# Patient Record
Sex: MALE | Race: WHITE | NOT HISPANIC OR LATINO | Employment: FULL TIME | URBAN - NONMETROPOLITAN AREA
[De-identification: names, ages, dates, MRNs, and addresses within clinical notes are randomized per-mention and may not be internally consistent; named-entity substitution may affect disease eponyms.]

---

## 2024-05-21 ENCOUNTER — HOSPITAL ENCOUNTER (EMERGENCY)
Facility: HOSPITAL | Age: 74
Discharge: HOME | End: 2024-05-21
Admitting: STUDENT IN AN ORGANIZED HEALTH CARE EDUCATION/TRAINING PROGRAM
Payer: MEDICARE

## 2024-05-21 VITALS
RESPIRATION RATE: 15 BRPM | SYSTOLIC BLOOD PRESSURE: 117 MMHG | OXYGEN SATURATION: 100 % | DIASTOLIC BLOOD PRESSURE: 69 MMHG | TEMPERATURE: 98.6 F | HEART RATE: 59 BPM | HEIGHT: 70 IN | BODY MASS INDEX: 30.21 KG/M2 | WEIGHT: 211 LBS

## 2024-05-21 DIAGNOSIS — E86.0 DEHYDRATION: Primary | ICD-10-CM

## 2024-05-21 DIAGNOSIS — R79.89 ELEVATED SERUM CREATININE: ICD-10-CM

## 2024-05-21 DIAGNOSIS — I95.1 ORTHOSTATIC HYPOTENSION: ICD-10-CM

## 2024-05-21 DIAGNOSIS — E87.5 HYPERKALEMIA: ICD-10-CM

## 2024-05-21 LAB
ALBUMIN SERPL-MCNC: 4.2 G/DL (ref 3.5–5.2)
ALBUMIN/GLOB SERPL: 1.2 G/DL
ALP SERPL-CCNC: 82 U/L (ref 39–117)
ALT SERPL W P-5'-P-CCNC: 61 U/L (ref 1–41)
ANION GAP SERPL CALCULATED.3IONS-SCNC: 13 MMOL/L (ref 5–15)
AST SERPL-CCNC: 252 U/L (ref 1–40)
BACTERIA UR QL AUTO: NORMAL /HPF
BASOPHILS # BLD AUTO: 0.02 10*3/MM3 (ref 0–0.2)
BASOPHILS NFR BLD AUTO: 0.1 % (ref 0–1.5)
BILIRUB SERPL-MCNC: 0.9 MG/DL (ref 0–1.2)
BILIRUB UR QL STRIP: ABNORMAL
BUN SERPL-MCNC: 16 MG/DL (ref 8–23)
BUN/CREAT SERPL: 11.9 (ref 7–25)
CALCIUM SPEC-SCNC: 9.2 MG/DL (ref 8.6–10.5)
CHLORIDE SERPL-SCNC: 98 MMOL/L (ref 98–107)
CLARITY UR: CLEAR
CO2 SERPL-SCNC: 23 MMOL/L (ref 22–29)
COLOR UR: ABNORMAL
CREAT SERPL-MCNC: 1.34 MG/DL (ref 0.76–1.27)
DEPRECATED RDW RBC AUTO: 46.8 FL (ref 37–54)
EGFRCR SERPLBLD CKD-EPI 2021: 56.3 ML/MIN/1.73
EOSINOPHIL # BLD AUTO: 0.03 10*3/MM3 (ref 0–0.4)
EOSINOPHIL NFR BLD AUTO: 0.2 % (ref 0.3–6.2)
ERYTHROCYTE [DISTWIDTH] IN BLOOD BY AUTOMATED COUNT: 13.4 % (ref 12.3–15.4)
GLOBULIN UR ELPH-MCNC: 3.5 GM/DL
GLUCOSE SERPL-MCNC: 108 MG/DL (ref 65–99)
GLUCOSE UR STRIP-MCNC: NEGATIVE MG/DL
HCT VFR BLD AUTO: 41.6 % (ref 37.5–51)
HGB BLD-MCNC: 13.3 G/DL (ref 13–17.7)
HGB UR QL STRIP.AUTO: NEGATIVE
HYALINE CASTS UR QL AUTO: NORMAL /LPF
IMM GRANULOCYTES # BLD AUTO: 0.06 10*3/MM3 (ref 0–0.05)
IMM GRANULOCYTES NFR BLD AUTO: 0.4 % (ref 0–0.5)
KETONES UR QL STRIP: ABNORMAL
LEUKOCYTE ESTERASE UR QL STRIP.AUTO: ABNORMAL
LYMPHOCYTES # BLD AUTO: 1.54 10*3/MM3 (ref 0.7–3.1)
LYMPHOCYTES NFR BLD AUTO: 11 % (ref 19.6–45.3)
MAGNESIUM SERPL-MCNC: 2.2 MG/DL (ref 1.6–2.4)
MCH RBC QN AUTO: 30.3 PG (ref 26.6–33)
MCHC RBC AUTO-ENTMCNC: 32 G/DL (ref 31.5–35.7)
MCV RBC AUTO: 94.8 FL (ref 79–97)
MONOCYTES # BLD AUTO: 1.01 10*3/MM3 (ref 0.1–0.9)
MONOCYTES NFR BLD AUTO: 7.2 % (ref 5–12)
NEUTROPHILS NFR BLD AUTO: 11.4 10*3/MM3 (ref 1.7–7)
NEUTROPHILS NFR BLD AUTO: 81.1 % (ref 42.7–76)
NITRITE UR QL STRIP: NEGATIVE
NRBC BLD AUTO-RTO: 0 /100 WBC (ref 0–0.2)
PH UR STRIP.AUTO: 6 [PH] (ref 5–8)
PLATELET # BLD AUTO: 187 10*3/MM3 (ref 140–450)
PMV BLD AUTO: 9.1 FL (ref 6–12)
POTASSIUM SERPL-SCNC: 5.6 MMOL/L (ref 3.5–5.2)
PROT SERPL-MCNC: 7.7 G/DL (ref 6–8.5)
PROT UR QL STRIP: ABNORMAL
RBC # BLD AUTO: 4.39 10*6/MM3 (ref 4.14–5.8)
RBC # UR STRIP: NORMAL /HPF
REF LAB TEST METHOD: NORMAL
SODIUM SERPL-SCNC: 134 MMOL/L (ref 136–145)
SP GR UR STRIP: 1.03 (ref 1–1.03)
SQUAMOUS #/AREA URNS HPF: NORMAL /HPF
TSH SERPL DL<=0.05 MIU/L-ACNC: 0.79 UIU/ML (ref 0.27–4.2)
UROBILINOGEN UR QL STRIP: ABNORMAL
WBC # UR STRIP: NORMAL /HPF
WBC NRBC COR # BLD AUTO: 14.06 10*3/MM3 (ref 3.4–10.8)

## 2024-05-21 PROCEDURE — 81001 URINALYSIS AUTO W/SCOPE: CPT

## 2024-05-21 PROCEDURE — 83735 ASSAY OF MAGNESIUM: CPT

## 2024-05-21 PROCEDURE — 99283 EMERGENCY DEPT VISIT LOW MDM: CPT

## 2024-05-21 PROCEDURE — 84443 ASSAY THYROID STIM HORMONE: CPT

## 2024-05-21 PROCEDURE — 36415 COLL VENOUS BLD VENIPUNCTURE: CPT

## 2024-05-21 PROCEDURE — 85025 COMPLETE CBC W/AUTO DIFF WBC: CPT

## 2024-05-21 PROCEDURE — 80053 COMPREHEN METABOLIC PANEL: CPT

## 2024-05-21 PROCEDURE — 25810000003 SODIUM CHLORIDE 0.9 % SOLUTION

## 2024-05-21 RX ORDER — SODIUM CHLORIDE 0.9 % (FLUSH) 0.9 %
10 SYRINGE (ML) INJECTION AS NEEDED
Status: DISCONTINUED | OUTPATIENT
Start: 2024-05-21 | End: 2024-05-21 | Stop reason: HOSPADM

## 2024-05-21 RX ADMIN — SODIUM CHLORIDE 1000 ML: 9 INJECTION, SOLUTION INTRAVENOUS at 18:57

## 2024-05-21 NOTE — ED PROVIDER NOTES
Subjective   History of Present Illness  Patient is a 74-year-old male who presents emergency department with possible dehydration.  Patient reports that he got too hot yesterday and now feels like he is dehydrated.  Patient states that he was lightheaded yesterday and still feels lightheaded at times whenever he stands up.  He denies any syncopal episodes.  Patient denies chest pain or pain anywhere else.  Denies shortness of breath.        Review of Systems   Neurological:  Positive for light-headedness.   All other systems reviewed and are negative.      History reviewed. No pertinent past medical history.    No Known Allergies    Past Surgical History:   Procedure Laterality Date    BACK SURGERY      HERNIA REPAIR         History reviewed. No pertinent family history.    Social History     Socioeconomic History    Marital status: Single   Tobacco Use    Smoking status: Every Day     Types: Cigarettes   Substance and Sexual Activity    Alcohol use: Not Currently    Drug use: Not Currently    Sexual activity: Yes           Objective   Physical Exam  Vitals and nursing note reviewed.   Constitutional:       General: He is not in acute distress.     Appearance: Normal appearance. He is normal weight. He is not ill-appearing or toxic-appearing.   HENT:      Head: Normocephalic.   Cardiovascular:      Rate and Rhythm: Normal rate and regular rhythm.      Pulses: Normal pulses.      Heart sounds: Normal heart sounds.   Pulmonary:      Effort: Pulmonary effort is normal.      Breath sounds: Normal breath sounds.   Abdominal:      General: Abdomen is flat. Bowel sounds are normal. There is no distension.      Palpations: Abdomen is soft.      Tenderness: There is no abdominal tenderness.   Musculoskeletal:         General: Normal range of motion.      Cervical back: Normal range of motion and neck supple.   Skin:     General: Skin is warm and dry.   Neurological:      General: No focal deficit present.      Mental  Status: He is alert and oriented to person, place, and time. Mental status is at baseline.   Psychiatric:         Mood and Affect: Mood normal.         Behavior: Behavior normal.         Thought Content: Thought content normal.         Judgment: Judgment normal.         Procedures           ED Course                                             Medical Decision Making  Patient is a 74-year-old male who presents emergency department with possible dehydration.  Patient reports that he got too hot yesterday and now feels like he is dehydrated.  Patient states that he was lightheaded yesterday and still feels lightheaded at times whenever he stands up.  He denies any syncopal episodes.  Patient denies chest pain or pain anywhere else.  Denies shortness of breath.    Patient was non-toxic and not-ill appearing on arrival. Vital signs stable.     Patient's presentation raises suspicion for differentials including, but not limited to, dehydration, electrolyte imbalance, COLEMAN, UTI, orthostatic hypotension.     External (non-ED) record review: None    Given this, Vj was placed on the monitor. Laboratory studies were ordered including CBC, CMP, TSH, magnesium, urinalysis.     Vj was given IV fluids for symptomatic relief.    Labs were reviewed.  Leukocytosis on CBC, stable H&H.  Magnesium normal.  TSH normal.  CMP with creatinine 1.34, sodium 134, potassium 5.6, ALT 61, .  Patient denied any symptoms other than feeling lightheaded.  He denied chest pain or abdominal pain.  Patient was orthostatic when she was given IV fluids.  Post IV fluids, patient's orthostatics improved.  He stated that he felt much better and was ready to go home.  On re-evaluation, patient remained hemodynamically stable and appeared to be comfortable and in no acute distress.  He was advised that he will need to follow-up with his primary care provider within this week for repeat labs.  He was also advised to stay well-hydrated at home.    I  discussed all of the lab results with the patient during this visit in the emergency department. I answered all the questions regarding the emergency department evaluation, diagnosis, and treatment plan. We talked about how crucial it is for the patient to follow up by calling their primary care provider as soon as possible to schedule an appointment for within the next few days or as soon as possible so that the symptoms can be reassessed to see if they have improved or to answer any additional questions. I also provided the patient with advice on returning safely and urged the patient to visit the emergency department right away if any worsening or new symptoms appeared. The patient verbalized understanding of the discharge instructions and agreed with them. Vj was discharged in stable condition.    Signed by:   RAMIRO Calloway 5/22/2024 14:40 CDT     Dragon disclaimer:  Part of this note may be an electronic transcription/translation of spoken language to printed text using the Dragon Dictation System.    Problems Addressed:  Dehydration: complicated acute illness or injury  Elevated serum creatinine: complicated acute illness or injury  Hyperkalemia: complicated acute illness or injury  Orthostatic hypotension: complicated acute illness or injury    Amount and/or Complexity of Data Reviewed  Labs: ordered.        Final diagnoses:   Dehydration   Elevated serum creatinine   Hyperkalemia   Orthostatic hypotension       ED Disposition  ED Disposition       ED Disposition   Discharge    Condition   Stable    Comment   --               Provider, No Known  [facility] [id number]  [phone]    Schedule an appointment as soon as possible for a visit in 1 day      [facility] EMERGENCY DEPARTMENT  [address] [place] [id number] [phone]  Go to   If symptoms worsen         Medication List      No changes were made to your prescriptions during this visit.             Eva Krueger, APRN  05/22/24 1445

## 2024-05-21 NOTE — ED NOTES
MEDICAL SCREENING    Reason for Visit: Patient presents with possible dehydration. Patient reports he got too hot and feels like he is dehydrated. Denies pain anywhere. Patient states he is lightheaded whenever he is standing. Denies syncopal episodes. Denies shortness of breath.    Patient initially seen in triage.  The patient was advised further evaluation and diagnostic testing will be needed, some of the treatment and testing will be initiated in the lobby in order to begin the process.  The patient will be returned to the waiting area for the time being and will  be reassessed by a subsequent ED provider.  The patient will be brought back to the treatment area in as timely manner as possible.       Eva Krueger, APRN  05/21/24 2910

## 2024-05-22 NOTE — DISCHARGE INSTRUCTIONS
Today you are seen in the ER for your symptoms.  Your lab work revealed that you were dehydrated.  You were given IV fluids in the ER.  You need to stay well-hydrated at home.  Your blood pressure did drop, but it did improve after the IV fluids.  You will need to follow-up with primary care provider soon as possible to reassess symptoms.  You will need blood work to be redone within the next week to reassess kidney function.  Please return to the ER for any new or worsening symptoms.

## 2024-05-22 NOTE — ED NOTES
PT attempted to void for urinalysis , but was unable to provide sufficient sample. IVF started. Encouraged pt to void asap for sample

## 2024-07-10 ENCOUNTER — OFFICE VISIT (OUTPATIENT)
Dept: CARDIOLOGY | Facility: CLINIC | Age: 74
End: 2024-07-10
Payer: MEDICARE

## 2024-07-10 VITALS
SYSTOLIC BLOOD PRESSURE: 142 MMHG | DIASTOLIC BLOOD PRESSURE: 80 MMHG | WEIGHT: 192 LBS | OXYGEN SATURATION: 98 % | HEIGHT: 70 IN | BODY MASS INDEX: 27.49 KG/M2

## 2024-07-10 DIAGNOSIS — I25.5 ISCHEMIC CARDIOMYOPATHY: ICD-10-CM

## 2024-07-10 DIAGNOSIS — I20.0 UNSTABLE ANGINA: Primary | ICD-10-CM

## 2024-07-10 PROCEDURE — 99204 OFFICE O/P NEW MOD 45 MIN: CPT | Performed by: EMERGENCY MEDICINE

## 2024-07-10 RX ORDER — CARVEDILOL 3.12 MG/1
3.12 TABLET ORAL 2 TIMES DAILY WITH MEALS
Qty: 180 TABLET | Refills: 3 | Status: ON HOLD | OUTPATIENT
Start: 2024-07-10

## 2024-07-10 RX ORDER — PRAVASTATIN SODIUM 40 MG
40 TABLET ORAL DAILY
Qty: 90 TABLET | Refills: 3 | Status: ON HOLD | OUTPATIENT
Start: 2024-07-10

## 2024-07-10 RX ORDER — DIPHENOXYLATE HYDROCHLORIDE AND ATROPINE SULFATE 2.5; .025 MG/1; MG/1
1 TABLET ORAL DAILY
Status: ON HOLD | COMMUNITY

## 2024-07-10 RX ORDER — RANOLAZINE 500 MG/1
500 TABLET, EXTENDED RELEASE ORAL 2 TIMES DAILY
Qty: 180 TABLET | Refills: 3 | Status: ON HOLD | OUTPATIENT
Start: 2024-07-10

## 2024-07-10 RX ORDER — ASPIRIN 81 MG/1
81 TABLET ORAL DAILY
Qty: 90 TABLET | Refills: 3 | Status: ON HOLD | OUTPATIENT
Start: 2024-07-10

## 2024-07-10 RX ORDER — LISINOPRIL 5 MG/1
5 TABLET ORAL DAILY
Qty: 90 TABLET | Refills: 3 | Status: ON HOLD | OUTPATIENT
Start: 2024-07-10

## 2024-07-12 ENCOUNTER — HOSPITAL ENCOUNTER (INPATIENT)
Facility: HOSPITAL | Age: 74
LOS: 4 days | Discharge: HOME | DRG: 287 | End: 2024-07-16
Attending: EMERGENCY MEDICINE | Admitting: EMERGENCY MEDICINE
Payer: MEDICARE

## 2024-07-12 ENCOUNTER — APPOINTMENT (OUTPATIENT)
Dept: CARDIOLOGY | Facility: HOSPITAL | Age: 74
DRG: 287 | End: 2024-07-12
Payer: MEDICARE

## 2024-07-12 DIAGNOSIS — I25.5 ISCHEMIC CARDIOMYOPATHY: ICD-10-CM

## 2024-07-12 DIAGNOSIS — I20.0 UNSTABLE ANGINA: ICD-10-CM

## 2024-07-12 PROBLEM — I25.10 CAD (CORONARY ARTERY DISEASE): Status: ACTIVE | Noted: 2024-07-12

## 2024-07-12 LAB
ALBUMIN SERPL-MCNC: 4 G/DL (ref 3.5–5.2)
ALBUMIN/GLOB SERPL: 1.1 G/DL
ALP SERPL-CCNC: 95 U/L (ref 39–117)
ALT SERPL W P-5'-P-CCNC: 19 U/L (ref 1–41)
ANION GAP SERPL CALCULATED.3IONS-SCNC: 12 MMOL/L (ref 5–15)
ANION GAP SERPL CALCULATED.3IONS-SCNC: 14 MMOL/L (ref 5–15)
APTT PPP: 34 SECONDS (ref 24.5–36)
APTT PPP: 58.9 SECONDS (ref 24.5–36)
ARTERIAL PATENCY WRIST A: POSITIVE
AST SERPL-CCNC: 22 U/L (ref 1–40)
ATMOSPHERIC PRESS: 755 MMHG
BASE EXCESS BLDA CALC-SCNC: -0.3 MMOL/L (ref 0–2)
BDY SITE: ABNORMAL
BILIRUB SERPL-MCNC: 0.8 MG/DL (ref 0–1.2)
BODY TEMPERATURE: 37
BUN SERPL-MCNC: 9 MG/DL (ref 8–23)
BUN SERPL-MCNC: 9 MG/DL (ref 8–23)
BUN/CREAT SERPL: 7.2 (ref 7–25)
BUN/CREAT SERPL: 7.4 (ref 7–25)
CALCIUM SPEC-SCNC: 9.1 MG/DL (ref 8.6–10.5)
CALCIUM SPEC-SCNC: 9.3 MG/DL (ref 8.6–10.5)
CHLORIDE SERPL-SCNC: 101 MMOL/L (ref 98–107)
CHLORIDE SERPL-SCNC: 101 MMOL/L (ref 98–107)
CO2 SERPL-SCNC: 23 MMOL/L (ref 22–29)
CO2 SERPL-SCNC: 25 MMOL/L (ref 22–29)
CREAT SERPL-MCNC: 1.21 MG/DL (ref 0.76–1.27)
CREAT SERPL-MCNC: 1.25 MG/DL (ref 0.76–1.27)
DEPRECATED RDW RBC AUTO: 44.7 FL (ref 37–54)
EGFRCR SERPLBLD CKD-EPI 2021: 61.2 ML/MIN/1.73
EGFRCR SERPLBLD CKD-EPI 2021: 63.6 ML/MIN/1.73
ERYTHROCYTE [DISTWIDTH] IN BLOOD BY AUTOMATED COUNT: 13.6 % (ref 12.3–15.4)
GLOBULIN UR ELPH-MCNC: 3.5 GM/DL
GLUCOSE SERPL-MCNC: 107 MG/DL (ref 65–99)
GLUCOSE SERPL-MCNC: 110 MG/DL (ref 65–99)
HBA1C MFR BLD: 6 % (ref 4.8–5.6)
HCO3 BLDA-SCNC: 23.1 MMOL/L (ref 20–26)
HCT VFR BLD AUTO: 41.5 % (ref 37.5–51)
HGB BLD-MCNC: 13.7 G/DL (ref 13–17.7)
INR PPP: 1.04 (ref 0.91–1.09)
Lab: ABNORMAL
MCH RBC QN AUTO: 29.7 PG (ref 26.6–33)
MCHC RBC AUTO-ENTMCNC: 33 G/DL (ref 31.5–35.7)
MCV RBC AUTO: 89.8 FL (ref 79–97)
MODALITY: ABNORMAL
PCO2 BLDA: 33.3 MM HG (ref 35–45)
PCO2 TEMP ADJ BLD: 33.3 MM HG (ref 35–45)
PH BLDA: 7.45 PH UNITS (ref 7.35–7.45)
PH, TEMP CORRECTED: 7.45 PH UNITS (ref 7.35–7.45)
PLATELET # BLD AUTO: 207 10*3/MM3 (ref 140–450)
PMV BLD AUTO: 9.7 FL (ref 6–12)
PO2 BLDA: 79.2 MM HG (ref 83–108)
PO2 TEMP ADJ BLD: 79.2 MM HG (ref 83–108)
POTASSIUM SERPL-SCNC: 4.5 MMOL/L (ref 3.5–5.2)
POTASSIUM SERPL-SCNC: 4.5 MMOL/L (ref 3.5–5.2)
PROT SERPL-MCNC: 7.5 G/DL (ref 6–8.5)
PROTHROMBIN TIME: 14.1 SECONDS (ref 11.8–14.8)
RBC # BLD AUTO: 4.62 10*6/MM3 (ref 4.14–5.8)
SAO2 % BLDCOA: 97.1 % (ref 94–99)
SODIUM SERPL-SCNC: 138 MMOL/L (ref 136–145)
SODIUM SERPL-SCNC: 138 MMOL/L (ref 136–145)
VENTILATOR MODE: ABNORMAL
WBC NRBC COR # BLD AUTO: 8.13 10*3/MM3 (ref 3.4–10.8)

## 2024-07-12 PROCEDURE — 25010000002 DIPHENHYDRAMINE PER 50 MG: Performed by: EMERGENCY MEDICINE

## 2024-07-12 PROCEDURE — 99152 MOD SED SAME PHYS/QHP 5/>YRS: CPT | Performed by: EMERGENCY MEDICINE

## 2024-07-12 PROCEDURE — 25010000002 FENTANYL CITRATE (PF) 100 MCG/2ML SOLUTION: Performed by: EMERGENCY MEDICINE

## 2024-07-12 PROCEDURE — 4A023N7 MEASUREMENT OF CARDIAC SAMPLING AND PRESSURE, LEFT HEART, PERCUTANEOUS APPROACH: ICD-10-PCS | Performed by: EMERGENCY MEDICINE

## 2024-07-12 PROCEDURE — S0260 H&P FOR SURGERY: HCPCS | Performed by: EMERGENCY MEDICINE

## 2024-07-12 PROCEDURE — 93306 TTE W/DOPPLER COMPLETE: CPT

## 2024-07-12 PROCEDURE — B2111ZZ FLUOROSCOPY OF MULTIPLE CORONARY ARTERIES USING LOW OSMOLAR CONTRAST: ICD-10-PCS | Performed by: EMERGENCY MEDICINE

## 2024-07-12 PROCEDURE — 25010000002 MIDAZOLAM HCL (PF) 5 MG/5ML SOLUTION: Performed by: EMERGENCY MEDICINE

## 2024-07-12 PROCEDURE — 93458 L HRT ARTERY/VENTRICLE ANGIO: CPT | Performed by: EMERGENCY MEDICINE

## 2024-07-12 PROCEDURE — 36600 WITHDRAWAL OF ARTERIAL BLOOD: CPT

## 2024-07-12 PROCEDURE — 25010000002 HEPARIN (PORCINE) PER 1000 UNITS: Performed by: EMERGENCY MEDICINE

## 2024-07-12 PROCEDURE — C1894 INTRO/SHEATH, NON-LASER: HCPCS | Performed by: EMERGENCY MEDICINE

## 2024-07-12 PROCEDURE — 83036 HEMOGLOBIN GLYCOSYLATED A1C: CPT

## 2024-07-12 PROCEDURE — 25010000002 HEPARIN (PORCINE) 1000-0.9 UT/500ML-% SOLUTION: Performed by: EMERGENCY MEDICINE

## 2024-07-12 PROCEDURE — 99223 1ST HOSP IP/OBS HIGH 75: CPT | Performed by: SURGERY

## 2024-07-12 PROCEDURE — 80053 COMPREHEN METABOLIC PANEL: CPT | Performed by: EMERGENCY MEDICINE

## 2024-07-12 PROCEDURE — 25010000002 HEPARIN (PORCINE) 25000-0.45 UT/250ML-% SOLUTION: Performed by: EMERGENCY MEDICINE

## 2024-07-12 PROCEDURE — 93306 TTE W/DOPPLER COMPLETE: CPT | Performed by: EMERGENCY MEDICINE

## 2024-07-12 PROCEDURE — 25510000001 PERFLUTREN 6.52 MG/ML SUSPENSION: Performed by: EMERGENCY MEDICINE

## 2024-07-12 PROCEDURE — 85610 PROTHROMBIN TIME: CPT | Performed by: EMERGENCY MEDICINE

## 2024-07-12 PROCEDURE — 82803 BLOOD GASES ANY COMBINATION: CPT

## 2024-07-12 PROCEDURE — 25510000001 IOPAMIDOL PER 1 ML: Performed by: EMERGENCY MEDICINE

## 2024-07-12 PROCEDURE — 85027 COMPLETE CBC AUTOMATED: CPT | Performed by: EMERGENCY MEDICINE

## 2024-07-12 PROCEDURE — C1769 GUIDE WIRE: HCPCS | Performed by: EMERGENCY MEDICINE

## 2024-07-12 PROCEDURE — 85730 THROMBOPLASTIN TIME PARTIAL: CPT | Performed by: EMERGENCY MEDICINE

## 2024-07-12 PROCEDURE — B2151ZZ FLUOROSCOPY OF LEFT HEART USING LOW OSMOLAR CONTRAST: ICD-10-PCS | Performed by: EMERGENCY MEDICINE

## 2024-07-12 PROCEDURE — 25010000002 HEPARIN (PORCINE) 2000-0.9 UNIT/L-% SOLUTION: Performed by: EMERGENCY MEDICINE

## 2024-07-12 RX ORDER — ACETAMINOPHEN 325 MG/1
650 TABLET ORAL EVERY 4 HOURS PRN
Status: DISCONTINUED | OUTPATIENT
Start: 2024-07-12 | End: 2024-07-16 | Stop reason: HOSPADM

## 2024-07-12 RX ORDER — RANOLAZINE 500 MG/1
500 TABLET, EXTENDED RELEASE ORAL 2 TIMES DAILY
Status: DISCONTINUED | OUTPATIENT
Start: 2024-07-12 | End: 2024-07-16 | Stop reason: HOSPADM

## 2024-07-12 RX ORDER — ASPIRIN 81 MG/1
81 TABLET ORAL DAILY
Status: DISCONTINUED | OUTPATIENT
Start: 2024-07-12 | End: 2024-07-16 | Stop reason: HOSPADM

## 2024-07-12 RX ORDER — SODIUM CHLORIDE 0.9 % (FLUSH) 0.9 %
10 SYRINGE (ML) INJECTION AS NEEDED
Status: DISCONTINUED | OUTPATIENT
Start: 2024-07-12 | End: 2024-07-16 | Stop reason: HOSPADM

## 2024-07-12 RX ORDER — FUROSEMIDE 10 MG/ML
40 INJECTION INTRAMUSCULAR; INTRAVENOUS
Status: DISCONTINUED | OUTPATIENT
Start: 2024-07-13 | End: 2024-07-14

## 2024-07-12 RX ORDER — MIDAZOLAM HYDROCHLORIDE 5 MG/5ML
INJECTION, SOLUTION INTRAMUSCULAR; INTRAVENOUS
Status: DISCONTINUED | OUTPATIENT
Start: 2024-07-12 | End: 2024-07-12 | Stop reason: HOSPADM

## 2024-07-12 RX ORDER — DIPHENHYDRAMINE HYDROCHLORIDE 50 MG/ML
INJECTION INTRAMUSCULAR; INTRAVENOUS
Status: DISCONTINUED | OUTPATIENT
Start: 2024-07-12 | End: 2024-07-12 | Stop reason: HOSPADM

## 2024-07-12 RX ORDER — ONDANSETRON 2 MG/ML
4 INJECTION INTRAMUSCULAR; INTRAVENOUS EVERY 6 HOURS PRN
Status: DISCONTINUED | OUTPATIENT
Start: 2024-07-12 | End: 2024-07-16 | Stop reason: HOSPADM

## 2024-07-12 RX ORDER — NITROGLYCERIN 0.4 MG/1
0.4 TABLET SUBLINGUAL
Status: DISCONTINUED | OUTPATIENT
Start: 2024-07-12 | End: 2024-07-16 | Stop reason: HOSPADM

## 2024-07-12 RX ORDER — HEPARIN SODIUM 1000 [USP'U]/ML
2500 INJECTION, SOLUTION INTRAVENOUS; SUBCUTANEOUS AS NEEDED
Status: DISCONTINUED | OUTPATIENT
Start: 2024-07-12 | End: 2024-07-16 | Stop reason: HOSPADM

## 2024-07-12 RX ORDER — HEPARIN SODIUM 1000 [USP'U]/ML
5000 INJECTION, SOLUTION INTRAVENOUS; SUBCUTANEOUS AS NEEDED
Status: DISCONTINUED | OUTPATIENT
Start: 2024-07-12 | End: 2024-07-16 | Stop reason: HOSPADM

## 2024-07-12 RX ORDER — SODIUM CHLORIDE 9 MG/ML
100 INJECTION, SOLUTION INTRAVENOUS CONTINUOUS
Status: DISCONTINUED | OUTPATIENT
Start: 2024-07-12 | End: 2024-07-12

## 2024-07-12 RX ORDER — SODIUM CHLORIDE 0.9 % (FLUSH) 0.9 %
10 SYRINGE (ML) INJECTION EVERY 12 HOURS SCHEDULED
Status: DISCONTINUED | OUTPATIENT
Start: 2024-07-12 | End: 2024-07-16 | Stop reason: HOSPADM

## 2024-07-12 RX ORDER — LISINOPRIL 5 MG/1
5 TABLET ORAL DAILY
Status: DISCONTINUED | OUTPATIENT
Start: 2024-07-12 | End: 2024-07-16 | Stop reason: HOSPADM

## 2024-07-12 RX ORDER — VERAPAMIL HYDROCHLORIDE 2.5 MG/ML
INJECTION, SOLUTION INTRAVENOUS
Status: DISCONTINUED | OUTPATIENT
Start: 2024-07-12 | End: 2024-07-12 | Stop reason: HOSPADM

## 2024-07-12 RX ORDER — SODIUM CHLORIDE 9 MG/ML
1 INJECTION, SOLUTION INTRAVENOUS CONTINUOUS
Status: DISPENSED | OUTPATIENT
Start: 2024-07-12 | End: 2024-07-12

## 2024-07-12 RX ORDER — ONDANSETRON 4 MG/1
4 TABLET, ORALLY DISINTEGRATING ORAL EVERY 6 HOURS PRN
Status: DISCONTINUED | OUTPATIENT
Start: 2024-07-12 | End: 2024-07-16 | Stop reason: HOSPADM

## 2024-07-12 RX ORDER — HEPARIN SODIUM 200 [USP'U]/100ML
INJECTION, SOLUTION INTRAVENOUS
Status: DISCONTINUED | OUTPATIENT
Start: 2024-07-12 | End: 2024-07-12 | Stop reason: HOSPADM

## 2024-07-12 RX ORDER — HEPARIN SODIUM 1000 [USP'U]/ML
5000 INJECTION, SOLUTION INTRAVENOUS; SUBCUTANEOUS ONCE
Status: COMPLETED | OUTPATIENT
Start: 2024-07-12 | End: 2024-07-12

## 2024-07-12 RX ORDER — HEPARIN SODIUM 10000 [USP'U]/100ML
11.1 INJECTION, SOLUTION INTRAVENOUS
Status: DISPENSED | OUTPATIENT
Start: 2024-07-12 | End: 2024-07-14

## 2024-07-12 RX ORDER — CARVEDILOL 3.12 MG/1
3.12 TABLET ORAL 2 TIMES DAILY WITH MEALS
Status: DISCONTINUED | OUTPATIENT
Start: 2024-07-12 | End: 2024-07-16 | Stop reason: HOSPADM

## 2024-07-12 RX ORDER — LIDOCAINE HYDROCHLORIDE 20 MG/ML
INJECTION, SOLUTION INFILTRATION; PERINEURAL
Status: DISCONTINUED | OUTPATIENT
Start: 2024-07-12 | End: 2024-07-12 | Stop reason: HOSPADM

## 2024-07-12 RX ORDER — HEPARIN SODIUM 1000 [USP'U]/ML
INJECTION, SOLUTION INTRAVENOUS; SUBCUTANEOUS
Status: DISCONTINUED | OUTPATIENT
Start: 2024-07-12 | End: 2024-07-12 | Stop reason: HOSPADM

## 2024-07-12 RX ORDER — NITROGLYCERIN 0.4 MG/1
0.4 TABLET SUBLINGUAL
Status: DISCONTINUED | OUTPATIENT
Start: 2024-07-12 | End: 2024-07-12 | Stop reason: SDUPTHER

## 2024-07-12 RX ORDER — FENTANYL CITRATE 50 UG/ML
INJECTION, SOLUTION INTRAMUSCULAR; INTRAVENOUS
Status: DISCONTINUED | OUTPATIENT
Start: 2024-07-12 | End: 2024-07-12 | Stop reason: HOSPADM

## 2024-07-12 RX ORDER — NITROGLYCERIN 20 MG/100ML
10-50 INJECTION INTRAVENOUS
Status: DISCONTINUED | OUTPATIENT
Start: 2024-07-12 | End: 2024-07-16 | Stop reason: HOSPADM

## 2024-07-12 RX ADMIN — RANOLAZINE 500 MG: 500 TABLET, FILM COATED, EXTENDED RELEASE ORAL at 20:40

## 2024-07-12 RX ADMIN — ASPIRIN 81 MG: 81 TABLET, COATED ORAL at 17:37

## 2024-07-12 RX ADMIN — HEPARIN SODIUM 11.1 UNITS/KG/HR: 10000 INJECTION, SOLUTION INTRAVENOUS at 17:41

## 2024-07-12 RX ADMIN — LISINOPRIL 5 MG: 5 TABLET ORAL at 17:37

## 2024-07-12 RX ADMIN — HEPARIN SODIUM 5000 UNITS: 1000 INJECTION, SOLUTION INTRAVENOUS; SUBCUTANEOUS at 17:39

## 2024-07-12 RX ADMIN — PERFLUTREN 9.78 MG: 6.52 INJECTION, SUSPENSION INTRAVENOUS at 15:35

## 2024-07-12 RX ADMIN — CARVEDILOL 3.12 MG: 3.12 TABLET, FILM COATED ORAL at 17:37

## 2024-07-12 RX ADMIN — Medication 10 ML: at 20:40

## 2024-07-12 NOTE — Clinical Note
Hemostasis started on the right radial vein. R-Band was used in achieving hemostasis. Radial compression device applied to vessel. Hemostasis achieved successfully.

## 2024-07-12 NOTE — OP NOTE
[facility]        Date of procedure: 7/12/2024     Procedures performed:     1.  Access to the right radial artery via modified Seldinger technique  2.  Left heart catheterization with retrograde crossing aortic valve to the left ventricle where pressures were recorded  3.  LV ventriculography  4.  Selective bilateral coronary angiography  5.  Conscious sedation with continuous hemodynamic monitoring for 20 minutes  6.  Patent hemostasis achieved in the right radial artery access site using a TR band        Risk, Benefits, and Alternatives discussed with the patient and/or family.  Plan is for moderate sedation, and the patient agrees to proceed with the procedure.  An immediate assessment was done prior to the administration of moderate sedation        Indication: Unstable angina, recent transthoracic echocardiogram concerning for inferior MI, ischemic cardiomyopathy highly suspected  Premedication: Versed, Fentanyl  Contrast: Isovue 132 cc   radiation: Flouro time= 3.8 minutes. Air Kerma= 403 mGy  Catheters: 5F TIG, pigtail      Procedural details:      The patient was brought to the cath lab and prepped and draped in the usual fashion.  Access was obtained in the right radial artery via modified Seldinger technique.  A 6 Telugu sheath was placed into the artery and flushed.  Next, a TIG catheter was inserted and used to engage both the left and right coronary arteries and selective bilateral coronary angiography was performed in multiple views.  Next, pigtail catheter was inserted and used to cross aortic valve the left ventricle pressure recorded LV ventriculography was performed.  This cath was then pulled back cross aortic valve and again pressures were recorded.  Everything was then withdrawn through the sheath and the sheath was flushed.  Patent hemostasis was achieved in the right radial artery access site using a TR band.  Patient tolerated the procedure well without any  complications.  He left the Cath Lab in stable condition.      I supervised the administration of conscious sedation by nursing staff throughout the case.  First dose was given at 1336 and the end of my face-to-face encounter was at 1355, accounting for a total of 20 minutes of supervision.  During the case, continuous pulse oximetry, heart rate, blood pressure, and patient status were monitored.     Findings:    Hemodynamics:    Aorta: 125/80 mmHg  LV: 153/17 mmHg  LVEDP: 47 mmHg    Left ventriculography:  1. The contractility of the left ventricle is moderately reduced.  Estimated ejection fraction 36-40%.  Anterior and apical walls appear to be functioning normal.  2.  There is a large sized inferior basal aneurysm with a wide neck consistent with a true aneurysm  3.  Moderate mitral regurgitation present  4.  No significant gradient across aortic valve on pullback    Selective coronary angiography:     Left Main: Large-caliber vessel with mild disease in the distal segment prior to the bifurcation, FLORENCIO-3 flow    LAD: Proximal segment is a large-caliber with mild disease , the mid segment becomes moderate caliber and has 50 to 60% stenosis, distal vessel has 30 to 40% stenosis, FLORENCIO-3 flow    Diagonals: First diagonal is moderate caliber with mild diffuse disease, the second diagonal is also moderate caliber with mild disease and bifurcates into a superior and inferior segment    Left circumflex: Large-caliber vessel with mild disease in the proximal segment, the mid segment has a 60 to 70% stenosis prior to the origin of the second obtuse marginal, the distal segment has a 70 to 80% stenosis, FLORENCIO-3 flow distally    Obtuse marginals: First OM is small caliber, the second OM is also small caliber    RCA: Large-caliber vessel with mild disease in the proximal segment and mid segment, the mid/distal segment has 100% occlusion with FLORENCIO 0 flow distally.  Distal vessel fills retrograde via left to right  collaterals    PDA/VERO: Poorly visualized but appear to be small caliber vessels    Estimated Blood Loss: 20 cc    Specimens: None    Complications: None     Impression:    1.  Multivessel coronary artery disease as described above including significant lesions in the LAD, left circumflex, and right coronary arteries  2.  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  At least moderate mitral regurgitation  4.  Ischemic cardiomyopathy with an ejection fraction moderately reduced in the 36 to 40% range  5.  Significantly elevated left ventricular end-diastolic pressure     Plan:     1.  TR band off in 2 hours, we will admit the patient to the hospital to undergo close monitoring and preoperative workup  2.  Stat transthoracic echocardiogram  3.  Preop workup including CT chest, vein mapping, carotids  4.  Aspirin 81 and Lipitor 40  5.  Will start on heparin drip until a LV thrombus can be ruled out  6.  Continue on home dose Coreg, lisinopril and Ranexa therapies  7.  Will start on Lasix 40 IV twice daily tomorrow morning for diuresis due to the significant elevated end-diastolic pressure  8.  Cardiothoracic surgery, Dr. Maciel, consult to evaluate for best time to proceed with coronary artery bypass grafting and possible aneurysmectomy        Brennan Khoury, DO  Interventional cardiology  [facility]  July 12, 2024

## 2024-07-12 NOTE — Clinical Note
Tr band to right wrist with 13 ml air no hematoma or bleeding noted, wasted 3mg versed and 50mcg of fentanyl with enrrique head

## 2024-07-12 NOTE — H&P
Patient seen and examined at bedside.  The history and physical have not changed as below.    We have been performing diagnostic left heart catheterization with possible intervention based on findings.    Risk, benefits and alternatives were explained to patient he wished to proceed.      Expand All Collapse All         [facility] - CARDIOLOGY  New Patient Initial Outpatient Evaulation     Primary Care Physician: Stephane Pedro DO     Subjective          Chief Complaint   Patient presents with    ABNORMAL ECHO         History of Present Illness  History of Present Illness  The patient is a 74-year-old male who presents for evaluation of multiple medical concerns.     The patient, a farmer, experienced an episode of dizziness approximately 5 weeks ago, which he initially attributed to a heat stroke. He recalls an incident where he experienced profuse sweating after exiting a tractor to retrieve mud. He was unable to rise from the ground and had to brace himself against the tire. Upon regaining consciousness, he experienced dizziness and near syncope. He sought medical attention at a walk-in clinic the following morning, where he was advised to seek emergency medical attention. Upon arrival at the emergency room, he was diagnosed with dehydration and received intravenous fluids. He was subsequently referred to Dr. Pedro for an echocardiogram in [facility]. His blood work was conducted, and he was diagnosed with anemia. He was prescribed iron pills, which he took for a week, but discontinued due to constipation. He denies any history of myocardial infarction. He denies experiencing chest tightness or pressure on the day of the episode. He reports mild arm pain. He began experiencing chest pressure 3 days ago, which prompted him to seek emergency care. He has been experiencing dizziness for the past week, which lasts for approximately 10 minutes. He also reports watery eyes, blurred vision, and sweating.   He  has been a smoker for 50 years.   His mother had a heart attack in her 80s. His father  of cancer at 57.     Review of Systems   Constitutional: Positive for diaphoresis and malaise/fatigue. Negative for fever.   HENT:  Negative for congestion.    Eyes:  Negative for vision loss in left eye and vision loss in right eye.   Cardiovascular:  Positive for chest pain, dyspnea on exertion and near-syncope. Negative for claudication, irregular heartbeat, leg swelling, orthopnea, palpitations and syncope.   Respiratory:  Positive for shortness of breath. Negative for cough and wheezing.    Hematologic/Lymphatic: Negative for adenopathy.   Skin:  Negative for rash.   Musculoskeletal:  Negative for joint pain and joint swelling.   Gastrointestinal:  Negative for abdominal pain, diarrhea, nausea and vomiting.   Neurological:  Positive for dizziness. Negative for excessive daytime sleepiness, focal weakness, light-headedness, numbness and weakness.   Psychiatric/Behavioral:  Negative for depression. The patient does not have insomnia.          Otherwise complete ROS reviewed and negative except as mentioned in the HPI.        Past Medical History:   Medical History   History reviewed. No pertinent past medical history.        Past Surgical History:  Surgical History         Past Surgical History:   Procedure Laterality Date    BACK SURGERY        HERNIA REPAIR                Family History: family history includes Heart disease in his mother.     Social History:  reports that he has been smoking cigarettes. He does not have any smokeless tobacco history on file. He reports that he does not currently use alcohol. He reports that he does not currently use drugs.     Medications:          Prior to Admission medications    Medication Sig Start Date End Date Taking? Authorizing Provider   multivitamin (MULTI VITAMIN DAILY PO) Daily.     Yes Provider, MD Regulo   aspirin 81 MG EC tablet Take 1 tablet by mouth Daily. 7/10/24   "   Brennan Khoury,    carvedilol (COREG) 3.125 MG tablet Take 1 tablet by mouth 2 (Two) Times a Day With Meals. 7/10/24     Brennan Khoury DO   lisinopril (PRINIVIL,ZESTRIL) 5 MG tablet Take 1 tablet by mouth Daily. 7/10/24     Brennan Khoury DO   pravastatin (Pravachol) 40 MG tablet Take 1 tablet by mouth Daily. 7/10/24     Brennan Khoury DO   ranolazine (Ranexa) 500 MG 12 hr tablet Take 1 tablet by mouth 2 (Two) Times a Day. 7/10/24     Brennan Khoury DO      Allergies:  Allergies   No Known Allergies        Objective      Vital Signs: /80   Ht 177.8 cm (70\")   Wt 87.1 kg (192 lb)   SpO2 98%   BMI 27.55 kg/m²      Vitals and nursing note reviewed.   Constitutional:       Appearance: Normal and healthy appearance. Well-developed and not in distress.   Eyes:      Extraocular Movements: Extraocular movements intact.      Pupils: Pupils are equal, round, and reactive to light.   HENT:      Head: Normocephalic and atraumatic.    Mouth/Throat:      Pharynx: Oropharynx is clear.   Neck:      Vascular: JVD normal.      Trachea: Trachea normal.   Pulmonary:      Effort: Pulmonary effort is normal.      Breath sounds: Normal breath sounds. No wheezing. No rhonchi. No rales.   Cardiovascular:      PMI at left midclavicular line. Normal rate. Regular rhythm. Normal S1. Normal S2.       Murmurs: There is a grade 2/6 systolic murmur.      No gallop.  No click. No rub.   Pulses:     Dorsalis pedis: 2+ bilaterally.     Posterior tibial: 2+ bilaterally.  Abdominal:      General: Bowel sounds are normal.      Palpations: Abdomen is soft.      Tenderness: There is no abdominal tenderness.   Musculoskeletal: Normal range of motion.      Cervical back: Normal range of motion and neck supple. Skin:     General: Skin is warm and dry.      Capillary Refill: Capillary refill takes less than 2 seconds.   Feet:      Right foot:      Skin integrity: Skin integrity normal. " "     Left foot:      Skin integrity: Skin integrity normal.   Neurological:      Mental Status: Alert and oriented to person, place and time.      Sensory: Sensation is intact.      Motor: Motor function is intact.      Coordination: Coordination is intact.   Psychiatric:         Speech: Speech normal.         Behavior: Behavior is cooperative.         Physical Exam  Vital Signs  Vitals show a blood pressure of 142/80.     Results Reviewed:     Procedures     Results  Laboratory Studies  BNP level is elevated.     Imaging  Echocardiogram shows left ventricular chamber size is normal, thickness is normal, no hypertrophy. The basal inferior wall is aneurysmal. The neck is large, which is consistent with a true left ventricular aneurysm. Inferior wall is akinetic. Posterior wall is severely hypokinetic. Septum is hypokinetic. Ejection fraction 40%. Right ventricle normal size, normal function. Left atrium and right atrium are okay. Aortic valve, mild regurgitation, no stenosis. Mitral valve, moderate to severe regurgitation, tricuspid valve, mild pulmonary hypertension.     No results found for: \"CHOL\", \"TRIG\", \"HDL\", \"VLDL\", \"LDLHDL\"  No results found for: \"HGBA1C\"     Assessment / Plan         Problem List Items Addressed This Visit         Unstable angina - Primary     Relevant Medications     carvedilol (COREG) 3.125 MG tablet     ranolazine (Ranexa) 500 MG 12 hr tablet     Other Relevant Orders     Case Request Cath Lab: Left Heart Cath (Completed)     Adult Transthoracic Echo Complete W/ Cont if Necessary Per Protocol     Ischemic cardiomyopathy     Relevant Medications     carvedilol (COREG) 3.125 MG tablet     ranolazine (Ranexa) 500 MG 12 hr tablet     Other Relevant Orders     Case Request Cath Lab: Left Heart Cath (Completed)     Adult Transthoracic Echo Complete W/ Cont if Necessary Per Protocol      Assessment & Plan  1. Suspected myocardial infarction.  The patient's symptoms, including dizziness, " lightheadedness, and weakness, suggest a possible myocardial infarction. His BNP level is elevated, indicating a weakened heart. A heart catheterization has been scheduled for Friday. The patient has been advised to resume daily baby aspirin and multivitamins. Pravachol 40 has been prescribed, along with Coreg, lisinopril, and Ranexa.        Transcribed from ambient dictation for Brennan Khoury DO by Brennan Khoury DO.  07/12/24   12:37 CDT     Patient or patient representative verbalized consent for the use of Ambient Listening during the visit with  Brennan Khoury DO for chart documentation. 7/12/2024  12:38 CDT

## 2024-07-12 NOTE — CONSULTS
Referring Provider: Dr. Brennan Khoury  Reason for Consultation: LV aneurysm, coronary artery disease    Patient Care Team:  Stephane Pedro DO as PCP - General (Family Medicine)    Chief complaint: fatigue, shortness of breath     Subjective      History of present illness: Mr. Garcia is a 74-year-old male who presented to the hospital today for cardiac catheterization by Dr. Khoury for further evaluation of suspected myocardial infarction with LV aneurysm.  Approximately 5 weeks ago he had an episode of dizziness which he initially thought was heatstroke.  He describes an incident of diaphoresis, weakness, dizziness and syncope.  He was evaluated at a walk-in clinic the following morning and advised to seek emergency medical evaluation.  Ultimately, he was diagnosed with dehydration and given IV fluids.  He followed up with Dr. Pedro's office.  Transthoracic echocardiogram was obtained which shows an aneurysmal basal inferior wall with a large neck consistent with a true LV aneurysm.  The inferior wall is akinetic.  The posterior wall is severely hypokinetic as well as the septum.  Estimated EF is 40 to 45% and there is moderate to severe mitral valve regurgitation (posterior mitral leaflet restriction).  Mild pulmonary hypertension.  Mild aortic valve regurgitation.  With that, decision making made to proceed with cardiac catheterization.  This was performed today by Dr. Khoury and shows multivessel coronary artery disease including the LAD, left circumflex, and right coronary arteries.  Large sized inferior basal LV aneurysm with at least moderate mitral valve regurgitation, and ischemic cardiomyopathy with EF 36 to 40%.  LVEDP elevated (47 mmHg).  Cardiothoracic surgery services have been consulted for consideration of coronary artery bypass grafting, possible aneurysmectomy and mitral valve treatment.    He has history of chronic tobacco use (1 ppd x 50 years), BPH (untreated) and history of neck  surgery x 3 and umbilical hernia repair. Edentulous (has full set dentures). 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  Wife at bedside.  Hemoglobin A1c 6%.    He is seen in the COU with echo tech at bedside.     History  Code Status and Medical Interventions:   Ordered at: 07/12/24 1451     Level Of Support Discussed With:    Patient     Code Status (Patient has no pulse and is not breathing):    CPR (Attempt to Resuscitate)     Medical Interventions (Patient has pulse or is breathing):    Full Support     History reviewed. No pertinent past medical history.,   Past Surgical History:   Procedure Laterality Date    BACK SURGERY      HERNIA REPAIR     ,   Family History   Problem Relation Age of Onset    Heart disease Mother    ,   Social History     Tobacco Use    Smoking status: Every Day     Types: Cigarettes   Vaping Use    Vaping status: Never Used   Substance Use Topics    Alcohol use: Not Currently    Drug use: Not Currently   ,   Medications Prior to Admission   Medication Sig Dispense Refill Last Dose    aspirin 81 MG EC tablet Take 1 tablet by mouth Daily. 90 tablet 3 7/11/2024    carvedilol (COREG) 3.125 MG tablet Take 1 tablet by mouth 2 (Two) Times a Day With Meals. 180 tablet 3 7/12/2024    lisinopril (PRINIVIL,ZESTRIL) 5 MG tablet Take 1 tablet by mouth Daily. 90 tablet 3 7/11/2024    multivitamin (MULTI VITAMIN DAILY PO) Daily.   7/11/2024    pravastatin (Pravachol) 40 MG tablet Take 1 tablet by mouth Daily. 90 tablet 3 7/11/2024    ranolazine (Ranexa) 500 MG 12 hr tablet Take 1 tablet by mouth 2 (Two) Times a Day. 180 tablet 3 7/12/2024   , Allergies: Patient has no known allergies.    Review of Systems  Review of Systems   Constitutional:  Positive for activity change, diaphoresis and fatigue.   HENT:  Negative for trouble swallowing and voice change.    Respiratory:  Positive for shortness of breath. Negative for wheezing.    Cardiovascular:  Negative for chest pain, palpitations and leg swelling.  "  Genitourinary:  Positive for difficulty urinating.   Musculoskeletal:  Positive for arthralgias. Negative for back pain and gait problem.   Neurological:  Positive for dizziness.   Psychiatric/Behavioral:  Negative for agitation and behavioral problems.         Objective     Vital Signs   Visit Vitals  /91   Pulse 66   Temp 98 °F (36.7 °C) (Temporal)   Resp 27   Ht 175.3 cm (69\")   Wt 90 kg (198 lb 6.4 oz)   SpO2 94%   BMI 29.30 kg/m²       Physical Exam  Vitals reviewed.   Constitutional:       General: He is not in acute distress.     Appearance: He is well-developed. He is not diaphoretic.   HENT:      Head: Normocephalic and atraumatic.   Eyes:      Pupils: Pupils are equal, round, and reactive to light.   Cardiovascular:      Rate and Rhythm: Normal rate and regular rhythm.      Heart sounds: Murmur heard.      No friction rub.   Pulmonary:      Effort: Pulmonary effort is normal. No respiratory distress.      Breath sounds: Normal breath sounds. No wheezing or rales.   Abdominal:      General: There is no distension.      Palpations: Abdomen is soft.      Tenderness: There is no abdominal tenderness. There is no guarding.   Musculoskeletal:      Cervical back: Normal range of motion and neck supple.      Right lower leg: No edema.      Left lower leg: No edema.   Skin:     General: Skin is warm and dry.      Coloration: Skin is not pale.      Findings: No rash.   Neurological:      Mental Status: He is alert and oriented to person, place, and time.   Psychiatric:         Behavior: Behavior normal.           LAB:   CBC:  Results from last 7 days   Lab Units 07/12/24  1127   WBC 10*3/mm3 8.13   HEMATOCRIT % 41.5   PLATELETS 10*3/mm3 207          BMP:)  Results from last 7 days   Lab Units 07/12/24  1127   SODIUM mmol/L 138  138   POTASSIUM mmol/L 4.5  4.5   CHLORIDE mmol/L 101  101   CO2 mmol/L 23.0  25.0   GLUCOSE mg/dL 107*  110*   BUN mg/dL 9  9   CREATININE mg/dL 1.21  1.25           " COAG:  Results from last 7 days   Lab Units 07/12/24  1127   INR  1.04           IMAGES:       Imaging Results (Last 24 Hours)       ** No results found for the last 24 hours. **          Cardiac catheterization 7/12/2024 by Dr. Khoury  Findings:    Hemodynamics:   Aorta: 125/80 mmHg  LV: 153/17 mmHg  LVEDP: 47 mmHg    Left ventriculography:  1. The contractility of the left ventricle is moderately reduced. Estimated ejection fraction 36-40%. Anterior and apical walls appear to be functioning normal.  2. There is a large sized inferior basal aneurysm with a wide neck consistent with a true aneurysm  3. Moderate mitral regurgitation present  4. No significant gradient across aortic valve on pullback    Selective coronary angiography:     Left Main: Large-caliber vessel with mild disease in the distal segment prior to the bifurcation, FLORENCIO-3 flow    LAD: Proximal segment is a large-caliber with mild disease , the mid segment becomes moderate caliber and has 50 to 60% stenosis, distal vessel has 30 to 40% stenosis, FLORENCIO-3 flow    Diagonals: First diagonal is moderate caliber with mild diffuse disease, the second diagonal is also moderate caliber with mild disease and bifurcates into a superior and inferior segment    Left circumflex: Large-caliber vessel with mild disease in the proximal segment, the mid segment has a 60 to 70% stenosis prior to the origin of the second obtuse marginal, the distal segment has a 70 to 80% stenosis, FLORENCIO-3 flow distally    Obtuse marginals: First OM is small caliber, the second OM is also small caliber    RCA: Large-caliber vessel with mild disease in the proximal segment and mid segment, the mid/distal segment has 100% occlusion with FLORENCIO 0 flow distally. Distal vessel fills retrograde via left to right collaterals    PDA/VERO: Poorly visualized but appear to be small caliber vessels    Estimated Blood Loss: 20 cc    Specimens: None    Complications: None    Impression:    1.  Multivessel coronary artery disease as described above including significant lesions in the LAD, left circumflex, and right coronary arteries  2. 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 At least moderate mitral regurgitation  4. Ischemic cardiomyopathy with an ejection fraction moderately reduced in the 36 to 40% range  5. Significantly elevated left ventricular end-diastolic pressure              Assessment & Plan        Multivessel coronary artery disease  LV aneurysm  Mitral valve regurgitation    CAD (coronary artery disease)    Unstable angina    Ischemic cardiomyopathy  Chronic tobacco use  Recent myocardial infarction      I discussed the patient's findings and my recommendations with patient and significant other. We discussed the options for treatment of coronary artery disease to include medical therapy, coronary stenting, and surgical revascularization.  We discussed the pros and cons of each option and how it pertains to the current case. Discussed finding of LV aneurysm with now mitral valve regurgitation. Preoperative testing will begin. The STS Risk score will be calculated using the STS Risk Calculator and discussed with the patient and family.  We discussed the operative conduct and expected hospital and outpatient recovery.  Risks were discussed to include but not limited to bleeding, infection, stroke, heart attack, need for additional procedures, anesthesia risk, organ dysfunction and/or death, prolonged mechanical ventilation, prolonged ICU stay, chronic pain syndromes, sternal nonunion, and/or death.  We discussed the need to utilize all medical treatments additionally prescribed post surgery.         Discussed smoking cessation.     Tsoha Bradshaw, APRN  07/12/24  16:00 CDT

## 2024-07-12 NOTE — PROGRESS NOTES
[facility] - CARDIOLOGY  New Patient Initial Outpatient Evaulation    Primary Care Physician: Stephane Pedro,     Subjective     Chief Complaint   Patient presents with    ABNORMAL ECHO        History of Present Illness  History of Present Illness  The patient is a 74-year-old male who presents for evaluation of multiple medical concerns.    The patient, a farmer, experienced an episode of dizziness approximately 5 weeks ago, which he initially attributed to a heat stroke. He recalls an incident where he experienced profuse sweating after exiting a tractor to retrieve mud. He was unable to rise from the ground and had to brace himself against the tire. Upon regaining consciousness, he experienced dizziness and near syncope. He sought medical attention at a walk-in clinic the following morning, where he was advised to seek emergency medical attention. Upon arrival at the emergency room, he was diagnosed with dehydration and received intravenous fluids. He was subsequently referred to Dr. Pedro for an echocardiogram in [facility]. His blood work was conducted, and he was diagnosed with anemia. He was prescribed iron pills, which he took for a week, but discontinued due to constipation. He denies any history of myocardial infarction. He denies experiencing chest tightness or pressure on the day of the episode. He reports mild arm pain. He began experiencing chest pressure 3 days ago, which prompted him to seek emergency care. He has been experiencing dizziness for the past week, which lasts for approximately 10 minutes. He also reports watery eyes, blurred vision, and sweating.   He has been a smoker for 50 years.   His mother had a heart attack in her 80s. His father  of cancer at 57.    Review of Systems   Constitutional: Positive for diaphoresis and malaise/fatigue. Negative for fever.   HENT:  Negative for congestion.    Eyes:  Negative for vision loss in left eye and vision loss in right eye.    Cardiovascular:  Positive for chest pain, dyspnea on exertion and near-syncope. Negative for claudication, irregular heartbeat, leg swelling, orthopnea, palpitations and syncope.   Respiratory:  Positive for shortness of breath. Negative for cough and wheezing.    Hematologic/Lymphatic: Negative for adenopathy.   Skin:  Negative for rash.   Musculoskeletal:  Negative for joint pain and joint swelling.   Gastrointestinal:  Negative for abdominal pain, diarrhea, nausea and vomiting.   Neurological:  Positive for dizziness. Negative for excessive daytime sleepiness, focal weakness, light-headedness, numbness and weakness.   Psychiatric/Behavioral:  Negative for depression. The patient does not have insomnia.         Otherwise complete ROS reviewed and negative except as mentioned in the HPI.      Past Medical History: History reviewed. No pertinent past medical history.    Past Surgical History:  Past Surgical History:   Procedure Laterality Date    BACK SURGERY      HERNIA REPAIR         Family History: family history includes Heart disease in his mother.    Social History:  reports that he has been smoking cigarettes. He does not have any smokeless tobacco history on file. He reports that he does not currently use alcohol. He reports that he does not currently use drugs.    Medications:  Prior to Admission medications    Medication Sig Start Date End Date Taking? Authorizing Provider   multivitamin (MULTI VITAMIN DAILY PO) Daily.   Yes Provider, MD Regulo   aspirin 81 MG EC tablet Take 1 tablet by mouth Daily. 7/10/24   Brennan Khoury, DO   carvedilol (COREG) 3.125 MG tablet Take 1 tablet by mouth 2 (Two) Times a Day With Meals. 7/10/24   Brennan Khoury, DO   lisinopril (PRINIVIL,ZESTRIL) 5 MG tablet Take 1 tablet by mouth Daily. 7/10/24   Brennan Khoury DO   pravastatin (Pravachol) 40 MG tablet Take 1 tablet by mouth Daily. 7/10/24   Brennan Khoury, DO   ranolazine  "(Ranexa) 500 MG 12 hr tablet Take 1 tablet by mouth 2 (Two) Times a Day. 7/10/24   Brennan Khoury DO     Allergies:  No Known Allergies    Objective     Vital Signs: /80   Ht 177.8 cm (70\")   Wt 87.1 kg (192 lb)   SpO2 98%   BMI 27.55 kg/m²     Vitals and nursing note reviewed.   Constitutional:       Appearance: Normal and healthy appearance. Well-developed and not in distress.   Eyes:      Extraocular Movements: Extraocular movements intact.      Pupils: Pupils are equal, round, and reactive to light.   HENT:      Head: Normocephalic and atraumatic.    Mouth/Throat:      Pharynx: Oropharynx is clear.   Neck:      Vascular: JVD normal.      Trachea: Trachea normal.   Pulmonary:      Effort: Pulmonary effort is normal.      Breath sounds: Normal breath sounds. No wheezing. No rhonchi. No rales.   Cardiovascular:      PMI at left midclavicular line. Normal rate. Regular rhythm. Normal S1. Normal S2.       Murmurs: There is a grade 2/6 systolic murmur.      No gallop.  No click. No rub.   Pulses:     Dorsalis pedis: 2+ bilaterally.     Posterior tibial: 2+ bilaterally.  Abdominal:      General: Bowel sounds are normal.      Palpations: Abdomen is soft.      Tenderness: There is no abdominal tenderness.   Musculoskeletal: Normal range of motion.      Cervical back: Normal range of motion and neck supple. Skin:     General: Skin is warm and dry.      Capillary Refill: Capillary refill takes less than 2 seconds.   Feet:      Right foot:      Skin integrity: Skin integrity normal.      Left foot:      Skin integrity: Skin integrity normal.   Neurological:      Mental Status: Alert and oriented to person, place and time.      Sensory: Sensation is intact.      Motor: Motor function is intact.      Coordination: Coordination is intact.   Psychiatric:         Speech: Speech normal.         Behavior: Behavior is cooperative.       Physical Exam  Vital Signs  Vitals show a blood pressure of " "142/80.    Results Reviewed:      ECG 12 Lead Rhythm Change    Date/Time: 7/14/2024 8:46 PM  Performed by: Brennan Khoury DO    Authorized by: Brennan Khoury DO  Comparison: compared with previous ECG   Similar to previous ECG  Rhythm: sinus rhythm  Rate: normal  Conduction: conduction normal  QRS axis: normal  Other findings: non-specific ST-T wave changes    Clinical impression: abnormal EKG          Results  Laboratory Studies  BNP level is elevated.    Imaging  Echocardiogram shows left ventricular chamber size is normal, thickness is normal, no hypertrophy. The basal inferior wall is aneurysmal. The neck is large, which is consistent with a true left ventricular aneurysm. Inferior wall is akinetic. Posterior wall is severely hypokinetic. Septum is hypokinetic. Ejection fraction 40%. Right ventricle normal size, normal function. Left atrium and right atrium are okay. Aortic valve, mild regurgitation, no stenosis. Mitral valve, moderate to severe regurgitation, tricuspid valve, mild pulmonary hypertension.    No results found for: \"CHOL\", \"TRIG\", \"HDL\", \"VLDL\", \"LDLHDL\"  No results found for: \"HGBA1C\"    Assessment / Plan        Problem List Items Addressed This Visit       Unstable angina - Primary    Relevant Medications    carvedilol (COREG) 3.125 MG tablet    ranolazine (Ranexa) 500 MG 12 hr tablet    Other Relevant Orders    Case Request Cath Lab: Left Heart Cath (Completed)    Adult Transthoracic Echo Complete W/ Cont if Necessary Per Protocol    Ischemic cardiomyopathy    Relevant Medications    carvedilol (COREG) 3.125 MG tablet    ranolazine (Ranexa) 500 MG 12 hr tablet    Other Relevant Orders    Case Request Cath Lab: Left Heart Cath (Completed)    Adult Transthoracic Echo Complete W/ Cont if Necessary Per Protocol     Assessment & Plan  1. Suspected myocardial infarction.  The patient's symptoms, including dizziness, lightheadedness, and weakness, suggest a possible myocardial " infarction. His BNP level is elevated, indicating a weakened heart. A heart catheterization has been scheduled for Friday. The patient has been advised to resume daily baby aspirin and multivitamins. Pravachol 40 has been prescribed, along with Coreg, lisinopril, and Ranexa.      Transcribed from ambient dictation for Brennan Khoury DO by Brennan Khoury DO.  07/12/24   12:37 CDT    Patient or patient representative verbalized consent for the use of Ambient Listening during the visit with  Brennan Khoury DO for chart documentation. 7/12/2024  12:38 CDT

## 2024-07-12 NOTE — Clinical Note
A sheath was successfully inserted with ultrasound guidance into the right radial artery. Sheath insertion not delayed.

## 2024-07-13 ENCOUNTER — APPOINTMENT (OUTPATIENT)
Dept: CT IMAGING | Facility: HOSPITAL | Age: 74
DRG: 287 | End: 2024-07-13
Payer: MEDICARE

## 2024-07-13 LAB
ANION GAP SERPL CALCULATED.3IONS-SCNC: 8 MMOL/L (ref 5–15)
APTT PPP: 58.6 SECONDS (ref 24.5–36)
APTT PPP: 84.7 SECONDS (ref 24.5–36)
APTT PPP: 86.2 SECONDS (ref 24.5–36)
BASOPHILS # BLD AUTO: 0.05 10*3/MM3 (ref 0–0.2)
BASOPHILS NFR BLD AUTO: 0.7 % (ref 0–1.5)
BUN SERPL-MCNC: 11 MG/DL (ref 8–23)
BUN/CREAT SERPL: 8.9 (ref 7–25)
CALCIUM SPEC-SCNC: 8.3 MG/DL (ref 8.6–10.5)
CHLORIDE SERPL-SCNC: 106 MMOL/L (ref 98–107)
CHOLEST SERPL-MCNC: 161 MG/DL (ref 0–200)
CO2 SERPL-SCNC: 25 MMOL/L (ref 22–29)
CREAT SERPL-MCNC: 1.24 MG/DL (ref 0.76–1.27)
DEPRECATED RDW RBC AUTO: 46.9 FL (ref 37–54)
EGFRCR SERPLBLD CKD-EPI 2021: 61.8 ML/MIN/1.73
EOSINOPHIL # BLD AUTO: 0.36 10*3/MM3 (ref 0–0.4)
EOSINOPHIL NFR BLD AUTO: 5 % (ref 0.3–6.2)
ERYTHROCYTE [DISTWIDTH] IN BLOOD BY AUTOMATED COUNT: 13.8 % (ref 12.3–15.4)
GLUCOSE SERPL-MCNC: 99 MG/DL (ref 65–99)
HCT VFR BLD AUTO: 35.6 % (ref 37.5–51)
HDLC SERPL-MCNC: 25 MG/DL (ref 40–60)
HGB BLD-MCNC: 11.3 G/DL (ref 13–17.7)
IMM GRANULOCYTES # BLD AUTO: 0.09 10*3/MM3 (ref 0–0.05)
IMM GRANULOCYTES NFR BLD AUTO: 1.3 % (ref 0–0.5)
LDLC SERPL CALC-MCNC: 109 MG/DL (ref 0–100)
LDLC/HDLC SERPL: 4.26 {RATIO}
LYMPHOCYTES # BLD AUTO: 2.16 10*3/MM3 (ref 0.7–3.1)
LYMPHOCYTES NFR BLD AUTO: 30 % (ref 19.6–45.3)
MCH RBC QN AUTO: 29.6 PG (ref 26.6–33)
MCHC RBC AUTO-ENTMCNC: 31.7 G/DL (ref 31.5–35.7)
MCV RBC AUTO: 93.2 FL (ref 79–97)
MONOCYTES # BLD AUTO: 0.56 10*3/MM3 (ref 0.1–0.9)
MONOCYTES NFR BLD AUTO: 7.8 % (ref 5–12)
NEUTROPHILS NFR BLD AUTO: 3.98 10*3/MM3 (ref 1.7–7)
NEUTROPHILS NFR BLD AUTO: 55.2 % (ref 42.7–76)
NRBC BLD AUTO-RTO: 0 /100 WBC (ref 0–0.2)
PLATELET # BLD AUTO: 166 10*3/MM3 (ref 140–450)
PMV BLD AUTO: 10 FL (ref 6–12)
POTASSIUM SERPL-SCNC: 4.2 MMOL/L (ref 3.5–5.2)
RBC # BLD AUTO: 3.82 10*6/MM3 (ref 4.14–5.8)
SODIUM SERPL-SCNC: 139 MMOL/L (ref 136–145)
TRIGL SERPL-MCNC: 148 MG/DL (ref 0–150)
VLDLC SERPL-MCNC: 27 MG/DL (ref 5–40)
WBC NRBC COR # BLD AUTO: 7.2 10*3/MM3 (ref 3.4–10.8)

## 2024-07-13 PROCEDURE — 85730 THROMBOPLASTIN TIME PARTIAL: CPT | Performed by: EMERGENCY MEDICINE

## 2024-07-13 PROCEDURE — 99233 SBSQ HOSP IP/OBS HIGH 50: CPT | Performed by: SURGERY

## 2024-07-13 PROCEDURE — 80061 LIPID PANEL: CPT | Performed by: EMERGENCY MEDICINE

## 2024-07-13 PROCEDURE — 93010 ELECTROCARDIOGRAM REPORT: CPT | Performed by: EMERGENCY MEDICINE

## 2024-07-13 PROCEDURE — 25510000001 IOPAMIDOL PER 1 ML: Performed by: EMERGENCY MEDICINE

## 2024-07-13 PROCEDURE — 85025 COMPLETE CBC W/AUTO DIFF WBC: CPT | Performed by: EMERGENCY MEDICINE

## 2024-07-13 PROCEDURE — 71275 CT ANGIOGRAPHY CHEST: CPT

## 2024-07-13 PROCEDURE — 80048 BASIC METABOLIC PNL TOTAL CA: CPT | Performed by: EMERGENCY MEDICINE

## 2024-07-13 PROCEDURE — 25010000002 HEPARIN (PORCINE) PER 1000 UNITS: Performed by: EMERGENCY MEDICINE

## 2024-07-13 PROCEDURE — 25010000002 HEPARIN (PORCINE) 25000-0.45 UT/250ML-% SOLUTION: Performed by: EMERGENCY MEDICINE

## 2024-07-13 PROCEDURE — 99232 SBSQ HOSP IP/OBS MODERATE 35: CPT | Performed by: NURSE PRACTITIONER

## 2024-07-13 PROCEDURE — 25010000002 FUROSEMIDE PER 20 MG: Performed by: EMERGENCY MEDICINE

## 2024-07-13 PROCEDURE — 93005 ELECTROCARDIOGRAM TRACING: CPT | Performed by: EMERGENCY MEDICINE

## 2024-07-13 RX ADMIN — FUROSEMIDE 40 MG: 10 INJECTION, SOLUTION INTRAMUSCULAR; INTRAVENOUS at 17:34

## 2024-07-13 RX ADMIN — ASPIRIN 81 MG: 81 TABLET, COATED ORAL at 08:26

## 2024-07-13 RX ADMIN — CARVEDILOL 3.12 MG: 3.12 TABLET, FILM COATED ORAL at 17:34

## 2024-07-13 RX ADMIN — IOPAMIDOL 100 ML: 755 INJECTION, SOLUTION INTRAVENOUS at 09:50

## 2024-07-13 RX ADMIN — HEPARIN SODIUM 2500 UNITS: 1000 INJECTION INTRAVENOUS; SUBCUTANEOUS at 13:59

## 2024-07-13 RX ADMIN — HEPARIN SODIUM 13.22 UNITS/KG/HR: 10000 INJECTION, SOLUTION INTRAVENOUS at 13:59

## 2024-07-13 RX ADMIN — Medication 10 ML: at 21:05

## 2024-07-13 RX ADMIN — Medication 10 ML: at 08:27

## 2024-07-13 RX ADMIN — LISINOPRIL 5 MG: 5 TABLET ORAL at 08:26

## 2024-07-13 RX ADMIN — RANOLAZINE 500 MG: 500 TABLET, FILM COATED, EXTENDED RELEASE ORAL at 08:26

## 2024-07-13 RX ADMIN — FUROSEMIDE 40 MG: 10 INJECTION, SOLUTION INTRAMUSCULAR; INTRAVENOUS at 08:26

## 2024-07-13 RX ADMIN — CARVEDILOL 3.12 MG: 3.12 TABLET, FILM COATED ORAL at 08:26

## 2024-07-13 RX ADMIN — RANOLAZINE 500 MG: 500 TABLET, FILM COATED, EXTENDED RELEASE ORAL at 21:05

## 2024-07-13 RX ADMIN — HEPARIN SODIUM 2500 UNITS: 1000 INJECTION INTRAVENOUS; SUBCUTANEOUS at 01:28

## 2024-07-13 NOTE — PROGRESS NOTES
[facility] -  Progress Note     LOS: 1 day   Patient Care Team:  Stephane Pedro DO as PCP - General (Family Medicine)    Chief Complaint: CAD Follow Up     Subjective     Interval History: sitting up in chair, stable without complaints. He denies any chest pain, shortness of breath, dizziness, or palpitations. He is currently eating visiting with family members.     Tele: SR intermittent PVCs        Review of Systems:     Review of Systems   Constitutional:  Negative for diaphoresis and fatigue.   Respiratory:  Negative for cough, chest tightness, shortness of breath and wheezing.    Cardiovascular:  Negative for chest pain, palpitations and leg swelling.   Gastrointestinal:  Negative for abdominal distention and abdominal pain.   Genitourinary:  Negative for difficulty urinating.   Neurological:  Negative for dizziness, weakness, light-headedness and headaches.   Psychiatric/Behavioral:  Negative for agitation and confusion.      Objective     Vital Sign Min/Max for last 24 hours  Temp  Min: 97.6 °F (36.4 °C)  Max: 98.4 °F (36.9 °C)   BP  Min: 114/60  Max: 154/85   Pulse  Min: 64  Max: 75   Resp  Min: 18  Max: 27   SpO2  Min: 92 %  Max: 98 %   No data recorded   No data recorded         07/12/24  1129   Weight: 90 kg (198 lb 6.4 oz)       Physical Exam:    Vitals reviewed.   Constitutional:       General: Awake.      Appearance: Normal appearance. Well-developed, overweight and not in distress. Chronically ill-appearing.   HENT:      Head: Normocephalic and atraumatic.   Pulmonary:      Effort: Pulmonary effort is normal.      Breath sounds: Normal breath sounds.   Cardiovascular:      Normal rate. Regular rhythm.   Edema:     Peripheral edema absent.   Musculoskeletal:      Cervical back: Normal range of motion and neck supple. Skin:     General: Skin is warm and dry.   Neurological:      Mental Status: Alert, oriented to person, place, and time and oriented to person, place and  time.   Psychiatric:         Attention and Perception: Attention normal.         Mood and Affect: Mood normal.         Speech: Speech normal.         Behavior: Behavior normal. Behavior is cooperative.         Thought Content: Thought content normal.         Cognition and Memory: Cognition and memory normal.         Judgment: Judgment normal.         Results Review:   Lab Results   Component Value Date    WBC 7.20 07/13/2024    HGB 11.3 (L) 07/13/2024    HCT 35.6 (L) 07/13/2024    MCV 93.2 07/13/2024     07/13/2024     Lab Results   Component Value Date    GLUCOSE 99 07/13/2024    CALCIUM 8.3 (L) 07/13/2024     07/13/2024    K 4.2 07/13/2024    CO2 25.0 07/13/2024     07/13/2024    BUN 11 07/13/2024    CREATININE 1.24 07/13/2024    EGFR 61.8 07/13/2024    BCR 8.9 07/13/2024    ANIONGAP 8.0 07/13/2024     Lab Results   Component Value Date    CHOL 161 07/13/2024    TRIG 148 07/13/2024    HDL 25 (L) 07/13/2024     (H) 07/13/2024     Lab Results   Component Value Date    HGBA1C 6.00 (H) 07/12/2024     Lab Results   Component Value Date    PTT 86.2 (H) 07/13/2024       Medication Review: yes  Current Facility-Administered Medications   Medication Dose Route Frequency Provider Last Rate Last Admin    acetaminophen (TYLENOL) tablet 650 mg  650 mg Oral Q4H PRN Brennan Khoury DO        aspirin EC tablet 81 mg  81 mg Oral Daily Brennan Khoury DO   81 mg at 07/13/24 0826    carvedilol (COREG) tablet 3.125 mg  3.125 mg Oral BID With Meals Brennan Khoury DO   3.125 mg at 07/13/24 0826    furosemide (LASIX) injection 40 mg  40 mg Intravenous BID Brennan Khoury DO   40 mg at 07/13/24 0826    heparin (porcine) injection 2,500 Units  2,500 Units Intravenous PRN Brennan Khoury DO   2,500 Units at 07/13/24 0128    heparin (porcine) injection 5,000 Units  5,000 Units Intravenous PRN Brennan Khoury,         heparin 21766 units/250 mL (100  units/mL) in 0.45 % NaCl infusion  11.1 Units/kg/hr Intravenous Titrated Brennan Khoury DO 10.9 mL/hr at 07/13/24 0119 12.111 Units/kg/hr at 07/13/24 0119    lisinopril (PRINIVIL,ZESTRIL) tablet 5 mg  5 mg Oral Daily Brennan Khoury, DO   5 mg at 07/13/24 0826    nitroglycerin (NITROSTAT) SL tablet 0.4 mg  0.4 mg Sublingual Q5 Min PRN Brennan Khoury,         nitroglycerin (TRIDIL) 200 mcg/ml infusion  10-50 mcg/min Intravenous Titrated Brennan Khoury DO   Held at 07/12/24 1621    ondansetron ODT (ZOFRAN-ODT) disintegrating tablet 4 mg  4 mg Oral Q6H PRN Brennan Khoury DO        Or    ondansetron (ZOFRAN) injection 4 mg  4 mg Intravenous Q6H PRN Brennan Khoury DO        ranolazine (RANEXA) 12 hr tablet 500 mg  500 mg Oral BID Brennan Khoury, DO   500 mg at 07/13/24 0826    sodium chloride 0.9 % flush 10 mL  10 mL Intravenous Q12H Brennan Khoury, DO   10 mL at 07/13/24 0827    sodium chloride 0.9 % flush 10 mL  10 mL Intravenous PRN Brennan Khoury DO           Assessment & Plan     1.  Multivessel Coronary Artery Disease  2.  LV aneurysm  3.  Ischemic Cardiomyopathy: LVEF 36-40% LVEDP 47  4.  Mitral Valve Regurgitation  5.  Mixed Hyperlipidemia  6.  Tobacco Abuse        Continue current medications  Strict intake and output documentation  CMP with other labs in a.m.  Continue to monitor volume status and transition to oral diuretics may be as early as tomorrow  Ongoing workup from CT surgery  Will plan for TERESA on Monday, requested by Dr. Maciel.      Electronically signed by RAMIRO Mattson, 07/13/24, 11:41 AM CDT.

## 2024-07-13 NOTE — PLAN OF CARE
Goal Outcome Evaluation:  Plan of Care Reviewed With: patient           Outcome Evaluation: Pt alert and oriented x4. Pleasant and cooperative, on heparing gtt. with no adverse reaction. waiting for further testing in the morning. Wife at bedside.

## 2024-07-13 NOTE — PLAN OF CARE
Goal Outcome Evaluation:  Plan of Care Reviewed With: patient        Progress: no change  Outcome Evaluation: Patient is doing well. No c/o pain. Receiving IV Lasix bid, good urine output, instructed patient to use urinal for accurate I/O. CTA chest done today. Heparin gtt currently infusing at 11.9 mL/hr. Awaiting TERESA on Monday and further plans per CT surgery. Family at bedside. Cont to monitor.

## 2024-07-13 NOTE — PROGRESS NOTES
"Patient name: Vj Garcia  Patient : 1950  VISIT # [id number]  MR #[id number]    Procedure:Procedure(s):  Left Heart Cath  Procedure Date:2024  POD:1 Day Post-Op    Subjective   Chest pain      Afebrile.  On room air.  No pain.  Remains on heparin drip.  A1c 6.0.  Awaiting further cardiac testing today.    Telemetry: Sinus, 70s  IV gtts: Heparin         Objective     Visit Vitals  /85 (BP Location: Left arm, Patient Position: Sitting)   Pulse 70   Temp 97.6 °F (36.4 °C) (Oral)   Resp 18   Ht 175.3 cm (69\")   Wt 90 kg (198 lb 6.4 oz)   SpO2 97%   BMI 29.30 kg/m²     No intake or output data in the 24 hours ending 24 0839    LABS:    CBC  WBC   Date/Time Value Ref Range Status   2024 05 7.20 3.40 - 10.80 10*3/mm3 Final     RBC   Date/Time Value Ref Range Status   202422 3.82 (L) 4.14 - 5.80 10*6/mm3 Final     Hemoglobin   Date/Time Value Ref Range Status   202422 11.3 (L) 13.0 - 17.7 g/dL Final     Hematocrit   Date/Time Value Ref Range Status   2024 35.6 (L) 37.5 - 51.0 % Final     MCH   Date/Time Value Ref Range Status   2024 29.6 26.6 - 33.0 pg Final     MCHC   Date/Time Value Ref Range Status   202422 31.7 31.5 - 35.7 g/dL Final     RDW   Date/Time Value Ref Range Status   2024 0522 13.8 12.3 - 15.4 % Final     RDW-SD   Date/Time Value Ref Range Status   2024 46.9 37.0 - 54.0 fl Final     MPV   Date/Time Value Ref Range Status   2024 10.0 6.0 - 12.0 fL Final     Platelets   Date/Time Value Ref Range Status   2024 166 140 - 450 10*3/mm3 Final       BMP  Glucose   Date/Time Value Ref Range Status   2024 0522 99 65 - 99 mg/dL Final     BUN   Date/Time Value Ref Range Status   2024 0522 11 8 - 23 mg/dL Final     Creatinine   Date/Time Value Ref Range Status   2024 0522 1.24 0.76 - 1.27 mg/dL Final     Sodium   Date/Time Value Ref Range Status   2024 0522 139 136 - 145 " mmol/L Final     Potassium   Date/Time Value Ref Range Status   07/13/2024 0522 4.2 3.5 - 5.2 mmol/L Final     Chloride   Date/Time Value Ref Range Status   07/13/2024 0522 106 98 - 107 mmol/L Final     CO2   Date/Time Value Ref Range Status   07/13/2024 0522 25.0 22.0 - 29.0 mmol/L Final     Calcium   Date/Time Value Ref Range Status   07/13/2024 0522 8.3 (L) 8.6 - 10.5 mg/dL Final     BUN/Creatinine Ratio   Date/Time Value Ref Range Status   07/13/2024 0522 8.9 7.0 - 25.0 Final     Anion Gap   Date/Time Value Ref Range Status   07/13/2024 0522 8.0 5.0 - 15.0 mmol/L Final     eGFR   Date/Time Value Ref Range Status   07/13/2024 0522 61.8 >60.0 mL/min/1.73 Final     IMAGES:       Imaging Results (Last 24 Hours)       ** No results found for the last 24 hours. **          My image interpretation: none    Physical Exam:  General: Alert, oriented. No apparent distress.   Cardiovascular: Regular rate and rhythm without murmur, rubs, or gallops.    Pulmonary: Clear to auscultation bilaterally without wheezing, rubs, or rales.  Abdomen: Soft, nondistended, and nontender.  Extremities: Warm, moves all extremities. No edema.    Neurologic:  Grossly intact with no focal deficits.            Impression:  Multivessel coronary artery disease  LV aneurysm  Mitral valve regurgitation    CAD (coronary artery disease)    Unstable angina    Ischemic cardiomyopathy  Chronic tobacco use  Recent myocardial infarction      Plan:  Awaiting further preoperative testing  Continue heparin drip  Will likely stay inpatient while awaiting TERESA next week.  Anticipate surgery in a couple of weeks  Discussed with patient and family      Uma Alicia, RAMIRO  07/13/24  08:39 CDT

## 2024-07-14 LAB
ALBUMIN SERPL-MCNC: 3.6 G/DL (ref 3.5–5.2)
ALBUMIN/GLOB SERPL: 1.2 G/DL
ALP SERPL-CCNC: 78 U/L (ref 39–117)
ALT SERPL W P-5'-P-CCNC: 13 U/L (ref 1–41)
ANION GAP SERPL CALCULATED.3IONS-SCNC: 12 MMOL/L (ref 5–15)
APTT PPP: 80.6 SECONDS (ref 24.5–36)
AST SERPL-CCNC: 17 U/L (ref 1–40)
BH CV ECHO MEAS - AO MAX PG: 4.2 MMHG
BH CV ECHO MEAS - AO MEAN PG: 2 MMHG
BH CV ECHO MEAS - AO ROOT DIAM: 3.1 CM
BH CV ECHO MEAS - AO V2 MAX: 103 CM/SEC
BH CV ECHO MEAS - AO V2 VTI: 21.7 CM
BH CV ECHO MEAS - AVA(I,D): 2.6 CM2
BH CV ECHO MEAS - EDV(CUBED): 231.5 ML
BH CV ECHO MEAS - EDV(MOD-SP2): 167 ML
BH CV ECHO MEAS - EDV(MOD-SP4): 201 ML
BH CV ECHO MEAS - EF(MOD-SP2): 49 %
BH CV ECHO MEAS - EF(MOD-SP4): 42.3 %
BH CV ECHO MEAS - ESV(CUBED): 128 ML
BH CV ECHO MEAS - ESV(MOD-SP2): 85.1 ML
BH CV ECHO MEAS - ESV(MOD-SP4): 116 ML
BH CV ECHO MEAS - FS: 17.9 %
BH CV ECHO MEAS - IVS/LVPW: 1.06 CM
BH CV ECHO MEAS - IVSD: 1.17 CM
BH CV ECHO MEAS - LA DIMENSION: 4 CM
BH CV ECHO MEAS - LAT PEAK E' VEL: 4.1 CM/SEC
BH CV ECHO MEAS - LV DIASTOLIC VOL/BSA (35-75): 97.7 CM2
BH CV ECHO MEAS - LV MASS(C)D: 302.9 GRAMS
BH CV ECHO MEAS - LV MAX PG: 2.12 MMHG
BH CV ECHO MEAS - LV MEAN PG: 1 MMHG
BH CV ECHO MEAS - LV SYSTOLIC VOL/BSA (12-30): 56.4 CM2
BH CV ECHO MEAS - LV V1 MAX: 72.8 CM/SEC
BH CV ECHO MEAS - LV V1 VTI: 13.6 CM
BH CV ECHO MEAS - LVIDD: 6.1 CM
BH CV ECHO MEAS - LVIDS: 5 CM
BH CV ECHO MEAS - LVOT AREA: 4.2 CM2
BH CV ECHO MEAS - LVOT DIAM: 2.3 CM
BH CV ECHO MEAS - LVPWD: 1.1 CM
BH CV ECHO MEAS - MED PEAK E' VEL: 4.7 CM/SEC
BH CV ECHO MEAS - MR MAX PG: 130.2 MMHG
BH CV ECHO MEAS - MR MAX VEL: 570.5 CM/SEC
BH CV ECHO MEAS - MR MEAN PG: 78 MMHG
BH CV ECHO MEAS - MR MEAN VEL: 405 CM/SEC
BH CV ECHO MEAS - MR VTI: 210 CM
BH CV ECHO MEAS - MV A MAX VEL: 56.6 CM/SEC
BH CV ECHO MEAS - MV DEC TIME: 0.18 SEC
BH CV ECHO MEAS - MV E MAX VEL: 74.4 CM/SEC
BH CV ECHO MEAS - MV E/A: 1.31
BH CV ECHO MEAS - PI END-D VEL: 145 CM/SEC
BH CV ECHO MEAS - SV(LVOT): 56.5 ML
BH CV ECHO MEAS - SV(MOD-SP2): 81.9 ML
BH CV ECHO MEAS - SV(MOD-SP4): 85 ML
BH CV ECHO MEAS - SVI(LVOT): 27.5 ML/M2
BH CV ECHO MEAS - SVI(MOD-SP2): 39.8 ML/M2
BH CV ECHO MEAS - SVI(MOD-SP4): 41.3 ML/M2
BH CV ECHO MEASUREMENTS AVERAGE E/E' RATIO: 16.91
BH CV XLRA - RV BASE: 2.9 CM
BH CV XLRA - RV LENGTH: 6.5 CM
BH CV XLRA - RV MID: 2.9 CM
BILIRUB SERPL-MCNC: 0.5 MG/DL (ref 0–1.2)
BUN SERPL-MCNC: 13 MG/DL (ref 8–23)
BUN/CREAT SERPL: 9.2 (ref 7–25)
CALCIUM SPEC-SCNC: 8.7 MG/DL (ref 8.6–10.5)
CHLORIDE SERPL-SCNC: 99 MMOL/L (ref 98–107)
CO2 SERPL-SCNC: 27 MMOL/L (ref 22–29)
CREAT SERPL-MCNC: 1.42 MG/DL (ref 0.76–1.27)
EGFRCR SERPLBLD CKD-EPI 2021: 52.5 ML/MIN/1.73
GLOBULIN UR ELPH-MCNC: 3.1 GM/DL
GLUCOSE SERPL-MCNC: 96 MG/DL (ref 65–99)
LEFT ATRIUM VOLUME INDEX: 30.7 ML/M2
LEFT ATRIUM VOLUME: 63.2 ML
POTASSIUM SERPL-SCNC: 4 MMOL/L (ref 3.5–5.2)
PROT SERPL-MCNC: 6.7 G/DL (ref 6–8.5)
QT INTERVAL: 452 MS
QTC INTERVAL: 484 MS
SODIUM SERPL-SCNC: 138 MMOL/L (ref 136–145)

## 2024-07-14 PROCEDURE — 99232 SBSQ HOSP IP/OBS MODERATE 35: CPT | Performed by: SURGERY

## 2024-07-14 PROCEDURE — 99232 SBSQ HOSP IP/OBS MODERATE 35: CPT | Performed by: NURSE PRACTITIONER

## 2024-07-14 PROCEDURE — B245ZZ4 ULTRASONOGRAPHY OF LEFT HEART, TRANSESOPHAGEAL: ICD-10-PCS | Performed by: EMERGENCY MEDICINE

## 2024-07-14 PROCEDURE — 80053 COMPREHEN METABOLIC PANEL: CPT | Performed by: NURSE PRACTITIONER

## 2024-07-14 PROCEDURE — 85730 THROMBOPLASTIN TIME PARTIAL: CPT | Performed by: EMERGENCY MEDICINE

## 2024-07-14 RX ORDER — FUROSEMIDE 40 MG/1
40 TABLET ORAL DAILY
Status: DISCONTINUED | OUTPATIENT
Start: 2024-07-14 | End: 2024-07-16 | Stop reason: HOSPADM

## 2024-07-14 RX ORDER — FUROSEMIDE 40 MG/1
40 TABLET ORAL DAILY
Status: DISCONTINUED | OUTPATIENT
Start: 2024-07-15 | End: 2024-07-14

## 2024-07-14 RX ADMIN — RANOLAZINE 500 MG: 500 TABLET, FILM COATED, EXTENDED RELEASE ORAL at 20:00

## 2024-07-14 RX ADMIN — RANOLAZINE 500 MG: 500 TABLET, FILM COATED, EXTENDED RELEASE ORAL at 10:07

## 2024-07-14 RX ADMIN — Medication 10 ML: at 20:00

## 2024-07-14 RX ADMIN — LISINOPRIL 5 MG: 5 TABLET ORAL at 10:07

## 2024-07-14 RX ADMIN — ASPIRIN 81 MG: 81 TABLET, COATED ORAL at 10:07

## 2024-07-14 RX ADMIN — Medication 10 ML: at 10:08

## 2024-07-14 RX ADMIN — FUROSEMIDE 40 MG: 40 TABLET ORAL at 10:13

## 2024-07-14 RX ADMIN — CARVEDILOL 3.12 MG: 3.12 TABLET, FILM COATED ORAL at 17:51

## 2024-07-14 RX ADMIN — CARVEDILOL 3.12 MG: 3.12 TABLET, FILM COATED ORAL at 10:07

## 2024-07-14 NOTE — PLAN OF CARE
Goal Outcome Evaluation:              Outcome Evaluation: Pt. with no c/o pain. Heparin gtt still infusing. Two therapeutic aPTT draws, now will get daily AM draws. Pt. to have TERESA on Monday. Safety maintained. Care ongoing.

## 2024-07-14 NOTE — PLAN OF CARE
Goal Outcome Evaluation:  Plan of Care Reviewed With: patient        Progress: no change  Outcome Evaluation: Heparin DC'd at 1800. NPO after mn for TERESA tomorrow.      Problem: Adult Inpatient Plan of Care  Goal: Plan of Care Review  Outcome: Ongoing, Progressing  Flowsheets (Taken 7/14/2024 1720)  Progress: no change  Plan of Care Reviewed With: patient  Outcome Evaluation: Heparin DC'd at 1800. NPO after mn for TERESA tomorrow.  Goal: Patient-Specific Goal (Individualized)  Outcome: Ongoing, Progressing  Goal: Absence of Hospital-Acquired Illness or Injury  Outcome: Ongoing, Progressing  Intervention: Identify and Manage Fall Risk  Recent Flowsheet Documentation  Taken 7/14/2024 1700 by Renetta Sharma RN  Safety Promotion/Fall Prevention: safety round/check completed  Taken 7/14/2024 1600 by Renetta Sharma RN  Safety Promotion/Fall Prevention: safety round/check completed  Taken 7/14/2024 1500 by Renetta Sharma RN  Safety Promotion/Fall Prevention: safety round/check completed  Taken 7/14/2024 1400 by Renetta Sharma RN  Safety Promotion/Fall Prevention: safety round/check completed  Taken 7/14/2024 1300 by Renetta Sharma RN  Safety Promotion/Fall Prevention: safety round/check completed  Taken 7/14/2024 1200 by Renetta Sharma RN  Safety Promotion/Fall Prevention: safety round/check completed  Taken 7/14/2024 1100 by Renetta Sharma RN  Safety Promotion/Fall Prevention: safety round/check completed  Taken 7/14/2024 1000 by Renetta Sharma RN  Safety Promotion/Fall Prevention: safety round/check completed  Taken 7/14/2024 0900 by Renetta Sharma RN  Safety Promotion/Fall Prevention: safety round/check completed  Taken 7/14/2024 0800 by Renetta Sharma RN  Safety Promotion/Fall Prevention: safety round/check completed  Taken 7/14/2024 0700 by Renetta Sharma RN  Safety Promotion/Fall Prevention: safety round/check completed  Intervention: Prevent Skin Injury  Recent Flowsheet Documentation  Taken 7/14/2024 1100 by  Renetta Sharma RN  Body Position: position changed independently  Taken 7/14/2024 1000 by Renetta Sharma RN  Body Position: position changed independently  Taken 7/14/2024 0800 by Renetta Sharma RN  Body Position: position changed independently  Intervention: Prevent and Manage VTE (Venous Thromboembolism) Risk  Recent Flowsheet Documentation  Taken 7/14/2024 0800 by Renetta Sharma RN  Activity Management: up ad kay  Goal: Optimal Comfort and Wellbeing  Outcome: Ongoing, Progressing  Goal: Readiness for Transition of Care  Outcome: Ongoing, Progressing     Problem: Hypertension Comorbidity  Goal: Blood Pressure in Desired Range  Outcome: Ongoing, Progressing     Problem: Arrhythmia/Dysrhythmia (Cardiac Catheterization)  Goal: Stable Heart Rate and Rhythm  Outcome: Ongoing, Progressing     Problem: Bleeding (Cardiac Catheterization)  Goal: Absence of Bleeding  Outcome: Ongoing, Progressing     Problem: Contrast-Induced Injury Risk (Cardiac Catheterization)  Goal: Absence of Contrast-Induced Injury  Outcome: Ongoing, Progressing     Problem: Embolism (Cardiac Catheterization)  Goal: Absence of Embolism Signs and Symptoms  Outcome: Ongoing, Progressing     Problem: Ongoing Anesthesia/Sedation Effects (Cardiac Catheterization)  Goal: Anesthesia/Sedation Recovery  Outcome: Ongoing, Progressing  Intervention: Optimize Anesthesia Recovery  Recent Flowsheet Documentation  Taken 7/14/2024 1700 by Renetta Sharma RN  Safety Promotion/Fall Prevention: safety round/check completed  Taken 7/14/2024 1600 by Renetta Sharma RN  Safety Promotion/Fall Prevention: safety round/check completed  Taken 7/14/2024 1500 by Renetta Sharma RN  Safety Promotion/Fall Prevention: safety round/check completed  Taken 7/14/2024 1400 by Renetta Sharma RN  Safety Promotion/Fall Prevention: safety round/check completed  Taken 7/14/2024 1300 by Renetta Sharma RN  Safety Promotion/Fall Prevention: safety round/check completed  Taken 7/14/2024  1200 by Renetta Sharma RN  Safety Promotion/Fall Prevention: safety round/check completed  Taken 7/14/2024 1100 by Renetta Sharma RN  Safety Promotion/Fall Prevention: safety round/check completed  Taken 7/14/2024 1000 by Renetta Sharma RN  Safety Promotion/Fall Prevention: safety round/check completed  Taken 7/14/2024 0900 by Renetta Sharma RN  Safety Promotion/Fall Prevention: safety round/check completed  Taken 7/14/2024 0800 by Renetta Sharma RN  Safety Promotion/Fall Prevention: safety round/check completed  Taken 7/14/2024 0700 by Renetta Sharma RN  Safety Promotion/Fall Prevention: safety round/check completed     Problem: Pain (Cardiac Catheterization)  Goal: Acceptable Pain Control  Outcome: Ongoing, Progressing     Problem: Vascular Access Protection (Cardiac Catheterization)  Goal: Absence of Vascular Access Complication  Outcome: Ongoing, Progressing  Intervention: Prevent Access Site Complications  Recent Flowsheet Documentation  Taken 7/14/2024 0800 by Renetta Sharma RN  Activity Management: up ad kay

## 2024-07-14 NOTE — PROGRESS NOTES
"Patient name: Vj Garcia  Patient : 1950  VISIT # [id number]  MR #[id number]    Procedure:Procedure(s):  Left Heart Cath  Procedure Date:2024  POD:2 Days Post-Op    Subjective   Chest pain      Afebrile.  On room air.  On heparin drip.  PTT 80.  He has been diuresing well.  Creatinine 1.4, potassium 4.0.  CTA completed yesterday.  Carotid studies, vein mapping and PFTs pending.  Plan for TERESA tomorrow.     Telemetry: Sinus, 60s         Objective     Visit Vitals  /67 (BP Location: Left arm, Patient Position: Lying)   Pulse 89   Temp 98.5 °F (36.9 °C) (Oral)   Resp 16   Ht 175.3 cm (69\")   Wt 90 kg (198 lb 6.4 oz)   SpO2 93%   BMI 29.30 kg/m²       Intake/Output Summary (Last 24 hours) at 2024 0722  Last data filed at 2024 0300  Gross per 24 hour   Intake 640 ml   Output 3825 ml   Net -3185 ml       LABS:    BMP  Glucose   Date/Time Value Ref Range Status   2024 0320 96 65 - 99 mg/dL Final     BUN   Date/Time Value Ref Range Status   2024 0320 13 8 - 23 mg/dL Final     Creatinine   Date/Time Value Ref Range Status   2024 0320 1.42 (H) 0.76 - 1.27 mg/dL Final     Sodium   Date/Time Value Ref Range Status   2024 0320 138 136 - 145 mmol/L Final     Potassium   Date/Time Value Ref Range Status   2024 0320 4.0 3.5 - 5.2 mmol/L Final     Chloride   Date/Time Value Ref Range Status   2024 0320 99 98 - 107 mmol/L Final     CO2   Date/Time Value Ref Range Status   2024 0320 27.0 22.0 - 29.0 mmol/L Final     Calcium   Date/Time Value Ref Range Status   2024 0320 8.7 8.6 - 10.5 mg/dL Final     BUN/Creatinine Ratio   Date/Time Value Ref Range Status   2024 0320 9.2 7.0 - 25.0 Final     Anion Gap   Date/Time Value Ref Range Status   2024 0320 12.0 5.0 - 15.0 mmol/L Final     eGFR   Date/Time Value Ref Range Status   2024 0320 52.5 (L) >60.0 mL/min/1.73 Final   IMAGES:       Imaging Results (Last 24 Hours)       Procedure Component " Value Units Date/Time    CT Angiogram Chest [[id number]] Collected: 07/13/24 0957     Updated: 07/13/24 1007    Narrative:      EXAM: CT ANGIOGRAM CHEST-      DATE: 7/13/2024 8:35 AM     HISTORY: LV aneurysm, CAD pre op planning; I20.0-Unstable angina;  I25.5-Ischemic cardiomyopathy       COMPARISON: None available.     DOSE LENGTH PRODUCT: 802.39 mGy.cm mGy cm. Automatic exposure control  was utilized to make radiation dose as low as reasonably achievable.     TECHNIQUE: Enhanced CT images of the chest obtained with multiplanar  reformats.     FINDINGS:     MEDIASTINUM/EXTRATHORACIC:   There is atherosclerosis of the thoracic  aorta which is ectatic. The ascending aorta measures 3.3 cm.. Aortic  arch is 3.0 cm. Descending aorta is 2.5 cm. Aortic root is 3.2 cm.     There is mild coronary artery calcification. There is cardiomegaly and a  small pericardial effusion. There is trace pleural fluid. There are  borderline mediastinal and right hilar lymph nodes. Subcarinal lymph  node measures 1 cm in short axis. Right hilar lymph nodes measure 1.5 cm  in short axis.     There is a broad-based aneurysm at the inferior wall of the left  ventricle which measures 4.8 x 5.8 x 4.2 cm.     PULMONARY ARTERIES: Pulmonary arteries are opacified and no filling  defects are seen.     LUNGS/AIRWAYS: There is emphysema. Bilateral areas of groundglass  opacity are noted and are nonspecific may be infectious in etiology. The  airways are unremarkable.        INCLUDED UPPER ABDOMEN: Liver contour is nodular worrisome for  cirrhosis. There is a right renal cyst. There is diverticulosis of the  colon without acute diverticulitis.     SOFT TISSUES: Submitted soft tissues of the chest are unremarkable.     BONES: No suspicious osseous lesions identified.       Impression:      1. Broad-based aneurysm at the base of the left ventricle.  2. Mild patchy groundglass opacities in both lungs are nonspecific but  may be infectious in etiology.  Borderline mediastinal and right hilar  lymph nodes likely reactive.  3. Nodular liver contour and recanalized periumbilical vein worrisome  for cirrhosis and portal venous hypertension.     This report was signed and finalized on 7/13/2024 10:04 AM by Dimas Gomez.             My image interpretation: Left ventricular aneurysm, mild groundglass changes, cirrhotic appearing liver    Physical Exam:  General: Alert, oriented. No apparent distress.   Cardiovascular: Regular rate and rhythm without murmur, rubs, or gallops.    Pulmonary: Clear to auscultation bilaterally without wheezing, rubs, or rales.  Abdomen: Soft, nondistended, and nontender.  Extremities: Warm, moves all extremities. No edema.    Neurologic:  Grossly intact with no focal deficits.          Impression:  Multivessel coronary artery disease  LV aneurysm  Mitral valve regurgitation    CAD (coronary artery disease)    Unstable angina    Ischemic cardiomyopathy  Chronic tobacco use  Recent myocardial infarction  ?  Liver cirrhosis        Plan:  Awaiting further preoperative testing.  Still awaiting vein mapping, carotid studies, PFTs and TERESA  Defer heparin drip  Anticipate TERESA tomorrow   Anticipate outpatient follow-up with plans surgery in a couple of weeks  Discussed with patient and family      Uma Alicia, RAMIRO  07/14/24  07:22 CDT

## 2024-07-14 NOTE — PROGRESS NOTES
[facility] -  Progress Note     LOS: 2 days   Patient Care Team:  Stephane Pedro DO as PCP - General (Family Medicine)    Chief Complaint: CAD Follow Up     Subjective     Interval History: sitting up in chair, stable without complaints. He denies any chest pain, shortness of breath, dizziness, or palpitations. He is eager to shower and have IV disconnected.    Tele: SR intermittent PVCs    Review of Systems:     Review of Systems   Constitutional:  Negative for diaphoresis and fatigue.   Respiratory:  Negative for cough, chest tightness, shortness of breath and wheezing.    Cardiovascular:  Negative for chest pain, palpitations and leg swelling.   Gastrointestinal:  Negative for abdominal distention and abdominal pain.   Genitourinary:  Negative for difficulty urinating.   Neurological:  Negative for dizziness, weakness, light-headedness and headaches.   Psychiatric/Behavioral:  Negative for agitation and confusion.      Objective     Vital Sign Min/Max for last 24 hours  Temp  Min: 97.6 °F (36.4 °C)  Max: 98.5 °F (36.9 °C)   BP  Min: 115/67  Max: 135/63   Pulse  Min: 64  Max: 89   Resp  Min: 16  Max: 18   SpO2  Min: 93 %  Max: 99 %   No data recorded   No data recorded         07/12/24  1129   Weight: 90 kg (198 lb 6.4 oz)       Physical Exam:    Vitals reviewed.   Constitutional:       General: Awake.      Appearance: Normal appearance. Well-developed, overweight and not in distress. Chronically ill-appearing.   HENT:      Head: Normocephalic and atraumatic.   Pulmonary:      Effort: Pulmonary effort is normal.      Breath sounds: Normal breath sounds.   Cardiovascular:      Normal rate. Regular rhythm.   Edema:     Peripheral edema absent.   Musculoskeletal:      Cervical back: Normal range of motion and neck supple. Skin:     General: Skin is warm and dry.   Neurological:      Mental Status: Alert, oriented to person, place, and time and oriented to person, place and time.    Psychiatric:         Attention and Perception: Attention normal.         Mood and Affect: Mood normal.         Speech: Speech normal.         Behavior: Behavior normal. Behavior is cooperative.         Thought Content: Thought content normal.         Cognition and Memory: Cognition and memory normal.         Judgment: Judgment normal.         Results Review:   Lab Results   Component Value Date    WBC 7.20 07/13/2024    HGB 11.3 (L) 07/13/2024    HCT 35.6 (L) 07/13/2024    MCV 93.2 07/13/2024     07/13/2024     Lab Results   Component Value Date    GLUCOSE 96 07/14/2024    CALCIUM 8.7 07/14/2024     07/14/2024    K 4.0 07/14/2024    CO2 27.0 07/14/2024    CL 99 07/14/2024    BUN 13 07/14/2024    CREATININE 1.42 (H) 07/14/2024    EGFR 52.5 (L) 07/14/2024    BCR 9.2 07/14/2024    ANIONGAP 12.0 07/14/2024     Lab Results   Component Value Date    CHOL 161 07/13/2024    TRIG 148 07/13/2024    HDL 25 (L) 07/13/2024     (H) 07/13/2024     Lab Results   Component Value Date    HGBA1C 6.00 (H) 07/12/2024     Lab Results   Component Value Date    PTT 80.6 (H) 07/14/2024       Medication Review: yes  Current Facility-Administered Medications   Medication Dose Route Frequency Provider Last Rate Last Admin    acetaminophen (TYLENOL) tablet 650 mg  650 mg Oral Q4H PRN Brennan Khoury DO        aspirin EC tablet 81 mg  81 mg Oral Daily Brennan Khoury DO   81 mg at 07/13/24 0826    carvedilol (COREG) tablet 3.125 mg  3.125 mg Oral BID With Meals Brennan Khoury DO   3.125 mg at 07/13/24 1734    [START ON 7/15/2024] furosemide (LASIX) tablet 40 mg  40 mg Oral Daily Brunilda Corral APRN        heparin (porcine) injection 2,500 Units  2,500 Units Intravenous PRN Brennan Khoury DO   2,500 Units at 07/13/24 1359    heparin (porcine) injection 5,000 Units  5,000 Units Intravenous PRN Brennan Khoury,         heparin 18789 units/250 mL (100 units/mL) in 0.45  % NaCl infusion  11.1 Units/kg/hr Intravenous Titrated Brennan Khoury DO 11.9 mL/hr at 07/13/24 1359 13.222 Units/kg/hr at 07/13/24 1359    lisinopril (PRINIVIL,ZESTRIL) tablet 5 mg  5 mg Oral Daily Brennan Khoury, DO   5 mg at 07/13/24 0826    nitroglycerin (NITROSTAT) SL tablet 0.4 mg  0.4 mg Sublingual Q5 Min PRN Brennan Khoury DO        nitroglycerin (TRIDIL) 200 mcg/ml infusion  10-50 mcg/min Intravenous Titrated Brennan Khoury DO   Held at 07/12/24 1621    ondansetron ODT (ZOFRAN-ODT) disintegrating tablet 4 mg  4 mg Oral Q6H PRN Brennan Khoury DO        Or    ondansetron (ZOFRAN) injection 4 mg  4 mg Intravenous Q6H PRN Brennan Khoury DO        ranolazine (RANEXA) 12 hr tablet 500 mg  500 mg Oral BID Brennan Khoury DO   500 mg at 07/13/24 2105    sodium chloride 0.9 % flush 10 mL  10 mL Intravenous Q12H Brennan Khoury, DO   10 mL at 07/13/24 2105    sodium chloride 0.9 % flush 10 mL  10 mL Intravenous PRN Brennan Khoury DO           Assessment & Plan     1.  Multivessel Coronary Artery Disease  2.  LV aneurysm  3.  Ischemic Cardiomyopathy: LVEF 36-40% LVEDP 47  4.  Mitral Valve Regurgitation  5.  Mixed Hyperlipidemia  6.  Tobacco Abuse  7. COLEMAN - likely ADRIANA with recent contrasted studies      NPO after midnight  Stop IV Lasix, change to oral dosing for tomorrow  Strict intake and output documentation  Ongoing workup from CT surgery.  BMP in am.   DC Heparin Gtt tonight - discussed with Dr. Maciel.   Will plan for TERESA on Monday, requested by Dr. Maciel, with his primary cardiologist Dr. Khoury.       Electronically signed by RAMIRO Mattson, 07/14/24, 8:22 AM CDT.

## 2024-07-14 NOTE — PROGRESS NOTES
I tried to do the Spirometry ordered on 7/12, but could not get a correct test result, I felt. I tried several times and patient stated, He was tired and did not want to do this again. He was very nice but did not want to do anymore. I have tx Javier Gutierrez to put this Spirometry on her list for Monday morning to see if she could get a better test on him.

## 2024-07-15 ENCOUNTER — APPOINTMENT (OUTPATIENT)
Dept: CARDIOLOGY | Facility: HOSPITAL | Age: 74
DRG: 287 | End: 2024-07-15
Payer: MEDICARE

## 2024-07-15 ENCOUNTER — APPOINTMENT (OUTPATIENT)
Dept: PULMONOLOGY | Facility: HOSPITAL | Age: 74
DRG: 287 | End: 2024-07-15
Payer: MEDICARE

## 2024-07-15 ENCOUNTER — APPOINTMENT (OUTPATIENT)
Dept: ULTRASOUND IMAGING | Facility: HOSPITAL | Age: 74
DRG: 287 | End: 2024-07-15
Payer: MEDICARE

## 2024-07-15 LAB
ANION GAP SERPL CALCULATED.3IONS-SCNC: 8 MMOL/L (ref 5–15)
APTT PPP: 30.4 SECONDS (ref 24.5–36)
BH CV ECHO MEAS - MR MAX PG: 82.1 MMHG
BH CV ECHO MEAS - MR MAX VEL: 453 CM/SEC
BH CV ECHO MEAS - MR MEAN PG: 53 MMHG
BH CV ECHO MEAS - MR MEAN VEL: 339 CM/SEC
BH CV ECHO MEAS - MR VTI: 180 CM
BH CV ECHO MEAS - MV MAX PG: 2.3 MMHG
BH CV ECHO MEAS - MV MEAN PG: 1 MMHG
BH CV ECHO MEAS - MV V2 VTI: 20 CM
BUN SERPL-MCNC: 12 MG/DL (ref 8–23)
BUN/CREAT SERPL: 8.4 (ref 7–25)
CALCIUM SPEC-SCNC: 8.6 MG/DL (ref 8.6–10.5)
CHLORIDE SERPL-SCNC: 101 MMOL/L (ref 98–107)
CO2 SERPL-SCNC: 28 MMOL/L (ref 22–29)
CREAT SERPL-MCNC: 1.43 MG/DL (ref 0.76–1.27)
EGFRCR SERPLBLD CKD-EPI 2021: 52.1 ML/MIN/1.73
GLUCOSE SERPL-MCNC: 103 MG/DL (ref 65–99)
POTASSIUM SERPL-SCNC: 4 MMOL/L (ref 3.5–5.2)
SODIUM SERPL-SCNC: 137 MMOL/L (ref 136–145)

## 2024-07-15 PROCEDURE — 93325 DOPPLER ECHO COLOR FLOW MAPG: CPT | Performed by: EMERGENCY MEDICINE

## 2024-07-15 PROCEDURE — 93312 ECHO TRANSESOPHAGEAL: CPT

## 2024-07-15 PROCEDURE — 94010 BREATHING CAPACITY TEST: CPT

## 2024-07-15 PROCEDURE — 93970 EXTREMITY STUDY: CPT

## 2024-07-15 PROCEDURE — 93880 EXTRACRANIAL BILAT STUDY: CPT

## 2024-07-15 PROCEDURE — 93321 DOPPLER ECHO F-UP/LMTD STD: CPT | Performed by: EMERGENCY MEDICINE

## 2024-07-15 PROCEDURE — 93312 ECHO TRANSESOPHAGEAL: CPT | Performed by: EMERGENCY MEDICINE

## 2024-07-15 PROCEDURE — 80048 BASIC METABOLIC PNL TOTAL CA: CPT | Performed by: NURSE PRACTITIONER

## 2024-07-15 PROCEDURE — 94010 BREATHING CAPACITY TEST: CPT | Performed by: INTERNAL MEDICINE

## 2024-07-15 PROCEDURE — 85730 THROMBOPLASTIN TIME PARTIAL: CPT | Performed by: EMERGENCY MEDICINE

## 2024-07-15 PROCEDURE — 25010000002 MIDAZOLAM HCL (PF) 5 MG/5ML SOLUTION: Performed by: EMERGENCY MEDICINE

## 2024-07-15 PROCEDURE — 93321 DOPPLER ECHO F-UP/LMTD STD: CPT

## 2024-07-15 PROCEDURE — 93325 DOPPLER ECHO COLOR FLOW MAPG: CPT

## 2024-07-15 PROCEDURE — 25010000002 FENTANYL CITRATE (PF) 50 MCG/ML SOLUTION: Performed by: EMERGENCY MEDICINE

## 2024-07-15 RX ORDER — MIDAZOLAM HYDROCHLORIDE 5 MG/5ML
INJECTION, SOLUTION INTRAMUSCULAR; INTRAVENOUS
Status: COMPLETED | OUTPATIENT
Start: 2024-07-15 | End: 2024-07-15

## 2024-07-15 RX ORDER — FENTANYL CITRATE 50 UG/ML
INJECTION, SOLUTION INTRAMUSCULAR; INTRAVENOUS
Status: COMPLETED | OUTPATIENT
Start: 2024-07-15 | End: 2024-07-15

## 2024-07-15 RX ADMIN — FUROSEMIDE 40 MG: 40 TABLET ORAL at 08:05

## 2024-07-15 RX ADMIN — RANOLAZINE 500 MG: 500 TABLET, FILM COATED, EXTENDED RELEASE ORAL at 08:05

## 2024-07-15 RX ADMIN — MIDAZOLAM HYDROCHLORIDE 1 MG: 1 INJECTION, SOLUTION INTRAMUSCULAR; INTRAVENOUS at 15:26

## 2024-07-15 RX ADMIN — CARVEDILOL 3.12 MG: 3.12 TABLET, FILM COATED ORAL at 08:05

## 2024-07-15 RX ADMIN — FENTANYL CITRATE 25 MCG: 50 INJECTION, SOLUTION INTRAMUSCULAR; INTRAVENOUS at 15:29

## 2024-07-15 RX ADMIN — FENTANYL CITRATE 25 MCG: 50 INJECTION, SOLUTION INTRAMUSCULAR; INTRAVENOUS at 15:27

## 2024-07-15 RX ADMIN — MIDAZOLAM HYDROCHLORIDE 2 MG: 1 INJECTION, SOLUTION INTRAMUSCULAR; INTRAVENOUS at 15:23

## 2024-07-15 RX ADMIN — MIDAZOLAM HYDROCHLORIDE 1 MG: 1 INJECTION, SOLUTION INTRAMUSCULAR; INTRAVENOUS at 15:45

## 2024-07-15 RX ADMIN — Medication 10 ML: at 22:20

## 2024-07-15 RX ADMIN — LISINOPRIL 5 MG: 5 TABLET ORAL at 08:05

## 2024-07-15 RX ADMIN — FENTANYL CITRATE 25 MCG: 50 INJECTION, SOLUTION INTRAMUSCULAR; INTRAVENOUS at 15:45

## 2024-07-15 RX ADMIN — RANOLAZINE 500 MG: 500 TABLET, FILM COATED, EXTENDED RELEASE ORAL at 21:56

## 2024-07-15 RX ADMIN — FENTANYL CITRATE 50 MCG: 50 INJECTION, SOLUTION INTRAMUSCULAR; INTRAVENOUS at 15:24

## 2024-07-15 RX ADMIN — FENTANYL CITRATE 25 MCG: 50 INJECTION, SOLUTION INTRAMUSCULAR; INTRAVENOUS at 15:34

## 2024-07-15 RX ADMIN — MIDAZOLAM HYDROCHLORIDE 1 MG: 1 INJECTION, SOLUTION INTRAMUSCULAR; INTRAVENOUS at 15:30

## 2024-07-15 RX ADMIN — MIDAZOLAM HYDROCHLORIDE 1 MG: 1 INJECTION, SOLUTION INTRAMUSCULAR; INTRAVENOUS at 15:29

## 2024-07-15 RX ADMIN — CARVEDILOL 3.12 MG: 3.12 TABLET, FILM COATED ORAL at 17:26

## 2024-07-15 RX ADMIN — ASPIRIN 81 MG: 81 TABLET, COATED ORAL at 08:05

## 2024-07-15 RX ADMIN — FENTANYL CITRATE 25 MCG: 50 INJECTION, SOLUTION INTRAMUSCULAR; INTRAVENOUS at 15:30

## 2024-07-15 RX ADMIN — FENTANYL CITRATE 25 MCG: 50 INJECTION, SOLUTION INTRAMUSCULAR; INTRAVENOUS at 15:25

## 2024-07-15 RX ADMIN — MIDAZOLAM HYDROCHLORIDE 1 MG: 1 INJECTION, SOLUTION INTRAMUSCULAR; INTRAVENOUS at 15:34

## 2024-07-15 RX ADMIN — MIDAZOLAM HYDROCHLORIDE 1 MG: 1 INJECTION, SOLUTION INTRAMUSCULAR; INTRAVENOUS at 15:55

## 2024-07-15 RX ADMIN — MIDAZOLAM HYDROCHLORIDE 2 MG: 1 INJECTION, SOLUTION INTRAMUSCULAR; INTRAVENOUS at 15:27

## 2024-07-15 RX ADMIN — Medication 10 ML: at 08:06

## 2024-07-15 NOTE — H&P (VIEW-ONLY)
"Patient name: Vj Garcia  Patient : 1950  VISIT # [id number]  MR #[id number]    Procedure:Procedure(s):  Left Heart Cath  Procedure Date:2024  POD:4 Days Post-Op    Subjective   Chest pain      Afebrile.  On room air and ambulating in the hallway.   He underwent TERESA yesterday and this has been reviewed by Dr. Maciel.  He is hopeful for discharge home today.  No chest pain.    Telemetry: Sinus, 60s    ROS: No fevers or chills.  No chest pain.  No shortness of breath.         Objective     Visit Vitals  /55 (BP Location: Left arm, Patient Position: Lying)   Pulse 65   Temp 98.1 °F (36.7 °C) (Oral)   Resp 16   Ht 177.2 cm (69.75\")   Wt 90 kg (198 lb 6.4 oz)   SpO2 94%   BMI 28.67 kg/m²       Intake/Output Summary (Last 24 hours) at 2024 0753  Last data filed at 2024 0305  Gross per 24 hour   Intake 120 ml   Output 300 ml   Net -180 ml       LABS:    BMP  Glucose   Date/Time Value Ref Range Status   07/15/2024 0655 103 (H) 65 - 99 mg/dL Final     BUN   Date/Time Value Ref Range Status   07/15/2024 0655 12 8 - 23 mg/dL Final     Creatinine   Date/Time Value Ref Range Status   07/15/2024 0655 1.43 (H) 0.76 - 1.27 mg/dL Final     Sodium   Date/Time Value Ref Range Status   07/15/2024 0655 137 136 - 145 mmol/L Final     Potassium   Date/Time Value Ref Range Status   07/15/2024 0655 4.0 3.5 - 5.2 mmol/L Final     Chloride   Date/Time Value Ref Range Status   07/15/2024 0655 101 98 - 107 mmol/L Final     CO2   Date/Time Value Ref Range Status   07/15/2024 0655 28.0 22.0 - 29.0 mmol/L Final     Calcium   Date/Time Value Ref Range Status   07/15/2024 0655 8.6 8.6 - 10.5 mg/dL Final     BUN/Creatinine Ratio   Date/Time Value Ref Range Status   07/15/2024 0655 8.4 7.0 - 25.0 Final     Anion Gap   Date/Time Value Ref Range Status   07/15/2024 0655 8.0 5.0 - 15.0 mmol/L Final     eGFR   Date/Time Value Ref Range Status   07/15/2024 0655 52.1 (L) >60.0 mL/min/1.73 Final   IMAGES:       Imaging " Results (Last 24 Hours)       Procedure Component Value Units Date/Time    US Carotid Bilateral [[id number]] Collected: 07/15/24 1032     Updated: 07/15/24 1036    Narrative:      US CAROTID BILATERAL-     HISTORY: Preoperative exam     7/15/2024 10:32 AM     FINDINGS:  Real time ultrasound with the assistance of color and spectral Doppler  demonstrates scattered areas of calcified plaque bilaterally.      Peak systolic velocities yield ICA/CCA peak systolic velocity ratios of  1.2 on the right and 1.0 on the left.       Peak systolic velocities within the right CCA, ECA, and ICA are as  follows: 83.5, 80.9 and 96.4 cm/s.       Peak systolic velocities within the left CCA, ECA, and ICA are as  follows: 83.7, 107.8 and 79.8 cm/s.       Antegrade vertebral artery flow is seen bilaterally.     These images and the report are permanently stored in the PACS system.       Impression:      No hemodynamically significant ICA stenosis per Society of Radiologists  in Ultrasound Consensus Criteria.    See below.        Consensus Panel Gray-Scale and Doppler US Criteria for Diagnosis of ICA  Stenosis:                                                                        Primary Parameters                                         Additional  Parameters     Degree of                                ICA PSV                Plaque  Estimate                 ICA/CCA PSV              ICA EDV  Stenosis (%)                          (cm/sec)                            (%)*                                     Ratio                       (cm/sec)  ________________________________________________________________________  _________________     Normal                                         <125                               None                                      <2.0                             <40     <50                                               <125                                <50                                       <2.0                              <40     50-69                                        125-230                            >/=50                                    2.0-4.0                             >/=70 but less than near             >230                              >/=50                                      >4.0                            >100       occlusion     Near occlusion                        High, low, or                      Visible                                  Variable                      Variable                                                    undetectable     Total occlusion                        Undetectable           Visible,  no detectable                     N/A                             N/A                                                                                                 lumen              This report was signed and finalized on 7/15/2024 10:33 AM by Dr. Vince Garrett MD.       US Vein Mapping Bilateral [[id number]] Collected: 07/15/24 1026     Updated: 07/15/24 1033    Narrative:      EXAMINATION: US VEIN MAPPING BILATERAL-  7/15/2024 10:26 AM     HISTORY: Preoperative planning. Coronary artery disease.     FINDINGS: Sonography was performed of the greater saphenous vein  bilaterally for assessment of patency and size. No evidence of  thrombosis.        GSV diameters:     Right SFJ-0.48 cm     Right upper thigh 0.33 cm     Right mid thigh-0.33 cm     Right lower thigh-0.28 cm     Right knee-0.26 cm     Right upper calf-0.22 cm     Right mid calf-0.22 cm     Right ankle-0.20 cm     Left GSV diameters:     Left SFJ-0.42 cm     Left upper thigh-0.39 cm     Left mid thigh-0.24 cm     Left lower thigh-0.22 cm     Left knee-0.19 cm     Left upper calf-0.20 cm      Left mid calf-0.22 cm     Left ankle-0.21 cm       Impression:      1. The greater saphenous vein is patent bilaterally. Diameters of the  vein are measured from the SFJ to the ankle bilaterally with  measurements as  above.     This report was signed and finalized on 7/15/2024 10:30 AM by Dr. Vince Garrett MD.               Physical Exam:  General: Alert, oriented. No apparent distress.   Cardiovascular: Regular rate and rhythm without murmur, rubs, or gallops.    Pulmonary: Clear to auscultation bilaterally without wheezing, rubs, or rales.  Abdomen: Soft, nondistended, and nontender.  Extremities: Warm, moves all extremities. No edema.    Neurologic:  Grossly intact with no focal deficits.          Impression:  Multivessel coronary artery disease  LV aneurysm  Mitral valve regurgitation    CAD (coronary artery disease)    Unstable angina    Ischemic cardiomyopathy  Chronic tobacco use  Recent myocardial infarction        Plan:  TERESA results discussed with patient and his wife in detail by Dr. Maciel.  Plan on OR 7/23 for CABG, LV aneurysm repair, possible mitral valve repair/replacement, LAAE by Dr. Maciel  Discussed with patient and family and they are agreeable  Okay for discharge home from CT surgery standpoint when okay with cardiology      RAMIRO Holbrook  07/16/24  07:53 CDT

## 2024-07-15 NOTE — CASE MANAGEMENT/SOCIAL WORK
Discharge Planning Assessment  [facility]     Patient Name: Vj Garcia  MRN: [id number]  Today's Date: 7/15/2024    Admit Date: 7/12/2024        Discharge Needs Assessment       Row Name 07/15/24 1326       Discharge Needs Assessment    Discharge Coordination/Progress Down for stress test. Unable to assess.                   Discharge Plan    No documentation.                 Continued Care and Services - Admitted Since 7/12/2024    No active coordination exists for this encounter.          Demographic Summary    No documentation.                  Functional Status    No documentation.                  Psychosocial    No documentation.                  Abuse/Neglect    No documentation.                  Legal    No documentation.                  Substance Abuse    No documentation.                  Patient Forms    No documentation.                     Jack Cervantes RN

## 2024-07-15 NOTE — PROGRESS NOTES
"Patient name: Vj Garcia  Patient : 1950  VISIT # [id number]  MR #[id number]    Procedure:Procedure(s):  Left Heart Cath  Procedure Date:2024  POD:4 Days Post-Op    Subjective   Chest pain      Afebrile.  On room air and ambulating in the hallway.   He underwent TERESA yesterday and this has been reviewed by Dr. Maciel.  He is hopeful for discharge home today.  No chest pain.    Telemetry: Sinus, 60s    ROS: No fevers or chills.  No chest pain.  No shortness of breath.         Objective     Visit Vitals  /55 (BP Location: Left arm, Patient Position: Lying)   Pulse 65   Temp 98.1 °F (36.7 °C) (Oral)   Resp 16   Ht 177.2 cm (69.75\")   Wt 90 kg (198 lb 6.4 oz)   SpO2 94%   BMI 28.67 kg/m²       Intake/Output Summary (Last 24 hours) at 2024 0753  Last data filed at 2024 0305  Gross per 24 hour   Intake 120 ml   Output 300 ml   Net -180 ml       LABS:    BMP  Glucose   Date/Time Value Ref Range Status   07/15/2024 0655 103 (H) 65 - 99 mg/dL Final     BUN   Date/Time Value Ref Range Status   07/15/2024 0655 12 8 - 23 mg/dL Final     Creatinine   Date/Time Value Ref Range Status   07/15/2024 0655 1.43 (H) 0.76 - 1.27 mg/dL Final     Sodium   Date/Time Value Ref Range Status   07/15/2024 0655 137 136 - 145 mmol/L Final     Potassium   Date/Time Value Ref Range Status   07/15/2024 0655 4.0 3.5 - 5.2 mmol/L Final     Chloride   Date/Time Value Ref Range Status   07/15/2024 0655 101 98 - 107 mmol/L Final     CO2   Date/Time Value Ref Range Status   07/15/2024 0655 28.0 22.0 - 29.0 mmol/L Final     Calcium   Date/Time Value Ref Range Status   07/15/2024 0655 8.6 8.6 - 10.5 mg/dL Final     BUN/Creatinine Ratio   Date/Time Value Ref Range Status   07/15/2024 0655 8.4 7.0 - 25.0 Final     Anion Gap   Date/Time Value Ref Range Status   07/15/2024 0655 8.0 5.0 - 15.0 mmol/L Final     eGFR   Date/Time Value Ref Range Status   07/15/2024 0655 52.1 (L) >60.0 mL/min/1.73 Final   IMAGES:       Imaging " Results (Last 24 Hours)       Procedure Component Value Units Date/Time    US Carotid Bilateral [[id number]] Collected: 07/15/24 1032     Updated: 07/15/24 1036    Narrative:      US CAROTID BILATERAL-     HISTORY: Preoperative exam     7/15/2024 10:32 AM     FINDINGS:  Real time ultrasound with the assistance of color and spectral Doppler  demonstrates scattered areas of calcified plaque bilaterally.      Peak systolic velocities yield ICA/CCA peak systolic velocity ratios of  1.2 on the right and 1.0 on the left.       Peak systolic velocities within the right CCA, ECA, and ICA are as  follows: 83.5, 80.9 and 96.4 cm/s.       Peak systolic velocities within the left CCA, ECA, and ICA are as  follows: 83.7, 107.8 and 79.8 cm/s.       Antegrade vertebral artery flow is seen bilaterally.     These images and the report are permanently stored in the PACS system.       Impression:      No hemodynamically significant ICA stenosis per Society of Radiologists  in Ultrasound Consensus Criteria.    See below.        Consensus Panel Gray-Scale and Doppler US Criteria for Diagnosis of ICA  Stenosis:                                                                        Primary Parameters                                         Additional  Parameters     Degree of                                ICA PSV                Plaque  Estimate                 ICA/CCA PSV              ICA EDV  Stenosis (%)                          (cm/sec)                            (%)*                                     Ratio                       (cm/sec)  ________________________________________________________________________  _________________     Normal                                         <125                               None                                      <2.0                             <40     <50                                               <125                                <50                                       <2.0                              <40     50-69                                        125-230                            >/=50                                    2.0-4.0                             >/=70 but less than near             >230                              >/=50                                      >4.0                            >100       occlusion     Near occlusion                        High, low, or                      Visible                                  Variable                      Variable                                                    undetectable     Total occlusion                        Undetectable           Visible,  no detectable                     N/A                             N/A                                                                                                 lumen              This report was signed and finalized on 7/15/2024 10:33 AM by Dr. Vince Garrett MD.       US Vein Mapping Bilateral [[id number]] Collected: 07/15/24 1026     Updated: 07/15/24 1033    Narrative:      EXAMINATION: US VEIN MAPPING BILATERAL-  7/15/2024 10:26 AM     HISTORY: Preoperative planning. Coronary artery disease.     FINDINGS: Sonography was performed of the greater saphenous vein  bilaterally for assessment of patency and size. No evidence of  thrombosis.        GSV diameters:     Right SFJ-0.48 cm     Right upper thigh 0.33 cm     Right mid thigh-0.33 cm     Right lower thigh-0.28 cm     Right knee-0.26 cm     Right upper calf-0.22 cm     Right mid calf-0.22 cm     Right ankle-0.20 cm     Left GSV diameters:     Left SFJ-0.42 cm     Left upper thigh-0.39 cm     Left mid thigh-0.24 cm     Left lower thigh-0.22 cm     Left knee-0.19 cm     Left upper calf-0.20 cm      Left mid calf-0.22 cm     Left ankle-0.21 cm       Impression:      1. The greater saphenous vein is patent bilaterally. Diameters of the  vein are measured from the SFJ to the ankle bilaterally with  measurements as  above.     This report was signed and finalized on 7/15/2024 10:30 AM by Dr. Vince Garrett MD.               Physical Exam:  General: Alert, oriented. No apparent distress.   Cardiovascular: Regular rate and rhythm without murmur, rubs, or gallops.    Pulmonary: Clear to auscultation bilaterally without wheezing, rubs, or rales.  Abdomen: Soft, nondistended, and nontender.  Extremities: Warm, moves all extremities. No edema.    Neurologic:  Grossly intact with no focal deficits.          Impression:  Multivessel coronary artery disease  LV aneurysm  Mitral valve regurgitation    CAD (coronary artery disease)    Unstable angina    Ischemic cardiomyopathy  Chronic tobacco use  Recent myocardial infarction        Plan:  TERESA results discussed with patient and his wife in detail by Dr. Maciel.  Plan on OR 7/23 for CABG, LV aneurysm repair, possible mitral valve repair/replacement, LAAE by Dr. Maciel  Discussed with patient and family and they are agreeable  Okay for discharge home from CT surgery standpoint when okay with cardiology      RAMIRO Holbrook  07/16/24  07:53 CDT

## 2024-07-15 NOTE — PLAN OF CARE
Goal Outcome Evaluation:              Outcome Evaluation: Pt. with no c/o pain this shift. VSS. SB/S on tele 54-84. NPO since midnight for TERESA scheduled today. Safety maintained. Care ongoing.

## 2024-07-16 ENCOUNTER — TELEPHONE (OUTPATIENT)
Dept: CARDIAC SURGERY | Facility: CLINIC | Age: 74
End: 2024-07-16
Payer: MEDICARE

## 2024-07-16 ENCOUNTER — READMISSION MANAGEMENT (OUTPATIENT)
Dept: CALL CENTER | Facility: HOSPITAL | Age: 74
End: 2024-07-16
Payer: MEDICARE

## 2024-07-16 ENCOUNTER — PREP FOR SURGERY (OUTPATIENT)
Dept: OTHER | Facility: HOSPITAL | Age: 74
End: 2024-07-16
Payer: MEDICARE

## 2024-07-16 VITALS
RESPIRATION RATE: 16 BRPM | HEART RATE: 58 BPM | TEMPERATURE: 97.6 F | DIASTOLIC BLOOD PRESSURE: 73 MMHG | HEIGHT: 70 IN | WEIGHT: 198.4 LBS | SYSTOLIC BLOOD PRESSURE: 125 MMHG | BODY MASS INDEX: 28.4 KG/M2 | OXYGEN SATURATION: 99 %

## 2024-07-16 DIAGNOSIS — R06.02 SOB (SHORTNESS OF BREATH): ICD-10-CM

## 2024-07-16 DIAGNOSIS — I20.0 UNSTABLE ANGINA: Primary | ICD-10-CM

## 2024-07-16 LAB
ANION GAP SERPL CALCULATED.3IONS-SCNC: 12 MMOL/L (ref 5–15)
APTT PPP: 31.7 SECONDS (ref 24.5–36)
BASOPHILS # BLD AUTO: 0.03 10*3/MM3 (ref 0–0.2)
BASOPHILS NFR BLD AUTO: 0.4 % (ref 0–1.5)
BUN SERPL-MCNC: 17 MG/DL (ref 8–23)
BUN/CREAT SERPL: 10.8 (ref 7–25)
CALCIUM SPEC-SCNC: 9.2 MG/DL (ref 8.6–10.5)
CHLORIDE SERPL-SCNC: 97 MMOL/L (ref 98–107)
CO2 SERPL-SCNC: 27 MMOL/L (ref 22–29)
CREAT SERPL-MCNC: 1.58 MG/DL (ref 0.76–1.27)
DEPRECATED RDW RBC AUTO: 47 FL (ref 37–54)
EGFRCR SERPLBLD CKD-EPI 2021: 46.2 ML/MIN/1.73
EOSINOPHIL # BLD AUTO: 0.3 10*3/MM3 (ref 0–0.4)
EOSINOPHIL NFR BLD AUTO: 4.3 % (ref 0.3–6.2)
ERYTHROCYTE [DISTWIDTH] IN BLOOD BY AUTOMATED COUNT: 13.9 % (ref 12.3–15.4)
GLUCOSE SERPL-MCNC: 170 MG/DL (ref 65–99)
HCT VFR BLD AUTO: 38.3 % (ref 37.5–51)
HGB BLD-MCNC: 12.1 G/DL (ref 13–17.7)
IMM GRANULOCYTES # BLD AUTO: 0.02 10*3/MM3 (ref 0–0.05)
IMM GRANULOCYTES NFR BLD AUTO: 0.3 % (ref 0–0.5)
LYMPHOCYTES # BLD AUTO: 1.84 10*3/MM3 (ref 0.7–3.1)
LYMPHOCYTES NFR BLD AUTO: 26.6 % (ref 19.6–45.3)
MCH RBC QN AUTO: 29.2 PG (ref 26.6–33)
MCHC RBC AUTO-ENTMCNC: 31.6 G/DL (ref 31.5–35.7)
MCV RBC AUTO: 92.3 FL (ref 79–97)
MONOCYTES # BLD AUTO: 0.56 10*3/MM3 (ref 0.1–0.9)
MONOCYTES NFR BLD AUTO: 8.1 % (ref 5–12)
NEUTROPHILS NFR BLD AUTO: 4.18 10*3/MM3 (ref 1.7–7)
NEUTROPHILS NFR BLD AUTO: 60.3 % (ref 42.7–76)
NRBC BLD AUTO-RTO: 0 /100 WBC (ref 0–0.2)
PLATELET # BLD AUTO: 196 10*3/MM3 (ref 140–450)
PMV BLD AUTO: 9.8 FL (ref 6–12)
POTASSIUM SERPL-SCNC: 4 MMOL/L (ref 3.5–5.2)
RBC # BLD AUTO: 4.15 10*6/MM3 (ref 4.14–5.8)
SODIUM SERPL-SCNC: 136 MMOL/L (ref 136–145)
WBC NRBC COR # BLD AUTO: 6.93 10*3/MM3 (ref 3.4–10.8)

## 2024-07-16 PROCEDURE — 99233 SBSQ HOSP IP/OBS HIGH 50: CPT | Performed by: SURGERY

## 2024-07-16 PROCEDURE — 99239 HOSP IP/OBS DSCHRG MGMT >30: CPT | Performed by: NURSE PRACTITIONER

## 2024-07-16 PROCEDURE — 85730 THROMBOPLASTIN TIME PARTIAL: CPT | Performed by: EMERGENCY MEDICINE

## 2024-07-16 PROCEDURE — 80048 BASIC METABOLIC PNL TOTAL CA: CPT | Performed by: SURGERY

## 2024-07-16 PROCEDURE — 85025 COMPLETE CBC W/AUTO DIFF WBC: CPT | Performed by: EMERGENCY MEDICINE

## 2024-07-16 RX ORDER — SODIUM CHLORIDE 9 MG/ML
40 INJECTION, SOLUTION INTRAVENOUS AS NEEDED
OUTPATIENT
Start: 2024-07-16

## 2024-07-16 RX ORDER — DEXTROSE MONOHYDRATE 25 G/50ML
10-50 INJECTION, SOLUTION INTRAVENOUS
OUTPATIENT
Start: 2024-07-16

## 2024-07-16 RX ORDER — SODIUM CHLORIDE 0.9 % (FLUSH) 0.9 %
10 SYRINGE (ML) INJECTION EVERY 12 HOURS SCHEDULED
OUTPATIENT
Start: 2024-07-16

## 2024-07-16 RX ORDER — SODIUM CHLORIDE 0.9 % (FLUSH) 0.9 %
30 SYRINGE (ML) INJECTION ONCE AS NEEDED
OUTPATIENT
Start: 2024-07-16

## 2024-07-16 RX ORDER — SODIUM CHLORIDE 9 MG/ML
30 INJECTION, SOLUTION INTRAVENOUS CONTINUOUS PRN
OUTPATIENT
Start: 2024-07-16

## 2024-07-16 RX ORDER — IBUPROFEN 600 MG/1
1 TABLET ORAL
OUTPATIENT
Start: 2024-07-16

## 2024-07-16 RX ORDER — ATORVASTATIN CALCIUM 40 MG/1
40 TABLET, FILM COATED ORAL DAILY
Qty: 30 TABLET | Refills: 11 | Status: SHIPPED | OUTPATIENT
Start: 2024-07-16

## 2024-07-16 RX ORDER — FUROSEMIDE 40 MG/1
40 TABLET ORAL DAILY
Qty: 30 TABLET | Refills: 11 | Status: SHIPPED | OUTPATIENT
Start: 2024-07-17 | End: 2024-07-29 | Stop reason: HOSPADM

## 2024-07-16 RX ORDER — SODIUM CHLORIDE 0.9 % (FLUSH) 0.9 %
10 SYRINGE (ML) INJECTION AS NEEDED
OUTPATIENT
Start: 2024-07-16

## 2024-07-16 RX ORDER — NICOTINE POLACRILEX 4 MG
15 LOZENGE BUCCAL
OUTPATIENT
Start: 2024-07-16

## 2024-07-16 RX ORDER — NITROGLYCERIN 0.4 MG/1
0.4 TABLET SUBLINGUAL
Qty: 25 TABLET | Refills: 2 | Status: SHIPPED | OUTPATIENT
Start: 2024-07-16 | End: 2024-07-29 | Stop reason: HOSPADM

## 2024-07-16 RX ORDER — ACETAMINOPHEN 500 MG
1000 TABLET ORAL ONCE
OUTPATIENT
Start: 2024-07-23

## 2024-07-16 RX ADMIN — Medication 10 ML: at 08:38

## 2024-07-16 RX ADMIN — ASPIRIN 81 MG: 81 TABLET, COATED ORAL at 08:34

## 2024-07-16 RX ADMIN — LISINOPRIL 5 MG: 5 TABLET ORAL at 08:35

## 2024-07-16 RX ADMIN — CARVEDILOL 3.12 MG: 3.12 TABLET, FILM COATED ORAL at 08:34

## 2024-07-16 RX ADMIN — RANOLAZINE 500 MG: 500 TABLET, FILM COATED, EXTENDED RELEASE ORAL at 08:34

## 2024-07-16 RX ADMIN — FUROSEMIDE 40 MG: 40 TABLET ORAL at 08:34

## 2024-07-16 NOTE — TELEPHONE ENCOUNTER
Surgery orders are in for 7/23/2024.  Please call patient with Prework information.  Confirm he does not take anticoagulation or Plavix.  He has dentures.

## 2024-07-16 NOTE — DISCHARGE SUMMARY
Date of Discharge:  2024    Discharge Diagnosis:   CAD (coronary artery disease)    Unstable angina    Ischemic cardiomyopathy      Presenting Problem/History of Present Illness  Unstable angina [I20.0]  Ischemic cardiomyopathy [I25.5]  CAD (coronary artery disease) [I25.10]    History of Present Illness  The patient is a 74-year-old male who presents for evaluation of multiple medical concerns.     The patient, a farmer, experienced an episode of dizziness approximately 5 weeks ago, which he initially attributed to a heat stroke. He recalls an incident where he experienced profuse sweating after exiting a tractor to retrieve mud. He was unable to rise from the ground and had to brace himself against the tire. Upon regaining consciousness, he experienced dizziness and near syncope. He sought medical attention at a walk-in clinic the following morning, where he was advised to seek emergency medical attention. Upon arrival at the emergency room, he was diagnosed with dehydration and received intravenous fluids. He was subsequently referred to Dr. Pedro for an echocardiogram in [facility]. His blood work was conducted, and he was diagnosed with anemia. He was prescribed iron pills, which he took for a week, but discontinued due to constipation. He denies any history of myocardial infarction. He denies experiencing chest tightness or pressure on the day of the episode. He reports mild arm pain. He began experiencing chest pressure 3 days ago, which prompted him to seek emergency care. He has been experiencing dizziness for the past week, which lasts for approximately 10 minutes. He also reports watery eyes, blurred vision, and sweating.   He has been a smoker for 50 years.   His mother had a heart attack in her 80s. His father  of cancer at 57.  Assessment & Plan  1. Suspected myocardial infarction.  The patient's symptoms, including dizziness, lightheadedness, and weakness, suggest a possible myocardial  infarction. His BNP level is elevated, indicating a weakened heart. A heart catheterization has been scheduled for Friday. The patient has been advised to resume daily baby aspirin and multivitamins. Pravachol 40 has been prescribed, along with Coreg, lisinopril, and Ranexa.       Hospital Course  Patient presented on 7/12 for a diagnostic LHC. 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 He CT surgery was consulted for consideration of CABG and he was started on IV lasix. 2D echo was obtained and revealed EF 36-40%, moderate-severe MR, moderate pericardial effusion and aneurysmal basal inferior and basal inferoseptal walls. A TERESA was recommended per CT surgery to assess the degree of MR and location of the aneurysm. This was completed yesterday which revealed severe MR and aneurysmal basal inferior and inferoseptal wall. He has been cleared by CT surgery to be discharged home with tentative plans for CABG, LV aneurysm repair, possible mitral valve repair/replacement and LAAE on 7/23 per Dr. Maciel. He reports he feels well and is eager to go home. Labs and vitals are stable.     Procedures Performed  Procedure(s):  Left Heart Cath       Review of Systems   Constitutional:  Negative for chills, diaphoresis, fatigue and unexpected weight change.   HENT:  Negative for nosebleeds.    Respiratory:  Negative for cough, chest tightness, shortness of breath and wheezing.    Cardiovascular:  Negative for chest pain, palpitations and leg swelling.   Gastrointestinal:  Negative for anal bleeding, blood in stool, diarrhea and nausea.   Neurological:  Negative for dizziness, syncope and light-headedness.   All other systems reviewed and are negative.      Consults:   Consults       Date and Time Order Name Status Description    7/12/2024  2:52 PM  Inpatient Cardiothoracic Surgery Consult              Pertinent Test Results:  Lab Results (last 72 hours)       Procedure Component Value Units Date/Time    Basic Metabolic Panel [[id number]]  (Abnormal) Collected: 07/16/24 0812    Specimen: Blood Updated: 07/16/24 0925     Glucose 170 mg/dL      BUN 17 mg/dL      Creatinine 1.58 mg/dL      Sodium 136 mmol/L      Potassium 4.0 mmol/L      Chloride 97 mmol/L      CO2 27.0 mmol/L      Calcium 9.2 mg/dL      BUN/Creatinine Ratio 10.8     Anion Gap 12.0 mmol/L      eGFR 46.2 mL/min/1.73     Narrative:      GFR Normal >60  Chronic Kidney Disease <60  Kidney Failure <15    The GFR formula is only valid for adults with stable renal function between ages 18 and 70.    aPTT [[id number]]  (Normal) Collected: 07/16/24 0244    Specimen: Blood Updated: 07/16/24 0332     PTT 31.7 seconds     CBC & Differential [[id number]]  (Abnormal) Collected: 07/16/24 0244    Specimen: Blood Updated: 07/16/24 0322    Narrative:      The following orders were created for panel order CBC & Differential.  Procedure                               Abnormality         Status                     ---------                               -----------         ------                     CBC Auto Differential[[id number]]        Abnormal            Final result                 Please view results for these tests on the individual orders.    CBC Auto Differential [[id number]]  (Abnormal) Collected: 07/16/24 0244    Specimen: Blood Updated: 07/16/24 0322     WBC 6.93 10*3/mm3      RBC 4.15 10*6/mm3      Hemoglobin 12.1 g/dL      Hematocrit 38.3 %      MCV 92.3 fL      MCH 29.2 pg      MCHC 31.6 g/dL      RDW 13.9 %      RDW-SD 47.0 fl      MPV 9.8 fL      Platelets 196 10*3/mm3      Neutrophil % 60.3 %      Lymphocyte % 26.6 %      Monocyte % 8.1 %      Eosinophil % 4.3 %      Basophil % 0.4 %      Immature Grans % 0.3 %      Neutrophils, Absolute 4.18 10*3/mm3      Lymphocytes, Absolute 1.84 10*3/mm3       Monocytes, Absolute 0.56 10*3/mm3      Eosinophils, Absolute 0.30 10*3/mm3      Basophils, Absolute 0.03 10*3/mm3      Immature Grans, Absolute 0.02 10*3/mm3      nRBC 0.0 /100 WBC     aPTT [[id number]]  (Normal) Collected: 07/15/24 0655    Specimen: Blood Updated: 07/15/24 0808     PTT 30.4 seconds     Basic Metabolic Panel [[id number]]  (Abnormal) Collected: 07/15/24 0655    Specimen: Blood Updated: 07/15/24 0737     Glucose 103 mg/dL      BUN 12 mg/dL      Creatinine 1.43 mg/dL      Sodium 137 mmol/L      Potassium 4.0 mmol/L      Chloride 101 mmol/L      CO2 28.0 mmol/L      Calcium 8.6 mg/dL      BUN/Creatinine Ratio 8.4     Anion Gap 8.0 mmol/L      eGFR 52.1 mL/min/1.73     Narrative:      GFR Normal >60  Chronic Kidney Disease <60  Kidney Failure <15    The GFR formula is only valid for adults with stable renal function between ages 18 and 70.    Comprehensive Metabolic Panel [[id number]]  (Abnormal) Collected: 07/14/24 0320    Specimen: Blood Updated: 07/14/24 0437     Glucose 96 mg/dL      BUN 13 mg/dL      Creatinine 1.42 mg/dL      Sodium 138 mmol/L      Potassium 4.0 mmol/L      Chloride 99 mmol/L      CO2 27.0 mmol/L      Calcium 8.7 mg/dL      Total Protein 6.7 g/dL      Albumin 3.6 g/dL      ALT (SGPT) 13 U/L      AST (SGOT) 17 U/L      Alkaline Phosphatase 78 U/L      Total Bilirubin 0.5 mg/dL      Globulin 3.1 gm/dL      A/G Ratio 1.2 g/dL      BUN/Creatinine Ratio 9.2     Anion Gap 12.0 mmol/L      eGFR 52.5 mL/min/1.73     Narrative:      GFR Normal >60  Chronic Kidney Disease <60  Kidney Failure <15    The GFR formula is only valid for adults with stable renal function between ages 18 and 70.    aPTT [[id number]]  (Abnormal) Collected: 07/14/24 0015    Specimen: Blood Updated: 07/14/24 0047     PTT 80.6 seconds     aPTT [[id number]]  (Abnormal) Collected: 07/13/24 1906    Specimen: Blood Updated: 07/13/24 1933     PTT 84.7 seconds     aPTT [[id number]]  (Abnormal) Collected: 07/13/24 1242    Specimen:  "Blood Updated: 07/13/24 1326     PTT 58.6 seconds             Ejection Fraction  No results found for: \"EF\"    Echo EF Estimated  No results found for: \"ECHOEFEST\"    Nuclear Stress Ejection Fraction  No components found for: \"NUCEF\"    Cath Ejection Fraction Quantitative  No results found for: \"CATHEF\"    Condition on Discharge:  stable    Vital Signs  Temp:  [97.5 °F (36.4 °C)-98.7 °F (37.1 °C)] 97.6 °F (36.4 °C)  Heart Rate:  [53-74] 58  Resp:  [14-18] 16  BP: (106-156)/(55-96) 125/73    Physical Exam:  Vitals reviewed.   Constitutional:       General: Not in acute distress.     Appearance: Healthy appearance. Well-developed. Not diaphoretic.   Eyes:      General: No scleral icterus.     Conjunctiva/sclera: Conjunctivae normal.      Pupils: Pupils are equal, round, and reactive to light.   HENT:      Head: Normocephalic.    Mouth/Throat:      Pharynx: No oropharyngeal exudate.   Neck:      Vascular: No JVR.   Pulmonary:      Effort: Pulmonary effort is normal. No respiratory distress.      Breath sounds: Normal breath sounds. No wheezing. No rhonchi. No rales.   Chest:      Chest wall: Not tender to palpatation.   Cardiovascular:      Normal rate. Regular rhythm.      Murmurs: There is a grade 2/6 high frequency blowing holosystolic murmur at the apex.   Pulses:     Intact distal pulses.   Edema:     Peripheral edema absent.   Abdominal:      General: Bowel sounds are normal. There is no distension.      Palpations: Abdomen is soft.      Tenderness: There is no abdominal tenderness.   Musculoskeletal: Normal range of motion.      Cervical back: Normal range of motion and neck supple. Skin:     General: Skin is warm and dry.      Coloration: Skin is not pale.      Findings: No erythema or rash.   Neurological:      Mental Status: Alert, oriented to person, place, and time and oriented to person, place and time.      Deep Tendon Reflexes: Reflexes are normal and symmetric.   Psychiatric:         Behavior: Behavior " normal.         Discharge Disposition  Home or Self Care    Discharge Medications     Discharge Medications        New Medications        Instructions Start Date   atorvastatin 40 MG tablet  Commonly known as: LIPITOR   40 mg, Oral, Daily      furosemide 40 MG tablet  Commonly known as: LASIX   40 mg, Oral, Daily   Start Date: July 17, 2024     mupirocin 2 % nasal ointment  Commonly known as: BACTROBAN   1 Application, Nasal, Once, Apply a pea-sized amount to a Q-tip and swab each nare x 1 dose on 7/22/2024 at 1700   Start Date: July 22, 2024     nitroglycerin 0.4 MG SL tablet  Commonly known as: NITROSTAT   0.4 mg, Sublingual, Every 5 Minutes PRN, Take no more than 3 doses in 15 minutes.             Continue These Medications        Instructions Start Date   aspirin 81 MG EC tablet   81 mg, Oral, Daily      carvedilol 3.125 MG tablet  Commonly known as: COREG   3.125 mg, Oral, 2 Times Daily With Meals      lisinopril 5 MG tablet  Commonly known as: PRINIVIL,ZESTRIL   5 mg, Oral, Daily      multivitamin tablet tablet   1 tablet, Oral, Daily      ranolazine 500 MG 12 hr tablet  Commonly known as: Ranexa   500 mg, Oral, 2 Times Daily             Stop These Medications      pravastatin 40 MG tablet  Commonly known as: Pravachol              Discharge Diet: cardiac, low salt    Activity at Discharge: Do not lift greater than 5 pounds. Do not drive for 48 hours. Do not bend and twist at waist. Shower only for one week, do not submerge in water. Discussed signs and symptoms of infection including drainage, redness, and pain. Discussed routine groin care.       Follow-up Appointments  Future Appointments   Date Time Provider Department Center   7/22/2024 11:30 AM  PAD PAT 35 RM 5 BH PAD PAT PAD   8/26/2024 11:00 AM PAD ECHO ROOM 2  PAD CARDI PAD   10/10/2024 10:00 AM Brennan Khoury DO [facility] CD PAD PAD       Plan:   -d/c home today.   -continue home medications which include ASA, Lisinopril and Toprol.   -new  home medications to include Lasix 40mg daily and lipitor in place of pravastatin.   -not starting Aldactone given kidney function   -educated on low salt diet and daily weights.   -tentative plans for surgery per Dr. Maciel on 7/23.        Electronically signed by RAMIRO Ralph, 07/16/24, 1:10 PM CDT.     Time spent on discharge: 30 minutes

## 2024-07-16 NOTE — PLAN OF CARE
"Goal Outcome Evaluation:              Outcome Evaluation: Patient oriented x4 with VSS. Currently refusing telemetry, stating \"it's not doing any good anyway. I'll wear it again in the morning.\" No other complaints expressed at this time. Rest has been decent.                               "

## 2024-07-16 NOTE — TELEPHONE ENCOUNTER
Patient aware of prework date/time and OR date/arrival time 0700/npo/no meds. Aware to  Bactroban for use on 7/22 @ 1700 - instructions given. Confirmed pt is not currently taking any anticoagulation other than 81 mg aspirin.

## 2024-07-17 ENCOUNTER — TELEPHONE (OUTPATIENT)
Dept: CARDIAC SURGERY | Facility: CLINIC | Age: 74
End: 2024-07-17
Payer: MEDICARE

## 2024-07-17 NOTE — OUTREACH NOTE
Prep Survey      Flowsheet Row Responses   [facility] patient discharged from? [facility]   Is LACE score < 7 ? No   Eligibility Readm Mgmt   Discharge diagnosis CAD (coronary artery disease)   Does the patient have one of the following disease processes/diagnoses(primary or secondary)? Other   Does the patient have Home health ordered? No   Is there a DME ordered? No   Prep survey completed? Yes            Melissa QUINTERO - Registered Nurse

## 2024-07-17 NOTE — TELEPHONE ENCOUNTER
I am not sure why he needs to see his PCP before surgery.  These instructions were not from our office unless it was given to him on his discharge paperwork from cardiology.  Please notify patient that Dr. Maciel would like him to arrive at 0600 on the day of surgery rather than 0700.

## 2024-07-17 NOTE — TELEPHONE ENCOUNTER
Pt calling.  States he is sched for surgery with Dr Maciel on Tuesday but he has gotten a call from our office or someone at [facility] that left a message stating he needed to get appt with his PCP (Dr Pedro) before surgery.  Pt calling to find out why or more info re: this.  Can reach pt at #[phone]/jory

## 2024-07-18 NOTE — TELEPHONE ENCOUNTER
Called pt re: this. He states he got it figured out, it was something with his insurance that requires he see his PCP prior to OR. He has an appt. I advised of new OR arrival time of 0600 instead of 0700. He voiced understanding.

## 2024-07-22 ENCOUNTER — HOSPITAL ENCOUNTER (OUTPATIENT)
Dept: GENERAL RADIOLOGY | Facility: HOSPITAL | Age: 74
Discharge: HOME | End: 2024-07-22
Payer: MEDICARE

## 2024-07-22 ENCOUNTER — ANESTHESIA EVENT (OUTPATIENT)
Dept: PERIOP | Facility: HOSPITAL | Age: 74
End: 2024-07-22
Payer: MEDICARE

## 2024-07-22 ENCOUNTER — PRE-ADMISSION TESTING (OUTPATIENT)
Dept: PREADMISSION TESTING | Facility: HOSPITAL | Age: 74
DRG: 220 | End: 2024-07-22
Payer: MEDICARE

## 2024-07-22 VITALS
HEIGHT: 70 IN | BODY MASS INDEX: 27.58 KG/M2 | SYSTOLIC BLOOD PRESSURE: 124 MMHG | OXYGEN SATURATION: 98 % | HEART RATE: 42 BPM | DIASTOLIC BLOOD PRESSURE: 52 MMHG | RESPIRATION RATE: 16 BRPM | WEIGHT: 192.68 LBS

## 2024-07-22 DIAGNOSIS — I20.0 UNSTABLE ANGINA: ICD-10-CM

## 2024-07-22 DIAGNOSIS — R06.02 SOB (SHORTNESS OF BREATH): ICD-10-CM

## 2024-07-22 LAB
ABO GROUP BLD: NORMAL
ALBUMIN SERPL-MCNC: 4.3 G/DL (ref 3.5–5.2)
ALBUMIN/GLOB SERPL: 1.3 G/DL
ALP SERPL-CCNC: 82 U/L (ref 39–117)
ALT SERPL W P-5'-P-CCNC: 23 U/L (ref 1–41)
ANION GAP SERPL CALCULATED.3IONS-SCNC: 11 MMOL/L (ref 5–15)
APTT PPP: 29.3 SECONDS (ref 24.5–36)
AST SERPL-CCNC: 21 U/L (ref 1–40)
BASOPHILS # BLD AUTO: 0.06 10*3/MM3 (ref 0–0.2)
BASOPHILS NFR BLD AUTO: 0.8 % (ref 0–1.5)
BILIRUB SERPL-MCNC: 0.6 MG/DL (ref 0–1.2)
BILIRUB UR QL STRIP: NEGATIVE
BLD GP AB SCN SERPL QL: NEGATIVE
BUN SERPL-MCNC: 15 MG/DL (ref 8–23)
BUN/CREAT SERPL: 10.3 (ref 7–25)
CALCIUM SPEC-SCNC: 9.3 MG/DL (ref 8.6–10.5)
CHLORIDE SERPL-SCNC: 98 MMOL/L (ref 98–107)
CLARITY UR: CLEAR
CO2 SERPL-SCNC: 28 MMOL/L (ref 22–29)
COLOR UR: YELLOW
CREAT SERPL-MCNC: 1.45 MG/DL (ref 0.76–1.27)
DEPRECATED RDW RBC AUTO: 46.1 FL (ref 37–54)
EGFRCR SERPLBLD CKD-EPI 2021: 51.2 ML/MIN/1.73
EOSINOPHIL # BLD AUTO: 0.32 10*3/MM3 (ref 0–0.4)
EOSINOPHIL NFR BLD AUTO: 4.5 % (ref 0.3–6.2)
ERYTHROCYTE [DISTWIDTH] IN BLOOD BY AUTOMATED COUNT: 13.5 % (ref 12.3–15.4)
GLOBULIN UR ELPH-MCNC: 3.4 GM/DL
GLUCOSE SERPL-MCNC: 94 MG/DL (ref 65–99)
GLUCOSE UR STRIP-MCNC: NEGATIVE MG/DL
HCT VFR BLD AUTO: 43 % (ref 37.5–51)
HGB BLD-MCNC: 13.7 G/DL (ref 13–17.7)
HGB UR QL STRIP.AUTO: NEGATIVE
IMM GRANULOCYTES # BLD AUTO: 0.02 10*3/MM3 (ref 0–0.05)
IMM GRANULOCYTES NFR BLD AUTO: 0.3 % (ref 0–0.5)
INR PPP: 1 (ref 0.91–1.09)
KETONES UR QL STRIP: NEGATIVE
LEUKOCYTE ESTERASE UR QL STRIP.AUTO: NEGATIVE
LYMPHOCYTES # BLD AUTO: 2.05 10*3/MM3 (ref 0.7–3.1)
LYMPHOCYTES NFR BLD AUTO: 28.9 % (ref 19.6–45.3)
MCH RBC QN AUTO: 29.7 PG (ref 26.6–33)
MCHC RBC AUTO-ENTMCNC: 31.9 G/DL (ref 31.5–35.7)
MCV RBC AUTO: 93.3 FL (ref 79–97)
MONOCYTES # BLD AUTO: 0.46 10*3/MM3 (ref 0.1–0.9)
MONOCYTES NFR BLD AUTO: 6.5 % (ref 5–12)
NEUTROPHILS NFR BLD AUTO: 4.18 10*3/MM3 (ref 1.7–7)
NEUTROPHILS NFR BLD AUTO: 59 % (ref 42.7–76)
NITRITE UR QL STRIP: NEGATIVE
NRBC BLD AUTO-RTO: 0 /100 WBC (ref 0–0.2)
PH UR STRIP.AUTO: 6 [PH] (ref 5–8)
PLATELET # BLD AUTO: 251 10*3/MM3 (ref 140–450)
PMV BLD AUTO: 9.9 FL (ref 6–12)
POTASSIUM SERPL-SCNC: 4.5 MMOL/L (ref 3.5–5.2)
PROT SERPL-MCNC: 7.7 G/DL (ref 6–8.5)
PROT UR QL STRIP: NEGATIVE
PROTHROMBIN TIME: 13.6 SECONDS (ref 11.8–14.8)
RBC # BLD AUTO: 4.61 10*6/MM3 (ref 4.14–5.8)
RH BLD: POSITIVE
SODIUM SERPL-SCNC: 137 MMOL/L (ref 136–145)
SP GR UR STRIP: 1.01 (ref 1–1.03)
T&S EXPIRATION DATE: NORMAL
UROBILINOGEN UR QL STRIP: NORMAL
WBC NRBC COR # BLD AUTO: 7.09 10*3/MM3 (ref 3.4–10.8)

## 2024-07-22 PROCEDURE — 86920 COMPATIBILITY TEST SPIN: CPT

## 2024-07-22 PROCEDURE — 85610 PROTHROMBIN TIME: CPT

## 2024-07-22 PROCEDURE — 71046 X-RAY EXAM CHEST 2 VIEWS: CPT

## 2024-07-22 PROCEDURE — 86900 BLOOD TYPING SEROLOGIC ABO: CPT

## 2024-07-22 PROCEDURE — 86901 BLOOD TYPING SEROLOGIC RH(D): CPT

## 2024-07-22 PROCEDURE — 86850 RBC ANTIBODY SCREEN: CPT

## 2024-07-22 PROCEDURE — 85730 THROMBOPLASTIN TIME PARTIAL: CPT

## 2024-07-22 PROCEDURE — 80053 COMPREHEN METABOLIC PANEL: CPT

## 2024-07-22 PROCEDURE — 93010 ELECTROCARDIOGRAM REPORT: CPT | Performed by: INTERNAL MEDICINE

## 2024-07-22 PROCEDURE — 81003 URINALYSIS AUTO W/O SCOPE: CPT

## 2024-07-22 PROCEDURE — 93005 ELECTROCARDIOGRAM TRACING: CPT

## 2024-07-22 PROCEDURE — 36415 COLL VENOUS BLD VENIPUNCTURE: CPT

## 2024-07-22 PROCEDURE — 85025 COMPLETE CBC W/AUTO DIFF WBC: CPT

## 2024-07-22 NOTE — ANESTHESIA PREPROCEDURE EVALUATION
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  LV function looks to be around 40%.    Neuro/Psych- negative ROS  (-) seizures, TIA, CVA  GI/Hepatic/Renal/Endo    (+) renal disease- CRI  (-) GERD, liver disease, diabetes    Musculoskeletal (-) negative ROS    Abdominal    Substance History - negative use     OB/GYN          Other                    Anesthesia Plan    ASA 4     general, [place], CVL and PAC     (No CI to TERESA)  intravenous induction   Postoperative Plan: Expected vent after surgery  Anesthetic plan, risks, benefits, and alternatives have been provided, discussed and informed consent has been obtained with: patient.    Use of blood products discussed with patient  Consented to blood products.        CODE STATUS:

## 2024-07-22 NOTE — DISCHARGE INSTRUCTIONS

## 2024-07-23 ENCOUNTER — APPOINTMENT (OUTPATIENT)
Dept: CARDIOLOGY | Facility: HOSPITAL | Age: 74
DRG: 220 | End: 2024-07-23
Payer: MEDICARE

## 2024-07-23 ENCOUNTER — HOSPITAL ENCOUNTER (INPATIENT)
Facility: HOSPITAL | Age: 74
LOS: 6 days | Discharge: HOME | DRG: 220 | End: 2024-07-29
Attending: SURGERY | Admitting: SURGERY
Payer: MEDICARE

## 2024-07-23 ENCOUNTER — ANESTHESIA (OUTPATIENT)
Dept: PERIOP | Facility: HOSPITAL | Age: 74
End: 2024-07-23
Payer: MEDICARE

## 2024-07-23 ENCOUNTER — APPOINTMENT (OUTPATIENT)
Dept: GENERAL RADIOLOGY | Facility: HOSPITAL | Age: 74
DRG: 220 | End: 2024-07-23
Payer: MEDICARE

## 2024-07-23 ENCOUNTER — READMISSION MANAGEMENT (OUTPATIENT)
Dept: CALL CENTER | Facility: HOSPITAL | Age: 74
End: 2024-07-23
Payer: MEDICARE

## 2024-07-23 DIAGNOSIS — Z74.09 IMPAIRED MOBILITY: Primary | ICD-10-CM

## 2024-07-23 DIAGNOSIS — I25.3 LEFT VENTRICULAR ANEURYSM: ICD-10-CM

## 2024-07-23 DIAGNOSIS — I20.0 UNSTABLE ANGINA: ICD-10-CM

## 2024-07-23 DIAGNOSIS — I25.119 CORONARY ARTERY DISEASE INVOLVING NATIVE CORONARY ARTERY OF NATIVE HEART WITH ANGINA PECTORIS: ICD-10-CM

## 2024-07-23 LAB
ALBUMIN SERPL-MCNC: 3.4 G/DL (ref 3.5–5.2)
ALBUMIN SERPL-MCNC: 4.1 G/DL (ref 3.5–5.2)
ANION GAP SERPL CALCULATED.3IONS-SCNC: 11 MMOL/L (ref 5–15)
ANION GAP SERPL CALCULATED.3IONS-SCNC: 8 MMOL/L (ref 5–15)
APTT PPP: 31.9 SECONDS (ref 24.5–36)
ARTERIAL PATENCY WRIST A: ABNORMAL
ATMOSPHERIC PRESS: 753 MMHG
ATMOSPHERIC PRESS: 754 MMHG
ATMOSPHERIC PRESS: 755 MMHG
BASE EXCESS BLDA CALC-SCNC: -1 MMOL/L (ref 0–2)
BASE EXCESS BLDA CALC-SCNC: -1.2 MMOL/L (ref 0–2)
BASE EXCESS BLDA CALC-SCNC: -2 MMOL/L (ref 0–2)
BASE EXCESS BLDA CALC-SCNC: -2.1 MMOL/L (ref 0–2)
BASE EXCESS BLDA CALC-SCNC: -2.5 MMOL/L (ref 0–2)
BASE EXCESS BLDA CALC-SCNC: 0.3 MMOL/L (ref 0–2)
BASE EXCESS BLDA CALC-SCNC: 0.6 MMOL/L (ref 0–2)
BASE EXCESS BLDA CALC-SCNC: 2 MMOL/L (ref 0–2)
BASE EXCESS BLDA CALC-SCNC: 2.1 MMOL/L (ref 0–2)
BASOPHILS # BLD AUTO: 0.02 10*3/MM3 (ref 0–0.2)
BASOPHILS NFR BLD AUTO: 0.2 % (ref 0–1.5)
BDY SITE: ABNORMAL
BODY TEMPERATURE: 37
BUN SERPL-MCNC: 15 MG/DL (ref 8–23)
BUN SERPL-MCNC: 15 MG/DL (ref 8–23)
BUN/CREAT SERPL: 10.6 (ref 7–25)
BUN/CREAT SERPL: 10.7 (ref 7–25)
CA-I BLD-MCNC: 4.28 MG/DL (ref 4.6–5.4)
CA-I BLD-MCNC: 4.31 MG/DL (ref 4.6–5.4)
CA-I BLD-MCNC: 4.42 MG/DL (ref 4.6–5.4)
CA-I BLD-MCNC: 4.58 MG/DL (ref 4.6–5.4)
CA-I BLD-MCNC: 4.91 MG/DL (ref 4.6–5.4)
CA-I BLD-MCNC: 5.02 MG/DL (ref 4.6–5.4)
CA-I BLD-MCNC: 5.1 MG/DL (ref 4.6–5.4)
CA-I BLD-MCNC: 5.67 MG/DL (ref 4.6–5.4)
CA-I BLD-MCNC: 5.72 MG/DL (ref 4.6–5.4)
CA-I BLD-MCNC: 5.8 MG/DL (ref 4.6–5.4)
CALCIUM SPEC-SCNC: 9.3 MG/DL (ref 8.6–10.5)
CALCIUM SPEC-SCNC: 9.9 MG/DL (ref 8.6–10.5)
CHLORIDE SERPL-SCNC: 105 MMOL/L (ref 98–107)
CHLORIDE SERPL-SCNC: 107 MMOL/L (ref 98–107)
CO2 SERPL-SCNC: 23 MMOL/L (ref 22–29)
CO2 SERPL-SCNC: 24 MMOL/L (ref 22–29)
COHGB MFR BLD: 0.6 % (ref 0–5)
COHGB MFR BLD: 0.7 % (ref 0–5)
COHGB MFR BLD: 0.8 % (ref 0–5)
COHGB MFR BLD: 0.9 % (ref 0–5)
COHGB MFR BLD: 0.9 % (ref 0–5)
COHGB MFR BLD: 1 % (ref 0–5)
COHGB MFR BLD: 1 % (ref 0–5)
COHGB MFR BLD: 1.2 % (ref 0–5)
CREAT SERPL-MCNC: 1.4 MG/DL (ref 0.76–1.27)
CREAT SERPL-MCNC: 1.42 MG/DL (ref 0.76–1.27)
DEPRECATED RDW RBC AUTO: 46.3 FL (ref 37–54)
DEPRECATED RDW RBC AUTO: 46.4 FL (ref 37–54)
DEPRECATED RDW RBC AUTO: 46.5 FL (ref 37–54)
DEPRECATED RDW RBC AUTO: 46.5 FL (ref 37–54)
EGFRCR SERPLBLD CKD-EPI 2021: 52.5 ML/MIN/1.73
EGFRCR SERPLBLD CKD-EPI 2021: 53.4 ML/MIN/1.73
EOSINOPHIL # BLD AUTO: 0.12 10*3/MM3 (ref 0–0.4)
EOSINOPHIL NFR BLD AUTO: 0.9 % (ref 0.3–6.2)
ERYTHROCYTE [DISTWIDTH] IN BLOOD BY AUTOMATED COUNT: 13.6 % (ref 12.3–15.4)
ERYTHROCYTE [DISTWIDTH] IN BLOOD BY AUTOMATED COUNT: 13.6 % (ref 12.3–15.4)
ERYTHROCYTE [DISTWIDTH] IN BLOOD BY AUTOMATED COUNT: 13.7 % (ref 12.3–15.4)
ERYTHROCYTE [DISTWIDTH] IN BLOOD BY AUTOMATED COUNT: 13.7 % (ref 12.3–15.4)
FIBRINOGEN PPP-MCNC: 278 MG/DL (ref 240–460)
FIBRINOGEN PPP-MCNC: 302 MG/DL (ref 240–460)
GAS FLOW AIRWAY: 2.5 LPM
GAS FLOW AIRWAY: 2.5 LPM
GAS FLOW AIRWAY: 3 LPM
GLUCOSE BLDC GLUCOMTR-MCNC: 115 MG/DL (ref 70–130)
GLUCOSE BLDC GLUCOMTR-MCNC: 119 MG/DL (ref 70–130)
GLUCOSE BLDC GLUCOMTR-MCNC: 133 MG/DL (ref 70–130)
GLUCOSE BLDC GLUCOMTR-MCNC: 158 MG/DL (ref 70–130)
GLUCOSE BLDC GLUCOMTR-MCNC: 159 MG/DL (ref 70–130)
GLUCOSE BLDC GLUCOMTR-MCNC: 159 MG/DL (ref 70–130)
GLUCOSE BLDC GLUCOMTR-MCNC: 254 MG/DL (ref 70–130)
GLUCOSE SERPL-MCNC: 132 MG/DL (ref 65–99)
GLUCOSE SERPL-MCNC: 156 MG/DL (ref 65–99)
HCO3 BLDA-SCNC: 23 MMOL/L (ref 20–26)
HCO3 BLDA-SCNC: 23.4 MMOL/L (ref 20–26)
HCO3 BLDA-SCNC: 24.2 MMOL/L (ref 20–26)
HCO3 BLDA-SCNC: 24.4 MMOL/L (ref 20–26)
HCO3 BLDA-SCNC: 25 MMOL/L (ref 20–26)
HCO3 BLDA-SCNC: 25.1 MMOL/L (ref 20–26)
HCO3 BLDA-SCNC: 25.9 MMOL/L (ref 20–26)
HCT VFR BLD AUTO: 28.6 % (ref 37.5–51)
HCT VFR BLD AUTO: 28.8 % (ref 37.5–51)
HCT VFR BLD AUTO: 32.3 % (ref 37.5–51)
HCT VFR BLD AUTO: 34.9 % (ref 37.5–51)
HCT VFR BLD CALC: 30.6 % (ref 38–51)
HCT VFR BLD CALC: 30.8 % (ref 38–51)
HCT VFR BLD CALC: 31.2 % (ref 38–51)
HCT VFR BLD CALC: 31.8 % (ref 38–51)
HCT VFR BLD CALC: 33.3 % (ref 38–51)
HCT VFR BLD CALC: 35.9 % (ref 38–51)
HCT VFR BLD CALC: 38 % (ref 38–51)
HCT VFR BLD CALC: 38.1 % (ref 38–51)
HGB BLD-MCNC: 10.6 G/DL (ref 13–17.7)
HGB BLD-MCNC: 11.6 G/DL (ref 13–17.7)
HGB BLD-MCNC: 9.3 G/DL (ref 13–17.7)
HGB BLD-MCNC: 9.4 G/DL (ref 13–17.7)
HGB BLDA-MCNC: 10 G/DL (ref 14–18)
HGB BLDA-MCNC: 10 G/DL (ref 14–18)
HGB BLDA-MCNC: 10.2 G/DL (ref 14–18)
HGB BLDA-MCNC: 10.4 G/DL (ref 14–18)
HGB BLDA-MCNC: 10.9 G/DL (ref 14–18)
HGB BLDA-MCNC: 11.7 G/DL (ref 14–18)
HGB BLDA-MCNC: 12.4 G/DL (ref 14–18)
HGB BLDA-MCNC: 12.4 G/DL (ref 14–18)
IMM GRANULOCYTES # BLD AUTO: 0.34 10*3/MM3 (ref 0–0.05)
IMM GRANULOCYTES NFR BLD AUTO: 2.6 % (ref 0–0.5)
INHALED O2 CONCENTRATION: 100 %
INHALED O2 CONCENTRATION: 30 %
INHALED O2 CONCENTRATION: 40 %
INHALED O2 CONCENTRATION: 60 %
INHALED O2 CONCENTRATION: 90 %
INR PPP: 1.18 (ref 0.91–1.09)
INR PPP: 1.49 (ref 0.91–1.09)
LYMPHOCYTES # BLD AUTO: 1.84 10*3/MM3 (ref 0.7–3.1)
LYMPHOCYTES NFR BLD AUTO: 14.3 % (ref 19.6–45.3)
Lab: ABNORMAL
Lab: NORMAL
MCH RBC QN AUTO: 30 PG (ref 26.6–33)
MCH RBC QN AUTO: 30.1 PG (ref 26.6–33)
MCH RBC QN AUTO: 30.2 PG (ref 26.6–33)
MCH RBC QN AUTO: 30.5 PG (ref 26.6–33)
MCHC RBC AUTO-ENTMCNC: 32.5 G/DL (ref 31.5–35.7)
MCHC RBC AUTO-ENTMCNC: 32.6 G/DL (ref 31.5–35.7)
MCHC RBC AUTO-ENTMCNC: 32.8 G/DL (ref 31.5–35.7)
MCHC RBC AUTO-ENTMCNC: 33.2 G/DL (ref 31.5–35.7)
MCV RBC AUTO: 91.5 FL (ref 79–97)
MCV RBC AUTO: 91.8 FL (ref 79–97)
MCV RBC AUTO: 92.3 FL (ref 79–97)
MCV RBC AUTO: 92.9 FL (ref 79–97)
METHGB BLD QL: 0.5 % (ref 0–3)
METHGB BLD QL: 0.5 % (ref 0–3)
METHGB BLD QL: 0.7 % (ref 0–3)
METHGB BLD QL: 0.8 % (ref 0–3)
METHGB BLD QL: 1 % (ref 0–3)
METHGB BLD QL: 1 % (ref 0–3)
MODALITY: ABNORMAL
MONOCYTES # BLD AUTO: 0.53 10*3/MM3 (ref 0.1–0.9)
MONOCYTES NFR BLD AUTO: 4.1 % (ref 5–12)
NEUTROPHILS NFR BLD AUTO: 10.03 10*3/MM3 (ref 1.7–7)
NEUTROPHILS NFR BLD AUTO: 77.9 % (ref 42.7–76)
NOTIFIED BY: ABNORMAL
NOTIFIED WHO: ABNORMAL
NRBC BLD AUTO-RTO: 0 /100 WBC (ref 0–0.2)
OXYHGB MFR BLDV: 95.1 % (ref 94–99)
OXYHGB MFR BLDV: 98.6 % (ref 94–99)
OXYHGB MFR BLDV: 98.7 % (ref 94–99)
OXYHGB MFR BLDV: 98.7 % (ref 94–99)
OXYHGB MFR BLDV: 99 % (ref 94–99)
OXYHGB MFR BLDV: 99.1 % (ref 94–99)
OXYHGB MFR BLDV: 99.1 % (ref 94–99)
OXYHGB MFR BLDV: 99.2 % (ref 94–99)
PCO2 BLDA: 33.9 MM HG (ref 35–45)
PCO2 BLDA: 36.3 MM HG (ref 35–45)
PCO2 BLDA: 37.1 MM HG (ref 35–45)
PCO2 BLDA: 39.9 MM HG (ref 35–45)
PCO2 BLDA: 41.4 MM HG (ref 35–45)
PCO2 BLDA: 41.7 MM HG (ref 35–45)
PCO2 BLDA: 43 MM HG (ref 35–45)
PCO2 BLDA: 46.1 MM HG (ref 35–45)
PCO2 BLDA: 58.3 MM HG (ref 35–45)
PCO2 TEMP ADJ BLD: 33.9 MM HG (ref 35–45)
PCO2 TEMP ADJ BLD: 36.3 MM HG (ref 35–45)
PCO2 TEMP ADJ BLD: 37.1 MM HG (ref 35–45)
PCO2 TEMP ADJ BLD: 39.9 MM HG (ref 35–45)
PCO2 TEMP ADJ BLD: 41.4 MM HG (ref 35–45)
PCO2 TEMP ADJ BLD: 41.7 MM HG (ref 35–45)
PCO2 TEMP ADJ BLD: 43 MM HG (ref 35–45)
PCO2 TEMP ADJ BLD: 46.1 MM HG (ref 35–45)
PCO2 TEMP ADJ BLD: 58.3 MM HG (ref 35–45)
PEEP RESPIRATORY: 10 CM[H2O]
PEEP RESPIRATORY: 5 CM[H2O]
PEEP RESPIRATORY: 8 CM[H2O]
PH BLDA: 7.25 PH UNITS (ref 7.35–7.45)
PH BLDA: 7.34 PH UNITS (ref 7.35–7.45)
PH BLDA: 7.35 PH UNITS (ref 7.35–7.45)
PH BLDA: 7.36 PH UNITS (ref 7.35–7.45)
PH BLDA: 7.36 PH UNITS (ref 7.35–7.45)
PH BLDA: 7.41 PH UNITS (ref 7.35–7.45)
PH BLDA: 7.45 PH UNITS (ref 7.35–7.45)
PH BLDA: 7.46 PH UNITS (ref 7.35–7.45)
PH BLDA: 7.46 PH UNITS (ref 7.35–7.45)
PH, TEMP CORRECTED: 7.25 PH UNITS (ref 7.35–7.45)
PH, TEMP CORRECTED: 7.34 PH UNITS (ref 7.35–7.45)
PH, TEMP CORRECTED: 7.35 PH UNITS (ref 7.35–7.45)
PH, TEMP CORRECTED: 7.36 PH UNITS (ref 7.35–7.45)
PH, TEMP CORRECTED: 7.36 PH UNITS (ref 7.35–7.45)
PH, TEMP CORRECTED: 7.41 PH UNITS (ref 7.35–7.45)
PH, TEMP CORRECTED: 7.45 PH UNITS (ref 7.35–7.45)
PH, TEMP CORRECTED: 7.46 PH UNITS (ref 7.35–7.45)
PH, TEMP CORRECTED: 7.46 PH UNITS (ref 7.35–7.45)
PHOSPHATE SERPL-MCNC: 3.6 MG/DL (ref 2.5–4.5)
PHOSPHATE SERPL-MCNC: 4 MG/DL (ref 2.5–4.5)
PLATELET # BLD AUTO: 126 10*3/MM3 (ref 140–450)
PLATELET # BLD AUTO: 130 10*3/MM3 (ref 140–450)
PLATELET # BLD AUTO: 130 10*3/MM3 (ref 140–450)
PLATELET # BLD AUTO: 156 10*3/MM3 (ref 140–450)
PMV BLD AUTO: 10.3 FL (ref 6–12)
PMV BLD AUTO: 9.5 FL (ref 6–12)
PMV BLD AUTO: 9.8 FL (ref 6–12)
PMV BLD AUTO: 9.9 FL (ref 6–12)
PO2 BLD: 395 MM[HG] (ref 0–500)
PO2 BLDA: 158 MM HG (ref 83–108)
PO2 BLDA: 204 MM HG (ref 83–108)
PO2 BLDA: 426 MM HG (ref 83–108)
PO2 BLDA: 453 MM HG (ref 83–108)
PO2 BLDA: 468 MM HG (ref 83–108)
PO2 BLDA: 545 MM HG (ref 83–108)
PO2 BLDA: 88.8 MM HG (ref 83–108)
PO2 BLDA: >547 MM HG (ref 83–108)
PO2 BLDA: >547 MM HG (ref 83–108)
PO2 TEMP ADJ BLD: 158 MM HG (ref 83–108)
PO2 TEMP ADJ BLD: 204 MM HG (ref 83–108)
PO2 TEMP ADJ BLD: 426 MM HG (ref 83–108)
PO2 TEMP ADJ BLD: 453 MM HG (ref 83–108)
PO2 TEMP ADJ BLD: 468 MM HG (ref 83–108)
PO2 TEMP ADJ BLD: 545 MM HG (ref 83–108)
PO2 TEMP ADJ BLD: 88.8 MM HG (ref 83–108)
PO2 TEMP ADJ BLD: >547 MM HG (ref 83–108)
PO2 TEMP ADJ BLD: >547 MM HG (ref 83–108)
POTASSIUM BLDA-SCNC: 4.2 MMOL/L (ref 3.5–5.2)
POTASSIUM BLDA-SCNC: 4.4 MMOL/L (ref 3.5–5.2)
POTASSIUM BLDA-SCNC: 4.5 MMOL/L (ref 3.5–5.2)
POTASSIUM BLDA-SCNC: 5 MMOL/L (ref 3.5–5.2)
POTASSIUM BLDA-SCNC: 5.3 MMOL/L (ref 3.5–5.2)
POTASSIUM BLDA-SCNC: 5.5 MMOL/L (ref 3.5–5.2)
POTASSIUM BLDA-SCNC: 5.9 MMOL/L (ref 3.5–5.2)
POTASSIUM BLDA-SCNC: 6.2 MMOL/L (ref 3.5–5.2)
POTASSIUM SERPL-SCNC: 5.2 MMOL/L (ref 3.5–5.2)
POTASSIUM SERPL-SCNC: 5.9 MMOL/L (ref 3.5–5.2)
PROTHROMBIN TIME: 15.5 SECONDS (ref 11.8–14.8)
PROTHROMBIN TIME: 18.6 SECONDS (ref 11.8–14.8)
PSV: 10 CMH2O
QT INTERVAL: 454 MS
QTC INTERVAL: 460 MS
RBC # BLD AUTO: 3.08 10*6/MM3 (ref 4.14–5.8)
RBC # BLD AUTO: 3.12 10*6/MM3 (ref 4.14–5.8)
RBC # BLD AUTO: 3.53 10*6/MM3 (ref 4.14–5.8)
RBC # BLD AUTO: 3.8 10*6/MM3 (ref 4.14–5.8)
SAO2 % BLDCOA: 100 % (ref 94–99)
SAO2 % BLDCOA: 97.1 % (ref 94–99)
SAO2 % BLDCOA: 99.8 % (ref 94–99)
SAO2 % BLDCOA: >100.1 % (ref 94–99)
SET MECH RESP RATE: 20
SET MECH RESP RATE: 20
SODIUM BLDA-SCNC: 136 MMOL/L (ref 136–145)
SODIUM BLDA-SCNC: 138 MMOL/L (ref 136–145)
SODIUM BLDA-SCNC: 138 MMOL/L (ref 136–145)
SODIUM BLDA-SCNC: 139 MMOL/L (ref 136–145)
SODIUM BLDA-SCNC: 141 MMOL/L (ref 136–145)
SODIUM BLDA-SCNC: 141 MMOL/L (ref 136–145)
SODIUM SERPL-SCNC: 139 MMOL/L (ref 136–145)
SODIUM SERPL-SCNC: 139 MMOL/L (ref 136–145)
VENTILATOR MODE: ABNORMAL
VT ON VENT VENT: 560 ML
VT ON VENT VENT: 560 ML
WBC NRBC COR # BLD AUTO: 12.61 10*3/MM3 (ref 3.4–10.8)
WBC NRBC COR # BLD AUTO: 12.88 10*3/MM3 (ref 3.4–10.8)
WBC NRBC COR # BLD AUTO: 12.97 10*3/MM3 (ref 3.4–10.8)
WBC NRBC COR # BLD AUTO: 22.01 10*3/MM3 (ref 3.4–10.8)

## 2024-07-23 PROCEDURE — C1713 ANCHOR/SCREW BN/BN,TIS/BN: HCPCS | Performed by: SURGERY

## 2024-07-23 PROCEDURE — 25010000002 HEPARIN (PORCINE) PER 1000 UNITS

## 2024-07-23 PROCEDURE — 25010000002 EPINEPHRINE PER 0.1 MG: Performed by: SURGERY

## 2024-07-23 PROCEDURE — 25010000002 MIDAZOLAM PER 1 MG: Performed by: ANESTHESIOLOGY

## 2024-07-23 PROCEDURE — C1760 CLOSURE DEV, VASC: HCPCS | Performed by: SURGERY

## 2024-07-23 PROCEDURE — 25010000002 PROTAMINE SULFATE PER 10 MG

## 2024-07-23 PROCEDURE — 25810000003 SODIUM CHLORIDE 0.9 % SOLUTION

## 2024-07-23 PROCEDURE — 93005 ELECTROCARDIOGRAM TRACING: CPT | Performed by: SURGERY

## 2024-07-23 PROCEDURE — 94799 UNLISTED PULMONARY SVC/PX: CPT

## 2024-07-23 PROCEDURE — 25010000002 FUROSEMIDE PER 20 MG

## 2024-07-23 PROCEDURE — 94640 AIRWAY INHALATION TREATMENT: CPT

## 2024-07-23 PROCEDURE — 25010000002 ALBUMIN HUMAN 5% PER 50 ML: Performed by: SURGERY

## 2024-07-23 PROCEDURE — C1751 CATH, INF, PER/CENT/MIDLINE: HCPCS

## 2024-07-23 PROCEDURE — A4648 IMPLANTABLE TISSUE MARKER: HCPCS | Performed by: SURGERY

## 2024-07-23 PROCEDURE — 25010000002 PHENYLEPHRINE 10 MG/ML SOLUTION

## 2024-07-23 PROCEDURE — 02100Z9 BYPASS CORONARY ARTERY, ONE ARTERY FROM LEFT INTERNAL MAMMARY, OPEN APPROACH: ICD-10-PCS | Performed by: SURGERY

## 2024-07-23 PROCEDURE — 82805 BLOOD GASES W/O2 SATURATION: CPT

## 2024-07-23 PROCEDURE — 0 DEXTROSE 5 % SOLUTION: Performed by: SURGERY

## 2024-07-23 PROCEDURE — 82803 BLOOD GASES ANY COMBINATION: CPT

## 2024-07-23 PROCEDURE — 25810000003 DEXTROSE 5 % WITH KCL 20 MEQ 20 MEQ/L SOLUTION: Performed by: SURGERY

## 2024-07-23 PROCEDURE — 82375 ASSAY CARBOXYHB QUANT: CPT

## 2024-07-23 PROCEDURE — 02QL0ZZ REPAIR LEFT VENTRICLE, OPEN APPROACH: ICD-10-PCS | Performed by: SURGERY

## 2024-07-23 PROCEDURE — 33542 REMOVAL OF HEART LESION: CPT | Performed by: SURGERY

## 2024-07-23 PROCEDURE — 0 INSULIN REGULAR HUMAN PER 5 UNITS: Performed by: SURGERY

## 2024-07-23 PROCEDURE — B24BZZ4 ULTRASONOGRAPHY OF HEART WITH AORTA, TRANSESOPHAGEAL: ICD-10-PCS | Performed by: SURGERY

## 2024-07-23 PROCEDURE — 94761 N-INVAS EAR/PLS OXIMETRY MLT: CPT

## 2024-07-23 PROCEDURE — C1889 IMPLANT/INSERT DEVICE, NOC: HCPCS | Performed by: SURGERY

## 2024-07-23 PROCEDURE — 5A1221Z PERFORMANCE OF CARDIAC OUTPUT, CONTINUOUS: ICD-10-PCS | Performed by: SURGERY

## 2024-07-23 PROCEDURE — P9047 ALBUMIN (HUMAN), 25%, 50ML: HCPCS

## 2024-07-23 PROCEDURE — 25010000002 MANNITOL PER 50 ML

## 2024-07-23 PROCEDURE — 25010000002 VANCOMYCIN 1 G RECONSTITUTED SOLUTION 1 EACH VIAL: Performed by: SURGERY

## 2024-07-23 PROCEDURE — 82330 ASSAY OF CALCIUM: CPT

## 2024-07-23 PROCEDURE — 25810000003 LACTATED RINGERS PER 1000 ML: Performed by: SURGERY

## 2024-07-23 PROCEDURE — 25010000002 VANCOMYCIN 1 G RECONSTITUTED SOLUTION

## 2024-07-23 PROCEDURE — 85730 THROMBOPLASTIN TIME PARTIAL: CPT | Performed by: SURGERY

## 2024-07-23 PROCEDURE — 25010000002 BUPIVACAINE (PF) 0.5 % SOLUTION 30 ML VIAL: Performed by: SURGERY

## 2024-07-23 PROCEDURE — 63710000001 INSULIN LISPRO (HUMAN) PER 5 UNITS: Performed by: SURGERY

## 2024-07-23 PROCEDURE — 93010 ELECTROCARDIOGRAM REPORT: CPT | Performed by: INTERNAL MEDICINE

## 2024-07-23 PROCEDURE — 33425 REPAIR OF MITRAL VALVE: CPT | Performed by: SURGERY

## 2024-07-23 PROCEDURE — 25810000003 LACTATED RINGERS PER 1000 ML

## 2024-07-23 PROCEDURE — P9041 ALBUMIN (HUMAN),5%, 50ML: HCPCS | Performed by: SURGERY

## 2024-07-23 PROCEDURE — 02L70CK OCCLUSION OF LEFT ATRIAL APPENDAGE WITH EXTRALUMINAL DEVICE, OPEN APPROACH: ICD-10-PCS | Performed by: SURGERY

## 2024-07-23 PROCEDURE — 25010000002 HEPARIN (PORCINE) PER 1000 UNITS: Performed by: SURGERY

## 2024-07-23 PROCEDURE — 93318 ECHO TRANSESOPHAGEAL INTRAOP: CPT

## 2024-07-23 PROCEDURE — 25010000002 VASOPRESSIN 20 UNIT/ML SOLUTION

## 2024-07-23 PROCEDURE — 33517 CABG ARTERY-VEIN SINGLE: CPT | Performed by: SURGERY

## 2024-07-23 PROCEDURE — 88307 TISSUE EXAM BY PATHOLOGIST: CPT | Performed by: SURGERY

## 2024-07-23 PROCEDURE — 85027 COMPLETE CBC AUTOMATED: CPT | Performed by: SURGERY

## 2024-07-23 PROCEDURE — 88304 TISSUE EXAM BY PATHOLOGIST: CPT | Performed by: SURGERY

## 2024-07-23 PROCEDURE — 25010000002 MORPHINE PER 10 MG: Performed by: SURGERY

## 2024-07-23 PROCEDURE — G0378 HOSPITAL OBSERVATION PER HR: HCPCS

## 2024-07-23 PROCEDURE — 85384 FIBRINOGEN ACTIVITY: CPT | Performed by: SURGERY

## 2024-07-23 PROCEDURE — 25010000002 ALBUMIN HUMAN 25% PER 50 ML

## 2024-07-23 PROCEDURE — 021009W BYPASS CORONARY ARTERY, ONE ARTERY FROM AORTA WITH AUTOLOGOUS VENOUS TISSUE, OPEN APPROACH: ICD-10-PCS | Performed by: SURGERY

## 2024-07-23 PROCEDURE — 25010000002 PROPOFOL 10 MG/ML EMULSION

## 2024-07-23 PROCEDURE — 83050 HGB METHEMOGLOBIN QUAN: CPT

## 2024-07-23 PROCEDURE — 25010000002 ACETAMINOPHEN 10 MG/ML SOLUTION: Performed by: SURGERY

## 2024-07-23 PROCEDURE — 25810000003 SODIUM CHLORIDE 0.9 % SOLUTION: Performed by: SURGERY

## 2024-07-23 PROCEDURE — 71045 X-RAY EXAM CHEST 1 VIEW: CPT

## 2024-07-23 PROCEDURE — 25010000002 PAPAVERINE PER 60 MG: Performed by: SURGERY

## 2024-07-23 PROCEDURE — 25010000002 MIDAZOLAM PER 1 MG

## 2024-07-23 PROCEDURE — 94664 DEMO&/EVAL PT USE INHALER: CPT

## 2024-07-23 PROCEDURE — 25010000002 POTASSIUM CHLORIDE PER 2 MEQ OF POTASSIUM

## 2024-07-23 PROCEDURE — 02CL0ZZ EXTIRPATION OF MATTER FROM LEFT VENTRICLE, OPEN APPROACH: ICD-10-PCS | Performed by: SURGERY

## 2024-07-23 PROCEDURE — 25010000002 SUGAMMADEX 200 MG/2ML SOLUTION

## 2024-07-23 PROCEDURE — 25010000002 CEFAZOLIN PER 500 MG: Performed by: SURGERY

## 2024-07-23 PROCEDURE — 25810000003 SODIUM CHLORIDE 0.9 % SOLUTION 250 ML FLEX CONT

## 2024-07-23 PROCEDURE — 02VG0ZZ RESTRICTION OF MITRAL VALVE, OPEN APPROACH: ICD-10-PCS | Performed by: SURGERY

## 2024-07-23 PROCEDURE — 85025 COMPLETE CBC W/AUTO DIFF WBC: CPT | Performed by: SURGERY

## 2024-07-23 PROCEDURE — 82948 REAGENT STRIP/BLOOD GLUCOSE: CPT

## 2024-07-23 PROCEDURE — 80069 RENAL FUNCTION PANEL: CPT | Performed by: SURGERY

## 2024-07-23 PROCEDURE — 06BP4ZZ EXCISION OF RIGHT SAPHENOUS VEIN, PERCUTANEOUS ENDOSCOPIC APPROACH: ICD-10-PCS | Performed by: SURGERY

## 2024-07-23 PROCEDURE — 94002 VENT MGMT INPAT INIT DAY: CPT

## 2024-07-23 PROCEDURE — 25010000002 CEFAZOLIN PER 500 MG: Performed by: NURSE PRACTITIONER

## 2024-07-23 PROCEDURE — 25810000003 LACTATED RINGERS PER 1000 ML: Performed by: ANESTHESIOLOGY

## 2024-07-23 PROCEDURE — 85610 PROTHROMBIN TIME: CPT | Performed by: SURGERY

## 2024-07-23 PROCEDURE — 33533 CABG ARTERIAL SINGLE: CPT | Performed by: SURGERY

## 2024-07-23 DEVICE — ADHS SURG BIOGLUE PREF 5ML: Type: IMPLANTABLE DEVICE | Site: HEART | Status: FUNCTIONAL

## 2024-07-23 DEVICE — IMPLANTABLE DEVICE: Type: IMPLANTABLE DEVICE | Site: HEART | Status: FUNCTIONAL

## 2024-07-23 DEVICE — APPL CLIP LAA ATRICLIP FLX 45MM: Type: IMPLANTABLE DEVICE | Site: HEART | Status: FUNCTIONAL

## 2024-07-23 DEVICE — PLEDGET INCISIONLINE REINF TFE SFT PTFE 1.5X3X7MM WHT: Type: IMPLANTABLE DEVICE | Site: HEART | Status: FUNCTIONAL

## 2024-07-23 DEVICE — DISK-SHAPED STYLE, SILICONE (1 PER STERILE PKG)
Type: IMPLANTABLE DEVICE | Site: HEART | Status: FUNCTIONAL
Brand: SCANLAN® RADIOMARK® GRAFT MARKERS

## 2024-07-23 DEVICE — SEAL HEMO SURG ARISTA/AH ABS/PWDR 3GM: Type: IMPLANTABLE DEVICE | Site: CHEST | Status: FUNCTIONAL

## 2024-07-23 DEVICE — WR SUT NONABS MF SS V40 1/2CIR TC 6/0 18IN M649G: Type: IMPLANTABLE DEVICE | Site: CHEST | Status: FUNCTIONAL

## 2024-07-23 DEVICE — INCISIONLINE PLEDGET SFT 133X25.5MM: Type: IMPLANTABLE DEVICE | Site: HEART | Status: FUNCTIONAL

## 2024-07-23 DEVICE — ABSORBABLE HEMOSTAT (OXIDIZED REGENERATED CELLULOSE)
Type: IMPLANTABLE DEVICE | Site: HEART | Status: FUNCTIONAL
Brand: SURGICEL NU-KNIT

## 2024-07-23 DEVICE — WAX BONE HEMO NAT 2.5G: Type: IMPLANTABLE DEVICE | Site: CHEST | Status: FUNCTIONAL

## 2024-07-23 DEVICE — KT HEMOST ABS SURGIFOAM PORCN 1GRAM: Type: IMPLANTABLE DEVICE | Site: CHEST | Status: FUNCTIONAL

## 2024-07-23 RX ORDER — NICARDIPINE HYDROCHLORIDE 2.5 MG/ML
INJECTION INTRAVENOUS AS NEEDED
Status: DISCONTINUED | OUTPATIENT
Start: 2024-07-23 | End: 2024-07-23 | Stop reason: SURG

## 2024-07-23 RX ORDER — ASPIRIN 81 MG/1
81 TABLET ORAL DAILY
Status: DISCONTINUED | OUTPATIENT
Start: 2024-07-25 | End: 2024-07-29 | Stop reason: HOSPADM

## 2024-07-23 RX ORDER — ONDANSETRON 2 MG/ML
4 INJECTION INTRAMUSCULAR; INTRAVENOUS EVERY 6 HOURS PRN
Status: DISCONTINUED | OUTPATIENT
Start: 2024-07-23 | End: 2024-07-29 | Stop reason: HOSPADM

## 2024-07-23 RX ORDER — OXYCODONE HYDROCHLORIDE 5 MG/1
5 TABLET ORAL EVERY 4 HOURS PRN
Status: DISCONTINUED | OUTPATIENT
Start: 2024-07-23 | End: 2024-07-29 | Stop reason: HOSPADM

## 2024-07-23 RX ORDER — NICOTINE POLACRILEX 4 MG
15 LOZENGE BUCCAL
Status: DISCONTINUED | OUTPATIENT
Start: 2024-07-23 | End: 2024-07-24

## 2024-07-23 RX ORDER — SODIUM CHLORIDE, SODIUM LACTATE, POTASSIUM CHLORIDE, CALCIUM CHLORIDE 600; 310; 30; 20 MG/100ML; MG/100ML; MG/100ML; MG/100ML
9 INJECTION, SOLUTION INTRAVENOUS CONTINUOUS
Status: DISCONTINUED | OUTPATIENT
Start: 2024-07-23 | End: 2024-07-23

## 2024-07-23 RX ORDER — ACETAMINOPHEN 650 MG/1
650 SUPPOSITORY RECTAL EVERY 4 HOURS PRN
Status: DISCONTINUED | OUTPATIENT
Start: 2024-07-24 | End: 2024-07-23

## 2024-07-23 RX ORDER — SODIUM CHLORIDE 0.9 % (FLUSH) 0.9 %
10 SYRINGE (ML) INJECTION AS NEEDED
Status: DISCONTINUED | OUTPATIENT
Start: 2024-07-23 | End: 2024-07-23 | Stop reason: HOSPADM

## 2024-07-23 RX ORDER — SODIUM CHLORIDE, SODIUM LACTATE, POTASSIUM CHLORIDE, CALCIUM CHLORIDE 600; 310; 30; 20 MG/100ML; MG/100ML; MG/100ML; MG/100ML
1000 INJECTION, SOLUTION INTRAVENOUS CONTINUOUS
Status: DISCONTINUED | OUTPATIENT
Start: 2024-07-23 | End: 2024-07-23

## 2024-07-23 RX ORDER — IBUPROFEN 600 MG/1
1 TABLET ORAL
Status: DISCONTINUED | OUTPATIENT
Start: 2024-07-23 | End: 2024-07-23 | Stop reason: HOSPADM

## 2024-07-23 RX ORDER — METOPROLOL TARTRATE 1 MG/ML
INJECTION, SOLUTION INTRAVENOUS AS NEEDED
Status: DISCONTINUED | OUTPATIENT
Start: 2024-07-23 | End: 2024-07-23 | Stop reason: SURG

## 2024-07-23 RX ORDER — DEXTROSE MONOHYDRATE 25 G/50ML
50 INJECTION, SOLUTION INTRAVENOUS ONCE
Status: COMPLETED | OUTPATIENT
Start: 2024-07-23 | End: 2024-07-23

## 2024-07-23 RX ORDER — CHLORHEXIDINE GLUCONATE 500 MG/1
1 CLOTH TOPICAL EVERY 24 HOURS
Status: DISCONTINUED | OUTPATIENT
Start: 2024-07-24 | End: 2024-07-24

## 2024-07-23 RX ORDER — SODIUM CHLORIDE 0.9 % (FLUSH) 0.9 %
10 SYRINGE (ML) INJECTION EVERY 12 HOURS SCHEDULED
Status: DISCONTINUED | OUTPATIENT
Start: 2024-07-23 | End: 2024-07-23 | Stop reason: HOSPADM

## 2024-07-23 RX ORDER — ACETAMINOPHEN 325 MG/1
650 TABLET ORAL EVERY 4 HOURS PRN
Status: DISCONTINUED | OUTPATIENT
Start: 2024-07-24 | End: 2024-07-23

## 2024-07-23 RX ORDER — VANCOMYCIN HYDROCHLORIDE 1 G/20ML
INJECTION, POWDER, LYOPHILIZED, FOR SOLUTION INTRAVENOUS AS NEEDED
Status: DISCONTINUED | OUTPATIENT
Start: 2024-07-23 | End: 2024-07-23 | Stop reason: SURG

## 2024-07-23 RX ORDER — CLOPIDOGREL BISULFATE 75 MG/1
75 TABLET ORAL DAILY
Status: DISCONTINUED | OUTPATIENT
Start: 2024-07-24 | End: 2024-07-29 | Stop reason: HOSPADM

## 2024-07-23 RX ORDER — LIDOCAINE 4 G/G
2 PATCH TOPICAL
Status: DISCONTINUED | OUTPATIENT
Start: 2024-07-24 | End: 2024-07-24

## 2024-07-23 RX ORDER — DEXTROSE MONOHYDRATE 25 G/50ML
12.5 INJECTION, SOLUTION INTRAVENOUS AS NEEDED
Status: DISCONTINUED | OUTPATIENT
Start: 2024-07-23 | End: 2024-07-23 | Stop reason: HOSPADM

## 2024-07-23 RX ORDER — NICOTINE POLACRILEX 4 MG
15 LOZENGE BUCCAL
Status: DISCONTINUED | OUTPATIENT
Start: 2024-07-23 | End: 2024-07-23 | Stop reason: HOSPADM

## 2024-07-23 RX ORDER — MAGNESIUM HYDROXIDE 1200 MG/15ML
LIQUID ORAL AS NEEDED
Status: DISCONTINUED | OUTPATIENT
Start: 2024-07-23 | End: 2024-07-23 | Stop reason: HOSPADM

## 2024-07-23 RX ORDER — ROCURONIUM BROMIDE 10 MG/ML
INJECTION, SOLUTION INTRAVENOUS AS NEEDED
Status: DISCONTINUED | OUTPATIENT
Start: 2024-07-23 | End: 2024-07-23 | Stop reason: SURG

## 2024-07-23 RX ORDER — INSULIN LISPRO 100 [IU]/ML
10 INJECTION, SOLUTION INTRAVENOUS; SUBCUTANEOUS ONCE
Status: COMPLETED | OUTPATIENT
Start: 2024-07-23 | End: 2024-07-23

## 2024-07-23 RX ORDER — HEPARIN SODIUM 1000 [USP'U]/ML
INJECTION, SOLUTION INTRAVENOUS; SUBCUTANEOUS AS NEEDED
Status: DISCONTINUED | OUTPATIENT
Start: 2024-07-23 | End: 2024-07-23 | Stop reason: SURG

## 2024-07-23 RX ORDER — DEXMEDETOMIDINE HYDROCHLORIDE 4 UG/ML
INJECTION, SOLUTION INTRAVENOUS CONTINUOUS PRN
Status: DISCONTINUED | OUTPATIENT
Start: 2024-07-23 | End: 2024-07-23 | Stop reason: SURG

## 2024-07-23 RX ORDER — NOREPINEPHRINE BITARTRATE 0.03 MG/ML
.02-.3 INJECTION, SOLUTION INTRAVENOUS CONTINUOUS PRN
Status: DISCONTINUED | OUTPATIENT
Start: 2024-07-23 | End: 2024-07-24

## 2024-07-23 RX ORDER — PROPOFOL 10 MG/ML
VIAL (ML) INTRAVENOUS AS NEEDED
Status: DISCONTINUED | OUTPATIENT
Start: 2024-07-23 | End: 2024-07-23 | Stop reason: SURG

## 2024-07-23 RX ORDER — CHLORHEXIDINE GLUCONATE ORAL RINSE 1.2 MG/ML
15 SOLUTION DENTAL EVERY 12 HOURS
Status: DISCONTINUED | OUTPATIENT
Start: 2024-07-23 | End: 2024-07-26

## 2024-07-23 RX ORDER — OXYCODONE HYDROCHLORIDE 10 MG/1
10 TABLET ORAL EVERY 4 HOURS PRN
Status: DISCONTINUED | OUTPATIENT
Start: 2024-07-23 | End: 2024-07-29 | Stop reason: HOSPADM

## 2024-07-23 RX ORDER — POTASSIUM CHLORIDE, DEXTROSE MONOHYDRATE 150; 5 MG/100ML; G/100ML
30 INJECTION, SOLUTION INTRAVENOUS CONTINUOUS
Status: DISCONTINUED | OUTPATIENT
Start: 2024-07-23 | End: 2024-07-24

## 2024-07-23 RX ORDER — SODIUM CHLORIDE 0.9 % (FLUSH) 0.9 %
3 SYRINGE (ML) INJECTION AS NEEDED
Status: DISCONTINUED | OUTPATIENT
Start: 2024-07-23 | End: 2024-07-23 | Stop reason: HOSPADM

## 2024-07-23 RX ORDER — CHLORHEXIDINE GLUCONATE ORAL RINSE 1.2 MG/ML
15 SOLUTION DENTAL EVERY 12 HOURS SCHEDULED
Status: DISCONTINUED | OUTPATIENT
Start: 2024-07-23 | End: 2024-07-23 | Stop reason: SDUPTHER

## 2024-07-23 RX ORDER — MEPERIDINE HYDROCHLORIDE 50 MG/ML
25 INJECTION INTRAMUSCULAR; INTRAVENOUS; SUBCUTANEOUS
Status: DISCONTINUED | OUTPATIENT
Start: 2024-07-23 | End: 2024-07-24

## 2024-07-23 RX ORDER — ACETAMINOPHEN 500 MG
1000 TABLET ORAL ONCE
Status: COMPLETED | OUTPATIENT
Start: 2024-07-23 | End: 2024-07-23

## 2024-07-23 RX ORDER — LIDOCAINE HYDROCHLORIDE 20 MG/ML
INJECTION, SOLUTION EPIDURAL; INFILTRATION; INTRACAUDAL; PERINEURAL AS NEEDED
Status: DISCONTINUED | OUTPATIENT
Start: 2024-07-23 | End: 2024-07-23 | Stop reason: SURG

## 2024-07-23 RX ORDER — MIDAZOLAM HYDROCHLORIDE 1 MG/ML
0.5 INJECTION INTRAMUSCULAR; INTRAVENOUS
Status: DISCONTINUED | OUTPATIENT
Start: 2024-07-23 | End: 2024-07-23 | Stop reason: HOSPADM

## 2024-07-23 RX ORDER — FENTANYL CITRATE 50 UG/ML
25 INJECTION, SOLUTION INTRAMUSCULAR; INTRAVENOUS
Status: DISCONTINUED | OUTPATIENT
Start: 2024-07-23 | End: 2024-07-23 | Stop reason: HOSPADM

## 2024-07-23 RX ORDER — METHOCARBAMOL 500 MG/1
500 TABLET, FILM COATED ORAL EVERY 12 HOURS SCHEDULED
Status: DISCONTINUED | OUTPATIENT
Start: 2024-07-23 | End: 2024-07-24

## 2024-07-23 RX ORDER — ACETAMINOPHEN 160 MG/5ML
650 SOLUTION ORAL EVERY 4 HOURS PRN
Status: DISCONTINUED | OUTPATIENT
Start: 2024-07-24 | End: 2024-07-23

## 2024-07-23 RX ORDER — CALCIUM CHLORIDE 100 MG/ML
INJECTION INTRAVENOUS; INTRAVENTRICULAR AS NEEDED
Status: DISCONTINUED | OUTPATIENT
Start: 2024-07-23 | End: 2024-07-23 | Stop reason: SURG

## 2024-07-23 RX ORDER — LIDOCAINE HYDROCHLORIDE 10 MG/ML
0.5 INJECTION, SOLUTION EPIDURAL; INFILTRATION; INTRACAUDAL; PERINEURAL ONCE AS NEEDED
Status: DISCONTINUED | OUTPATIENT
Start: 2024-07-23 | End: 2024-07-23 | Stop reason: HOSPADM

## 2024-07-23 RX ORDER — MORPHINE SULFATE 2 MG/ML
2 INJECTION, SOLUTION INTRAMUSCULAR; INTRAVENOUS
Status: DISCONTINUED | OUTPATIENT
Start: 2024-07-23 | End: 2024-07-24

## 2024-07-23 RX ORDER — DEXMEDETOMIDINE HYDROCHLORIDE 4 UG/ML
.2-1.5 INJECTION, SOLUTION INTRAVENOUS
Status: DISCONTINUED | OUTPATIENT
Start: 2024-07-23 | End: 2024-07-24

## 2024-07-23 RX ORDER — VECURONIUM BROMIDE 1 MG/ML
INJECTION, POWDER, LYOPHILIZED, FOR SOLUTION INTRAVENOUS AS NEEDED
Status: DISCONTINUED | OUTPATIENT
Start: 2024-07-23 | End: 2024-07-23 | Stop reason: SURG

## 2024-07-23 RX ORDER — ACETAMINOPHEN 325 MG/1
650 TABLET ORAL EVERY 6 HOURS SCHEDULED
Status: DISCONTINUED | OUTPATIENT
Start: 2024-07-24 | End: 2024-07-29 | Stop reason: HOSPADM

## 2024-07-23 RX ORDER — DEXTROSE MONOHYDRATE 25 G/50ML
10-50 INJECTION, SOLUTION INTRAVENOUS
Status: DISCONTINUED | OUTPATIENT
Start: 2024-07-23 | End: 2024-07-24

## 2024-07-23 RX ORDER — SODIUM CHLORIDE 9 MG/ML
30 INJECTION, SOLUTION INTRAVENOUS CONTINUOUS PRN
Status: DISCONTINUED | OUTPATIENT
Start: 2024-07-23 | End: 2024-07-23 | Stop reason: HOSPADM

## 2024-07-23 RX ORDER — IBUPROFEN 600 MG/1
1 TABLET ORAL
Status: DISCONTINUED | OUTPATIENT
Start: 2024-07-23 | End: 2024-07-24

## 2024-07-23 RX ORDER — DEXTROSE MONOHYDRATE 50 MG/ML
30 INJECTION, SOLUTION INTRAVENOUS CONTINUOUS
Status: DISCONTINUED | OUTPATIENT
Start: 2024-07-23 | End: 2024-07-24

## 2024-07-23 RX ORDER — BUPIVACAINE HCL/0.9 % NACL/PF 0.125 %
PLASTIC BAG, INJECTION (ML) EPIDURAL AS NEEDED
Status: DISCONTINUED | OUTPATIENT
Start: 2024-07-23 | End: 2024-07-23 | Stop reason: SURG

## 2024-07-23 RX ORDER — PROTAMINE SULFATE 10 MG/ML
INJECTION, SOLUTION INTRAVENOUS AS NEEDED
Status: DISCONTINUED | OUTPATIENT
Start: 2024-07-23 | End: 2024-07-23 | Stop reason: SURG

## 2024-07-23 RX ORDER — SUFENTANIL CITRATE 50 UG/ML
INJECTION EPIDURAL; INTRAVENOUS AS NEEDED
Status: DISCONTINUED | OUTPATIENT
Start: 2024-07-23 | End: 2024-07-23 | Stop reason: SURG

## 2024-07-23 RX ORDER — MIDAZOLAM HYDROCHLORIDE 1 MG/ML
2 INJECTION INTRAMUSCULAR; INTRAVENOUS ONCE
Status: COMPLETED | OUTPATIENT
Start: 2024-07-23 | End: 2024-07-23

## 2024-07-23 RX ORDER — MIDAZOLAM HYDROCHLORIDE 1 MG/ML
INJECTION INTRAMUSCULAR; INTRAVENOUS AS NEEDED
Status: DISCONTINUED | OUTPATIENT
Start: 2024-07-23 | End: 2024-07-23 | Stop reason: SURG

## 2024-07-23 RX ORDER — ALBUTEROL SULFATE 2.5 MG/3ML
2.5 SOLUTION RESPIRATORY (INHALATION) EVERY 4 HOURS PRN
Status: DISCONTINUED | OUTPATIENT
Start: 2024-07-23 | End: 2024-07-24

## 2024-07-23 RX ORDER — ALBUMIN, HUMAN INJ 5% 5 %
500 SOLUTION INTRAVENOUS AS NEEDED
Status: DISCONTINUED | OUTPATIENT
Start: 2024-07-23 | End: 2024-07-24

## 2024-07-23 RX ORDER — ASPIRIN 81 MG/1
162 TABLET, CHEWABLE ORAL ONCE
Status: COMPLETED | OUTPATIENT
Start: 2024-07-24 | End: 2024-07-24

## 2024-07-23 RX ORDER — POLYETHYLENE GLYCOL 3350 17 G/17G
17 POWDER, FOR SOLUTION ORAL DAILY
Status: DISCONTINUED | OUTPATIENT
Start: 2024-07-24 | End: 2024-07-29 | Stop reason: HOSPADM

## 2024-07-23 RX ORDER — BISACODYL 5 MG/1
10 TABLET, DELAYED RELEASE ORAL 2 TIMES DAILY
Status: DISCONTINUED | OUTPATIENT
Start: 2024-07-24 | End: 2024-07-29 | Stop reason: HOSPADM

## 2024-07-23 RX ORDER — IPRATROPIUM BROMIDE AND ALBUTEROL SULFATE 2.5; .5 MG/3ML; MG/3ML
3 SOLUTION RESPIRATORY (INHALATION)
Status: DISCONTINUED | OUTPATIENT
Start: 2024-07-23 | End: 2024-07-24

## 2024-07-23 RX ORDER — SODIUM CHLORIDE 9 MG/ML
125 INJECTION, SOLUTION INTRAVENOUS CONTINUOUS
Status: DISCONTINUED | OUTPATIENT
Start: 2024-07-23 | End: 2024-07-24

## 2024-07-23 RX ORDER — SODIUM CHLORIDE 9 MG/ML
40 INJECTION, SOLUTION INTRAVENOUS AS NEEDED
Status: DISCONTINUED | OUTPATIENT
Start: 2024-07-23 | End: 2024-07-23 | Stop reason: HOSPADM

## 2024-07-23 RX ORDER — DEXTROSE MONOHYDRATE 25 G/50ML
10-50 INJECTION, SOLUTION INTRAVENOUS
Status: DISCONTINUED | OUTPATIENT
Start: 2024-07-23 | End: 2024-07-23 | Stop reason: HOSPADM

## 2024-07-23 RX ORDER — ATORVASTATIN CALCIUM 10 MG/1
20 TABLET, FILM COATED ORAL NIGHTLY
Status: DISCONTINUED | OUTPATIENT
Start: 2024-07-24 | End: 2024-07-29 | Stop reason: HOSPADM

## 2024-07-23 RX ORDER — CARVEDILOL 3.12 MG/1
3.12 TABLET ORAL EVERY 12 HOURS SCHEDULED
Status: DISCONTINUED | OUTPATIENT
Start: 2024-07-24 | End: 2024-07-27

## 2024-07-23 RX ORDER — SODIUM CHLORIDE 0.9 % (FLUSH) 0.9 %
30 SYRINGE (ML) INJECTION ONCE AS NEEDED
Status: DISCONTINUED | OUTPATIENT
Start: 2024-07-23 | End: 2024-07-23 | Stop reason: HOSPADM

## 2024-07-23 RX ORDER — ENOXAPARIN SODIUM 100 MG/ML
40 INJECTION SUBCUTANEOUS DAILY
Status: DISCONTINUED | OUTPATIENT
Start: 2024-07-24 | End: 2024-07-29 | Stop reason: HOSPADM

## 2024-07-23 RX ORDER — ACETAMINOPHEN 10 MG/ML
1000 INJECTION, SOLUTION INTRAVENOUS EVERY 8 HOURS
Status: COMPLETED | OUTPATIENT
Start: 2024-07-23 | End: 2024-07-23

## 2024-07-23 RX ADMIN — NOREPINEPHRINE BITARTRATE 6 MCG: 1 INJECTION, SOLUTION, CONCENTRATE INTRAVENOUS at 10:53

## 2024-07-23 RX ADMIN — NOREPINEPHRINE BITARTRATE 6 MCG: 1 INJECTION, SOLUTION, CONCENTRATE INTRAVENOUS at 10:59

## 2024-07-23 RX ADMIN — VECURONIUM BROMIDE 5 MG: 1 INJECTION, POWDER, LYOPHILIZED, FOR SOLUTION INTRAVENOUS at 13:00

## 2024-07-23 RX ADMIN — ACETAMINOPHEN 1000 MG: 500 TABLET, FILM COATED ORAL at 06:46

## 2024-07-23 RX ADMIN — NOREPINEPHRINE BITARTRATE 6 MCG: 1 INJECTION, SOLUTION, CONCENTRATE INTRAVENOUS at 11:40

## 2024-07-23 RX ADMIN — NOREPINEPHRINE BITARTRATE 3 MCG: 1 INJECTION, SOLUTION, CONCENTRATE INTRAVENOUS at 11:19

## 2024-07-23 RX ADMIN — SUGAMMADEX 200 MG: 100 INJECTION, SOLUTION INTRAVENOUS at 15:14

## 2024-07-23 RX ADMIN — ACETAMINOPHEN 1000 MG: 10 INJECTION, SOLUTION INTRAVENOUS at 23:14

## 2024-07-23 RX ADMIN — SODIUM CHLORIDE 2 UNITS/HR: 9 INJECTION, SOLUTION INTRAVENOUS at 17:19

## 2024-07-23 RX ADMIN — Medication 100 MCG: at 10:18

## 2024-07-23 RX ADMIN — NOREPINEPHRINE BITARTRATE 6 MCG: 1 INJECTION, SOLUTION, CONCENTRATE INTRAVENOUS at 11:42

## 2024-07-23 RX ADMIN — CALCIUM CHLORIDE 0.5 G: 100 INJECTION INTRAVENOUS; INTRAVENTRICULAR at 14:09

## 2024-07-23 RX ADMIN — SUFENTANIL CITRATE 50 MCG: 50 INJECTION, SOLUTION EPIDURAL; INTRAVENOUS at 14:37

## 2024-07-23 RX ADMIN — DEXTROSE MONOHYDRATE 30 ML/HR: 50 INJECTION, SOLUTION INTRAVENOUS at 16:21

## 2024-07-23 RX ADMIN — PROTAMINE SULFATE 200 MG: 10 INJECTION, SOLUTION INTRAVENOUS at 13:56

## 2024-07-23 RX ADMIN — SODIUM CHLORIDE 50 ML/HR: 9 INJECTION, SOLUTION INTRAVENOUS at 14:17

## 2024-07-23 RX ADMIN — SUFENTANIL CITRATE 25 MCG: 50 INJECTION, SOLUTION EPIDURAL; INTRAVENOUS at 13:54

## 2024-07-23 RX ADMIN — CEFAZOLIN 2000 MG: 2 INJECTION, POWDER, FOR SOLUTION INTRAMUSCULAR; INTRAVENOUS at 14:16

## 2024-07-23 RX ADMIN — ALBUMIN (HUMAN) 500 ML: 12.5 INJECTION, SOLUTION INTRAVENOUS at 17:37

## 2024-07-23 RX ADMIN — PROPOFOL 20 MG: 10 INJECTION, EMULSION INTRAVENOUS at 14:41

## 2024-07-23 RX ADMIN — SODIUM CHLORIDE, POTASSIUM CHLORIDE, SODIUM LACTATE AND CALCIUM CHLORIDE 1000 ML: 600; 310; 30; 20 INJECTION, SOLUTION INTRAVENOUS at 08:17

## 2024-07-23 RX ADMIN — SUFENTANIL CITRATE 20 MCG: 50 INJECTION, SOLUTION EPIDURAL; INTRAVENOUS at 11:04

## 2024-07-23 RX ADMIN — DEXMEDETOMIDINE HYDROCHLORIDE 0.2 MCG/KG/HR: 4 INJECTION, SOLUTION INTRAVENOUS at 13:25

## 2024-07-23 RX ADMIN — SUFENTANIL CITRATE 50 MCG: 50 INJECTION, SOLUTION EPIDURAL; INTRAVENOUS at 10:13

## 2024-07-23 RX ADMIN — CALCIUM CHLORIDE 0.5 G: 100 INJECTION INTRAVENOUS; INTRAVENTRICULAR at 14:05

## 2024-07-23 RX ADMIN — CEFAZOLIN 2 G: 2 INJECTION, POWDER, FOR SOLUTION INTRAMUSCULAR; INTRAVENOUS at 21:46

## 2024-07-23 RX ADMIN — SUFENTANIL CITRATE 25 MCG: 50 INJECTION, SOLUTION EPIDURAL; INTRAVENOUS at 14:15

## 2024-07-23 RX ADMIN — IPRATROPIUM BROMIDE AND ALBUTEROL SULFATE 3 ML: 2.5; .5 SOLUTION RESPIRATORY (INHALATION) at 18:00

## 2024-07-23 RX ADMIN — DEXMEDETOMIDINE HYDROCHLORIDE 0.8 MCG/KG/HR: 4 INJECTION, SOLUTION INTRAVENOUS at 18:19

## 2024-07-23 RX ADMIN — IPRATROPIUM BROMIDE AND ALBUTEROL SULFATE 3 ML: 2.5; .5 SOLUTION RESPIRATORY (INHALATION) at 15:37

## 2024-07-23 RX ADMIN — METOPROLOL TARTRATE 1 MG: 5 INJECTION INTRAVENOUS at 10:39

## 2024-07-23 RX ADMIN — SODIUM CHLORIDE 125 ML/HR: 9 INJECTION, SOLUTION INTRAVENOUS at 20:36

## 2024-07-23 RX ADMIN — CHLORHEXIDINE GLUCONATE 0.12% ORAL RINSE 15 ML: 1.2 LIQUID ORAL at 17:39

## 2024-07-23 RX ADMIN — MIDAZOLAM HYDROCHLORIDE 3 MG: 1 INJECTION, SOLUTION INTRAMUSCULAR; INTRAVENOUS at 13:16

## 2024-07-23 RX ADMIN — ROCURONIUM BROMIDE 50 MG: 50 INJECTION INTRAVENOUS at 11:58

## 2024-07-23 RX ADMIN — INSULIN LISPRO 10 UNITS: 100 INJECTION, SOLUTION INTRAVENOUS; SUBCUTANEOUS at 19:25

## 2024-07-23 RX ADMIN — ROCURONIUM BROMIDE 100 MG: 50 INJECTION INTRAVENOUS at 10:13

## 2024-07-23 RX ADMIN — NICARDIPINE HYDROCHLORIDE 5 MG/HR: 2.5 INJECTION INTRAVENOUS at 14:56

## 2024-07-23 RX ADMIN — Medication 200 MCG: at 10:23

## 2024-07-23 RX ADMIN — HEPARIN SODIUM 35000 UNITS: 1000 INJECTION, SOLUTION INTRAVENOUS; SUBCUTANEOUS at 11:32

## 2024-07-23 RX ADMIN — MIDAZOLAM HYDROCHLORIDE 2 MG: 1 INJECTION, SOLUTION INTRAMUSCULAR; INTRAVENOUS at 08:21

## 2024-07-23 RX ADMIN — Medication 1 APPLICATION: at 06:46

## 2024-07-23 RX ADMIN — POTASSIUM CHLORIDE AND DEXTROSE MONOHYDRATE 30 ML/HR: 150; 5 INJECTION, SOLUTION INTRAVENOUS at 15:35

## 2024-07-23 RX ADMIN — VANCOMYCIN HYDROCHLORIDE 1 G: 1 INJECTION, POWDER, LYOPHILIZED, FOR SOLUTION INTRAVENOUS at 10:22

## 2024-07-23 RX ADMIN — OXYCODONE HYDROCHLORIDE 5 MG: 5 TABLET ORAL at 21:41

## 2024-07-23 RX ADMIN — MIDAZOLAM HYDROCHLORIDE 5 MG: 1 INJECTION, SOLUTION INTRAMUSCULAR; INTRAVENOUS at 10:12

## 2024-07-23 RX ADMIN — NICARDIPINE HYDROCHLORIDE 500 MCG: 2.5 INJECTION INTRAVENOUS at 14:53

## 2024-07-23 RX ADMIN — ACETAMINOPHEN 1000 MG: 10 INJECTION, SOLUTION INTRAVENOUS at 16:54

## 2024-07-23 RX ADMIN — SUFENTANIL CITRATE 30 MCG: 50 INJECTION, SOLUTION EPIDURAL; INTRAVENOUS at 14:32

## 2024-07-23 RX ADMIN — Medication 200 MCG: at 10:29

## 2024-07-23 RX ADMIN — MORPHINE SULFATE 2 MG: 2 INJECTION, SOLUTION INTRAMUSCULAR; INTRAVENOUS at 19:29

## 2024-07-23 RX ADMIN — LIDOCAINE HYDROCHLORIDE 100 MG: 20 INJECTION, SOLUTION EPIDURAL; INFILTRATION; INTRACAUDAL; PERINEURAL at 10:12

## 2024-07-23 RX ADMIN — SUFENTANIL CITRATE 50 MCG: 50 INJECTION, SOLUTION EPIDURAL; INTRAVENOUS at 10:12

## 2024-07-23 RX ADMIN — DEXTROSE MONOHYDRATE 50 ML: 25 INJECTION, SOLUTION INTRAVENOUS at 19:20

## 2024-07-23 RX ADMIN — SODIUM CHLORIDE, POTASSIUM CHLORIDE, SODIUM LACTATE AND CALCIUM CHLORIDE: 600; 310; 30; 20 INJECTION, SOLUTION INTRAVENOUS at 10:46

## 2024-07-23 RX ADMIN — PROPOFOL 30 MG: 10 INJECTION, EMULSION INTRAVENOUS at 10:12

## 2024-07-23 RX ADMIN — AMINOCAPROIC ACID 5 G: 250 INJECTION, SOLUTION INTRAVENOUS at 10:46

## 2024-07-23 RX ADMIN — ROCURONIUM BROMIDE 50 MG: 50 INJECTION INTRAVENOUS at 11:04

## 2024-07-23 RX ADMIN — METHOCARBAMOL 500 MG: 500 TABLET, FILM COATED ORAL at 22:49

## 2024-07-23 RX ADMIN — CEFAZOLIN 2000 MG: 2 INJECTION, POWDER, FOR SOLUTION INTRAMUSCULAR; INTRAVENOUS at 10:18

## 2024-07-23 RX ADMIN — Medication 1 APPLICATION: at 17:39

## 2024-07-23 RX ADMIN — NOREPINEPHRINE BITARTRATE 6 MCG: 1 INJECTION, SOLUTION, CONCENTRATE INTRAVENOUS at 11:05

## 2024-07-23 RX ADMIN — ALBUMIN (HUMAN) 500 ML: 12.5 INJECTION, SOLUTION INTRAVENOUS at 16:22

## 2024-07-23 NOTE — OP NOTE
Cardiac Surgery Operative Note     Date of Procedure:  7/23/2024     Preoperative Diagnosis:   Posterior Basal LV Aneurysm  Severe Mitral Regurgitation, Functional  Coronary artery disease involving native coronary artery of native heart with unstable angina  Chronic Systolic Heart Failure, LVEF 40%  Hypertension  Hyperlipidemia  Current Active Smoker  COPD  CKD     Postoperative Diagnosis:  Same     Procedures Performed:  1. LV Aneurysmectomy with Linear Repair  2. Mitral Valve Repair with [id number] Hollis Stitch  3. Coronary Artery Bypass, using arterial graft; single arterial graft  4. Coronary Artery bypass, using venous grafts and arterial grafts, 1 venous graft  5. Left Atrial Appendage Closure, 45mm Atriclip     Procedure Summary: Posterior Basal LV Aneurysm Repair (Resection of Aneurysm Wall with Linear Closure, MV repair (Hollis Stitch at A2/P2), CABG x2 (LIMA to LAD, SVG to distal OM), LAAE with 45 mm Atriclip    Surgeon:  Kobe Maciel MD  Assistants:  Sil Weaver CFA; Violetta Gregorio CFA  Anesthesia Staff:  Aaron Juarez MD  Anesthesia Type:  General     Estimated Blood Loss:  Minimal (Cell Saver)     Drains:  1. 24 [place] Serafin drain-left pleural space  2. 24 [place] Serafin drains x2-anterior and posterior mediastinum     Specimens:  None     Operative Indications:   Mr. Garcia is a 74-year-old male who presented to hospital last week with new onset heart failure symptoms that been worsening over the last several weeks.  Approximately 5 weeks prior to his evaluation he had what he thought was a heatstroke and in retrospect was likely STEMI.  On workup after presentation was found to have occluded RCA and a large LV aneurysm posterior basal.  He also had disease in his LAD and circumflex.  He had a right dominant coronary system.  Aneurysm was causing severe MR due to functional tethering.  The other segments of his LV were working well.  He was a good surgical candidate given this I discussed  with him proceeding with LV aneurysmectomy, mitral valve repair versus replacement and CABG.  He understood the risk and benefits and agreed to proceed.    Operative Findings:    Moderate sized bloody pericardial effusion on opening pericardium    Excellent arterial conduit. Good venous conduit. The LAD at site of grafting measured 2 mm in size and had moderate to severe atherosclerotic disease burden, grafted using LIMA.  Distal OM bypassed using saphenous vein, 1.8 mm with mild atherosclerotic disease burden.      Posterior basal LV Aneurysm, abutted the ventricular septum, did not involve the posterior medial papillary muscle, meausred approximately 3.5cm in width and 4.5 cm in length, linear closure with excellent opposition      MV easily exposed through ventriculotomy, Hollis stitch x2 at A2/P2    Closure of AUDRA with 45mm Atriclip    At beginning of case, TERESA with large aneurysm, moderate LV dysfunction, mild AI, severe MR  At end of case, TERESA with improved LV function to mildly reduced, reduced size of LV cavity with closure of the aneurysm, trace or no MR     Total aortic cross-clamp time:  91 minutes  Total cardiac bypass time:  118 minutes     Operative description in detail:  The patient was taken to the operative suite where the patient was placed in a supine position.  Induction of general anesthesia and placement of a single-lumen endotracheal tube was performed without remark.  Appropriate arterial and venous access was established without remark.  A right IJ central venous catheter was placed followed by a [place] pulmonary artery catheter by anesthesia staff. The patient was then prepped and draped in the usual and sterile surgical fashion.  A timeout was performed.  Perioperative antibiotics were administered. Beta blocker was held given bradycardia.     A simultaneous team approach was used to harvest both the internal mammary artery as well as the right saphenous vein.  Right saphenous vein was  harvested using open skip technique in the standard fashion.  It was an adequate length conduit with some sclerotic segments that were excised.  Side branches were controlled with a combination of suture and clips.  The leg wounds were hemostatic and were closed in anatomic layers using absorbable suture.     Median sternotomy was performed in standard fashion. Hemostasis was ensured along sternal edges.  A Rultract device was used to expose the posterior sternal table.  The left internal mammary artery was taken down using a standard pedicle technique with a combination of electrocautery and clips to control all side branches.  At that time, the patient was systemically anticoagulated with IV heparin.  A 24 Arabic Serafin drain was placed in the left pleural space.  After suitable time of circulating heparin, clips were placed doubly onto the mammary artery distally and it was divided proximal to the previously placed clips.  It had excellent arterial inflow.  The mammary artery was controlled distally.  The mammary artery harvest bed was hemostatic.  The Rultract device was removed from the sterile field and a sternal retractor was used for exposure.         Midline mediastinal fat and thymus were divided and pericardium opened.  There was a moderate-sized bloody pericardial effusion.  A pericardial well was created.  The distal ascending aorta and right atrial appendage were cannulated for cardiopulmonary bypass in standard fashion.  Aortic line was tested and was satisfactory.  A retrograde cardioplegia catheter was inserted to the coronary sinus in standard fashion using TERESA guidance.  A combined cardioplegia/aortic root vent set was secured with a horizontal mattress 4-0 Prolene suture.  With an appropriate ACT cardiopulmonary bypass was commenced.  I dissected out the LAD for suitable sites for bypass grafting. The distal OM artery was examined and dissected out for suitable bypass sites. With that, I proceeded  to apply the aortic cross-clamp and administered cardioplegia utilizing standard cardioplegia.    This was given in an antegrade and retrograde fashion.  I ensured during the entire cardioplegia administration the LV did not distend.  Upon achieving electrical-mechanical arrest, I applied topical hypothermia to the ventricle and total standard dose was administered.  Cardioplegia was administered after each anastomosis and every 15 minutes with combination of retrograde and antegrade cardioplegia.     I then directed my attention to the distal OM.  Coronary arteriotomy was made and augmented to size with Parsons scissors.  The saphenous vein graft was prepared, it was anastomosed in end-to-side fashion using a running 7-0 Prolene suture.  Anastomosis was assessed for hemostasis which was excellent.      I then placed a stay stitch in the apex of the heart retracted towards the head exposing the posterior wall.  Aneurysm was easily exposed.  Stay sutures were placed on each side of the aneurysm and it was opened sharply with a 15 blade.  There was some mural thrombus within the aneurysm this was carefully dissected out and the aneurysm was opened in its entirety.  Given its size in elliptical shape I felt comfortable with a linear closure.  Given this I resected the diseased aneurysmal portion of the wall.      With this there was excellent exposure of the mitral valve from the ventricular side.  I was able to identify P2 and A2 as the areas of convergence of chordae from each papillary head.  Both papillary muscles appeared healthy.  I placed 2 Hollis stitches at A2 P2 using 4-0 Ethibond suture.    I returned my attention to the aneurysm repair.  The aneurysm did approximate the septum.  I then closed with interrupted horizontal mattress 2-0 Ethibond sutures with a felt strip tolerate side.  This resulted in excellent opposition.  I then oversewed the entire suture line with a running 0 Prolene suture.  BioGlue was  placed over the closed aneurysmectomy.    I then measured the vein graft with heart full for proximal anastomosis.  Aortotomy was made and augmented to size using a 4 mm punch.  Proximal anastomosis was made in an end-to-side fashion using a running 6-0 Prolene suture.  Hemostasis was excellent and the aortic root was de-aired while tying the suture.  Additional cardioplegia dose was given.      I directed my attention towards the LAD.  A coronary arteriotomy was made and augmented to size with Parsons scissors.  It is as per operative findings.  The LIMA was prepared.  The anastomosis was constructed in an end-to-side orientation with running 7-0 Prolene suture.  The anastomosis was assessed for hemostasis which was excellent. It is without tension or torsion.  I did tack the mammary artery pedicle to the anterior aspect of the heart with 6-0 Prolene suture.     I then measured the left atrial appendage to a 45 mm atrial clip and closed in standard fashion.    With that being accomplished, a terminal hotshot was administered.  The patient was placed in Trendelenburg position.  Upon completion of terminal hotshot and placement of temporary epicardial pacing wires, with the aortic vent on high and pump flows diminished, the aortic cross-clamp was released.  With that, full support was implemented.  A nonworking beating phase was implemented.  Ventilation restored.  I surveyed each graft and each anastomosis was hemostatic and had excellent lay.  I examined the aneurysm repair site and there was adequate hemostasis with good closure.  With all in readiness, the heart was allowed to fill and then weaned off cardiopulmonary bypass.  Retrograde cardioplegia catheter was removed and site oversewn as a matter of routine.  We confirmed on TERESA no MR and excellent LV function.  We removed the aortic root vent/cardioplegia cannula set.  Its associated pursestring suture was tied securely and this was reinforced as per matter of  routine. The table was now placed in neutral position. I decannulated the venous line and snared down its associated pursestring suture.  Systemic intravenous protamine was administered.  All associated blood volume was returned to the patient. With continued good hemodynamics, I decannulated the arterial line and tied down its associated pursestring sutures.  Aortic cannulation site was reinforced as per routine.  At this time I tied down the previously snared pursestring suture.  The mediastinum was drained with 24 [place] Serafin drains placed anteriorly and posteriorly.  I surveyed the chest and hemostasis was pristine.  The sternum was reapproximated with stainless steel wires in an interrupted fashion.  In layers anatomically the soft tissue planes were reapproximated. Instruments, sharps, and sponge counts were reported as correct.      Complications: None     Disposition: Transferred to ICU in stable and guarded condition.     Kobe Maciel MD  Cardiothoracic Surgeon

## 2024-07-23 NOTE — ANESTHESIA PROCEDURE NOTES
Airway  Date/Time: 7/23/2024 10:17 AM  Airway not difficult    General Information and Staff    Patient location during procedure: OR  CRNA/CAA: Brunilda Gutierrez CRNA    Indications and Patient Condition  Indications for airway management: airway protection    Preoxygenated: yes  Mask difficulty assessment: 2 - vent by mask + OA or adjuvant +/- NMBA    Final Airway Details  Final airway type: endotracheal airway      Successful airway: ETT  Cuffed: yes   Successful intubation technique: direct laryngoscopy  Facilitating devices/methods: intubating stylet  Endotracheal tube insertion site: oral  Blade: Gilbert  Blade size: 3.5  ETT size (mm): 7.5  Cormack-Lehane Classification: grade I - full view of glottis  Placement verified by: chest auscultation   Cuff volume (mL): 7  Measured from: gums  ETT/EBT to gums (cm): 22  Number of attempts at approach: 1  Assessment: lips, teeth, and gum same as pre-op and atraumatic intubation    Additional Comments  Intubation performed without difficulty by SRNA

## 2024-07-23 NOTE — ANESTHESIA PROCEDURE NOTES
Intra-Op Anesthesia TERESA    Procedure Performed: Intra-Op Anesthesia TERESA       Start Time:  7/23/2024 10:31 AM       End Time:   7/23/2024 10:46 AM    Preanesthesia Checklist:  Patient identified, IV assessed, risks and benefits discussed, monitors and equipment assessed, procedure being performed at surgeon's request and anesthesia consent obtained.    General Procedure Information  Diagnostic Indications for Echo:  assessment of surgical repair and hemodynamic monitoring  Physician Requesting Echo: Kobe Maciel MD  Location performed:  OR  Intubated  Bite block not placed  Heart visualized  Probe Insertion:  Easy  Probe Type:  Multiplane  Modalities:  2D only, pulse wave Doppler, color flow mapping and continuous wave Doppler        Anesthesia Information  Performed Personally  Anesthesiologist:  Aaron Juarez MD      Echocardiogram Comments:       Routine periop TERESA

## 2024-07-23 NOTE — ANESTHESIA PROCEDURE NOTES
Central Line      Patient location during procedure: OR  Start time: 7/23/2024 10:20 AM  Stop Time:7/23/2024 10:28 AM  Indications: central pressure monitoring, vascular access and MD/Surgeon request  Staff  Anesthesiologist: Aaron Juarez MD  Preanesthetic Checklist  Completed: patient identified, IV checked, site marked, risks and benefits discussed, surgical consent, monitors and equipment checked, pre-op evaluation and timeout performed  Central Line Prep  Sterile Tech:cap, gloves, gown, mask and sterile barriers  Prep: chloraprep  Patient monitoring: blood pressure monitoring, continuous pulse oximetry and EKG  Central Line Procedure  Laterality:right  Location:internal jugular  Catheter Type:triple lumen, MAC and [place]  Catheter Size:9 Fr  Guidance:ultrasound guided  PROCEDURE NOTE/ULTRASOUND INTERPRETATION.  Using ultrasound guidance the potential vascular sites for insertion of the catheter were visualized to determine the patency of the vessel to be used for vascular access.  After selecting the appropriate site for insertion, the needle was visualized under ultrasound being inserted into the internal jugular vein, followed by ultrasound confirmation of wire and catheter placement. There were no abnormalities seen on ultrasound; an image was taken; and the patient tolerated the procedure with no complications. Images: still images not obtained  Assessment  Post procedure:biopatch applied, line sutured and occlusive dressing applied  Assessement:blood return through all ports, free fluid flow, chest x-ray ordered and Tre Test  Complications:no  Patient Tolerance:patient tolerated the procedure well with no apparent complications

## 2024-07-23 NOTE — ANESTHESIA POSTPROCEDURE EVALUATION
Patient: Vj Garcia    Procedure Summary       Date: 07/23/24 Room / Location:  [facility] OR  /  [facility]    Anesthesia Start: 1010 Anesthesia Stop: 1507    Procedures:       CORONARY ARTERY BYPASS GRAFTING X2, LEFT INTERNAL MAMMARY ARTERY GRAFT, RIGHT LEG OPEN VEIN HARVEST, MITRAL VALVE REAPIR, LEFT VENTRICULAR ANEURYSM REPAIR,  ATRIAL APPENDAGE EXCLUSION LEFT 45 ATRICLIP WITH TRANSESOPHAGEAL ECHOCARDIOGRAM (Chest)      POSSIBLE MITRAL VALVE REPAIR/REPLACEMENT (Chest)      ATRIAL APPENDAGE EXCLUSION LEFT WITH TRANSESOPHAGEAL ECHOCARDIOGRAM (Chest) Diagnosis:       Unstable angina      (Unstable angina [I20.0])    Surgeons: Kobe Maciel MD Provider: Brunilda Gutierrez CRNA    Anesthesia Type: general, Gricelda, CVL, PAC ASA Status: 4            Anesthesia Type: general, Gricelda, CVL, PAC    Vitals  Vitals Value Taken Time   /71 07/23/24 1516   Temp     Pulse 80 07/23/24 1521   Resp     SpO2 100 % 07/23/24 1521   Vitals shown include unfiled device data.        Post Anesthesia Care and Evaluation    Patient location during evaluation: ICU  Patient participation: complete - patient cannot participate  Post-procedure mental status: sedated & intubated.    Airway patency: intubated.  Anesthetic complications: No anesthetic complications  PONV Status: NA  Cardiovascular status: acceptable and hemodynamically stable (a-paced @ 80 bpm)  Respiratory status: acceptable, intubated and ETT  Hydration status: acceptable    Comments: Report given to primary RN- , RR 20, HR- a-paced at 80 bpm, CVP 16, PAP- 33/23, BP- 118/71, manual CO performed by nurse upon arrival = 4.83, precedex at 1 mcg/kg/hr, amicar @ 1 g/hr, cardene at 2.5 mg/hr. NAD noted. VSS.   No anesthesia care post op

## 2024-07-23 NOTE — INTERVAL H&P NOTE
No significant change.  Cr is stable.  Continued heart failure symptoms.  Discussed again the operative plan as well as risks and benefits.  He understands and agrees to proceed.    Kobe Maciel M.D.  Cardiothoracic Surgeon

## 2024-07-23 NOTE — ANESTHESIA PROCEDURE NOTES
Arterial Line      Patient location during procedure: holding area  Start time: 7/23/2024 8:25 AM  Stop Time:7/23/2024 8:28 AM       Line placed for hemodynamic monitoring.  Performed By   Anesthesiologist: Aaron Juarez MD   Preanesthetic Checklist  Completed: patient identified, IV checked, site marked, risks and benefits discussed, surgical consent, monitors and equipment checked, pre-op evaluation and timeout performed  Arterial Line Prep    Sterile Tech: mask, cap and gloves  Prep: ChloraPrep  Patient monitoring: continuous pulse oximetry  Arterial Line Procedure   Laterality:left  Location:  radial artery  Catheter size: 20 G   Guidance: ultrasound guided and palpation technique  Number of attempts: 1  Successful placement: yes Images: still images not obtained  Post Assessment   Dressing Type: occlusive dressing applied and secured with tape.   Complications no  Circ/Move/Sens Assessment: normal.   Patient Tolerance: patient tolerated the procedure well with no apparent complications

## 2024-07-24 ENCOUNTER — APPOINTMENT (OUTPATIENT)
Dept: GENERAL RADIOLOGY | Facility: HOSPITAL | Age: 74
DRG: 220 | End: 2024-07-24
Payer: MEDICARE

## 2024-07-24 PROBLEM — Z72.0 TOBACCO USE: Status: ACTIVE | Noted: 2024-07-24

## 2024-07-24 PROBLEM — E66.3 OVERWEIGHT WITH BODY MASS INDEX (BMI) OF 28 TO 28.9 IN ADULT: Status: ACTIVE | Noted: 2024-07-24

## 2024-07-24 PROBLEM — N18.31 STAGE 3A CHRONIC KIDNEY DISEASE: Status: ACTIVE | Noted: 2024-07-24

## 2024-07-24 PROBLEM — I25.119 CORONARY ARTERY DISEASE INVOLVING NATIVE CORONARY ARTERY OF NATIVE HEART WITH ANGINA PECTORIS: Status: ACTIVE | Noted: 2024-07-24

## 2024-07-24 LAB
ALBUMIN SERPL-MCNC: 3.7 G/DL (ref 3.5–5.2)
ANION GAP SERPL CALCULATED.3IONS-SCNC: 11 MMOL/L (ref 5–15)
BASOPHILS # BLD AUTO: 0.02 10*3/MM3 (ref 0–0.2)
BASOPHILS NFR BLD AUTO: 0.1 % (ref 0–1.5)
BUN SERPL-MCNC: 17 MG/DL (ref 8–23)
BUN/CREAT SERPL: 12.5 (ref 7–25)
CALCIUM SPEC-SCNC: 8.8 MG/DL (ref 8.6–10.5)
CHLORIDE SERPL-SCNC: 103 MMOL/L (ref 98–107)
CO2 SERPL-SCNC: 23 MMOL/L (ref 22–29)
CREAT SERPL-MCNC: 1.36 MG/DL (ref 0.76–1.27)
DEPRECATED RDW RBC AUTO: 46.3 FL (ref 37–54)
EGFRCR SERPLBLD CKD-EPI 2021: 55.3 ML/MIN/1.73
EOSINOPHIL # BLD AUTO: 0.01 10*3/MM3 (ref 0–0.4)
EOSINOPHIL NFR BLD AUTO: 0.1 % (ref 0.3–6.2)
ERYTHROCYTE [DISTWIDTH] IN BLOOD BY AUTOMATED COUNT: 13.5 % (ref 12.3–15.4)
GLUCOSE BLDC GLUCOMTR-MCNC: 146 MG/DL (ref 70–130)
GLUCOSE BLDC GLUCOMTR-MCNC: 150 MG/DL (ref 70–130)
GLUCOSE BLDC GLUCOMTR-MCNC: 152 MG/DL (ref 70–130)
GLUCOSE BLDC GLUCOMTR-MCNC: 153 MG/DL (ref 70–130)
GLUCOSE BLDC GLUCOMTR-MCNC: 161 MG/DL (ref 70–130)
GLUCOSE BLDC GLUCOMTR-MCNC: 166 MG/DL (ref 70–130)
GLUCOSE BLDC GLUCOMTR-MCNC: 173 MG/DL (ref 70–130)
GLUCOSE SERPL-MCNC: 154 MG/DL (ref 65–99)
HCT VFR BLD AUTO: 31.4 % (ref 37.5–51)
HGB BLD-MCNC: 9.9 G/DL (ref 13–17.7)
IMM GRANULOCYTES # BLD AUTO: 0.08 10*3/MM3 (ref 0–0.05)
IMM GRANULOCYTES NFR BLD AUTO: 0.5 % (ref 0–0.5)
INR PPP: 1.2 (ref 0.91–1.09)
LYMPHOCYTES # BLD AUTO: 0.22 10*3/MM3 (ref 0.7–3.1)
LYMPHOCYTES NFR BLD AUTO: 1.4 % (ref 19.6–45.3)
MAGNESIUM SERPL-MCNC: 1.9 MG/DL (ref 1.6–2.4)
MCH RBC QN AUTO: 29.5 PG (ref 26.6–33)
MCHC RBC AUTO-ENTMCNC: 31.5 G/DL (ref 31.5–35.7)
MCV RBC AUTO: 93.5 FL (ref 79–97)
MONOCYTES # BLD AUTO: 0.61 10*3/MM3 (ref 0.1–0.9)
MONOCYTES NFR BLD AUTO: 4 % (ref 5–12)
NEUTROPHILS NFR BLD AUTO: 14.48 10*3/MM3 (ref 1.7–7)
NEUTROPHILS NFR BLD AUTO: 93.9 % (ref 42.7–76)
PHOSPHATE SERPL-MCNC: 3.3 MG/DL (ref 2.5–4.5)
PLATELET # BLD AUTO: 131 10*3/MM3 (ref 140–450)
PMV BLD AUTO: 10.1 FL (ref 6–12)
POTASSIUM SERPL-SCNC: 4.6 MMOL/L (ref 3.5–5.2)
PROTHROMBIN TIME: 15.7 SECONDS (ref 11.8–14.8)
RBC # BLD AUTO: 3.36 10*6/MM3 (ref 4.14–5.8)
SODIUM SERPL-SCNC: 137 MMOL/L (ref 136–145)
WBC NRBC COR # BLD AUTO: 15.42 10*3/MM3 (ref 3.4–10.8)

## 2024-07-24 PROCEDURE — 94761 N-INVAS EAR/PLS OXIMETRY MLT: CPT

## 2024-07-24 PROCEDURE — 80069 RENAL FUNCTION PANEL: CPT | Performed by: SURGERY

## 2024-07-24 PROCEDURE — 25010000002 ONDANSETRON PER 1 MG: Performed by: SURGERY

## 2024-07-24 PROCEDURE — 25010000002 MAGNESIUM SULFATE 4 GM/100ML SOLUTION: Performed by: SURGERY

## 2024-07-24 PROCEDURE — 63710000001 INSULIN LISPRO (HUMAN) PER 5 UNITS: Performed by: SURGERY

## 2024-07-24 PROCEDURE — 25010000002 CEFAZOLIN PER 500 MG: Performed by: SURGERY

## 2024-07-24 PROCEDURE — 25010000002 ENOXAPARIN PER 10 MG: Performed by: SURGERY

## 2024-07-24 PROCEDURE — 94799 UNLISTED PULMONARY SVC/PX: CPT

## 2024-07-24 PROCEDURE — 25010000002 METOCLOPRAMIDE PER 10 MG: Performed by: NURSE PRACTITIONER

## 2024-07-24 PROCEDURE — 83735 ASSAY OF MAGNESIUM: CPT | Performed by: SURGERY

## 2024-07-24 PROCEDURE — 25010000002 AMIODARONE IN DEXTROSE 5% 360-4.14 MG/200ML-% SOLUTION: Performed by: SURGERY

## 2024-07-24 PROCEDURE — 63710000001 INSULIN LISPRO (HUMAN) PER 5 UNITS: Performed by: NURSE PRACTITIONER

## 2024-07-24 PROCEDURE — 97162 PT EVAL MOD COMPLEX 30 MIN: CPT

## 2024-07-24 PROCEDURE — 93005 ELECTROCARDIOGRAM TRACING: CPT | Performed by: SURGERY

## 2024-07-24 PROCEDURE — 85610 PROTHROMBIN TIME: CPT | Performed by: SURGERY

## 2024-07-24 PROCEDURE — 94664 DEMO&/EVAL PT USE INHALER: CPT

## 2024-07-24 PROCEDURE — 71045 X-RAY EXAM CHEST 1 VIEW: CPT

## 2024-07-24 PROCEDURE — 93010 ELECTROCARDIOGRAM REPORT: CPT | Performed by: INTERNAL MEDICINE

## 2024-07-24 PROCEDURE — 82948 REAGENT STRIP/BLOOD GLUCOSE: CPT

## 2024-07-24 PROCEDURE — 85025 COMPLETE CBC W/AUTO DIFF WBC: CPT | Performed by: SURGERY

## 2024-07-24 PROCEDURE — 97116 GAIT TRAINING THERAPY: CPT

## 2024-07-24 PROCEDURE — 25010000002 CEFAZOLIN PER 500 MG: Performed by: NURSE PRACTITIONER

## 2024-07-24 RX ORDER — NICOTINE POLACRILEX 4 MG
15 LOZENGE BUCCAL
Status: DISCONTINUED | OUTPATIENT
Start: 2024-07-24 | End: 2024-07-29 | Stop reason: HOSPADM

## 2024-07-24 RX ORDER — MAGNESIUM SULFATE HEPTAHYDRATE 40 MG/ML
4 INJECTION, SOLUTION INTRAVENOUS ONCE
Status: COMPLETED | OUTPATIENT
Start: 2024-07-24 | End: 2024-07-24

## 2024-07-24 RX ORDER — METOPROLOL TARTRATE 1 MG/ML
5 INJECTION, SOLUTION INTRAVENOUS ONCE
Status: COMPLETED | OUTPATIENT
Start: 2024-07-24 | End: 2024-07-24

## 2024-07-24 RX ORDER — METOCLOPRAMIDE HYDROCHLORIDE 5 MG/ML
10 INJECTION INTRAMUSCULAR; INTRAVENOUS EVERY 6 HOURS
Status: COMPLETED | OUTPATIENT
Start: 2024-07-24 | End: 2024-07-25

## 2024-07-24 RX ORDER — IBUPROFEN 600 MG/1
1 TABLET ORAL
Status: DISCONTINUED | OUTPATIENT
Start: 2024-07-24 | End: 2024-07-29 | Stop reason: HOSPADM

## 2024-07-24 RX ORDER — TAMSULOSIN HYDROCHLORIDE 0.4 MG/1
0.4 CAPSULE ORAL NIGHTLY
Status: DISCONTINUED | OUTPATIENT
Start: 2024-07-24 | End: 2024-07-29 | Stop reason: HOSPADM

## 2024-07-24 RX ORDER — PANTOPRAZOLE SODIUM 40 MG/1
40 TABLET, DELAYED RELEASE ORAL
Status: DISCONTINUED | OUTPATIENT
Start: 2024-07-24 | End: 2024-07-29 | Stop reason: HOSPADM

## 2024-07-24 RX ORDER — INSULIN LISPRO 100 [IU]/ML
2-7 INJECTION, SOLUTION INTRAVENOUS; SUBCUTANEOUS
Status: DISCONTINUED | OUTPATIENT
Start: 2024-07-24 | End: 2024-07-29 | Stop reason: HOSPADM

## 2024-07-24 RX ORDER — LIDOCAINE 4 G/G
2 PATCH TOPICAL
Status: DISCONTINUED | OUTPATIENT
Start: 2024-07-24 | End: 2024-07-29 | Stop reason: HOSPADM

## 2024-07-24 RX ORDER — METHOCARBAMOL 500 MG/1
500 TABLET, FILM COATED ORAL EVERY 8 HOURS SCHEDULED
Status: DISCONTINUED | OUTPATIENT
Start: 2024-07-24 | End: 2024-07-29 | Stop reason: HOSPADM

## 2024-07-24 RX ORDER — DEXTROSE MONOHYDRATE 25 G/50ML
25 INJECTION, SOLUTION INTRAVENOUS
Status: DISCONTINUED | OUTPATIENT
Start: 2024-07-24 | End: 2024-07-29 | Stop reason: HOSPADM

## 2024-07-24 RX ADMIN — CARVEDILOL 3.12 MG: 3.12 TABLET, FILM COATED ORAL at 20:31

## 2024-07-24 RX ADMIN — ENOXAPARIN SODIUM 40 MG: 100 INJECTION SUBCUTANEOUS at 08:30

## 2024-07-24 RX ADMIN — METHOCARBAMOL 500 MG: 500 TABLET, FILM COATED ORAL at 15:01

## 2024-07-24 RX ADMIN — PANTOPRAZOLE SODIUM 40 MG: 40 TABLET, DELAYED RELEASE ORAL at 08:29

## 2024-07-24 RX ADMIN — INSULIN LISPRO 2 UNITS: 100 INJECTION, SOLUTION INTRAVENOUS; SUBCUTANEOUS at 12:32

## 2024-07-24 RX ADMIN — METOCLOPRAMIDE HYDROCHLORIDE 10 MG: 5 INJECTION INTRAMUSCULAR; INTRAVENOUS at 20:32

## 2024-07-24 RX ADMIN — Medication 1 APPLICATION: at 05:31

## 2024-07-24 RX ADMIN — ACETAMINOPHEN 650 MG: 325 TABLET, FILM COATED ORAL at 18:03

## 2024-07-24 RX ADMIN — METOCLOPRAMIDE HYDROCHLORIDE 10 MG: 5 INJECTION INTRAMUSCULAR; INTRAVENOUS at 15:01

## 2024-07-24 RX ADMIN — Medication 1 APPLICATION: at 18:03

## 2024-07-24 RX ADMIN — LIDOCAINE 2 PATCH: 4 PATCH TOPICAL at 04:40

## 2024-07-24 RX ADMIN — METOPROLOL TARTRATE 5 MG: 1 INJECTION, SOLUTION INTRAVENOUS at 21:01

## 2024-07-24 RX ADMIN — CEFAZOLIN 2 G: 2 INJECTION, POWDER, FOR SOLUTION INTRAMUSCULAR; INTRAVENOUS at 22:06

## 2024-07-24 RX ADMIN — AMIODARONE HYDROCHLORIDE 1 MG/MIN: 1.8 INJECTION, SOLUTION INTRAVENOUS at 21:01

## 2024-07-24 RX ADMIN — ONDANSETRON 4 MG: 2 INJECTION INTRAMUSCULAR; INTRAVENOUS at 01:21

## 2024-07-24 RX ADMIN — BISACODYL 10 MG: 5 TABLET, COATED ORAL at 08:29

## 2024-07-24 RX ADMIN — OXYCODONE HYDROCHLORIDE 5 MG: 5 TABLET ORAL at 12:33

## 2024-07-24 RX ADMIN — OXYCODONE HYDROCHLORIDE 10 MG: 10 TABLET ORAL at 05:20

## 2024-07-24 RX ADMIN — ACETAMINOPHEN 650 MG: 325 TABLET, FILM COATED ORAL at 12:32

## 2024-07-24 RX ADMIN — INSULIN LISPRO 2 UNITS: 100 INJECTION, SOLUTION INTRAVENOUS; SUBCUTANEOUS at 18:03

## 2024-07-24 RX ADMIN — METOCLOPRAMIDE HYDROCHLORIDE 10 MG: 5 INJECTION INTRAMUSCULAR; INTRAVENOUS at 08:29

## 2024-07-24 RX ADMIN — OXYCODONE HYDROCHLORIDE 5 MG: 5 TABLET ORAL at 22:06

## 2024-07-24 RX ADMIN — TAMSULOSIN HYDROCHLORIDE 0.4 MG: 0.4 CAPSULE ORAL at 20:31

## 2024-07-24 RX ADMIN — IPRATROPIUM BROMIDE AND ALBUTEROL SULFATE 3 ML: 2.5; .5 SOLUTION RESPIRATORY (INHALATION) at 06:11

## 2024-07-24 RX ADMIN — CARVEDILOL 3.12 MG: 3.12 TABLET, FILM COATED ORAL at 08:29

## 2024-07-24 RX ADMIN — MAGNESIUM SULFATE HEPTAHYDRATE 4 G: 40 INJECTION, SOLUTION INTRAVENOUS at 05:31

## 2024-07-24 RX ADMIN — METHOCARBAMOL 500 MG: 500 TABLET, FILM COATED ORAL at 20:31

## 2024-07-24 RX ADMIN — CHLORHEXIDINE GLUCONATE 0.12% ORAL RINSE 15 ML: 1.2 LIQUID ORAL at 05:31

## 2024-07-24 RX ADMIN — ASPIRIN 81 MG CHEWABLE TABLET 162 MG: 81 TABLET CHEWABLE at 08:29

## 2024-07-24 RX ADMIN — ACETAMINOPHEN 650 MG: 325 TABLET, FILM COATED ORAL at 05:31

## 2024-07-24 RX ADMIN — CHLORHEXIDINE GLUCONATE 1 APPLICATION: 500 CLOTH TOPICAL at 05:27

## 2024-07-24 RX ADMIN — BISACODYL 10 MG: 5 TABLET, COATED ORAL at 20:31

## 2024-07-24 RX ADMIN — CEFAZOLIN 2 G: 2 INJECTION, POWDER, FOR SOLUTION INTRAMUSCULAR; INTRAVENOUS at 05:31

## 2024-07-24 RX ADMIN — POLYETHYLENE GLYCOL 3350 17 G: 17 POWDER, FOR SOLUTION ORAL at 08:29

## 2024-07-24 RX ADMIN — CLOPIDOGREL BISULFATE 75 MG: 75 TABLET, FILM COATED ORAL at 18:03

## 2024-07-24 RX ADMIN — CEFAZOLIN 2 G: 2 INJECTION, POWDER, FOR SOLUTION INTRAMUSCULAR; INTRAVENOUS at 13:48

## 2024-07-24 RX ADMIN — INSULIN LISPRO 2 UNITS: 100 INJECTION, SOLUTION INTRAVENOUS; SUBCUTANEOUS at 20:47

## 2024-07-24 RX ADMIN — ATORVASTATIN CALCIUM 20 MG: 10 TABLET, FILM COATED ORAL at 20:31

## 2024-07-24 RX ADMIN — CHLORHEXIDINE GLUCONATE 0.12% ORAL RINSE 15 ML: 1.2 LIQUID ORAL at 18:03

## 2024-07-24 RX ADMIN — OXYCODONE HYDROCHLORIDE 10 MG: 10 TABLET ORAL at 01:42

## 2024-07-24 NOTE — CASE MANAGEMENT/SOCIAL WORK
Discharge Planning Assessment   [place]     Patient Name: Vj Garcia  MRN: [id number]  Today's Date: 7/24/2024    Admit Date: 7/23/2024        Discharge Needs Assessment       Row Name 07/24/24 1423       Living Environment    Current Living Arrangements home    Potentially Unsafe Housing Conditions none    In the past 12 months has the electric, gas, oil, or water company threatened to shut off services in your home? No    Primary Care Provided by self    Provides Primary Care For no one    Family Caregiver if Needed child(adrian), adult;significant other    Quality of Family Relationships supportive;helpful;involved    Able to Return to Prior Arrangements yes       Resource/Environmental Concerns    Resource/Environmental Concerns none    Transportation Concerns none       Transportation Needs    In the past 12 months, has lack of transportation kept you from medical appointments or from getting medications? no    In the past 12 months, has lack of transportation kept you from meetings, work, or from getting things needed for daily living? No       Food Insecurity    Within the past 12 months, you worried that your food would run out before you got the money to buy more. Never true    Within the past 12 months, the food you bought just didn't last and you didn't have money to get more. Never true       Transition Planning    Patient/Family Anticipates Transition to home with family    Patient/Family Anticipated Services at Transition none    Transportation Anticipated family or friend will provide       Discharge Needs Assessment    Readmission Within the Last 30 Days no previous admission in last 30 days    Equipment Currently Used at Home none    Concerns to be Addressed no discharge needs identified    Anticipated Changes Related to Illness none    Equipment Needed After Discharge none    Discharge Coordination/Progress Patient resides at home with his spouse/significant other.  Patient functions  independently, has a PCP and RX coverage.  Patient plans to return home upon discharge.  SW following for any needs.                   Discharge Plan    No documentation.                 Continued Care and Services - Admitted Since 7/23/2024    No active coordination exists for this encounter.          Demographic Summary    No documentation.                  Functional Status    No documentation.                  Psychosocial    No documentation.                  Abuse/Neglect    No documentation.                  Legal    No documentation.                  Substance Abuse    No documentation.                  Patient Forms    No documentation.                     WANG Dubose

## 2024-07-24 NOTE — PLAN OF CARE
Goal Outcome Evaluation:  Plan of Care Reviewed With: patient        Progress: improving  Outcome Evaluation: PT eval complete. Pt alert and oriented x4. Pt found in recliner and agreeable to therapy. Pt reports being ind prior to this including ambulation short and long distances without AD. 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 Pt educated and reminded of sternal precautions throughout session. Pt pre vitals found to be HR 82, O2 93, and /66. Pt spv-CGA for sit<>stand and CGA for ambulation 150 ft. Pt demonstrates decrease in gait speed and step length. Pt reports dizziness upon standing and dizziness penitentiary into walk requiring a 30 second rest break. Pt returned to room with good eccentric control to sit in recliner and left with all needs met. Pt post vitals found to be HR 84, O2 96, and /56. Pt would benefit from skilled PT to address gait deficits and functional strength and endurance deficits. Anticipate d/c home with assist.      Anticipated Discharge Disposition (PT): home with assist

## 2024-07-24 NOTE — THERAPY EVALUATION
Patient Name: Vj Garcia  : 1950    MRN: [id number]                              Today's Date: 2024       Admit Date: 2024    Visit Dx:     ICD-10-CM ICD-9-CM   1. Impaired mobility [Z74.09]  Z74.09 799.89   2. Unstable angina  I20.0 411.1     Patient Active Problem List   Diagnosis    Unstable angina    Ischemic cardiomyopathy    CAD (coronary artery disease)    Left ventricular aneurysm    Coronary artery disease involving native coronary artery of native heart with angina pectoris    Tobacco use    Overweight with body mass index (BMI) of 28 to 28.9 in adult    Stage 3a chronic kidney disease     Past Medical History:   Diagnosis Date    COPD (chronic obstructive pulmonary disease)     Elevated cholesterol     Enlarged prostate     History of heart attack     Hypertension     Urination frequency     Weak urine stream      Past Surgical History:   Procedure Laterality Date    ATRIAL APPENDAGE EXCLUSION LEFT WITH TRANSESOPHAGEAL ECHOCARDIOGRAM N/A 2024    Procedure: ATRIAL APPENDAGE EXCLUSION LEFT WITH TRANSESOPHAGEAL ECHOCARDIOGRAM;  Surgeon: Kobe Maciel MD;  Location:  [facility] OR;  Service: Cardiothoracic;  Laterality: N/A;    BACK SURGERY      x3    CARDIAC CATHETERIZATION N/A 2024    Procedure: Left Heart Cath;  Surgeon: Brennan Khoury DO;  Location:  [facility]ATH [facility]AS[facility]ATION;  Service: Cardiovascular;  Laterality: N/A;    CORONARY ARTERY BYPASS GRAFT N/A 2024    Procedure: CORONARY ARTERY BYPASS GRAFTING X2, LEFT INTERNAL MAMMARY ARTERY GRAFT, RIGHT LEG OPEN VEIN HARVEST, MITRAL VALVE REAPIR, LEFT VENTRICULAR ANEURYSM REPAIR,  ATRIAL APPENDAGE EXCLUSION LEFT 45 ATRICLIP WITH TRANSESOPHAGEAL ECHOCARDIOGRAM;  Surgeon: Kobe Maciel MD;  Location:  [facility];  Service: Cardiothoracic;  Laterality: N/A;    EXCISION      fatty patch on back    HERNIA REPAIR      MITRAL VALVE REPAIR/REPLACEMENT N/A 2024    Procedure: POSSIBLE MITRAL VALVE  REPAIR/REPLACEMENT;  Surgeon: Kobe Maciel MD;  Location:  [facility];  Service: Cardiothoracic;  Laterality: N/A;      General Information       Row Name 07/24/24 08          Physical Therapy Time and Intention    Document Type evaluation  CC: abnormal echo; PMH: heart cath 7/12/24 Dx: unstable chest pain s/p CABG x2 7/23/24  -ERNESTO (r) CN (t) ERNESTO (c)     Mode of Treatment physical therapy  -ERNESTO (r) CN (t) ERNESTO (c)       Row Name 07/24/24 08          General Information    Patient Profile Reviewed yes  -ERNESTO (r) CN (t) ERNESTO (c)     Prior Level of Function independent:;feeding;grooming;dressing;bathing;all household mobility;home management;community mobility;gait;driving;work;ADL's  -ERNESTO (r) CN (t) ERNESTO (c)     Existing Precautions/Restrictions fall;sternal  -ERNESTO (r)  ERNESTO (c)     Barriers to Rehab medically complex  -ERNESTO       Row Name 07/24/24 08          Living Environment    People in Home spouse  -ERNESTO (r) CN (t) ERNESTO (c)       Row Name 07/24/24 08          Home Main Entrance    Number of Stairs, Main Entrance three  -ERNESTO (r) CN (t) ERNESTO (c)     Stair Railings, Main Entrance none  -ERNESTO (r) CN (t) ERNESTO (c)       Row Name 07/24/24 08          Stairs Within Home, Primary    Number of Stairs, Within Home, Primary none  -ERNESTO (r) CN (t) ERNESTO (c)       Row Name 07/24/24 08          Cognition    Orientation Status (Cognition) oriented x 4  -ERNESTO (r) CN (t) ERNESTO (c)       Row Name 07/24/24 08          Safety Issues, Functional Mobility    Impairments Affecting Function (Mobility) balance;endurance/activity tolerance;pain;strength  -ERNESTO (r) CN (t) ERNESTO (c)               User Key  (r) = Recorded By, (t) = Taken By, (c) = Cosigned By      Initials Name Provider Type    Phi Pierre, PT DPT Physical Therapist    Sarah Chavez, PT Student PT Student                   Mobility       Row Name 07/24/24 08          Bed Mobility    Comment, (Bed Mobility) Pt found in recliner upon arrival.  -ERNESTO (r) CN (t) ERNESTO (c)       Row Name  07/24/24 0807          Sit-Stand Transfer    Sit-Stand [place] (Transfers) contact guard  -ERNESTO       Row Name 07/24/24 0807          Gait/Stairs (Locomotion)    [place] Level (Gait) contact guard  -ERNESTO     Distance in Feet (Gait) 150  -ERNESTO (r) CN (t) ERNESTO (c)     Deviations/Abnormal Patterns (Gait) gait speed decreased;stride length decreased;home decreased  -ERNESTO (r) CN (t) ERNESTO (c)               User Key  (r) = Recorded By, (t) = Taken By, (c) = Cosigned By      Initials Name Provider Type    Phi Pierre, PT DPT Physical Therapist    Sarah Chavez, PT Student PT Student                   Obj/Interventions       Row Name 07/24/24 0807          Range of Motion Comprehensive    General Range of Motion bilateral upper extremity ROM WFL;bilateral lower extremity ROM WFL  -ERNESTO (r) CN (t) ERNESTO (c)       Row Name 07/24/24 0807          Strength Comprehensive (MMT)    Comment, General Manual Muscle Testing (MMT) Assessment BLE functional oberservation sit<>stand and ambulation 4+/5.  -ERNESTO (r) CN (t) ERNESTO (c)       Row Name 07/24/24 0807          Balance    Balance Assessment sitting static balance;sitting dynamic balance;standing static balance;standing dynamic balance  -ERNESTO (r) CN (t) ERNESTO (c)     Static Sitting Balance standby assist  -ERNESTO (r) CN (t) ERNESTO (c)     Dynamic Sitting Balance standby assist  -ERNESTO (r) CN (t) ERNESTO (c)     Position, Sitting Balance unsupported;sitting in chair  -ERNESTO (r) CN (t) ERNESTO (c)     Static Standing Balance supervision  -ERNESTO (r) CN (t) ERNESTO (c)     Dynamic Standing Balance supervision;contact guard  -ERNESTO (r) CN (t) ERNESTO (c)     Position/Device Used, Standing Balance unsupported  -ERNESTO (r) CN (t) ERNESTO (c)               User Key  (r) = Recorded By, (t) = Taken By, (c) = Cosigned By      Initials Name Provider Type    Phi Pierre, PT DPT Physical Therapist    Sarah Chavez, PT Student PT Student                   Goals/Plan       Row Name 07/24/24 0807          Bed Mobility Goal 1  (PT)    Activity/Assistive Device (Bed Mobility Goal 1, PT) sit to supine/supine to sit  -ERNESTO     [place] Level/Cues Needed (Bed Mobility Goal 1, PT) supervision required  -ERNESTO     Time Frame (Bed Mobility Goal 1, PT) long term goal (LTG);10 days  -ERNESTO     Progress/Outcomes (Bed Mobility Goal 1, PT) new goal  -ERNESTO       Row Name 07/24/24 0807          Transfer Goal 1 (PT)    Activity/Assistive Device (Transfer Goal 1, PT) sit-to-stand/stand-to-sit;bed-to-chair/chair-to-bed  -ERNESTO     [place] Level/Cues Needed (Transfer Goal 1, PT) supervision required  -ERNESTO     Time Frame (Transfer Goal 1, PT) long term goal (LTG);10 days  -ERNESTO     Progress/Outcome (Transfer Goal 1, PT) new goal  -ERNESTO       Row Name 07/24/24 0807          Gait Training Goal 1 (PT)    Activity/Assistive Device (Gait Training Goal 1, PT) gait (walking locomotion);decrease fall risk;forward stepping;improve balance and speed;increase endurance/gait distance  -ERNESTO (r) CN (t) ERNESTO (c)     [place] Level (Gait Training Goal 1, PT) supervision required  -ERNESTO     Distance (Gait Training Goal 1, PT) 300  -ERNESTO (r) CN (t) ERNESTO (c)     Time Frame (Gait Training Goal 1, PT) long term goal (LTG);10 days  -ERNESTO (r) CN (t) ERNESTO (c)     Progress/Outcome (Gait Training Goal 1, PT) new goal  -ERNESTO (r) CN (t) ERNESTO (c)       Row Name 07/24/24 0807          Stairs Goal 1 (PT)    Activity/Assistive Device (Stairs Goal 1, PT) stairs, all skills;ascending stairs;descending stairs;decrease fall risk;improve balance and speed  -ERNESTO (r) CN (t) ERNESTO (c)     [place] Level/Cues Needed (Stairs Goal 1, PT) supervision required  -ERNESTO     Number of Stairs (Stairs Goal 1, PT) 4  -ERNESTO (r) CN (t) ERNESTO (c)     Time Frame (Stairs Goal 1, PT) long term goal (LTG);10 days  -ERNESTO (r) CN (t) ERNESTO (c)     Progress/Outcome (Stairs Goal 1, PT) new goal  -ERNESTO (r) CN (t) ERNESTO (c)       Row Name 07/24/24 8993          Therapy Assessment/Plan (PT)    Planned Therapy Interventions (PT) balance training;bed mobility  training;gait training;home exercise program;strengthening;stair training;ROM (range of motion);postural re-education;patient/family education;transfer training  -ERNESTO               User Key  (r) = Recorded By, (t) = Taken By, (c) = Cosigned By      Initials Name Provider Type    Phi Pierre, PT DPT Physical Therapist    Sarah Chavez, PT Student PT Student                   Clinical Impression       Row Name 07/24/24 0807          Pain    Pain Intervention(s) Repositioned;Ambulation/increased activity  -ERNESTO (r) CN (t) ERNESTO (c)     Additional Documentation Pain Scale: FACES Pre/Post-Treatment (Group)  -ERNESTO (r) CN (t) ERNESTO (c)       Row Name 07/24/24 0807          Pain Scale: FACES Pre/Post-Treatment    Pain: FACES Scale, Pretreatment 2-->hurts little bit  -ERNESTO (r) CN (t) ERNESTO (c)     Posttreatment Pain Rating 4-->hurts little more  -ERNESTO (r) CN (t) ERNESTO (c)     Pain Location incisional  -ERNESTO     Pain Location - chest  -ERNESTO (r) CN (t) ERNESTO (c)       Row Name 07/24/24 0807          Plan of Care Review    Plan of Care Reviewed With patient  -ERNESTO (r) CN (t) ERNESTO (c)     Progress improving  -ERNESTO (r) CN (t) ERNESTO (c)     Outcome Evaluation PT eval complete. Pt alert and oriented x4. Pt found in recliner and agreeable to therapy. Pt reports being ind prior to this including ambulation short and long distances without AD. 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 Pt educated and reminded of sternal precautions throughout session. Pt pre vitals found to be HR 82, O2 93, and /66. Pt spv-CGA for sit<>stand and CGA for ambulation 150 ft. Pt demonstrates decrease in gait speed and step length. Pt reports dizziness upon standing and dizziness half-way into walk requiring a 30 second rest break. Pt returned to room with good eccentric control to sit in recliner and left with all needs met. Pt post vitals found to be HR 84, O2 96, and /56. Pt would benefit from skilled PT to address gait deficits and functional  strength and endurance deficits. Anticipate d/c home with assist.  -ERNESTO (r) CN (t) ERNESTO (c)       [facility] Name 07/24/24 0807          Therapy Assessment/Plan (PT)    Patient/Family Therapy Goals Statement (PT) go home.  -ERNESTO (r) CN (t) ERNESTO (c)     Rehab Potential (PT) good, to achieve stated therapy goals  -ERNESTO (r) CN (t) ERNESTO (c)     Criteria for Skilled Interventions Met (PT) yes;skilled treatment is necessary  -ERNESTO (r) CN (t) ERNESTO (c)     Therapy Frequency (PT) 2 times/day  -ERNESTO (r) CN (t) ERNESTO (c)     Predicted Duration of Therapy Intervention (PT) until d/c  -ERNESTO (r) CN (t) ERNESTO (c)       Row Name 07/24/24 0807          Vital Signs    Pre Systolic BP Rehab 108  -ERNESTO (r) CN (t) ERNESTO (c)     Pre Treatment Diastolic BP 66  -ERNESTO (r) CN (t) ERNESTO (c)     Post Systolic BP Rehab 109  -ERNESTO (r) CN (t) ERNESTO (c)     Post Treatment Diastolic BP 56  -ERNESTO (r) CN (t) ERNESTO (c)     Pretreatment Heart Rate (beats/min) 82  -ERNESTO (r) CN (t) ERNESTO (c)     Posttreatment Heart Rate (beats/min) 84  -ERNESTO (r) CN (t) ERNESTO (c)     Pre SpO2 (%) 93  -ERNESTO (r) CN (t) ERNESTO (c)     Post SpO2 (%) 96  -ERNESTO (r) CN (t) ERNESTO (c)     Pre Patient Position Sitting  -ERNESTO     Intra Patient Position Standing  -ERNESTO     Post Patient Position Sitting  -ERNESTO       Row Name 07/24/24 0807          Positioning and Restraints    Pre-Treatment Position sitting in chair/recliner  -ERNESTO (r) CN (t) ERNESTO (c)     Post Treatment Position chair  -ERNESTO (r) CN (t) ERNESTO (c)     In Chair sitting;call light within reach;encouraged to call for assist;with nsg;patient within staff view  -ERNESTO (r) CN (t) ERNESTO (c)               User Key  (r) = Recorded By, (t) = Taken By, (c) = Cosigned By      Initials Name Provider Type    Phi Pierre, PT DPT Physical Therapist    Sraah Chavez, PT Student PT Student                   Outcome Measures       Row Name 07/24/24 0811 07/24/24 0807       How much help from another person do you currently need...    Turning from your back to your side while in flat bed without using bedrails? 2   -SE 4  -ERNESTO (r) CN (t) ERNESTO (c)    Moving from lying on back to sitting on the side of a flat bed without bedrails? 2  -SE 3  -ERNESTO (r) CN (t) ERNESTO (c)    Moving to and from a bed to a chair (including a wheelchair)? 2  -SE 4  -ERNESTO (r) CN (t) ERNESTO (c)    Standing up from a chair using your arms (e.g., wheelchair, bedside chair)? 2  -SE 4  -ERNESTO (r) CN (t) ERNESTO (c)    Climbing 3-5 steps with a railing? 2  -SE 3  -ERNESTO (r) CN (t) ERNESTO (c)    To walk in hospital room? 3  -SE 3  -ERNESTO (r) CN (t) ERNESTO (c)    AM-PAC 6 Clicks Score (PT) 13  -SE 21  -ERNESTO (r) CN (t)    Highest Level of Mobility Goal 4 --> Transfer to chair/commode  -SE 6 --> Walk 10 steps or more  -ERNESTO (r) CN (t)      Row Name 07/24/24 0500          How much help from another person do you currently need...    Turning from your back to your side while in flat bed without using bedrails? 2  -SEA     Moving from lying on back to sitting on the side of a flat bed without bedrails? 2  -SEA     Moving to and from a bed to a chair (including a wheelchair)? 2  -SEA     Standing up from a chair using your arms (e.g., wheelchair, bedside chair)? 2  -SEA     Climbing 3-5 steps with a railing? 2  -SEA     To walk in hospital room? 3  -SEA     AM-PAC 6 Clicks Score (PT) 13  -SEA     Highest Level of Mobility Goal 4 --> Transfer to chair/commode  -SEA       Row Name 07/24/24 0807          Functional Assessment    Outcome Measure Options AM-PAC 6 Clicks Basic Mobility (PT)  -ERNESTO (r) CN (t) ERNESTO (c)               User Key  (r) = Recorded By, (t) = Taken By, (c) = Cosigned By      Initials Name Provider Type    Phi Pierre, PT DPT Physical Therapist    Aung Montana, RN Registered Nurse    Aung Lemos RN Registered Nurse    Sarah Chavez, PT Student PT Student                                 Physical Therapy Education       Title: PT OT SLP Therapies (In Progress)       Topic: Physical Therapy (In Progress)       Point: Mobility training (Done)       Learning Progress  Summary             Patient Acceptance, E,TB, VU,DU by CN at 7/24/2024 0807    Comment: Educated on role of PT in pt care.                         Point: Home exercise program (Not Started)       Learner Progress:  Not documented in this visit.              Point: Body mechanics (Not Started)       Learner Progress:  Not documented in this visit.              Point: Precautions (Not Started)       Learner Progress:  Not documented in this visit.                              User Key       Initials Effective Dates Name Provider Type Discipline    CHINO 06/24/24 -  Sarah Mitchell, PT Student PT Student PT                  PT Recommendation and Plan     Plan of Care Reviewed With: patient  Progress: improving  Outcome Evaluation: PT eval complete. Pt alert and oriented x4. Pt found in recliner and agreeable to therapy. Pt reports being ind prior to this including ambulation short and long distances without AD. 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 Pt educated and reminded of sternal precautions throughout session. Pt pre vitals found to be HR 82, O2 93, and /66. Pt spv-CGA for sit<>stand and CGA for ambulation 150 ft. Pt demonstrates decrease in gait speed and step length. Pt reports dizziness upon standing and dizziness senior care into walk requiring a 30 second rest break. Pt returned to room with good eccentric control to sit in recliner and left with all needs met. Pt post vitals found to be HR 84, O2 96, and /56. Pt would benefit from skilled PT to address gait deficits and functional strength and endurance deficits. Anticipate d/c home with assist.     Time Calculation:         PT Charges       Row Name 07/24/24 0807             Time Calculation    Start Time 0807  -RENESTO (r) CN (t) ERNESTO (c)      Stop Time 0900  -ERNESTO (r) CN (t) ERNESTO (c)      Time Calculation (min) 53 min  -ERNESTO (r) CN (t)      PT Received On 07/24/24  -ERNESTO (r) CN (t) ERNESTO (c)      PT Goal Re-Cert Due Date 08/03/24  -ERNESTO (r) CN (t) ERNESTO  (c)                User Key  (r) = Recorded By, (t) = Taken By, (c) = Cosigned By      Initials Name Provider Type    Phi Pierre, PT DPT Physical Therapist    Sarah Chavez, PT Student PT Student                      PT G-Codes  Outcome Measure Options: AM-PAC 6 Clicks Basic Mobility (PT)  AM-PAC 6 Clicks Score (PT): 13  PT Discharge Summary  Anticipated Discharge Disposition (PT): home with assist    Sarah Mitchell, PT Student  7/24/2024

## 2024-07-24 NOTE — PROGRESS NOTES
"Patient name: Vj Garcia  Patient : 1950  VISIT # [id number]  MR #[id number]    Procedure:Procedure(s):  CORONARY ARTERY BYPASS GRAFTING X2, LEFT INTERNAL MAMMARY ARTERY GRAFT, RIGHT LEG OPEN VEIN HARVEST, MITRAL VALVE REAPIR, LEFT VENTRICULAR ANEURYSM REPAIR,  ATRIAL APPENDAGE EXCLUSION LEFT 45 ATRICLIP WITH TRANSESOPHAGEAL ECHOCARDIOGRAM  POSSIBLE MITRAL VALVE REPAIR/REPLACEMENT  ATRIAL APPENDAGE EXCLUSION LEFT WITH TRANSESOPHAGEAL ECHOCARDIOGRAM  Procedure Date:2024  POD:1 Day Post-Op    Subjective     Extubated overnight tolerating room air.  Weight is up 6 pounds from baseline.  Creatinine today is 1.36.  Complains of surgical pain.  He is ambulated a lap around the ICU.    Telemetry: Sinus rhythm 80s  IV drips: IV fluids, carding, insulin       Objective     Visit Vitals  /56   Pulse 84   Temp 99 °F (37.2 °C) (Bladder)   Resp 18   Ht 178.5 cm (70.28\")   Wt 91.1 kg (200 lb 12.8 oz)   SpO2 95%   BMI 28.59 kg/m²   Baseline weight 194 pounds    Intake/Output Summary (Last 24 hours) at 2024 0930  Last data filed at 2024 0600  Gross per 24 hour   Intake 5340.07 ml   Output 3497 ml   Net 1843.07 ml     MCT: 420 mL 24 hours, serosanguineous, no airleak  Left pleural drain: 82 mL in 24 hours, serosanguineous, no airleak    Lab:     CBC:  Results from last 7 days   Lab Units 24  1519   WBC 10*3/mm3 15.42* 12.61* 22.01*   HEMATOCRIT % 31.4* 32.3* 34.9*   PLATELETS 10*3/mm3 131* 130* 156          BMP:  Results from last 7 days   Lab Units 24  1519   SODIUM mmol/L 137  --  139 139   SODIUM, ARTERIAL mmol/L  --  139  --   --    POTASSIUM mmol/L 4.6  --  5.9* 5.2   CHLORIDE mmol/L 103  --  105 107   CO2 mmol/L 23.0  --  23.0 24.0   GLUCOSE mg/dL 154*  --  156* 132*   BUN mg/dL 17  --  15 15   CREATININE mg/dL 1.36*  --  1.42* 1.40*          COAG:  Results from last 7 days   Lab Units 24  0228 " 07/23/24  1519 07/23/24  1403   INR  1.20*   < > 1.49*   APTT seconds  --   --  31.9    < > = values in this interval not displayed.       IMAGES:       Imaging Results (Last 24 Hours)       Procedure Component Value Units Date/Time    XR Chest 1 View [[id number]] Collected: 07/24/24 0556     Updated: 07/24/24 0603    Narrative:      EXAMINATION: XR CHEST 1 VW-     7/24/2024 2:10 AM     HISTORY: Post-Op Heart Surgery     A frontal projection of the chest is compared with the previous study  dated 7/23/2024.     The lungs are poorly expanded.     There is interval removal of the endotracheal tube and [place]-Huber  catheter. The vascular sheath in the right internal jugular vein  persist. Left-sided chest tube and mediastinal drain are in place.     There are linear parenchymal changes in the lungs bilaterally suggesting  atelectatic changes or evolving mild pulmonary venous congestion.     There is left lower lobar consolidation and a small left basilar pleural  effusion with blunting of the left lateral costophrenic sulcus.     There is moderate cardiomegaly. There is evidence of prior cardiac  surgery. Left atrial appendage clamp is in place.     No acute bony abnormality. Postsurgical changes of the neck are similar  to the previous study.       Impression:      1. Poor lung expansion. Mild pulm venous congestion. Left lower lobar  consolidation and a small left basal pleural effusion.  2. There are right IJ vascular sheath, left chest tube and mediastinal  drain in place.              This report was signed and finalized on 7/24/2024 6:00 AM by Dr. Everton Ziegler MD.       XR Chest 1 View [[id number]] Collected: 07/23/24 1624     Updated: 07/23/24 1631    Narrative:      XR CHEST 1 VW- 7/23/2024 4:11 p.m.     HISTORY: [place] placement; I20.0-Unstable angina     COMPARISON: 7/23/2024 3:33 p.m.     FINDINGS: Frontal view the chest obtained.     Repositioning of the [place]-Huber catheter. The tip is now present  within  the pulmonary outflow track/proximal main pulmonary artery. Endotracheal  tube remains appropriate in position. Left atrial appendage clip. There  is an additional line projecting over the central mediastinum of unknown  etiology, perhaps a mediastinal drain. Left thoracotomy tube in place.     Basilar atelectasis. No convincing edema or consolidation. No pleural  effusion or pneumothorax. Calcified left hilar lymph nodes.       Impression:      1. Retraction of the [place]-Huber catheter with the tip now present in the  pulmonary outflow tract. Appropriate positioning of the endotracheal  tube. Left thoracotomy tube and additional line projecting over the mid  mediastinum, perhaps mediastinal drain. Bibasilar atelectasis.  .        This report was signed and finalized on 7/23/2024 4:28 PM by Dr. Dara Coelho MD.       XR Chest 1 View [[id number]] Collected: 07/23/24 1553     Updated: 07/23/24 1559    Narrative:      EXAMINATION: XR CHEST 1 VW- 7/23/2024 3:53 PM     HISTORY: Post-Op Check Line & Tube Placement; I20.0-Unstable angina.     REPORT: A frontal view of the chest was obtained.     COMPARISON: Chest x-rays 7/22/2024.     The patient has undergone median sternotomy and CABG, the endotracheal  tube, mediastinal drains and left pleural drain appear in satisfactory  position. The tip of the pulmonary artery catheter is partially coiled,  likely within the main pulmonary artery segment. No pneumothorax or  pleural effusion is identified. A left atrial appendage occlusion clip  is present. Heart size is normal. There is previous ACDF and cervical  corpectomy. 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       Impression:      Status post median sternotomy/CABG, the life support lines  appear in good position, though the tip of the pulmonary artery catheter  is noted to be partially coiled likely within the main pulmonary artery  segment. The lungs are clear.     This  report was signed and finalized on 7/23/2024 3:56 PM by Dr. Keaton Gifford MD.             CXR: Chest tubes in stable position with no pneumothorax.  Hypoventilation with mild pulmonary vascular congestion.  Left basilar pleural effusion.    Physical Exam:  General: Alert, oriented. No apparent distress.   Cardiovascular: Regular rate and rhythm without murmur, rubs, or gallops.    Pulmonary: Clear to auscultation bilaterally without wheezing, rubs, or rales.  Chest: Sternotomy incision clean, dry, and intact. Sternum stable. No clicks. Chest tubes to 20 cm suction. No air leak. Fluid is serosanguineous.   Abdomen: Soft, nondistended, and nontender.  Extremities: Warm, moves all extremities. No edema.  Saphenectomy site is clean dry and intact with Ace wrap  Neurologic:  Grossly intact with no focal deficits.            Impression:  Posterior basal LV aneurysm  Severe mitral valve regurgitation, functional  Coronary artery disease  Hypertension, well-controlled  Hyperlipidemia, on statin  Tobacco use  CKD, stage IIIa        Plan:  Begin bowel regimen  Reglan 10 mg IV every 6 hours x 1 day  Discontinue insulin drip  Wean Cardene, goal SBP less than 140  Routine postcardiac surgery care  Encourage pulmonary toilet and ambulation  Keep chest tubes 1 more day  Remain in ICU for now  Discussed with patient and nursing      RAMIRO Holbrook  07/24/24  09:30 CDT

## 2024-07-24 NOTE — PLAN OF CARE
Goal Outcome Evaluation:      Patient stood with SBA. Ambulated 200' with CGA. Good step length, good speed. Tolerated walk very well.

## 2024-07-24 NOTE — PLAN OF CARE
Goal Outcome Evaluation:  Plan of Care Reviewed With: patient        Progress: improving  Outcome Evaluation: Extubated 2056 to 2L NC, currently on room air spo2 94%. CO/CI 5.9/2.9. MST output 280mL overnight, PL 42mL overnight. Mag replaced per protocol order. 750mL on IS. Prn pain medicine given x3, pain increasing overnight. Nausea treated with prn medication x1. Dressings changed, C/D/I. Verbalizes frustration with pain and opposition to plan for typical routine for post-op cardiothoracic surgery patients, encouragement provided and benefits explained.

## 2024-07-25 ENCOUNTER — APPOINTMENT (OUTPATIENT)
Dept: GENERAL RADIOLOGY | Facility: HOSPITAL | Age: 74
DRG: 220 | End: 2024-07-25
Payer: MEDICARE

## 2024-07-25 LAB
ANION GAP SERPL CALCULATED.3IONS-SCNC: 10 MMOL/L (ref 5–15)
BUN SERPL-MCNC: 19 MG/DL (ref 8–23)
BUN/CREAT SERPL: 14.4 (ref 7–25)
CALCIUM SPEC-SCNC: 8.8 MG/DL (ref 8.6–10.5)
CHLORIDE SERPL-SCNC: 97 MMOL/L (ref 98–107)
CO2 SERPL-SCNC: 25 MMOL/L (ref 22–29)
CREAT SERPL-MCNC: 1.32 MG/DL (ref 0.76–1.27)
CYTO UR: NORMAL
DEPRECATED RDW RBC AUTO: 48.9 FL (ref 37–54)
EGFRCR SERPLBLD CKD-EPI 2021: 57.3 ML/MIN/1.73
ERYTHROCYTE [DISTWIDTH] IN BLOOD BY AUTOMATED COUNT: 14.1 % (ref 12.3–15.4)
GLUCOSE BLDC GLUCOMTR-MCNC: 124 MG/DL (ref 70–130)
GLUCOSE BLDC GLUCOMTR-MCNC: 156 MG/DL (ref 70–130)
GLUCOSE BLDC GLUCOMTR-MCNC: 158 MG/DL (ref 70–130)
GLUCOSE BLDC GLUCOMTR-MCNC: 170 MG/DL (ref 70–130)
GLUCOSE SERPL-MCNC: 156 MG/DL (ref 65–99)
HCT VFR BLD AUTO: 31.4 % (ref 37.5–51)
HGB BLD-MCNC: 10 G/DL (ref 13–17.7)
LAB AP CASE REPORT: NORMAL
Lab: NORMAL
MAGNESIUM SERPL-MCNC: 2.2 MG/DL (ref 1.6–2.4)
MCH RBC QN AUTO: 30.2 PG (ref 26.6–33)
MCHC RBC AUTO-ENTMCNC: 31.8 G/DL (ref 31.5–35.7)
MCV RBC AUTO: 94.9 FL (ref 79–97)
PATH REPORT.FINAL DX SPEC: NORMAL
PATH REPORT.GROSS SPEC: NORMAL
PLATELET # BLD AUTO: 142 10*3/MM3 (ref 140–450)
PMV BLD AUTO: 10.5 FL (ref 6–12)
POTASSIUM SERPL-SCNC: 4.4 MMOL/L (ref 3.5–5.2)
QT INTERVAL: 400 MS
QTC INTERVAL: 467 MS
RBC # BLD AUTO: 3.31 10*6/MM3 (ref 4.14–5.8)
SODIUM SERPL-SCNC: 132 MMOL/L (ref 136–145)
WBC NRBC COR # BLD AUTO: 17.61 10*3/MM3 (ref 3.4–10.8)

## 2024-07-25 PROCEDURE — 97116 GAIT TRAINING THERAPY: CPT

## 2024-07-25 PROCEDURE — 83735 ASSAY OF MAGNESIUM: CPT | Performed by: NURSE PRACTITIONER

## 2024-07-25 PROCEDURE — 25010000002 CEFAZOLIN PER 500 MG: Performed by: NURSE PRACTITIONER

## 2024-07-25 PROCEDURE — 25010000002 FUROSEMIDE PER 20 MG: Performed by: NURSE PRACTITIONER

## 2024-07-25 PROCEDURE — 63710000001 INSULIN LISPRO (HUMAN) PER 5 UNITS: Performed by: NURSE PRACTITIONER

## 2024-07-25 PROCEDURE — 93010 ELECTROCARDIOGRAM REPORT: CPT | Performed by: EMERGENCY MEDICINE

## 2024-07-25 PROCEDURE — 71045 X-RAY EXAM CHEST 1 VIEW: CPT

## 2024-07-25 PROCEDURE — 25010000002 AMIODARONE IN DEXTROSE 5% 360-4.14 MG/200ML-% SOLUTION: Performed by: SURGERY

## 2024-07-25 PROCEDURE — 25010000002 ENOXAPARIN PER 10 MG: Performed by: NURSE PRACTITIONER

## 2024-07-25 PROCEDURE — 85027 COMPLETE CBC AUTOMATED: CPT | Performed by: NURSE PRACTITIONER

## 2024-07-25 PROCEDURE — 80048 BASIC METABOLIC PNL TOTAL CA: CPT | Performed by: NURSE PRACTITIONER

## 2024-07-25 PROCEDURE — 25010000002 METOCLOPRAMIDE PER 10 MG: Performed by: NURSE PRACTITIONER

## 2024-07-25 PROCEDURE — 93005 ELECTROCARDIOGRAM TRACING: CPT | Performed by: SURGERY

## 2024-07-25 PROCEDURE — 82948 REAGENT STRIP/BLOOD GLUCOSE: CPT

## 2024-07-25 RX ORDER — FUROSEMIDE 10 MG/ML
20 INJECTION INTRAMUSCULAR; INTRAVENOUS
Status: DISCONTINUED | OUTPATIENT
Start: 2024-07-25 | End: 2024-07-28

## 2024-07-25 RX ORDER — BISACODYL 10 MG
10 SUPPOSITORY, RECTAL RECTAL ONCE
Status: COMPLETED | OUTPATIENT
Start: 2024-07-25 | End: 2024-07-25

## 2024-07-25 RX ORDER — GUAIFENESIN 600 MG/1
600 TABLET, EXTENDED RELEASE ORAL EVERY 12 HOURS SCHEDULED
Status: DISCONTINUED | OUTPATIENT
Start: 2024-07-25 | End: 2024-07-29 | Stop reason: HOSPADM

## 2024-07-25 RX ORDER — AMIODARONE HYDROCHLORIDE 200 MG/1
400 TABLET ORAL 2 TIMES DAILY WITH MEALS
Status: DISCONTINUED | OUTPATIENT
Start: 2024-07-25 | End: 2024-07-29 | Stop reason: HOSPADM

## 2024-07-25 RX ORDER — POTASSIUM CHLORIDE 750 MG/1
20 CAPSULE, EXTENDED RELEASE ORAL 2 TIMES DAILY WITH MEALS
Status: DISCONTINUED | OUTPATIENT
Start: 2024-07-25 | End: 2024-07-28

## 2024-07-25 RX ADMIN — ASPIRIN 81 MG: 81 TABLET, COATED ORAL at 08:21

## 2024-07-25 RX ADMIN — CEFAZOLIN 2 G: 2 INJECTION, POWDER, FOR SOLUTION INTRAMUSCULAR; INTRAVENOUS at 05:37

## 2024-07-25 RX ADMIN — ACETAMINOPHEN 650 MG: 325 TABLET, FILM COATED ORAL at 12:01

## 2024-07-25 RX ADMIN — OXYCODONE HYDROCHLORIDE 10 MG: 10 TABLET ORAL at 14:27

## 2024-07-25 RX ADMIN — METHOCARBAMOL 500 MG: 500 TABLET, FILM COATED ORAL at 05:37

## 2024-07-25 RX ADMIN — OXYCODONE HYDROCHLORIDE 10 MG: 10 TABLET ORAL at 05:42

## 2024-07-25 RX ADMIN — POTASSIUM CHLORIDE 20 MEQ: 750 CAPSULE, EXTENDED RELEASE ORAL at 08:21

## 2024-07-25 RX ADMIN — OXYCODONE HYDROCHLORIDE 10 MG: 10 TABLET ORAL at 09:56

## 2024-07-25 RX ADMIN — OXYCODONE HYDROCHLORIDE 10 MG: 10 TABLET ORAL at 22:27

## 2024-07-25 RX ADMIN — AMIODARONE HYDROCHLORIDE 400 MG: 200 TABLET ORAL at 17:17

## 2024-07-25 RX ADMIN — AMIODARONE HYDROCHLORIDE 0.5 MG/MIN: 1.8 INJECTION, SOLUTION INTRAVENOUS at 02:16

## 2024-07-25 RX ADMIN — ACETAMINOPHEN 650 MG: 325 TABLET, FILM COATED ORAL at 17:17

## 2024-07-25 RX ADMIN — GUAIFENESIN 600 MG: 600 TABLET, EXTENDED RELEASE ORAL at 08:27

## 2024-07-25 RX ADMIN — CARVEDILOL 3.12 MG: 3.12 TABLET, FILM COATED ORAL at 20:26

## 2024-07-25 RX ADMIN — INSULIN LISPRO 2 UNITS: 100 INJECTION, SOLUTION INTRAVENOUS; SUBCUTANEOUS at 08:22

## 2024-07-25 RX ADMIN — CLOPIDOGREL BISULFATE 75 MG: 75 TABLET, FILM COATED ORAL at 17:17

## 2024-07-25 RX ADMIN — POLYETHYLENE GLYCOL 3350 17 G: 17 POWDER, FOR SOLUTION ORAL at 08:22

## 2024-07-25 RX ADMIN — METOCLOPRAMIDE HYDROCHLORIDE 10 MG: 5 INJECTION INTRAMUSCULAR; INTRAVENOUS at 02:17

## 2024-07-25 RX ADMIN — BISACODYL 10 MG: 10 SUPPOSITORY RECTAL at 08:22

## 2024-07-25 RX ADMIN — AMIODARONE HYDROCHLORIDE 400 MG: 200 TABLET ORAL at 08:27

## 2024-07-25 RX ADMIN — INSULIN LISPRO 2 UNITS: 100 INJECTION, SOLUTION INTRAVENOUS; SUBCUTANEOUS at 12:02

## 2024-07-25 RX ADMIN — PANTOPRAZOLE SODIUM 40 MG: 40 TABLET, DELAYED RELEASE ORAL at 05:37

## 2024-07-25 RX ADMIN — OXYCODONE HYDROCHLORIDE 10 MG: 10 TABLET ORAL at 18:33

## 2024-07-25 RX ADMIN — FUROSEMIDE 20 MG: 10 INJECTION, SOLUTION INTRAMUSCULAR; INTRAVENOUS at 08:22

## 2024-07-25 RX ADMIN — CHLORHEXIDINE GLUCONATE 0.12% ORAL RINSE 15 ML: 1.2 LIQUID ORAL at 05:37

## 2024-07-25 RX ADMIN — ENOXAPARIN SODIUM 40 MG: 100 INJECTION SUBCUTANEOUS at 08:21

## 2024-07-25 RX ADMIN — BISACODYL 10 MG: 5 TABLET, COATED ORAL at 08:22

## 2024-07-25 RX ADMIN — ACETAMINOPHEN 650 MG: 325 TABLET, FILM COATED ORAL at 05:37

## 2024-07-25 RX ADMIN — ATORVASTATIN CALCIUM 20 MG: 10 TABLET, FILM COATED ORAL at 20:28

## 2024-07-25 RX ADMIN — INSULIN LISPRO 2 UNITS: 100 INJECTION, SOLUTION INTRAVENOUS; SUBCUTANEOUS at 20:27

## 2024-07-25 RX ADMIN — METHOCARBAMOL 500 MG: 500 TABLET, FILM COATED ORAL at 14:27

## 2024-07-25 RX ADMIN — BISACODYL 10 MG: 5 TABLET, COATED ORAL at 20:26

## 2024-07-25 RX ADMIN — CARVEDILOL 3.12 MG: 3.12 TABLET, FILM COATED ORAL at 08:21

## 2024-07-25 RX ADMIN — TAMSULOSIN HYDROCHLORIDE 0.4 MG: 0.4 CAPSULE ORAL at 20:28

## 2024-07-25 RX ADMIN — Medication 1 APPLICATION: at 05:37

## 2024-07-25 RX ADMIN — LIDOCAINE 2 PATCH: 4 PATCH TOPICAL at 08:23

## 2024-07-25 RX ADMIN — GUAIFENESIN 600 MG: 600 TABLET, EXTENDED RELEASE ORAL at 20:27

## 2024-07-25 RX ADMIN — POTASSIUM CHLORIDE 20 MEQ: 750 CAPSULE, EXTENDED RELEASE ORAL at 17:17

## 2024-07-25 RX ADMIN — FUROSEMIDE 20 MG: 10 INJECTION, SOLUTION INTRAMUSCULAR; INTRAVENOUS at 17:17

## 2024-07-25 RX ADMIN — METHOCARBAMOL 500 MG: 500 TABLET, FILM COATED ORAL at 20:27

## 2024-07-25 RX ADMIN — OXYCODONE HYDROCHLORIDE 10 MG: 10 TABLET ORAL at 02:10

## 2024-07-25 NOTE — DISCHARGE SUMMARY
[facility] Cardiothoracic Surgery  DISCHARGE SUMMARY        Date of Admission: 7/23/2024  Date of Discharge:  7/29/2024  Primary Care Physician: Stephane Pedro,     Discharge Diagnoses:  Active Hospital Problems    Diagnosis     **Unstable angina     Coronary artery disease involving native coronary artery of native heart with angina pectoris     Tobacco use     Overweight with body mass index (BMI) of 28 to 28.9 in adult     Stage 3a chronic kidney disease     Left ventricular aneurysm     CAD (coronary artery disease)        Procedures Performed:   Posterior Basal LV Aneurysm Repair (Resection of Aneurysm Wall with Linear Closure, MV repair (Hollis Stitch at A2/P2), CABG x2 (LIMA to LAD, SVG to distal OM), LAAE with 45 mm Atriclip by Dr. Maciel on 7/23/2024    HPI:  Mr. Vj Garcia is a 74 y.o. male who presented to the hospital last week with new onset heart failure symptoms that have been worsening over the past several weeks.  Approximately 5 weeks prior to his evaluation he had what he thought was a heatstroke and an retrospect was likely STEMI.  Workup during his hospitalization revealed occluded RCA with large posterior basal LV aneurysm.  He also had LAD and circumflex disease.  He had severe MR.  Dr. Maciel discussed with him the risk and benefits of proceeding with LV aneurysmectomy, mitral valve repair versus replacement along with CABG.  He understood these risks and agreed to proceed.    Hospital Course: On 7/23/2024, Mr. Garcia was taken to the operating room for posterior basal LV aneurysm repair, mitral valve repair, and CABG.  See separate op note by Dr. Maciel detailing the operation.  Following surgery he was transferred to the ICU in stable but guarded condition.  He remained hemodynamically stable and was extubated without remark during the evening hours following surgery.  Neurological exam was intact.  He was extubated to nasal cannula and ultimately was  tolerating room air.  When he was able to walk a lap around the unit with physical therapy, he was transferred to  on the afternoon of postop day 1.  The mediastinal chest tubes were removed on postop day 2.  The left pleural drain was removed on post op day 3.  The remainder of his hospital stay was significant for encouraging pulmonary toilet and ambulation, diuresis, and pain control.  He is eating well and is demonstrated adequate bowel function.  He is tolerating room air and has met all physical therapy goals.  Repeat transthoracic echo was obtained on postop day 3 revealed EF 36 to 40%.  He did have intermittent atrial fibrillation during his hospital stay and was started on amiodarone.  He is in sinus rhythm at the time of discharge.  Pacing wires were removed and he meets criteria for discharge home on postop day 6.  The patient is agreeable to this plan.     He will take Eliquis and aspirin at discharge.  He will take Lasix 20 mg daily for 5 days.  Routine follow-up with me in 1 week with labs.    Condition on Discharge:  Neurologically intact and has good pain control.  He is eating well and has demonstrated good bowel function.  The sternum is stable without clicks and the saphenectomy incisions are healing nicely.  The heart is in normal sinus rhythm.  He has met all physical therapy criteria and verbalizes understanding of sternotomy precautions.   He verbalizes understanding of a separately handed out cardiac surgery handout.       Discharge Disposition:  Home or Self Care [1]    Discharge Medications:     Discharge Medications        New Medications        Instructions Start Date   amiodarone 400 MG tablet  Commonly known as: PACERONE   400 mg, Oral, Daily      apixaban 5 MG tablet tablet  Commonly known as: ELIQUIS   5 mg, Oral, 2 Times Daily      HYDROcodone-acetaminophen 5-325 MG per tablet  Commonly known as: Norco   1 tablet, Oral, Every 6 Hours PRN      pantoprazole 40 MG EC tablet  Commonly  known as: PROTONIX   40 mg, Oral, Every Early Morning   Start Date: July 30, 2024     potassium chloride 10 MEQ CR capsule  Commonly known as: MICRO-K   10 mEq, Oral, Daily             Changes to Medications        Instructions Start Date   carvedilol 6.25 MG tablet  Commonly known as: COREG  What changed:   medication strength  how much to take  when to take this   6.25 mg, Oral, Every 12 Hours Scheduled      furosemide 20 MG tablet  Commonly known as: LASIX  What changed:   medication strength  how much to take   20 mg, Oral, Daily             Continue These Medications        Instructions Start Date   aspirin 81 MG EC tablet   81 mg, Oral, Daily      atorvastatin 40 MG tablet  Commonly known as: LIPITOR   40 mg, Oral, Daily      lisinopril 5 MG tablet  Commonly known as: PRINIVIL,ZESTRIL   5 mg, Oral, Daily      multivitamin tablet tablet   1 tablet, Oral, Daily             Stop These Medications      mupirocin 2 % nasal ointment  Commonly known as: BACTROBAN     nitroglycerin 0.4 MG SL tablet  Commonly known as: NITROSTAT     ranolazine 500 MG 12 hr tablet  Commonly known as: Ranexa              Discharge Diet: Regular diet     Discharge Care Plan / Instructions: Please see the separately handed out cardiac surgery handout.  He will not be referred to cardiac rehab at this time due to recent sternotomy.  We will reassess at his postop follow-up visit.    Activity at Discharge:   No heavy lifting greater than 5 pounds or a gallon of milk while maintaining sternal precautions.  Vj Garcia has been instructed on an exercise  regiment as detailed in a handed out cardiac surgery handout.    Tobacco:  The patient does not use tobacco products and therefore does not need tobacco cessation education.    BMI: BMI is >= 25 and <30. (Overweight) The following options were offered after discussion;: referral to primary care      Follow-up Appointments: Vj Garcia  is requested to see Stephane Pedro,   within 1-2 weeks from time of discharge, to see Tosha RUBIO in 1 week with labs, to follow-up with Dr. Maciel in 6 weeks,  and to follow-up with Dr. Khoury of the cardiology service in 6 weeks.

## 2024-07-25 NOTE — PLAN OF CARE
Problem: Adult Inpatient Plan of Care  Goal: Plan of Care Review  Outcome: Ongoing, Progressing  Flowsheets (Taken 7/25/2024 0356)  Progress: no change  Plan of Care Reviewed With: patient  Outcome Evaluation: Patient c/o pain, prn medication given with relief. Jones cath pulled at 0000, voided in urinal. Patient went afib rate in the 120's at 2010, MD notified. Amio protocol started and one time dose of lopressor 5mg IV given. Patient converted to NSR at 2130. NSR 74-83 on tele. VSS. Safety maintained. Will notify MD of any changes.

## 2024-07-25 NOTE — PLAN OF CARE
Goal Outcome Evaluation:              Outcome Evaluation: pt c/o 10/10 pain that decreased after mediastinal tubes were pulled but returned. S 73-85. Amio drip d/c, rhythm stable. Incisions c/d/i. safety maintained.

## 2024-07-25 NOTE — PROGRESS NOTES
"Patient name: Vj Garcia  Patient : 1950  VISIT # [id number]  MR #[id number]    Procedure:Procedure(s):  CORONARY ARTERY BYPASS GRAFTING X2, LEFT INTERNAL MAMMARY ARTERY GRAFT, RIGHT LEG OPEN VEIN HARVEST, MITRAL VALVE REAPIR, LEFT VENTRICULAR ANEURYSM REPAIR,  ATRIAL APPENDAGE EXCLUSION LEFT 45 ATRICLIP WITH TRANSESOPHAGEAL ECHOCARDIOGRAM  POSSIBLE MITRAL VALVE REPAIR/REPLACEMENT  ATRIAL APPENDAGE EXCLUSION LEFT WITH TRANSESOPHAGEAL ECHOCARDIOGRAM  Procedure Date:2024  POD:2 Days Post-Op    Subjective     Transferred to  yesterday afternoon.  He is tolerating room air.  Weight is down 1 pound and he is 7 pounds above his baseline.  Creatinine today is stable at 1.3.  No bowel movement.  Walking 200 feet with physical therapy.  A flutter overnight and amiodarone protocol has been started.  Converted to sinus rhythm at 2130.    Telemetry: Sinus rhythm 80s  IV drips: Amiodarone       Objective     Visit Vitals  /53 (BP Location: Left arm, Patient Position: Sitting)   Pulse 85   Temp 98.5 °F (36.9 °C) (Oral)   Resp 16   Ht 178.5 cm (70.28\")   Wt 91.4 kg (201 lb 9.6 oz)   SpO2 95%   BMI 28.70 kg/m²   Baseline weight 194 pounds    Intake/Output Summary (Last 24 hours) at 2024 0814  Last data filed at 2024 0725  Gross per 24 hour   Intake 520 ml   Output 1460 ml   Net -940 ml     MCT: 200 mL 24 hours, serosanguineous, no airleak  Left pleural drain: 310 mL in 24 hours, serosanguineous, no airleak    Lab:     CBC:  Results from last 7 days   Lab Units 24  0402 24  1832   WBC 10*3/mm3 17.61* 15.42* 12.61*   HEMATOCRIT % 31.4* 31.4* 32.3*   PLATELETS 10*3/mm3 142 131* 130*          BMP:  Results from last 7 days   Lab Units 24  0402 24  1832   SODIUM mmol/L 132* 137  --  139   SODIUM, ARTERIAL mmol/L  --   --  139  --    POTASSIUM mmol/L 4.4 4.6  --  5.9*   CHLORIDE mmol/L 97* 103  --  105   CO2 mmol/L 25.0 23.0  --  " 23.0   GLUCOSE mg/dL 156* 154*  --  156*   BUN mg/dL 19 17  --  15   CREATININE mg/dL 1.32* 1.36*  --  1.42*          COAG:  Results from last 7 days   Lab Units 07/24/24  0228 07/23/24  1519 07/23/24  1403   INR  1.20*   < > 1.49*   APTT seconds  --   --  31.9    < > = values in this interval not displayed.       IMAGES:       Imaging Results (Last 24 Hours)       Procedure Component Value Units Date/Time    XR Chest 1 View [[id number]] Collected: 07/25/24 0653     Updated: 07/25/24 0658    Narrative:      EXAMINATION: XR CHEST 1 VW-     7/25/2024 2:30 AM     HISTORY: Post-Op Heart Surgery     A frontal projection of the chest is compared with the previous study  dated 7/24/2024.     The lungs are poorly expanded.     There is interval removal of the right IJ vascular sheath.     There is a mild pulmonary venous congestion. No change.     Atelectatic changes in the lungs bilaterally persist.     There is a small left basal pleural effusion is similar to the previous  study.     Left-sided chest tube is in place. No visible pneumothorax. Mediastinal  drain is in place.     The heart size is not optimally evaluated due to the AP projection.  There is evidence of prior cardiac surgery. Left atrial appendage clamp  is in place.     There is no acute bony abnormality. Hardware fusion of the lower  cervical spine is partially visualized.       Impression:      1. Removal of the right IJ vascular sheath since the previous study. The  cardiopulmonary status is unchanged.     This report was signed and finalized on 7/25/2024 6:55 AM by Dr. Everton Ziegler MD.             CXR: Chest tubes in stable position with no pneumothorax.  Hypoventilation with mild pulmonary vascular congestion.  Left basilar pleural effusion.  Stable exam     Physical Exam:  General: Alert, oriented. No apparent distress.   Cardiovascular: Regular rate and rhythm without murmur, rubs, or gallops.    Pulmonary: Clear to auscultation bilaterally  without wheezing, rubs, or rales.  Chest: Sternotomy incision clean, dry, and intact. Sternum stable. No clicks. Chest tubes to 20 cm suction. No air leak. Fluid is serosanguineous.   Abdomen: Soft, nondistended, and nontender.  Extremities: Warm, moves all extremities.  Moderate peripheral edema.  Saphenectomy site is clean dry and intact  Neurologic:  Grossly intact with no focal deficits.            Impression:  Posterior basal LV aneurysm  Severe mitral valve regurgitation, functional  Coronary artery disease  Hypertension, well-controlled  Hyperlipidemia, on statin  Tobacco use  CKD, stage IIIa        Plan:  Continue bowel regimen, suppository today  Discontinue mediastinal chest tubes, keep left pleural drain  Diurese  Discontinue amiodarone drip after current bag has infused, start p.o. amiodarone  Routine postcardiac surgery care  Encourage pulmonary toilet and ambulation  Anticipate discharge home in 1 to 2 days  Discussed with patient and nursing      RAMIRO Holbrook  07/25/24  08:14 CDT

## 2024-07-25 NOTE — PLAN OF CARE
Goal Outcome Evaluation:      Patient stands and sits with SBA. Ambulated 275' with CGA. Patient has inconsistent gait speed and forward lean which worsens with fatigue. Had another discussion concerning his use of UE's and nonchalant attitude about it. Patient consistently uses UEs and then denies it with an unreasonable excuse. Patient will continue to benefit from safety education.

## 2024-07-25 NOTE — PLAN OF CARE
Goal Outcome Evaluation:      Patient stood and ambulated 260' with SBA. Patient has an inconsistent gait speed. Reviewed sternal restrictions as he has an attitude today about using arms. Discussed POC post D/C. Patient will benefit from increased ambulation and safety education.

## 2024-07-25 NOTE — THERAPY TREATMENT NOTE
Acute Care - Physical Therapy Treatment Note  [facility]     Patient Name: Vj Garcia  : 1950  MRN: [id number]  Today's Date: 2024      Visit Dx:     ICD-10-CM ICD-9-CM   1. Impaired mobility [Z74.09]  Z74.09 799.89   2. Unstable angina  I20.0 411.1     Patient Active Problem List   Diagnosis    Unstable angina    Ischemic cardiomyopathy    CAD (coronary artery disease)    Left ventricular aneurysm    Coronary artery disease involving native coronary artery of native heart with angina pectoris    Tobacco use    Overweight with body mass index (BMI) of 28 to 28.9 in adult    Stage 3a chronic kidney disease     Past Medical History:   Diagnosis Date    COPD (chronic obstructive pulmonary disease)     Elevated cholesterol     Enlarged prostate     History of heart attack     Hypertension     Urination frequency     Weak urine stream      Past Surgical History:   Procedure Laterality Date    ATRIAL APPENDAGE EXCLUSION LEFT WITH TRANSESOPHAGEAL ECHOCARDIOGRAM N/A 2024    Procedure: ATRIAL APPENDAGE EXCLUSION LEFT WITH TRANSESOPHAGEAL ECHOCARDIOGRAM;  Surgeon: Kobe Maciel MD;  Location:  [facility] OR;  Service: Cardiothoracic;  Laterality: N/A;    BACK SURGERY      x3    CARDIAC CATHETERIZATION N/A 2024    Procedure: Left Heart Cath;  Surgeon: Brennan Khoury DO;  Location:  [facility] CATH INVASIVE LOCATION;  Service: Cardiovascular;  Laterality: N/A;    CORONARY ARTERY BYPASS GRAFT N/A 2024    Procedure: CORONARY ARTERY BYPASS GRAFTING X2, LEFT INTERNAL MAMMARY ARTERY GRAFT, RIGHT LEG OPEN VEIN HARVEST, MITRAL VALVE REAPIR, LEFT VENTRICULAR ANEURYSM REPAIR,  ATRIAL APPENDAGE EXCLUSION LEFT 45 ATRICLIP WITH TRANSESOPHAGEAL ECHOCARDIOGRAM;  Surgeon: Kobe Maciel MD;  Location:  [facility] OR;  Service: Cardiothoracic;  Laterality: N/A;    EXCISION      fatty patch on back    HERNIA REPAIR      MITRAL VALVE REPAIR/REPLACEMENT N/A 2024    Procedure: POSSIBLE MITRAL VALVE  REPAIR/REPLACEMENT;  Surgeon: Kobe Maciel MD;  Location:  [facility] OR;  Service: Cardiothoracic;  Laterality: N/A;     PT Assessment (Last 12 Hours)       PT Evaluation and Treatment       Row Name 07/25/24 1000          Physical Therapy Time and Intention    Subjective Information complains of;fatigue;pain  -MAIK     Document Type therapy note (daily note)  -MAIK     Mode of Treatment physical therapy  -     Comment Reviewed sternal restrictions  -MAIK       Row Name 07/25/24 1000          General Information    Patient/Family/Caregiver Comments/Observations S.O.  -     Existing Precautions/Restrictions fall;sternal  chest tube  -       Row Name 07/25/24 1000          Pain    Pretreatment Pain Rating 5/10  -MAIK     Posttreatment Pain Rating 5/10  -MAIK     Pain Location - chest  -MAIK       Row Name 07/25/24 1000          Sit-Stand Transfer    Sit-Stand [place] (Transfers) standby assist  -       Row Name 07/25/24 1000          Stand-Sit Transfer    Stand-Sit [place] (Transfers) verbal cues;standby assist  -       Row Name 07/25/24 1000          Gait/Stairs (Locomotion)    [place] Level (Gait) contact guard  -     Distance in Feet (Gait) 260  -MAIK     Deviations/Abnormal Patterns (Gait) --  inconsistent gait speed  -       Row Name 07/25/24 1000          Motor Skills    Comments, Therapeutic Exercise warm ups x15  -MAIK       Row Name             Wound 07/23/24 1103 midline sternal Incision    Wound - Properties Group Placement Date: 07/23/24  - Placement Time: 1103  -LH Orientation: midline  -LH Location: sternal  -LH Primary Wound Type: Incision  -LH    Retired Wound - Properties Group Placement Date: 07/23/24  - Placement Time: 1103  -LH Orientation: midline  -LH Location: sternal  -LH Primary Wound Type: Incision  -LH    Retired Wound - Properties Group Date first assessed: 07/23/24  - Time first assessed: 1103  -LH Location: sternal  -LH Primary Wound Type: Incision  -LH      Row Name              Wound 07/23/24 1055 Right distal leg Incision    Wound - Properties Group Placement Date: 07/23/24  - Placement Time: 1055  -LH Side: Right  -LH Orientation: distal  -LH Location: leg  -LH Primary Wound Type: Incision  -LH Additional Comments: mastisol, steri strips, mepilex, ace  -LH    Retired Wound - Properties Group Placement Date: 07/23/24  - Placement Time: 1055  -LH Side: Right  -LH Orientation: distal  -LH Location: leg  -LH Primary Wound Type: Incision  -LH Additional Comments: mastisol, steri strips, mepilex, ace  -LH    Retired Wound - Properties Group Date first assessed: 07/23/24  - Time first assessed: 1055  -LH Side: Right  -LH Location: leg  -LH Primary Wound Type: Incision  -LH Additional Comments: mastisol, steri strips, mepilex, ace  -LH      Row Name 07/25/24 1000          Positioning and Restraints    Pre-Treatment Position sitting in chair/recliner  -MAIK     In Chair reclined;call light within reach;encouraged to call for assist;with family/caregiver  -MAIK               User Key  (r) = Recorded By, (t) = Taken By, (c) = Cosigned By      Initials Name Provider Type    Alesia Servin PTA Physical Therapist Assistant     Phyllis Chappell RN Registered Nurse                    Physical Therapy Education       Title: PT OT SLP Therapies (In Progress)       Topic: Physical Therapy (In Progress)       Point: Mobility training (Done)       Learning Progress Summary             Patient Acceptance, E,TB, VU,DU by CN at 7/24/2024 0807    Comment: Educated on role of PT in pt care.                         Point: Home exercise program (Not Started)       Learner Progress:  Not documented in this visit.              Point: Body mechanics (Not Started)       Learner Progress:  Not documented in this visit.              Point: Precautions (In Progress)       Learning Progress Summary             Patient Acceptance, E,D, NR by MAIK at 7/25/2024 1243    Comment: sternal restrictions                                          User Key       Initials Effective Dates Name Provider Type Discipline    MAIK 02/03/23 -  Alesia Metzger PTA Physical Therapist Assistant PT    CN 06/24/24 -  Sarah Mitchell, PT Student PT Student PT                  PT Recommendation and Plan         Outcome Measures       Row Name 07/25/24 1200             How much help from another person do you currently need...    Turning from your back to your side while in flat bed without using bedrails? 3  -MAIK      Moving from lying on back to sitting on the side of a flat bed without bedrails? 3  -MAIK      Moving to and from a bed to a chair (including a wheelchair)? 3  -MAIK      Standing up from a chair using your arms (e.g., wheelchair, bedside chair)? 3  -MAKI      Climbing 3-5 steps with a railing? 3  -MAIK      To walk in hospital room? 3  -MAIK      AM-PAC 6 Clicks Score (PT) 18  -MAIK      Highest Level of Mobility Goal 6 --> Walk 10 steps or more  -MAIK                User Key  (r) = Recorded By, (t) = Taken By, (c) = Cosigned By      Initials Name Provider Type    Alesia Servin PTA Physical Therapist Assistant                     Time Calculation:    PT Charges       Row Name 07/25/24 1247             Time Calculation    Start Time 1000  -MAIK      Stop Time 1025  -MAIK      Time Calculation (min) 25 min  -MAIK         Timed Charges    96156 - Gait Training Minutes  25  -MAIK         Total Minutes    Timed Charges Total Minutes 25  -MAIK       Total Minutes 25  -MAIK                User Key  (r) = Recorded By, (t) = Taken By, (c) = Cosigned By      Initials Name Provider Type    Alesia Servin PTA Physical Therapist Assistant                  Therapy Charges for Today       Code Description Service Date Service Provider Modifiers Qty    13422293096 HC GAIT TRAINING EA 15 MIN 7/24/2024 Alesia Metzger PTA GP 2    74857010938 HC GAIT TRAINING EA 15 MIN 7/25/2024 Alesia Metzger PTA GP 2            PT G-Codes  Outcome Measure Options:  AM-PAC 6 Clicks Basic Mobility (PT)  -[facility] 6 Clicks Score (PT): 18    Alesia Metzger, PTA  7/25/2024

## 2024-07-26 ENCOUNTER — APPOINTMENT (OUTPATIENT)
Dept: GENERAL RADIOLOGY | Facility: HOSPITAL | Age: 74
DRG: 220 | End: 2024-07-26
Payer: MEDICARE

## 2024-07-26 ENCOUNTER — APPOINTMENT (OUTPATIENT)
Dept: CARDIOLOGY | Facility: HOSPITAL | Age: 74
DRG: 220 | End: 2024-07-26
Payer: MEDICARE

## 2024-07-26 LAB
ANION GAP SERPL CALCULATED.3IONS-SCNC: 10 MMOL/L (ref 5–15)
BH BB BLOOD EXPIRATION DATE: NORMAL
BH BB BLOOD EXPIRATION DATE: NORMAL
BH BB BLOOD TYPE BARCODE: 5100
BH BB BLOOD TYPE BARCODE: 5100
BH BB DISPENSE STATUS: NORMAL
BH BB DISPENSE STATUS: NORMAL
BH BB PRODUCT CODE: NORMAL
BH BB PRODUCT CODE: NORMAL
BH BB UNIT NUMBER: NORMAL
BH BB UNIT NUMBER: NORMAL
BH CV ECHO MEAS - AO MAX PG: 4.1 MMHG
BH CV ECHO MEAS - AO MEAN PG: 2.44 MMHG
BH CV ECHO MEAS - AO ROOT DIAM: 3.2 CM
BH CV ECHO MEAS - AO V2 MAX: 101.4 CM/SEC
BH CV ECHO MEAS - AO V2 VTI: 20.6 CM
BH CV ECHO MEAS - AVA(I,D): 3.6 CM2
BH CV ECHO MEAS - EDV(CUBED): 158.1 ML
BH CV ECHO MEAS - EDV(MOD-SP2): 179.2 ML
BH CV ECHO MEAS - EDV(MOD-SP4): 90.6 ML
BH CV ECHO MEAS - EF(MOD-SP2): 41.2 %
BH CV ECHO MEAS - EF(MOD-SP4): 36.8 %
BH CV ECHO MEAS - ESV(CUBED): 101.6 ML
BH CV ECHO MEAS - ESV(MOD-SP2): 105.3 ML
BH CV ECHO MEAS - ESV(MOD-SP4): 57.2 ML
BH CV ECHO MEAS - FS: 13.7 %
BH CV ECHO MEAS - IVS/LVPW: 0.83 CM
BH CV ECHO MEAS - IVSD: 0.87 CM
BH CV ECHO MEAS - LA DIMENSION: 4.2 CM
BH CV ECHO MEAS - LAT PEAK E' VEL: 4 CM/SEC
BH CV ECHO MEAS - LV MASS(C)D: 196.5 GRAMS
BH CV ECHO MEAS - LV MAX PG: 2.2 MMHG
BH CV ECHO MEAS - LV MEAN PG: 1.24 MMHG
BH CV ECHO MEAS - LV V1 MAX: 74 CM/SEC
BH CV ECHO MEAS - LV V1 VTI: 14.8 CM
BH CV ECHO MEAS - LVIDD: 5.4 CM
BH CV ECHO MEAS - LVIDS: 4.7 CM
BH CV ECHO MEAS - LVOT AREA: 5 CM2
BH CV ECHO MEAS - LVOT DIAM: 2.5 CM
BH CV ECHO MEAS - LVPWD: 1.05 CM
BH CV ECHO MEAS - MED PEAK E' VEL: 4 CM/SEC
BH CV ECHO MEAS - MV A MAX VEL: 81.5 CM/SEC
BH CV ECHO MEAS - MV DEC SLOPE: 474.2 CM/SEC2
BH CV ECHO MEAS - MV DEC TIME: 0.22 SEC
BH CV ECHO MEAS - MV E MAX VEL: 102.9 CM/SEC
BH CV ECHO MEAS - MV E/A: 1.26
BH CV ECHO MEAS - MV MAX PG: 4.2 MMHG
BH CV ECHO MEAS - MV MEAN PG: 1.48 MMHG
BH CV ECHO MEAS - MV V2 VTI: 30.3 CM
BH CV ECHO MEAS - MVA(VTI): 2.47 CM2
BH CV ECHO MEAS - PA V2 MAX: 119.3 CM/SEC
BH CV ECHO MEAS - RV MAX PG: 1.26 MMHG
BH CV ECHO MEAS - RV V1 MAX: 56.2 CM/SEC
BH CV ECHO MEAS - RV V1 VTI: 11.6 CM
BH CV ECHO MEAS - SV(LVOT): 74.7 ML
BH CV ECHO MEAS - SV(MOD-SP2): 73.9 ML
BH CV ECHO MEAS - SV(MOD-SP4): 33.3 ML
BH CV ECHO MEAS - TAPSE (>1.6): 1.4 CM
BH CV ECHO MEAS - TR MAX PG: 1.01 MMHG
BH CV ECHO MEAS - TR MAX VEL: 50.3 CM/SEC
BH CV ECHO MEASUREMENTS AVERAGE E/E' RATIO: 25.73
BH CV XLRA - RV BASE: 3.8 CM
BH CV XLRA - RV LENGTH: 2.4 CM
BH CV XLRA - TDI S': 10 CM/SEC
BUN SERPL-MCNC: 24 MG/DL (ref 8–23)
BUN/CREAT SERPL: 19.7 (ref 7–25)
CALCIUM SPEC-SCNC: 8.4 MG/DL (ref 8.6–10.5)
CHLORIDE SERPL-SCNC: 97 MMOL/L (ref 98–107)
CO2 SERPL-SCNC: 26 MMOL/L (ref 22–29)
CREAT SERPL-MCNC: 1.22 MG/DL (ref 0.76–1.27)
CROSSMATCH INTERPRETATION: NORMAL
CROSSMATCH INTERPRETATION: NORMAL
DEPRECATED RDW RBC AUTO: 48.9 FL (ref 37–54)
EGFRCR SERPLBLD CKD-EPI 2021: 63 ML/MIN/1.73
ERYTHROCYTE [DISTWIDTH] IN BLOOD BY AUTOMATED COUNT: 14.2 % (ref 12.3–15.4)
GLUCOSE BLDC GLUCOMTR-MCNC: 134 MG/DL (ref 70–130)
GLUCOSE BLDC GLUCOMTR-MCNC: 139 MG/DL (ref 70–130)
GLUCOSE BLDC GLUCOMTR-MCNC: 142 MG/DL (ref 70–130)
GLUCOSE BLDC GLUCOMTR-MCNC: 194 MG/DL (ref 70–130)
GLUCOSE SERPL-MCNC: 147 MG/DL (ref 65–99)
HCT VFR BLD AUTO: 28.7 % (ref 37.5–51)
HGB BLD-MCNC: 9 G/DL (ref 13–17.7)
LEFT ATRIUM VOLUME INDEX: 32 ML/M2
LEFT ATRIUM VOLUME: 66 ML
MCH RBC QN AUTO: 29.6 PG (ref 26.6–33)
MCHC RBC AUTO-ENTMCNC: 31.4 G/DL (ref 31.5–35.7)
MCV RBC AUTO: 94.4 FL (ref 79–97)
PLATELET # BLD AUTO: 122 10*3/MM3 (ref 140–450)
PMV BLD AUTO: 10.9 FL (ref 6–12)
POTASSIUM SERPL-SCNC: 4.7 MMOL/L (ref 3.5–5.2)
QT INTERVAL: 404 MS
QTC INTERVAL: 463 MS
RBC # BLD AUTO: 3.04 10*6/MM3 (ref 4.14–5.8)
SODIUM SERPL-SCNC: 133 MMOL/L (ref 136–145)
UNIT  ABO: NORMAL
UNIT  ABO: NORMAL
UNIT  RH: NORMAL
UNIT  RH: NORMAL
WBC NRBC COR # BLD AUTO: 13.6 10*3/MM3 (ref 3.4–10.8)

## 2024-07-26 PROCEDURE — 80048 BASIC METABOLIC PNL TOTAL CA: CPT | Performed by: NURSE PRACTITIONER

## 2024-07-26 PROCEDURE — 93010 ELECTROCARDIOGRAM REPORT: CPT | Performed by: INTERNAL MEDICINE

## 2024-07-26 PROCEDURE — 85027 COMPLETE CBC AUTOMATED: CPT | Performed by: NURSE PRACTITIONER

## 2024-07-26 PROCEDURE — 25010000002 ENOXAPARIN PER 10 MG: Performed by: NURSE PRACTITIONER

## 2024-07-26 PROCEDURE — 93306 TTE W/DOPPLER COMPLETE: CPT

## 2024-07-26 PROCEDURE — 93306 TTE W/DOPPLER COMPLETE: CPT | Performed by: INTERNAL MEDICINE

## 2024-07-26 PROCEDURE — 97116 GAIT TRAINING THERAPY: CPT

## 2024-07-26 PROCEDURE — 82948 REAGENT STRIP/BLOOD GLUCOSE: CPT

## 2024-07-26 PROCEDURE — 93005 ELECTROCARDIOGRAM TRACING: CPT | Performed by: SURGERY

## 2024-07-26 PROCEDURE — 63710000001 INSULIN LISPRO (HUMAN) PER 5 UNITS: Performed by: NURSE PRACTITIONER

## 2024-07-26 PROCEDURE — 25010000002 FUROSEMIDE PER 20 MG: Performed by: NURSE PRACTITIONER

## 2024-07-26 PROCEDURE — 71046 X-RAY EXAM CHEST 2 VIEWS: CPT

## 2024-07-26 RX ORDER — BISACODYL 10 MG
10 SUPPOSITORY, RECTAL RECTAL ONCE
Status: COMPLETED | OUTPATIENT
Start: 2024-07-26 | End: 2024-07-26

## 2024-07-26 RX ADMIN — METHOCARBAMOL 500 MG: 500 TABLET, FILM COATED ORAL at 05:06

## 2024-07-26 RX ADMIN — ACETAMINOPHEN 650 MG: 325 TABLET, FILM COATED ORAL at 05:07

## 2024-07-26 RX ADMIN — TAMSULOSIN HYDROCHLORIDE 0.4 MG: 0.4 CAPSULE ORAL at 21:35

## 2024-07-26 RX ADMIN — ATORVASTATIN CALCIUM 20 MG: 10 TABLET, FILM COATED ORAL at 21:35

## 2024-07-26 RX ADMIN — FUROSEMIDE 20 MG: 10 INJECTION, SOLUTION INTRAMUSCULAR; INTRAVENOUS at 18:09

## 2024-07-26 RX ADMIN — METHOCARBAMOL 500 MG: 500 TABLET, FILM COATED ORAL at 21:35

## 2024-07-26 RX ADMIN — INSULIN LISPRO 2 UNITS: 100 INJECTION, SOLUTION INTRAVENOUS; SUBCUTANEOUS at 21:35

## 2024-07-26 RX ADMIN — AMIODARONE HYDROCHLORIDE 400 MG: 200 TABLET ORAL at 08:25

## 2024-07-26 RX ADMIN — ACETAMINOPHEN 650 MG: 325 TABLET, FILM COATED ORAL at 12:06

## 2024-07-26 RX ADMIN — PANTOPRAZOLE SODIUM 40 MG: 40 TABLET, DELAYED RELEASE ORAL at 05:07

## 2024-07-26 RX ADMIN — CARVEDILOL 3.12 MG: 3.12 TABLET, FILM COATED ORAL at 08:25

## 2024-07-26 RX ADMIN — POTASSIUM CHLORIDE 20 MEQ: 750 CAPSULE, EXTENDED RELEASE ORAL at 08:24

## 2024-07-26 RX ADMIN — GUAIFENESIN 600 MG: 600 TABLET, EXTENDED RELEASE ORAL at 08:25

## 2024-07-26 RX ADMIN — AMIODARONE HYDROCHLORIDE 400 MG: 200 TABLET ORAL at 18:09

## 2024-07-26 RX ADMIN — FUROSEMIDE 20 MG: 10 INJECTION, SOLUTION INTRAMUSCULAR; INTRAVENOUS at 08:24

## 2024-07-26 RX ADMIN — ENOXAPARIN SODIUM 40 MG: 100 INJECTION SUBCUTANEOUS at 08:24

## 2024-07-26 RX ADMIN — CARVEDILOL 3.12 MG: 3.12 TABLET, FILM COATED ORAL at 21:35

## 2024-07-26 RX ADMIN — BISACODYL 10 MG: 5 TABLET, COATED ORAL at 08:25

## 2024-07-26 RX ADMIN — OXYCODONE HYDROCHLORIDE 10 MG: 10 TABLET ORAL at 08:24

## 2024-07-26 RX ADMIN — OXYCODONE HYDROCHLORIDE 10 MG: 10 TABLET ORAL at 03:47

## 2024-07-26 RX ADMIN — BISACODYL 10 MG: 10 SUPPOSITORY RECTAL at 08:24

## 2024-07-26 RX ADMIN — METHOCARBAMOL 500 MG: 500 TABLET, FILM COATED ORAL at 14:30

## 2024-07-26 RX ADMIN — ASPIRIN 81 MG: 81 TABLET, COATED ORAL at 08:25

## 2024-07-26 RX ADMIN — ACETAMINOPHEN 650 MG: 325 TABLET, FILM COATED ORAL at 18:09

## 2024-07-26 RX ADMIN — POLYETHYLENE GLYCOL 3350 17 G: 17 POWDER, FOR SOLUTION ORAL at 08:24

## 2024-07-26 RX ADMIN — CLOPIDOGREL BISULFATE 75 MG: 75 TABLET, FILM COATED ORAL at 18:09

## 2024-07-26 RX ADMIN — POTASSIUM CHLORIDE 20 MEQ: 750 CAPSULE, EXTENDED RELEASE ORAL at 18:09

## 2024-07-26 RX ADMIN — Medication 10 MG: at 16:18

## 2024-07-26 RX ADMIN — GUAIFENESIN 600 MG: 600 TABLET, EXTENDED RELEASE ORAL at 21:35

## 2024-07-26 RX ADMIN — OXYCODONE HYDROCHLORIDE 10 MG: 10 TABLET ORAL at 19:34

## 2024-07-26 NOTE — PROGRESS NOTES
"Patient name: Vj Garcia  Patient : 1950  VISIT # [id number]  MR #[id number]    Procedure:Procedure(s):  CORONARY ARTERY BYPASS GRAFTING X2, LEFT INTERNAL MAMMARY ARTERY GRAFT, RIGHT LEG OPEN VEIN HARVEST, MITRAL VALVE REAPIR, LEFT VENTRICULAR ANEURYSM REPAIR,  ATRIAL APPENDAGE EXCLUSION LEFT 45 ATRICLIP WITH TRANSESOPHAGEAL ECHOCARDIOGRAM  POSSIBLE MITRAL VALVE REPAIR/REPLACEMENT  ATRIAL APPENDAGE EXCLUSION LEFT WITH TRANSESOPHAGEAL ECHOCARDIOGRAM  Procedure Date:2024  POD:3 Days Post-Op    Subjective     On room air.  Weight is down 1 pound and he is 6 pounds above his baseline.  Creatinine improved today at 1.22.  No bowel movement.  Walking 275 feet with physical therapy.  He has remained in sinus rhythm.    Telemetry: Sinus rhythm 79-86  IV drips: None       Objective     Visit Vitals  /64 (BP Location: Left arm, Patient Position: Sitting)   Pulse 79   Temp 98.5 °F (36.9 °C) (Oral)   Resp 16   Ht 178.5 cm (70.28\")   Wt 90.9 kg (200 lb 6.4 oz)   SpO2 96%   BMI 28.53 kg/m²   Baseline weight 194 pounds    Intake/Output Summary (Last 24 hours) at 2024 0745  Last data filed at 2024 0348  Gross per 24 hour   Intake 200 ml   Output 695 ml   Net -495 ml     Left pleural drain: 120 mL in 24 hours, serosanguineous, no airleak    Lab:     CBC:  Results from last 7 days   Lab Units 24  0410 24   WBC 10*3/mm3 13.60* 17.61* 15.42*   HEMATOCRIT % 28.7* 31.4* 31.4*   PLATELETS 10*3/mm3 122* 142 131*          BMP:  Results from last 7 days   Lab Units 24  0410 242 24   SODIUM mmol/L 133* 132* 137   POTASSIUM mmol/L 4.7 4.4 4.6   CHLORIDE mmol/L 97* 97* 103   CO2 mmol/L 26.0 25.0 23.0   GLUCOSE mg/dL 147* 156* 154*   BUN mg/dL 24* 19 17   CREATININE mg/dL 1.22 1.32* 1.36*          COAG:  Results from last 7 days   Lab Units 24  0228 24  1519 24  1403   INR  1.20*   < > 1.49*   APTT seconds  --   --  31.9    < > = " values in this interval not displayed.       IMAGES:       Imaging Results (Last 24 Hours)       Procedure Component Value Units Date/Time    XR Chest PA & Lateral [[id number]] Collected: 07/26/24 0648     Updated: 07/26/24 0653    Narrative:      EXAMINATION: XR CHEST PA AND LATERAL-     7/26/2024 3:42 AM     HISTORY: s/p CABG, MVRepair, LV aneurysm Repair, LAAE     The frontal and lateral views of the chest are compared with the  previous study dated 7/25/2024.     The lungs are poorly expanded.     Mild pulmonary venous congestion persist.     A small bibasilar pleural effusion persist. No pneumothorax.     There is persistent moderate cardiomegaly. There is a prior cardiac  surgery. Left atrial appendage clamp is in place.     Left-sided chest tube is in place. The mediastinal drain is not  visualized.     No acute bony abnormality. Hardware fusion of the lower cervical spine  is partially visible and not evaluated.       Impression:      1. No significant change since the previous study 1 day ago.        This report was signed and finalized on 7/26/2024 6:50 AM by Dr. Everton Ziegler MD.             CXR: Left chest tube in stable position with no pneumothorax.  Small left basilar pleural effusion.    Physical Exam:  General: Alert, oriented. No apparent distress.   Cardiovascular: Regular rate and rhythm without murmur, rubs, or gallops.    Pulmonary: Clear to auscultation bilaterally without wheezing, rubs, or rales.  Chest: Sternotomy incision clean, dry, and intact. Sternum stable. No clicks.  Left pleural drain to 20 cm suction. No air leak. Fluid is serosanguineous.   Abdomen: Soft, nondistended, and nontender.  Extremities: Warm, moves all extremities.  Mild peripheral edema.  Saphenectomy site is clean dry and intact  Neurologic:  Grossly intact with no focal deficits.            Impression:  Posterior basal LV aneurysm  Severe mitral valve regurgitation, functional  Coronary artery  disease  Hypertension, well-controlled  Hyperlipidemia, on statin  Tobacco use  CKD, stage IIIa        Plan:  Continue bowel regimen, suppository again today  Discontinue left pleural drain  Continue diuresis  Repeat complete transthoracic echo today.  Routine postcardiac surgery care  Encourage pulmonary toilet and ambulation  Anticipate discharge home tomorrow  Discussed with patient and nursing      RAMIRO Holbrook  07/26/24  07:45 CDT

## 2024-07-26 NOTE — PLAN OF CARE
Goal Outcome Evaluation:           Progress: no change  Outcome Evaluation: VSS. Pt c/o 10/10 pain this shift. See MAR for interventions. Pt up in chair at beginning of shift then moved to bed. Bed alarm was turned on. S 73-86 on tele. Pt had one walk this morning. Safety maintained.

## 2024-07-26 NOTE — CASE MANAGEMENT/SOCIAL WORK
Continued Stay Note  [facility]     Patient Name: Vj Garcia  MRN: [id number]  Today's Date: 7/26/2024    Admit Date: 7/23/2024    Plan: Home   Discharge Plan       Row Name 07/26/24 1425       Plan    Plan Home    Final Discharge Disposition Code 01 - home or self-care    Final Note Anticipate discharge home tomorrow. No dc needs identified.             MERI Marinelli

## 2024-07-26 NOTE — PLAN OF CARE
Goal Outcome Evaluation:              Outcome Evaluation: Patient pain better today in comparison to other days. Pleural tube removed, no complaints. Ambulating well. small BM visualized. Sinus 73-81. Safety maintained.

## 2024-07-26 NOTE — PLAN OF CARE
Goal Outcome Evaluation:      Patient stands and sits with supervision. Ambulated 275' with CGA. Instructed on and practiced stairs. Patient not using arms as much, strongly encourage he carry pillow. Reviewed sternal restrictions.

## 2024-07-27 ENCOUNTER — APPOINTMENT (OUTPATIENT)
Dept: GENERAL RADIOLOGY | Facility: HOSPITAL | Age: 74
DRG: 220 | End: 2024-07-27
Payer: MEDICARE

## 2024-07-27 LAB
ANION GAP SERPL CALCULATED.3IONS-SCNC: 11 MMOL/L (ref 5–15)
BUN SERPL-MCNC: 29 MG/DL (ref 8–23)
BUN/CREAT SERPL: 25.7 (ref 7–25)
CALCIUM SPEC-SCNC: 8.2 MG/DL (ref 8.6–10.5)
CHLORIDE SERPL-SCNC: 96 MMOL/L (ref 98–107)
CO2 SERPL-SCNC: 24 MMOL/L (ref 22–29)
CREAT SERPL-MCNC: 1.13 MG/DL (ref 0.76–1.27)
DEPRECATED RDW RBC AUTO: 47.6 FL (ref 37–54)
EGFRCR SERPLBLD CKD-EPI 2021: 69.1 ML/MIN/1.73
ERYTHROCYTE [DISTWIDTH] IN BLOOD BY AUTOMATED COUNT: 14.1 % (ref 12.3–15.4)
GLUCOSE BLDC GLUCOMTR-MCNC: 122 MG/DL (ref 70–130)
GLUCOSE BLDC GLUCOMTR-MCNC: 123 MG/DL (ref 70–130)
GLUCOSE BLDC GLUCOMTR-MCNC: 130 MG/DL (ref 70–130)
GLUCOSE BLDC GLUCOMTR-MCNC: 140 MG/DL (ref 70–130)
GLUCOSE SERPL-MCNC: 120 MG/DL (ref 65–99)
HCT VFR BLD AUTO: 27.9 % (ref 37.5–51)
HGB BLD-MCNC: 9 G/DL (ref 13–17.7)
MCH RBC QN AUTO: 30 PG (ref 26.6–33)
MCHC RBC AUTO-ENTMCNC: 32.3 G/DL (ref 31.5–35.7)
MCV RBC AUTO: 93 FL (ref 79–97)
PLATELET # BLD AUTO: 136 10*3/MM3 (ref 140–450)
PMV BLD AUTO: 11 FL (ref 6–12)
POTASSIUM SERPL-SCNC: 4.5 MMOL/L (ref 3.5–5.2)
RBC # BLD AUTO: 3 10*6/MM3 (ref 4.14–5.8)
SODIUM SERPL-SCNC: 131 MMOL/L (ref 136–145)
WBC NRBC COR # BLD AUTO: 10.95 10*3/MM3 (ref 3.4–10.8)

## 2024-07-27 PROCEDURE — 85027 COMPLETE CBC AUTOMATED: CPT | Performed by: NURSE PRACTITIONER

## 2024-07-27 PROCEDURE — 25010000002 ONDANSETRON PER 1 MG: Performed by: NURSE PRACTITIONER

## 2024-07-27 PROCEDURE — 25010000002 FUROSEMIDE PER 20 MG: Performed by: NURSE PRACTITIONER

## 2024-07-27 PROCEDURE — 25010000002 ENOXAPARIN PER 10 MG: Performed by: NURSE PRACTITIONER

## 2024-07-27 PROCEDURE — 25010000002 AMIODARONE IN DEXTROSE 5% 360-4.14 MG/200ML-% SOLUTION: Performed by: NURSE PRACTITIONER

## 2024-07-27 PROCEDURE — 71045 X-RAY EXAM CHEST 1 VIEW: CPT

## 2024-07-27 PROCEDURE — 82948 REAGENT STRIP/BLOOD GLUCOSE: CPT

## 2024-07-27 PROCEDURE — 93005 ELECTROCARDIOGRAM TRACING: CPT | Performed by: SURGERY

## 2024-07-27 PROCEDURE — 93010 ELECTROCARDIOGRAM REPORT: CPT | Performed by: EMERGENCY MEDICINE

## 2024-07-27 PROCEDURE — 93010 ELECTROCARDIOGRAM REPORT: CPT | Performed by: INTERNAL MEDICINE

## 2024-07-27 PROCEDURE — 25010000002 METOCLOPRAMIDE PER 10 MG: Performed by: NURSE PRACTITIONER

## 2024-07-27 PROCEDURE — 80048 BASIC METABOLIC PNL TOTAL CA: CPT | Performed by: NURSE PRACTITIONER

## 2024-07-27 PROCEDURE — 97116 GAIT TRAINING THERAPY: CPT

## 2024-07-27 RX ORDER — CARVEDILOL 6.25 MG/1
6.25 TABLET ORAL EVERY 12 HOURS SCHEDULED
Status: DISCONTINUED | OUTPATIENT
Start: 2024-07-27 | End: 2024-07-29 | Stop reason: HOSPADM

## 2024-07-27 RX ORDER — FUROSEMIDE 10 MG/ML
20 INJECTION INTRAMUSCULAR; INTRAVENOUS ONCE
Status: COMPLETED | OUTPATIENT
Start: 2024-07-27 | End: 2024-07-27

## 2024-07-27 RX ORDER — LISINOPRIL 5 MG/1
5 TABLET ORAL
Status: DISCONTINUED | OUTPATIENT
Start: 2024-07-27 | End: 2024-07-29 | Stop reason: HOSPADM

## 2024-07-27 RX ORDER — CARVEDILOL 3.12 MG/1
3.12 TABLET ORAL ONCE
Status: COMPLETED | OUTPATIENT
Start: 2024-07-27 | End: 2024-07-27

## 2024-07-27 RX ORDER — METOCLOPRAMIDE HYDROCHLORIDE 5 MG/ML
10 INJECTION INTRAMUSCULAR; INTRAVENOUS EVERY 6 HOURS PRN
Status: DISCONTINUED | OUTPATIENT
Start: 2024-07-27 | End: 2024-07-29 | Stop reason: HOSPADM

## 2024-07-27 RX ORDER — METOPROLOL TARTRATE 1 MG/ML
5 INJECTION, SOLUTION INTRAVENOUS ONCE
Status: COMPLETED | OUTPATIENT
Start: 2024-07-27 | End: 2024-07-27

## 2024-07-27 RX ORDER — DIPHENHYDRAMINE HCL 25 MG
25 CAPSULE ORAL NIGHTLY PRN
Status: DISCONTINUED | OUTPATIENT
Start: 2024-07-27 | End: 2024-07-29 | Stop reason: HOSPADM

## 2024-07-27 RX ADMIN — GUAIFENESIN 600 MG: 600 TABLET, EXTENDED RELEASE ORAL at 21:16

## 2024-07-27 RX ADMIN — FUROSEMIDE 20 MG: 10 INJECTION, SOLUTION INTRAMUSCULAR; INTRAVENOUS at 17:27

## 2024-07-27 RX ADMIN — FUROSEMIDE 20 MG: 10 INJECTION, SOLUTION INTRAMUSCULAR; INTRAVENOUS at 08:05

## 2024-07-27 RX ADMIN — METHOCARBAMOL 500 MG: 500 TABLET, FILM COATED ORAL at 14:31

## 2024-07-27 RX ADMIN — CARVEDILOL 3.12 MG: 3.12 TABLET, FILM COATED ORAL at 10:17

## 2024-07-27 RX ADMIN — CLOPIDOGREL BISULFATE 75 MG: 75 TABLET, FILM COATED ORAL at 17:27

## 2024-07-27 RX ADMIN — BISACODYL 10 MG: 5 TABLET, COATED ORAL at 08:06

## 2024-07-27 RX ADMIN — ONDANSETRON 4 MG: 2 INJECTION INTRAMUSCULAR; INTRAVENOUS at 07:32

## 2024-07-27 RX ADMIN — ATORVASTATIN CALCIUM 20 MG: 10 TABLET, FILM COATED ORAL at 21:16

## 2024-07-27 RX ADMIN — GUAIFENESIN 600 MG: 600 TABLET, EXTENDED RELEASE ORAL at 08:06

## 2024-07-27 RX ADMIN — CARVEDILOL 6.25 MG: 6.25 TABLET, FILM COATED ORAL at 21:16

## 2024-07-27 RX ADMIN — METHOCARBAMOL 500 MG: 500 TABLET, FILM COATED ORAL at 21:16

## 2024-07-27 RX ADMIN — METOCLOPRAMIDE 10 MG: 5 INJECTION, SOLUTION INTRAMUSCULAR; INTRAVENOUS at 10:17

## 2024-07-27 RX ADMIN — PANTOPRAZOLE SODIUM 40 MG: 40 TABLET, DELAYED RELEASE ORAL at 04:35

## 2024-07-27 RX ADMIN — POTASSIUM CHLORIDE 20 MEQ: 750 CAPSULE, EXTENDED RELEASE ORAL at 17:27

## 2024-07-27 RX ADMIN — FUROSEMIDE 20 MG: 10 INJECTION, SOLUTION INTRAMUSCULAR; INTRAVENOUS at 12:53

## 2024-07-27 RX ADMIN — TAMSULOSIN HYDROCHLORIDE 0.4 MG: 0.4 CAPSULE ORAL at 21:16

## 2024-07-27 RX ADMIN — ACETAMINOPHEN 650 MG: 325 TABLET, FILM COATED ORAL at 17:27

## 2024-07-27 RX ADMIN — LISINOPRIL 5 MG: 5 TABLET ORAL at 10:41

## 2024-07-27 RX ADMIN — ENOXAPARIN SODIUM 40 MG: 100 INJECTION SUBCUTANEOUS at 08:06

## 2024-07-27 RX ADMIN — METOPROLOL TARTRATE 5 MG: 5 INJECTION INTRAVENOUS at 08:06

## 2024-07-27 RX ADMIN — ASPIRIN 81 MG: 81 TABLET, COATED ORAL at 08:06

## 2024-07-27 RX ADMIN — AMIODARONE HYDROCHLORIDE 400 MG: 200 TABLET ORAL at 08:06

## 2024-07-27 RX ADMIN — CARVEDILOL 3.12 MG: 3.12 TABLET, FILM COATED ORAL at 08:06

## 2024-07-27 RX ADMIN — ACETAMINOPHEN 650 MG: 325 TABLET, FILM COATED ORAL at 04:36

## 2024-07-27 RX ADMIN — AMIODARONE HYDROCHLORIDE 0.5 MG/MIN: 1.8 INJECTION, SOLUTION INTRAVENOUS at 10:16

## 2024-07-27 RX ADMIN — ACETAMINOPHEN 650 MG: 325 TABLET, FILM COATED ORAL at 12:53

## 2024-07-27 RX ADMIN — Medication 5 MG: at 22:14

## 2024-07-27 RX ADMIN — BISACODYL 10 MG: 5 TABLET, COATED ORAL at 21:15

## 2024-07-27 RX ADMIN — METHOCARBAMOL 500 MG: 500 TABLET, FILM COATED ORAL at 04:35

## 2024-07-27 RX ADMIN — AMIODARONE HYDROCHLORIDE 400 MG: 200 TABLET ORAL at 17:27

## 2024-07-27 RX ADMIN — POTASSIUM CHLORIDE 20 MEQ: 750 CAPSULE, EXTENDED RELEASE ORAL at 08:06

## 2024-07-27 NOTE — PLAN OF CARE
Goal Outcome Evaluation:  Plan of Care Reviewed With: patient        Progress: no change  Outcome Evaluation: VSS. Pt c/o pain this shift. See MAR for intervention. Pt has had 2 bowel movements this shift. Possible d/c home today. S/ST . Safety maintained.

## 2024-07-27 NOTE — PROGRESS NOTES
"Patient name: Vj Garcia  Patient : 1950  VISIT # [id number]  MR #[id number]    Procedure:Procedure(s):  CORONARY ARTERY BYPASS GRAFTING X2, LEFT INTERNAL MAMMARY ARTERY GRAFT, RIGHT LEG OPEN VEIN HARVEST, MITRAL VALVE REAPIR, LEFT VENTRICULAR ANEURYSM REPAIR,  ATRIAL APPENDAGE EXCLUSION LEFT 45 ATRICLIP WITH TRANSESOPHAGEAL ECHOCARDIOGRAM  POSSIBLE MITRAL VALVE REPAIR/REPLACEMENT  ATRIAL APPENDAGE EXCLUSION LEFT WITH TRANSESOPHAGEAL ECHOCARDIOGRAM  Procedure Date:2024  POD:4 Days Post-Op    Subjective       Up and resting in recliner.  Developed atrial flutter earlier this morning denied any chest pain or significant shortness of breath.  Remains on room air.  Weight is up 1 pound, 7 pounds above baseline.  Ambulating well, 275 feet with physical therapy.  Complained of nausea.      Telemetry: Atrial flutter  bpm  IV drips: None       Objective     Visit Vitals  /73 (BP Location: Left arm, Patient Position: Sitting)   Pulse 110   Temp 97.6 °F (36.4 °C) (Oral)   Resp 16   Ht 178.5 cm (70.28\")   Wt 91.2 kg (201 lb)   SpO2 99%   BMI 28.61 kg/m²   Baseline weight 194 pounds    Intake/Output Summary (Last 24 hours) at 2024 1132  Last data filed at 2024 2130  Gross per 24 hour   Intake 120 ml   Output 551 ml   Net -431 ml       Lab:     CBC:  Results from last 7 days   Lab Units 24  0410 24  0402   WBC 10*3/mm3 10.95* 13.60* 17.61*   HEMATOCRIT % 27.9* 28.7* 31.4*   PLATELETS 10*3/mm3 136* 122* 142          BMP:  Results from last 7 days   Lab Units 24  02124  0410 24  0402   SODIUM mmol/L 131* 133* 132*   POTASSIUM mmol/L 4.5 4.7 4.4   CHLORIDE mmol/L 96* 97* 97*   CO2 mmol/L 24.0 26.0 25.0   GLUCOSE mg/dL 120* 147* 156*   BUN mg/dL 29* 24* 19   CREATININE mg/dL 1.13 1.22 1.32*          COAG:  Results from last 7 days   Lab Units 24  0228 24  1519 24  1403   INR  1.20*   < > 1.49*   APTT seconds  --   --  31.9    < " > = values in this interval not displayed.       IMAGES:       Imaging Results (Last 24 Hours)       Procedure Component Value Units Date/Time    XR Chest 1 View [[id number]] Collected: 07/27/24 0909     Updated: 07/27/24 0914    Narrative:      EXAMINATION: XR CHEST 1 VW- 7/27/2024 9:09 AM     HISTORY: Post-Op Heart Surgery.     REPORT: A frontal view of the chest was obtained.     COMPARISON: Chest x-rays 7/26/2024 0405 hours.     The left pleural drain has been removed, no pneumothorax is identified.  Mild increase in left basilar atelectasis. There may be a small left  pleural effusion. The right lung remains clear. Previous median  sternotomy, with a left atrial appendage occlusion device. There is  previous ACDF and mid cervical corpectomy. Mild central vascular  crowding without evidence of CHF.       Impression:      Level of the left-sided pleural drain with no pneumothorax.  Mild increase in left basilar atelectasis with questionable trace left  pleural effusion.     This report was signed and finalized on 7/27/2024 9:11 AM by Dr. Keaton Gifford MD.             CXR: No pneumothorax.  Mild left pleural effusion.    Physical Exam:  General: Alert, oriented. No apparent distress.   Cardiovascular: Atrial flutter without murmur, rubs, or gallops.    Pulmonary: Clear to auscultation bilaterally without wheezing, rubs, or rales.  Chest: Sternotomy incision clean, dry, and intact. Sternum stable. No clicks.   Abdomen: Soft, nondistended, and nontender.  Extremities: Warm, moves all extremities.  Mild peripheral edema.  Saphenectomy site is clean dry and intact  Neurologic:  Grossly intact with no focal deficits.            Impression:  Posterior basal LV aneurysm  Severe mitral valve regurgitation, functional  Coronary artery disease  Hypertension, well-controlled  Hyperlipidemia, on statin  Tobacco use  CKD, stage IIIa        Plan:  Continue diuresis, additional Lasix 20 mg IV x 1 and continue Lasix 20 mg IV  twice daily  Routine postcardiac surgery care  Encourage pulmonary toilet and ambulation  Resume amiodarone drip at 0.5 mg/min and continue oral dosing  Increased carvedilol to 6.25 mg p.o. every 12 hours with an additional dose of 3.125 mg p.o. this morning  Add lisinopril 5 mg p.o. daily  Reglan 10 mg IV every 6 hours as needed nausea      Discussed with patient, family and nursing      Sherry Song, RAMIRO  07/27/24  11:32 CDT

## 2024-07-27 NOTE — PLAN OF CARE
Goal Outcome Evaluation:              Outcome Evaluation: Criss noted this am- 110's. IV metoprolol given and amio drip 0.5 mg started. Patient converted to sinus at 1550 60s-80s. Converted again afib 80s. Physician notifed and asked to continue amio drip through the night. mild seeping at the base of sternal wound. Safety maintained.

## 2024-07-27 NOTE — PLAN OF CARE
Goal Outcome Evaluation:  Plan of Care Reviewed With: patient           Outcome Evaluation: Bed mobility SBA. Amb 260' CGA-min A. Pt had 2 LOB requiring min A to correct. Pt had mild unsteadiness for most of the walk. Educated on safety. Pt required some cues for sternal precaution when performing bed mobility.

## 2024-07-28 ENCOUNTER — APPOINTMENT (OUTPATIENT)
Dept: GENERAL RADIOLOGY | Facility: HOSPITAL | Age: 74
DRG: 220 | End: 2024-07-28
Payer: MEDICARE

## 2024-07-28 LAB
ANION GAP SERPL CALCULATED.3IONS-SCNC: 12 MMOL/L (ref 5–15)
BUN SERPL-MCNC: 34 MG/DL (ref 8–23)
BUN/CREAT SERPL: 25.4 (ref 7–25)
CALCIUM SPEC-SCNC: 8.6 MG/DL (ref 8.6–10.5)
CHLORIDE SERPL-SCNC: 93 MMOL/L (ref 98–107)
CO2 SERPL-SCNC: 26 MMOL/L (ref 22–29)
CREAT SERPL-MCNC: 1.34 MG/DL (ref 0.76–1.27)
DEPRECATED RDW RBC AUTO: 47.7 FL (ref 37–54)
EGFRCR SERPLBLD CKD-EPI 2021: 56.3 ML/MIN/1.73
ERYTHROCYTE [DISTWIDTH] IN BLOOD BY AUTOMATED COUNT: 14 % (ref 12.3–15.4)
GLUCOSE BLDC GLUCOMTR-MCNC: 112 MG/DL (ref 70–130)
GLUCOSE BLDC GLUCOMTR-MCNC: 126 MG/DL (ref 70–130)
GLUCOSE BLDC GLUCOMTR-MCNC: 139 MG/DL (ref 70–130)
GLUCOSE BLDC GLUCOMTR-MCNC: 204 MG/DL (ref 70–130)
GLUCOSE SERPL-MCNC: 129 MG/DL (ref 65–99)
HCT VFR BLD AUTO: 28.8 % (ref 37.5–51)
HGB BLD-MCNC: 9.2 G/DL (ref 13–17.7)
MCH RBC QN AUTO: 29.6 PG (ref 26.6–33)
MCHC RBC AUTO-ENTMCNC: 31.9 G/DL (ref 31.5–35.7)
MCV RBC AUTO: 92.6 FL (ref 79–97)
PLATELET # BLD AUTO: 179 10*3/MM3 (ref 140–450)
PMV BLD AUTO: 10.3 FL (ref 6–12)
POTASSIUM SERPL-SCNC: 4.5 MMOL/L (ref 3.5–5.2)
QT INTERVAL: 400 MS
QT INTERVAL: 430 MS
QTC INTERVAL: 450 MS
QTC INTERVAL: 536 MS
RBC # BLD AUTO: 3.11 10*6/MM3 (ref 4.14–5.8)
SODIUM SERPL-SCNC: 131 MMOL/L (ref 136–145)
WBC NRBC COR # BLD AUTO: 9.04 10*3/MM3 (ref 3.4–10.8)

## 2024-07-28 PROCEDURE — 25010000002 FUROSEMIDE PER 20 MG: Performed by: NURSE PRACTITIONER

## 2024-07-28 PROCEDURE — 97116 GAIT TRAINING THERAPY: CPT

## 2024-07-28 PROCEDURE — 82948 REAGENT STRIP/BLOOD GLUCOSE: CPT

## 2024-07-28 PROCEDURE — 25010000002 AMIODARONE IN DEXTROSE 5% 360-4.14 MG/200ML-% SOLUTION: Performed by: NURSE PRACTITIONER

## 2024-07-28 PROCEDURE — 85027 COMPLETE CBC AUTOMATED: CPT | Performed by: NURSE PRACTITIONER

## 2024-07-28 PROCEDURE — 25010000002 ENOXAPARIN PER 10 MG: Performed by: NURSE PRACTITIONER

## 2024-07-28 PROCEDURE — 63710000001 INSULIN LISPRO (HUMAN) PER 5 UNITS: Performed by: NURSE PRACTITIONER

## 2024-07-28 PROCEDURE — 71045 X-RAY EXAM CHEST 1 VIEW: CPT

## 2024-07-28 PROCEDURE — 80048 BASIC METABOLIC PNL TOTAL CA: CPT | Performed by: NURSE PRACTITIONER

## 2024-07-28 PROCEDURE — 25010000002 AMIODARONE IN DEXTROSE 5% 360-4.14 MG/200ML-% SOLUTION: Performed by: SURGERY

## 2024-07-28 RX ORDER — FUROSEMIDE 10 MG/ML
20 INJECTION INTRAMUSCULAR; INTRAVENOUS DAILY
Status: DISCONTINUED | OUTPATIENT
Start: 2024-07-28 | End: 2024-07-29 | Stop reason: HOSPADM

## 2024-07-28 RX ORDER — POTASSIUM CHLORIDE 750 MG/1
20 CAPSULE, EXTENDED RELEASE ORAL DAILY
Status: DISCONTINUED | OUTPATIENT
Start: 2024-07-29 | End: 2024-07-29 | Stop reason: HOSPADM

## 2024-07-28 RX ADMIN — ASPIRIN 81 MG: 81 TABLET, COATED ORAL at 08:30

## 2024-07-28 RX ADMIN — AMIODARONE HYDROCHLORIDE 400 MG: 200 TABLET ORAL at 17:38

## 2024-07-28 RX ADMIN — LISINOPRIL 5 MG: 5 TABLET ORAL at 08:30

## 2024-07-28 RX ADMIN — CARVEDILOL 6.25 MG: 6.25 TABLET, FILM COATED ORAL at 08:30

## 2024-07-28 RX ADMIN — GUAIFENESIN 600 MG: 600 TABLET, EXTENDED RELEASE ORAL at 08:30

## 2024-07-28 RX ADMIN — PANTOPRAZOLE SODIUM 40 MG: 40 TABLET, DELAYED RELEASE ORAL at 06:08

## 2024-07-28 RX ADMIN — ACETAMINOPHEN 650 MG: 325 TABLET, FILM COATED ORAL at 06:08

## 2024-07-28 RX ADMIN — AMIODARONE HYDROCHLORIDE 400 MG: 200 TABLET ORAL at 08:30

## 2024-07-28 RX ADMIN — GUAIFENESIN 600 MG: 600 TABLET, EXTENDED RELEASE ORAL at 20:23

## 2024-07-28 RX ADMIN — METHOCARBAMOL 500 MG: 500 TABLET, FILM COATED ORAL at 13:51

## 2024-07-28 RX ADMIN — BISACODYL 10 MG: 5 TABLET, COATED ORAL at 08:30

## 2024-07-28 RX ADMIN — INSULIN LISPRO 3 UNITS: 100 INJECTION, SOLUTION INTRAVENOUS; SUBCUTANEOUS at 11:58

## 2024-07-28 RX ADMIN — ACETAMINOPHEN 650 MG: 325 TABLET, FILM COATED ORAL at 11:58

## 2024-07-28 RX ADMIN — AMIODARONE HYDROCHLORIDE 0.5 MG/MIN: 1.8 INJECTION, SOLUTION INTRAVENOUS at 12:59

## 2024-07-28 RX ADMIN — ACETAMINOPHEN 650 MG: 325 TABLET, FILM COATED ORAL at 17:38

## 2024-07-28 RX ADMIN — METHOCARBAMOL 500 MG: 500 TABLET, FILM COATED ORAL at 06:09

## 2024-07-28 RX ADMIN — ATORVASTATIN CALCIUM 20 MG: 10 TABLET, FILM COATED ORAL at 20:22

## 2024-07-28 RX ADMIN — AMIODARONE HYDROCHLORIDE 0.5 MG/MIN: 1.8 INJECTION, SOLUTION INTRAVENOUS at 03:33

## 2024-07-28 RX ADMIN — FUROSEMIDE 20 MG: 10 INJECTION, SOLUTION INTRAMUSCULAR; INTRAVENOUS at 08:30

## 2024-07-28 RX ADMIN — CARVEDILOL 6.25 MG: 6.25 TABLET, FILM COATED ORAL at 20:23

## 2024-07-28 RX ADMIN — CLOPIDOGREL BISULFATE 75 MG: 75 TABLET, FILM COATED ORAL at 17:38

## 2024-07-28 RX ADMIN — METHOCARBAMOL 500 MG: 500 TABLET, FILM COATED ORAL at 22:00

## 2024-07-28 RX ADMIN — TAMSULOSIN HYDROCHLORIDE 0.4 MG: 0.4 CAPSULE ORAL at 20:37

## 2024-07-28 RX ADMIN — ENOXAPARIN SODIUM 40 MG: 100 INJECTION SUBCUTANEOUS at 08:28

## 2024-07-28 NOTE — THERAPY TREATMENT NOTE
Acute Care - Physical Therapy Treatment Note  [facility]     Patient Name: Vj Garcia  : 1950  MRN: [id number]  Today's Date: 2024      Visit Dx:     ICD-10-CM ICD-9-CM   1. Impaired mobility [Z74.09]  Z74.09 799.89   2. Unstable angina  I20.0 411.1     Patient Active Problem List   Diagnosis    Unstable angina    Ischemic cardiomyopathy    CAD (coronary artery disease)    Left ventricular aneurysm    Coronary artery disease involving native coronary artery of native heart with angina pectoris    Tobacco use    Overweight with body mass index (BMI) of 28 to 28.9 in adult    Stage 3a chronic kidney disease     Past Medical History:   Diagnosis Date    COPD (chronic obstructive pulmonary disease)     Elevated cholesterol     Enlarged prostate     History of heart attack     Hypertension     Urination frequency     Weak urine stream      Past Surgical History:   Procedure Laterality Date    ATRIAL APPENDAGE EXCLUSION LEFT WITH TRANSESOPHAGEAL ECHOCARDIOGRAM N/A 2024    Procedure: ATRIAL APPENDAGE EXCLUSION LEFT WITH TRANSESOPHAGEAL ECHOCARDIOGRAM;  Surgeon: Kobe Maciel MD;  Location:  [facility] OR;  Service: Cardiothoracic;  Laterality: N/A;    BACK SURGERY      x3    CARDIAC CATHETERIZATION N/A 2024    Procedure: Left Heart Cath;  Surgeon: Brennan Khoury DO;  Location:  [facility] CATH INVASIVE LOCATION;  Service: Cardiovascular;  Laterality: N/A;    CORONARY ARTERY BYPASS GRAFT N/A 2024    Procedure: CORONARY ARTERY BYPASS GRAFTING X2, LEFT INTERNAL MAMMARY ARTERY GRAFT, RIGHT LEG OPEN VEIN HARVEST, MITRAL VALVE REAPIR, LEFT VENTRICULAR ANEURYSM REPAIR,  ATRIAL APPENDAGE EXCLUSION LEFT 45 ATRICLIP WITH TRANSESOPHAGEAL ECHOCARDIOGRAM;  Surgeon: Kobe Maciel MD;  Location:  [facility] OR;  Service: Cardiothoracic;  Laterality: N/A;    EXCISION      fatty patch on back    HERNIA REPAIR      MITRAL VALVE REPAIR/REPLACEMENT N/A 2024    Procedure: POSSIBLE MITRAL VALVE  REPAIR/REPLACEMENT;  Surgeon: Kobe Maciel MD;  Location: [facility] OR;  Service: Cardiothoracic;  Laterality: N/A;     PT Assessment (Last 12 Hours)       PT Evaluation and Treatment       Row Name 07/28/24 1435 07/28/24 1126       Physical Therapy Time and Intention    Subjective Information no complaints  -WK no complaints  -WK    Document Type therapy note (daily note)  -WK therapy note (daily note)  -WK    Mode of Treatment physical therapy  -WK physical therapy  -WK      Row Name 07/28/24 0810          Physical Therapy Time and Intention    Session Not Performed other (see comments)  -WK     Comment pt getting breakfast  -WK       Row Name 07/28/24 1435 07/28/24 1126       General Information    Existing Precautions/Restrictions fall;sternal  -WK fall;sternal  -WK      Row Name 07/28/24 1435 07/28/24 1126       Pain    Pretreatment Pain Rating 0/10 - no pain  -WK 0/10 - no pain  -WK    Posttreatment Pain Rating 0/10 - no pain  -WK 0/10 - no pain  -WK      Row Name 07/28/24 1435 07/28/24 1126       Bed Mobility    Comment, (Bed Mobility) in chair  -WK in chair  -WK      Row Name 07/28/24 1435 07/28/24 1126       Sit-Stand Transfer    Sit-Stand [place] (Transfers) supervision  -WK supervision  -WK      Row Name 07/28/24 1435 07/28/24 1126       Stand-Sit Transfer    Stand-Sit [place] (Transfers) supervision  -WK supervision  -WK      Row Name 07/28/24 1435 07/28/24 1126       Gait/Stairs (Locomotion)    [place] Level (Gait) verbal cues;contact guard  -WK verbal cues;contact guard  -WK    Distance in Feet (Gait) 260  -  -WK    Deviations/Abnormal Patterns (Gait) festinating/shuffling  -WK festinating/shuffling  cues for foot clearance.  -WK    Comment, (Gait/Stairs) mild unsteadiness x2 that pt was able to correct. improved balanced compared to AM treatment.  -WK 1 LOB min A to correct. some other mild unsteadiness that pt is able to correct.  -WK      Row Name 07/28/24 1126          Motor  Skills    Comments, Therapeutic Exercise warm ups 20 reps  -WK       Row Name             Wound 07/23/24 1103 midline sternal Incision    Wound - Properties Group Placement Date: 07/23/24  -LH Placement Time: 1103  -LH Orientation: midline  -LH Location: sternal  -LH Primary Wound Type: Incision  -LH    Retired Wound - Properties Group Placement Date: 07/23/24  -LH Placement Time: 1103  -LH Orientation: midline  -LH Location: sternal  -LH Primary Wound Type: Incision  -LH    Retired Wound - Properties Group Date first assessed: 07/23/24  -LH Time first assessed: 1103  -LH Location: sternal  -LH Primary Wound Type: Incision  -LH      Row Name             Wound 07/23/24 1055 Right distal leg Incision    Wound - Properties Group Placement Date: 07/23/24  -LH Placement Time: 1055  -LH Side: Right  -LH Orientation: distal  -LH Location: leg  -LH Primary Wound Type: Incision  -LH Additional Comments: mastisol, steri strips, mepilex, ace  -LH    Retired Wound - Properties Group Placement Date: 07/23/24  -LH Placement Time: 1055  -LH Side: Right  -LH Orientation: distal  -LH Location: leg  -LH Primary Wound Type: Incision  -LH Additional Comments: mastisol, steri strips, mepilex, ace  -LH    Retired Wound - Properties Group Date first assessed: 07/23/24  -LH Time first assessed: 1055  -LH Side: Right  -LH Location: leg  -LH Primary Wound Type: Incision  -LH Additional Comments: mastisol, steri strips, mepilex, ace  -LH      Row Name 07/28/24 1126          Plan of Care Review    Plan of Care Reviewed With patient  -WK     Outcome Evaluation Pt amb 260 CGA with 1 LOB requiring min A to correct. Pt had decrease safety awareness and some other mild unsteadiness that he is able to correct. Pt required cues to increase foot clearance during gait.  -WK       Row Name 07/28/24 1435 07/28/24 1126       Vital Signs    Pretreatment Heart Rate (beats/min) 61  -WK 58  -WK    Intratreatment Heart Rate (beats/min) -- 65  -WK     Posttreatment Heart Rate (beats/min) 58  -WK --      Row Name 07/28/24 1435 07/28/24 1126       Positioning and Restraints    Pre-Treatment Position sitting in chair/recliner  -WK sitting in chair/recliner  -WK    Post Treatment Position chair  -WK chair  -WK    In Chair sitting;encouraged to call for assist;call light within reach  -WK reclined;call light within reach;encouraged to call for assist;with family/caregiver  -WK              User Key  (r) = Recorded By, (t) = Taken By, (c) = Cosigned By      Initials Name Provider Type    Phyllis Posada, RN Registered Nurse    Nataliia Multani PTA Physical Therapist Assistant                    Physical Therapy Education       Title: PT OT SLP Therapies (In Progress)       Topic: Physical Therapy (In Progress)       Point: Mobility training (In Progress)       Learning Progress Summary             Patient Acceptance, E,D, NR by MAIK at 7/26/2024 1156    Comment: stairs    Acceptance, E,TB, VU,DU by CHINO at 7/24/2024 0807    Comment: Educated on role of PT in pt care.                         Point: Home exercise program (Not Started)       Learner Progress:  Not documented in this visit.              Point: Body mechanics (Not Started)       Learner Progress:  Not documented in this visit.              Point: Precautions (In Progress)       Learning Progress Summary             Patient Acceptance, E,D, NR by MAIK at 7/25/2024 1243    Comment: sternal restrictions                                         User Key       Initials Effective Dates Name Provider Type Discipline    MAIK 02/03/23 -  Alesia Metzger PTA Physical Therapist Assistant PT    CHINO 06/24/24 -  Sarah Mitchell PT Student PT Student PT                  PT Recommendation and Plan     Plan of Care Reviewed With: patient  Outcome Evaluation: Pt amb 260 CGA with 1 LOB requiring min A to correct. Pt had decrease safety awareness and some other mild unsteadiness that he is able to correct. Pt required cues  to increase foot clearance during gait.   Outcome Measures       Row Name 07/28/24 1126 07/27/24 1331 07/26/24 1100       How much help from another person do you currently need...    Turning from your back to your side while in flat bed without using bedrails? 3  -WK 3  -WK 3  -MAIK    Moving from lying on back to sitting on the side of a flat bed without bedrails? 3  -WK 3  -WK 3  -MAIK    Moving to and from a bed to a chair (including a wheelchair)? 3  -WK 3  -WK 3  -MAIK    Standing up from a chair using your arms (e.g., wheelchair, bedside chair)? 3  -WK 3  -WK 3  -MAIK    Climbing 3-5 steps with a railing? 3  -WK 3  -WK 3  -MAIK    To walk in hospital room? 3  -WK 3  -WK 3  -MAIK    AM-PAC 6 Clicks Score (PT) 18  -WK 18  -WK 18  -MAIK    Highest Level of Mobility Goal 6 --> Walk 10 steps or more  -WK 6 --> Walk 10 steps or more  -WK 6 --> Walk 10 steps or more  -MAIK       Functional Assessment    Outcome Measure Options AM-PAC 6 Clicks Basic Mobility (PT)  -WK AM-PAC 6 Clicks Basic Mobility (PT)  -WK --              User Key  (r) = Recorded By, (t) = Taken By, (c) = Cosigned By      Initials Name Provider Type    Alesia Servin PTA Physical Therapist Assistant    Nataliia Multani, TRISTEN Physical Therapist Assistant                     Time Calculation:    PT Charges       Row Name 07/28/24 1448 07/28/24 1142          Time Calculation    Start Time 1435  -WK 1126  -WK     Stop Time 1448  -WK 1136  -WK     Time Calculation (min) 13 min  -WK 10 min  -WK     PT Received On 07/28/24  -WK 07/28/24  -WK        Time Calculation- PT    Total Timed Code Minutes- PT 13 minute(s)  -WK 10 minute(s)  -WK        Timed Charges    95397 - Gait Training Minutes  13  -WK 10  -WK        Total Minutes    Timed Charges Total Minutes 13  -WK 10  -WK      Total Minutes 13  -WK 10  -WK               User Key  (r) = Recorded By, (t) = Taken By, (c) = Cosigned By      Initials Name Provider Type    Nataliia Multani PTA Physical Therapist  Assistant                  Therapy Charges for Today       Code Description Service Date Service Provider Modifiers Qty    62970526019 HC GAIT TRAINING EA 15 MIN 7/27/2024 Nataliia Givens, PTA GP 1    34403815529 HC GAIT TRAINING EA 15 MIN 7/28/2024 Nataliia Givens, PTA GP 1    40287672783 HC GAIT TRAINING EA 15 MIN 7/28/2024 Nataliia Givens, PTA GP 1            PT G-Codes  Outcome Measure Options: AM-PAC 6 Clicks Basic Mobility (PT)  AM-PAC 6 Clicks Score (PT): 18    Nataliia Givens PTA  7/28/2024

## 2024-07-28 NOTE — PROGRESS NOTES
"Patient name: Vj Garcia  Patient : 1950  VISIT # [id number]  MR #[id number]    Procedure:Procedure(s):  CORONARY ARTERY BYPASS GRAFTING X2, LEFT INTERNAL MAMMARY ARTERY GRAFT, RIGHT LEG OPEN VEIN HARVEST, MITRAL VALVE REAPIR, LEFT VENTRICULAR ANEURYSM REPAIR,  ATRIAL APPENDAGE EXCLUSION LEFT 45 ATRICLIP WITH TRANSESOPHAGEAL ECHOCARDIOGRAM  POSSIBLE MITRAL VALVE REPAIR/REPLACEMENT  ATRIAL APPENDAGE EXCLUSION LEFT WITH TRANSESOPHAGEAL ECHOCARDIOGRAM  Procedure Date:2024  POD:5 Days Post-Op    Subjective     Open resting in recliner, spouse at bedside.  Continues to go in and out of atrial flutter, last episode 3 this morning, currently in sinus rhythm.  Denies any chest pain or shortness of air.  Weight is down 3 pounds, remains 4 pounds above baseline.  Creatinine is 1.34 (at baseline) with a potassium of 4.5.  Ambulating well with physical therapy.  No further episodes of nausea.    Telemetry: Atrial flutter  bpm  IV drips: None       Objective     Visit Vitals  /61 (BP Location: Right arm, Patient Position: Sitting)   Pulse 62   Temp 97.8 °F (36.6 °C) (Oral)   Resp 16   Ht 178.5 cm (70.28\")   Wt 90.2 kg (198 lb 12.8 oz)   SpO2 98%   BMI 28.30 kg/m²   Baseline weight 194 pounds    Intake/Output Summary (Last 24 hours) at 2024 0925  Last data filed at 2024 0900  Gross per 24 hour   Intake 240 ml   Output 775 ml   Net -535 ml       Lab:     CBC:  Results from last 7 days   Lab Units 240   WBC 10*3/mm3 9.04 10.95* 13.60*   HEMATOCRIT % 28.8* 27.9* 28.7*   PLATELETS 10*3/mm3 179 136* 122*          BMP:  Results from last 7 days   Lab Units 24   SODIUM mmol/L 131* 131* 133*   POTASSIUM mmol/L 4.5 4.5 4.7   CHLORIDE mmol/L 93* 96* 97*   CO2 mmol/L 26.0 24.0 26.0   GLUCOSE mg/dL 129* 120* 147*   BUN mg/dL 34* 29* 24*   CREATININE mg/dL 1.34* 1.13 1.22          COAG:  Results from last 7 days   Lab " Units 07/24/24  0228 07/23/24  1519 07/23/24  1403   INR  1.20*   < > 1.49*   APTT seconds  --   --  31.9    < > = values in this interval not displayed.       IMAGES:       Imaging Results (Last 24 Hours)       Procedure Component Value Units Date/Time    XR Chest 1 View [[id number]] Resulted: 07/28/24 0403     Updated: 07/28/24 0404          My personal interpretation of chest x-ray: No pneumothorax.  Trace left pleural effusion, stable.    Physical Exam:  General: Alert, oriented. No apparent distress.   Cardiovascular: Sinus rhythm without murmur, rubs, or gallops.    Pulmonary: Clear to auscultation bilaterally without wheezing, rubs, or rales.  Chest: Sternotomy incision clean, dry, and intact. Sternum stable. No clicks.   Abdomen: Soft, nondistended, and nontender.  Extremities: Warm, moves all extremities.  Mild peripheral edema.  Saphenectomy site is clean dry and intact  Neurologic:  Grossly intact with no focal deficits.            Impression:  Posterior basal LV aneurysm  Severe mitral valve regurgitation, functional  Coronary artery disease  Hypertension, well-controlled  Hyperlipidemia, on statin  Tobacco use  CKD, stage IIIa        Plan:  Decrease Lasix 20 mg IV to daily  Routine postcardiac surgery care  Encourage pulmonary toilet and ambulation  Continue amiodarone drip at 0.5 mg/min until completed 1 additional bag plus bag hanging  Continue oral amiodarone         Discussed with patient, family and nursing    Possible discharge home tomorrow 7/29/2024    Sherry Song, RAMIRO  07/28/24  09:25 CDT

## 2024-07-28 NOTE — PLAN OF CARE
Goal Outcome Evaluation:              Outcome Evaluation: Pt appeared to rest well today, very eager to go home. S/SB 56-61. Safety maintained.

## 2024-07-28 NOTE — PLAN OF CARE
Goal Outcome Evaluation:              Outcome Evaluation: No c/o pain. Amio gtt stopped at 2200 due to bradycardia, but was resumed again at 0333 after pt went into aflutter. He converted back to sinus at 0419. Vitals otherwise stable.

## 2024-07-28 NOTE — THERAPY TREATMENT NOTE
Acute Care - Physical Therapy Treatment Note  [facility]     Patient Name: Vj Garcia  : 1950  MRN: [id number]  Today's Date: 2024      Visit Dx:     ICD-10-CM ICD-9-CM   1. Impaired mobility [Z74.09]  Z74.09 799.89   2. Unstable angina  I20.0 411.1     Patient Active Problem List   Diagnosis    Unstable angina    Ischemic cardiomyopathy    CAD (coronary artery disease)    Left ventricular aneurysm    Coronary artery disease involving native coronary artery of native heart with angina pectoris    Tobacco use    Overweight with body mass index (BMI) of 28 to 28.9 in adult    Stage 3a chronic kidney disease     Past Medical History:   Diagnosis Date    COPD (chronic obstructive pulmonary disease)     Elevated cholesterol     Enlarged prostate     History of heart attack     Hypertension     Urination frequency     Weak urine stream      Past Surgical History:   Procedure Laterality Date    ATRIAL APPENDAGE EXCLUSION LEFT WITH TRANSESOPHAGEAL ECHOCARDIOGRAM N/A 2024    Procedure: ATRIAL APPENDAGE EXCLUSION LEFT WITH TRANSESOPHAGEAL ECHOCARDIOGRAM;  Surgeon: Kobe Maciel MD;  Location:  [facility] OR;  Service: Cardiothoracic;  Laterality: N/A;    BACK SURGERY      x3    CARDIAC CATHETERIZATION N/A 2024    Procedure: Left Heart Cath;  Surgeon: Brennan Khoury DO;  Location:  [facility] CATH INVASIVE LOCATION;  Service: Cardiovascular;  Laterality: N/A;    CORONARY ARTERY BYPASS GRAFT N/A 2024    Procedure: CORONARY ARTERY BYPASS GRAFTING X2, LEFT INTERNAL MAMMARY ARTERY GRAFT, RIGHT LEG OPEN VEIN HARVEST, MITRAL VALVE REAPIR, LEFT VENTRICULAR ANEURYSM REPAIR,  ATRIAL APPENDAGE EXCLUSION LEFT 45 ATRICLIP WITH TRANSESOPHAGEAL ECHOCARDIOGRAM;  Surgeon: Kobe Maciel MD;  Location:  [facility] OR;  Service: Cardiothoracic;  Laterality: N/A;    EXCISION      fatty patch on back    HERNIA REPAIR      MITRAL VALVE REPAIR/REPLACEMENT N/A 2024    Procedure: POSSIBLE MITRAL VALVE  REPAIR/REPLACEMENT;  Surgeon: Kobe Maciel MD;  Location:  [facility] OR;  Service: Cardiothoracic;  Laterality: N/A;     PT Assessment (Last 12 Hours)       PT Evaluation and Treatment       Row Name 07/28/24 1126 07/28/24 0810       Physical Therapy Time and Intention    Subjective Information no complaints  -WK --    Document Type therapy note (daily note)  -WK --    Mode of Treatment physical therapy  -WK --    Session Not Performed -- other (see comments)  -WK    Comment -- pt getting breakfast  -WK      Row Name 07/28/24 1126          General Information    Existing Precautions/Restrictions fall;sternal  -WK       Row Name 07/28/24 1126          Pain    Pretreatment Pain Rating 0/10 - no pain  -WK     Posttreatment Pain Rating 0/10 - no pain  -WK       Row Name 07/28/24 1126          Bed Mobility    Comment, (Bed Mobility) in chair  -WK       [facility] Name 07/28/24 1126          Sit-Stand Transfer    Sit-Stand [place] (Transfers) supervision  -WK       Row Name 07/28/24 1126          Stand-Sit Transfer    Stand-Sit [place] (Transfers) supervision  -WK       Row Name 07/28/24 1126          Gait/Stairs (Locomotion)    [place] Level (Gait) verbal cues;contact guard  -WK     Distance in Feet (Gait) 260  -WK     Deviations/Abnormal Patterns (Gait) festinating/shuffling  cues for foot clearance.  -WK     Comment, (Gait/Stairs) 1 LOB min A to correct. some other mild unsteadiness that pt is able to correct.  -WK       Row Name 07/28/24 1126          Motor Skills    Comments, Therapeutic Exercise warm ups 20 reps  -WK       Row Name             Wound 07/23/24 1103 midline sternal Incision    Wound - Properties Group Placement Date: 07/23/24  - Placement Time: 1103 -LH Orientation: midline  -LH Location: sternal  -LH Primary Wound Type: Incision  -LH    Retired Wound - Properties Group Placement Date: 07/23/24  - Placement Time: 1103  -LH Orientation: midline  -LH Location: sternal  -LH Primary Wound Type:  Incision  -LH    Retired Wound - Properties Group Date first assessed: 07/23/24  - Time first assessed: 1103  -LH Location: sternal  -LH Primary Wound Type: Incision  -LH      Row Name             Wound 07/23/24 1055 Right distal leg Incision    Wound - Properties Group Placement Date: 07/23/24  - Placement Time: 1055  -LH Side: Right  -LH Orientation: distal  -LH Location: leg  -LH Primary Wound Type: Incision  -LH Additional Comments: mastisol, steri strips, mepilex, ace  -LH    Retired Wound - Properties Group Placement Date: 07/23/24  - Placement Time: 1055  -LH Side: Right  -LH Orientation: distal  -LH Location: leg  -LH Primary Wound Type: Incision  -LH Additional Comments: mastisol, steri strips, mepilex, ace  -LH    Retired Wound - Properties Group Date first assessed: 07/23/24  - Time first assessed: 1055  -LH Side: Right  -LH Location: leg  -LH Primary Wound Type: Incision  -LH Additional Comments: mastisol, steri strips, mepilex, ace  -LH      Row Name 07/28/24 1126          Plan of Care Review    Plan of Care Reviewed With patient  -WK     Outcome Evaluation Pt amb 260 CGA with 1 LOB requiring min A to correct. Pt had decrease safety awareness and some other mild unsteadiness that he is able to correct. Pt required cues to increase foot clearance during gait.  -WK       Row Name 07/28/24 1126          Vital Signs    Pretreatment Heart Rate (beats/min) 58  -WK     Intratreatment Heart Rate (beats/min) 65  -WK       Row Name 07/28/24 1126          Positioning and Restraints    Pre-Treatment Position sitting in chair/recliner  -WK     Post Treatment Position chair  -WK     In Chair reclined;call light within reach;encouraged to call for assist;with family/caregiver  -WK               User Key  (r) = Recorded By, (t) = Taken By, (c) = Cosigned By      Initials Name Provider Type     Phyllis Chappell RN Registered Nurse    Nataliia Multani PTA Physical Therapist Assistant                     Physical Therapy Education       Title: PT OT SLP Therapies (In Progress)       Topic: Physical Therapy (In Progress)       Point: Mobility training (In Progress)       Learning Progress Summary             Patient Acceptance, E,D, NR by MAIK at 7/26/2024 1156    Comment: stairs    Acceptance, E,TB, VU,DU by CHINO at 7/24/2024 0807    Comment: Educated on role of PT in pt care.                         Point: Home exercise program (Not Started)       Learner Progress:  Not documented in this visit.              Point: Body mechanics (Not Started)       Learner Progress:  Not documented in this visit.              Point: Precautions (In Progress)       Learning Progress Summary             Patient Acceptance, E,D, NR by MAIK at 7/25/2024 1243    Comment: sternal restrictions                                         User Key       Initials Effective Dates Name Provider Type Discipline    MAIK 02/03/23 -  Alesia Metzger, PTA Physical Therapist Assistant PT    CHINO 06/24/24 -  Sarah Mitchell PT Student PT Student PT                  PT Recommendation and Plan     Plan of Care Reviewed With: patient  Outcome Evaluation: Pt amb 260 CGA with 1 LOB requiring min A to correct. Pt had decrease safety awareness and some other mild unsteadiness that he is able to correct. Pt required cues to increase foot clearance during gait.   Outcome Measures       Row Name 07/28/24 1126 07/27/24 1331 07/26/24 1100       How much help from another person do you currently need...    Turning from your back to your side while in flat bed without using bedrails? 3  -WK 3  -WK 3  -MAIK    Moving from lying on back to sitting on the side of a flat bed without bedrails? 3  -WK 3  -WK 3  -MAIK    Moving to and from a bed to a chair (including a wheelchair)? 3  -WK 3  -WK 3  -MAIK    Standing up from a chair using your arms (e.g., wheelchair, bedside chair)? 3  -WK 3  -WK 3  -MAIK    Climbing 3-5 steps with a railing? 3  -WK 3  -WK 3  -MAIK    To walk in  hospital room? 3  -WK 3  -WK 3  -MAIK    AM-PAC 6 Clicks Score (PT) 18  -WK 18  -WK 18  -MAIK    Highest Level of Mobility Goal 6 --> Walk 10 steps or more  -WK 6 --> Walk 10 steps or more  -WK 6 --> Walk 10 steps or more  -MAIK       Functional Assessment    Outcome Measure Options AM-PAC 6 Clicks Basic Mobility (PT)  -WK AM-PAC 6 Clicks Basic Mobility (PT)  -WK --      Row Name 07/25/24 1200             How much help from another person do you currently need...    Turning from your back to your side while in flat bed without using bedrails? 3  -MAIK      Moving from lying on back to sitting on the side of a flat bed without bedrails? 3  -MAIK      Moving to and from a bed to a chair (including a wheelchair)? 3  -MAIK      Standing up from a chair using your arms (e.g., wheelchair, bedside chair)? 3  -MAIK      Climbing 3-5 steps with a railing? 3  -MAIK      To walk in hospital room? 3  -MAIK      AM-PAC 6 Clicks Score (PT) 18  -MAIK      Highest Level of Mobility Goal 6 --> Walk 10 steps or more  -MAIK                User Key  (r) = Recorded By, (t) = Taken By, (c) = Cosigned By      Initials Name Provider Type    MAIK Alesia Metzger PTA Physical Therapist Assistant    Nataliia Multani PTA Physical Therapist Assistant                     Time Calculation:    PT Charges       Row Name 07/28/24 1142             Time Calculation    Start Time 1126  -WK      Stop Time 1136  -WK      Time Calculation (min) 10 min  -WK      PT Received On 07/28/24  -WK         Time Calculation- PT    Total Timed Code Minutes- PT 10 minute(s)  -WK         Timed Charges    85982 - Gait Training Minutes  10  -WK         Total Minutes    Timed Charges Total Minutes 10  -WK       Total Minutes 10  -WK                User Key  (r) = Recorded By, (t) = Taken By, (c) = Cosigned By      Initials Name Provider Type    Nataliia Multani PTA Physical Therapist Assistant                  Therapy Charges for Today       Code Description Service Date Service  Provider Modifiers Qty    17773231932 HC GAIT TRAINING EA 15 MIN 7/27/2024 Nataliia Givens, PTA GP 1    32403156008 HC GAIT TRAINING EA 15 MIN 7/28/2024 Nataliia Givens, TRISTEN GP 1            PT G-Codes  Outcome Measure Options: AM-PAC 6 Clicks Basic Mobility (PT)  AM-PAC 6 Clicks Score (PT): 18    Nataliia Givens, TRISTEN  7/28/2024

## 2024-07-28 NOTE — PLAN OF CARE
Goal Outcome Evaluation:  Plan of Care Reviewed With: patient           Outcome Evaluation: Pt amb 260 CGA with 1 LOB requiring min A to correct. Pt had decrease safety awareness and some other mild unsteadiness that he is able to correct. Pt required cues to increase foot clearance during gait.

## 2024-07-29 ENCOUNTER — APPOINTMENT (OUTPATIENT)
Dept: GENERAL RADIOLOGY | Facility: HOSPITAL | Age: 74
DRG: 220 | End: 2024-07-29
Payer: MEDICARE

## 2024-07-29 ENCOUNTER — READMISSION MANAGEMENT (OUTPATIENT)
Dept: CALL CENTER | Facility: HOSPITAL | Age: 74
End: 2024-07-29
Payer: MEDICARE

## 2024-07-29 VITALS
WEIGHT: 197.6 LBS | HEIGHT: 70 IN | TEMPERATURE: 97.8 F | OXYGEN SATURATION: 97 % | HEART RATE: 68 BPM | DIASTOLIC BLOOD PRESSURE: 54 MMHG | SYSTOLIC BLOOD PRESSURE: 108 MMHG | RESPIRATION RATE: 16 BRPM | BODY MASS INDEX: 28.29 KG/M2

## 2024-07-29 LAB
ANION GAP SERPL CALCULATED.3IONS-SCNC: 10 MMOL/L (ref 5–15)
BUN SERPL-MCNC: 27 MG/DL (ref 8–23)
BUN/CREAT SERPL: 24.1 (ref 7–25)
CALCIUM SPEC-SCNC: 8.3 MG/DL (ref 8.6–10.5)
CHLORIDE SERPL-SCNC: 97 MMOL/L (ref 98–107)
CO2 SERPL-SCNC: 26 MMOL/L (ref 22–29)
CREAT SERPL-MCNC: 1.12 MG/DL (ref 0.76–1.27)
DEPRECATED RDW RBC AUTO: 46.9 FL (ref 37–54)
EGFRCR SERPLBLD CKD-EPI 2021: 69.8 ML/MIN/1.73
ERYTHROCYTE [DISTWIDTH] IN BLOOD BY AUTOMATED COUNT: 13.9 % (ref 12.3–15.4)
GLUCOSE BLDC GLUCOMTR-MCNC: 119 MG/DL (ref 70–130)
GLUCOSE BLDC GLUCOMTR-MCNC: 141 MG/DL (ref 70–130)
GLUCOSE SERPL-MCNC: 107 MG/DL (ref 65–99)
HCT VFR BLD AUTO: 27.4 % (ref 37.5–51)
HGB BLD-MCNC: 8.8 G/DL (ref 13–17.7)
MCH RBC QN AUTO: 29.4 PG (ref 26.6–33)
MCHC RBC AUTO-ENTMCNC: 32.1 G/DL (ref 31.5–35.7)
MCV RBC AUTO: 91.6 FL (ref 79–97)
PLATELET # BLD AUTO: 183 10*3/MM3 (ref 140–450)
PMV BLD AUTO: 9.9 FL (ref 6–12)
POTASSIUM SERPL-SCNC: 4.2 MMOL/L (ref 3.5–5.2)
QT INTERVAL: 408 MS
QT INTERVAL: 412 MS
QT INTERVAL: 454 MS
QTC INTERVAL: 475 MS
QTC INTERVAL: 485 MS
QTC INTERVAL: 500 MS
RBC # BLD AUTO: 2.99 10*6/MM3 (ref 4.14–5.8)
SODIUM SERPL-SCNC: 133 MMOL/L (ref 136–145)
WBC NRBC COR # BLD AUTO: 8.52 10*3/MM3 (ref 3.4–10.8)

## 2024-07-29 PROCEDURE — 97116 GAIT TRAINING THERAPY: CPT

## 2024-07-29 PROCEDURE — 80048 BASIC METABOLIC PNL TOTAL CA: CPT | Performed by: NURSE PRACTITIONER

## 2024-07-29 PROCEDURE — 85027 COMPLETE CBC AUTOMATED: CPT | Performed by: NURSE PRACTITIONER

## 2024-07-29 PROCEDURE — 71045 X-RAY EXAM CHEST 1 VIEW: CPT

## 2024-07-29 PROCEDURE — 25010000002 FUROSEMIDE PER 20 MG: Performed by: NURSE PRACTITIONER

## 2024-07-29 PROCEDURE — 82948 REAGENT STRIP/BLOOD GLUCOSE: CPT

## 2024-07-29 PROCEDURE — 25010000002 ENOXAPARIN PER 10 MG: Performed by: NURSE PRACTITIONER

## 2024-07-29 RX ORDER — POTASSIUM CHLORIDE 750 MG/1
10 CAPSULE, EXTENDED RELEASE ORAL DAILY
Qty: 5 CAPSULE | Refills: 0 | Status: SHIPPED | OUTPATIENT
Start: 2024-07-29 | End: 2024-08-04 | Stop reason: HOSPADM

## 2024-07-29 RX ORDER — AMIODARONE HYDROCHLORIDE 400 MG/1
400 TABLET ORAL DAILY
Qty: 30 TABLET | Refills: 0 | Status: SHIPPED | OUTPATIENT
Start: 2024-07-29 | End: 2024-08-28

## 2024-07-29 RX ORDER — HYDROCODONE BITARTRATE AND ACETAMINOPHEN 5; 325 MG/1; MG/1
1 TABLET ORAL EVERY 6 HOURS PRN
Qty: 25 TABLET | Refills: 0 | Status: SHIPPED | OUTPATIENT
Start: 2024-07-29

## 2024-07-29 RX ORDER — PANTOPRAZOLE SODIUM 40 MG/1
40 TABLET, DELAYED RELEASE ORAL
Qty: 30 TABLET | Refills: 0 | Status: SHIPPED | OUTPATIENT
Start: 2024-07-30

## 2024-07-29 RX ORDER — CARVEDILOL 6.25 MG/1
6.25 TABLET ORAL EVERY 12 HOURS SCHEDULED
Qty: 60 TABLET | Refills: 2 | Status: SHIPPED | OUTPATIENT
Start: 2024-07-29

## 2024-07-29 RX ORDER — FUROSEMIDE 20 MG/1
20 TABLET ORAL DAILY
Qty: 5 TABLET | Refills: 0 | Status: SHIPPED | OUTPATIENT
Start: 2024-07-29 | End: 2024-08-04 | Stop reason: HOSPADM

## 2024-07-29 RX ADMIN — CARVEDILOL 6.25 MG: 6.25 TABLET, FILM COATED ORAL at 09:10

## 2024-07-29 RX ADMIN — PANTOPRAZOLE SODIUM 40 MG: 40 TABLET, DELAYED RELEASE ORAL at 06:14

## 2024-07-29 RX ADMIN — POTASSIUM CHLORIDE 20 MEQ: 750 CAPSULE, EXTENDED RELEASE ORAL at 09:10

## 2024-07-29 RX ADMIN — METHOCARBAMOL 500 MG: 500 TABLET, FILM COATED ORAL at 06:14

## 2024-07-29 RX ADMIN — LISINOPRIL 5 MG: 5 TABLET ORAL at 09:10

## 2024-07-29 RX ADMIN — ENOXAPARIN SODIUM 40 MG: 100 INJECTION SUBCUTANEOUS at 09:11

## 2024-07-29 RX ADMIN — ACETAMINOPHEN 650 MG: 325 TABLET, FILM COATED ORAL at 06:14

## 2024-07-29 RX ADMIN — FUROSEMIDE 20 MG: 10 INJECTION, SOLUTION INTRAMUSCULAR; INTRAVENOUS at 09:11

## 2024-07-29 RX ADMIN — ASPIRIN 81 MG: 81 TABLET, COATED ORAL at 09:10

## 2024-07-29 RX ADMIN — GUAIFENESIN 600 MG: 600 TABLET, EXTENDED RELEASE ORAL at 09:10

## 2024-07-29 RX ADMIN — ACETAMINOPHEN 650 MG: 325 TABLET, FILM COATED ORAL at 12:09

## 2024-07-29 RX ADMIN — AMIODARONE HYDROCHLORIDE 400 MG: 200 TABLET ORAL at 09:10

## 2024-07-29 NOTE — PLAN OF CARE
Goal Outcome Evaluation:           Progress: improving  Outcome Evaluation: VSS.  SB/S 54-59, per tele.  Amio drip stopped at 0220 and pt has maintained S/SB rhythm.  No complaints of pain.

## 2024-07-29 NOTE — PROGRESS NOTES
"Patient name: Vj Garcia  Patient : 1950  VISIT # [id number]  MR #[id number]    Procedure:Procedure(s):  CORONARY ARTERY BYPASS GRAFTING X2, LEFT INTERNAL MAMMARY ARTERY GRAFT, RIGHT LEG OPEN VEIN HARVEST, MITRAL VALVE REAPIR, LEFT VENTRICULAR ANEURYSM REPAIR,  ATRIAL APPENDAGE EXCLUSION LEFT 45 ATRICLIP WITH TRANSESOPHAGEAL ECHOCARDIOGRAM  POSSIBLE MITRAL VALVE REPAIR/REPLACEMENT  ATRIAL APPENDAGE EXCLUSION LEFT WITH TRANSESOPHAGEAL ECHOCARDIOGRAM  Procedure Date:2024  POD:6 Days Post-Op    Subjective     Resting in chair tolerating room air.  Weight is down 1 pound and he is 3 pounds above his baseline.  Creatinine improved today at 1.12.  Walking 260 feet with physical therapy.  He has remained in sinus rhythm.    Telemetry: Sinus rhythm 60s  IV drips: None       Objective     Visit Vitals  /66 (BP Location: Left arm, Patient Position: Sitting)   Pulse 61   Temp 98.4 °F (36.9 °C) (Oral)   Resp 16   Ht 178.5 cm (70.28\")   Wt 89.6 kg (197 lb 9.6 oz)   SpO2 97%   BMI 28.13 kg/m²   Baseline weight 194 pounds    Intake/Output Summary (Last 24 hours) at 2024 0749  Last data filed at 2024 0731  Gross per 24 hour   Intake 360 ml   Output 945 ml   Net -585 ml       Lab:     CBC:  Results from last 7 days   Lab Units 24  0336 24  0429 24  0217   WBC 10*3/mm3 8.52 9.04 10.95*   HEMATOCRIT % 27.4* 28.8* 27.9*   PLATELETS 10*3/mm3 183 179 136*          BMP:  Results from last 7 days   Lab Units 24  0336 24  0429 24  0217   SODIUM mmol/L 133* 131* 131*   POTASSIUM mmol/L 4.2 4.5 4.5   CHLORIDE mmol/L 97* 93* 96*   CO2 mmol/L 26.0 26.0 24.0   GLUCOSE mg/dL 107* 129* 120*   BUN mg/dL 27* 34* 29*   CREATININE mg/dL 1.12 1.34* 1.13          COAG:  Results from last 7 days   Lab Units 24  0228 24  1519 24  1403   INR  1.20*   < > 1.49*   APTT seconds  --   --  31.9    < > = values in this interval not displayed.       IMAGES:       Imaging " Results (Last 24 Hours)       Procedure Component Value Units Date/Time    XR Chest 1 View [[id number]] Collected: 07/29/24 0647     Updated: 07/29/24 0656    Narrative:      EXAMINATION: XR CHEST 1 VW-     7/29/2024 2:05 AM     HISTORY: Post-Op Heart Surgery     A frontal projection of the chest is compared with the previous study  dated 7/28/2024.     The lungs are moderately well-expanded.     Small focal consolidation at the left base and a small bibasilar pleural  effusion persist.     There is no pulmonary congestion or pneumothorax.     There is persistent moderate cardiomegaly. There is evidence of prior  cardiac surgery. Left atrial appendage clamp is in place.     No acute bony abnormality. Hardware fusion of the cervical spine is  partially visualized and is similar to the previous study.       Impression:      1. No change since the previous study 1 day ago.              This report was signed and finalized on 7/29/2024 6:53 AM by Dr. Everton Ziegler MD.       XR Chest 1 View [[id number]] Collected: 07/28/24 0928     Updated: 07/28/24 0933    Narrative:      EXAMINATION: XR CHEST 1 VW- 7/28/2024 9:28 AM     HISTORY: Post-Op Heart Surgery.     COMPARISON: Chest x-ray 7/27/2024 0327 hours.     REPORT:  The lungs are hypoaerated, with basilar atelectasis greater on the left  as before. No focal infiltrate or pulmonary consolidation is identified.  No pneumothorax is identified. Mild blunting of the left costophrenic  angle suggestive of a small pleural effusion. Heart size is normal.  There is previous CABG and left atrial appendage occlusion device is  present. The osseous structures show no acute findings. Previous ACDF  and cervical spine corpectomy.       Impression:      Shallow inspiration, with bibasilar atelectasis greater on  the left, probable small pleural effusion. No pneumothorax is  identified. Previous CABG.     This report was signed and finalized on 7/28/2024 9:30 AM by   Keaton Gifford MD.             CXR: No pneumothorax.  Trace left basilar pleural effusion.    Physical Exam:  General: Alert, oriented. No apparent distress.   Cardiovascular: Sinus rhythm without murmur, rubs, or gallops.    Pulmonary: Clear to auscultation bilaterally without wheezing, rubs, or rales.  Chest: Sternotomy incision clean, dry, and intact. Sternum stable. No clicks.   Abdomen: Soft, nondistended, and nontender.  Extremities: Warm, moves all extremities.  Mild peripheral edema.  Saphenectomy site is clean dry and intact  Neurologic:  Grossly intact with no focal deficits.            Impression:  Posterior basal LV aneurysm  Severe mitral valve regurgitation, functional  Coronary artery disease  Hypertension, well-controlled  Hyperlipidemia, on statin  Tobacco use  CKD, stage IIIa        Plan:  He is ready for discharge home today.  He will be discharged with Eliquis due to intermittent atrial fibrillation.  He will take Lasix 20 mg daily for 5 days.  Plan to discontinue pacing wires later today  Routine postcardiac surgery care  Encourage pulmonary toilet and ambulation  Routine follow-up with me in 1 week with labs before appointment  Discussed with patient and nursing          RAMIRO Holbrook  07/29/24  07:49 CDT

## 2024-07-29 NOTE — THERAPY DISCHARGE NOTE
Acute Care - Physical Therapy Discharge Summary  [facility]       Patient Name: Vj Garcia  : 1950  MRN: [id number]    Today's Date: 2024                 Admit Date: 2024      PT Recommendation and Plan    Visit Dx:    ICD-10-CM ICD-9-CM   1. Impaired mobility [Z74.09]  Z74.09 799.89   2. Unstable angina  I20.0 411.1   3. Coronary artery disease involving native coronary artery of native heart with angina pectoris  I25.119 414.01     413.9   4. Left ventricular aneurysm  I25.3 414.10        Outcome Measures       Row Name 24 1126 24 1331          How much help from another person do you currently need...    Turning from your back to your side while in flat bed without using bedrails? 3  -WK 3  -WK     Moving from lying on back to sitting on the side of a flat bed without bedrails? 3  -WK 3  -WK     Moving to and from a bed to a chair (including a wheelchair)? 3  -WK 3  -WK     Standing up from a chair using your arms (e.g., wheelchair, bedside chair)? 3  -WK 3  -WK     Climbing 3-5 steps with a railing? 3  -WK 3  -WK     To walk in hospital room? 3  -WK 3  -WK     AM-PAC 6 Clicks Score (PT) 18  -WK 18  -WK     Highest Level of Mobility Goal 6 --> Walk 10 steps or more  -WK 6 --> Walk 10 steps or more  -WK        Functional Assessment    Outcome Measure Options AM-PAC 6 Clicks Basic Mobility (PT)  -WK AM-PAC 6 Clicks Basic Mobility (PT)  -WK               User Key  (r) = Recorded By, (t) = Taken By, (c) = Cosigned By      Initials Name Provider Type    WK Nataliia Givens PTA Physical Therapist Assistant                     PT Charges       Row Name 24 1205             Time Calculation    Start Time 1054  -MAIK      Stop Time 1119  -MAIK      Time Calculation (min) 25 min  -MAIK                User Key  (r) = Recorded By, (t) = Taken By, (c) = Cosigned By      Initials Name Provider Type    MAIK Alesia Metzger PTA Physical Therapist Assistant                     PT Rehab Goals        Row Name 07/29/24 1602 07/29/24 1500          Bed Mobility Goal 1 (PT)    Activity/Assistive Device (Bed Mobility Goal 1, PT) sit to supine/supine to sit  -NW --     [place] Level/Cues Needed (Bed Mobility Goal 1, PT) supervision required  -NW standby assist  Goal: supervision  -MAIK     Time Frame (Bed Mobility Goal 1, PT) long term goal (LTG);10 days  -NW --     Progress/Outcomes (Bed Mobility Goal 1, PT) goal not met  -NW goal not met  -MAIK        Transfer Goal 1 (PT)    Activity/Assistive Device (Transfer Goal 1, PT) sit-to-stand/stand-to-sit;bed-to-chair/chair-to-bed  -NW --     [place] Level/Cues Needed (Transfer Goal 1, PT) supervision required  -NW supervision required  -MAIK     Time Frame (Transfer Goal 1, PT) long term goal (LTG);10 days  -NW --     Progress/Outcome (Transfer Goal 1, PT) goal met  -NW goal met  -MAIK        Gait Training Goal 1 (PT)    Activity/Assistive Device (Gait Training Goal 1, PT) gait (walking locomotion);decrease fall risk;forward stepping;improve balance and speed;increase endurance/gait distance  -NW --     [place] Level (Gait Training Goal 1, PT) supervision required  -NW standby assist  -MAIK     Distance (Gait Training Goal 1, PT) 300  -  Goal: 300  -MAIK     Time Frame (Gait Training Goal 1, PT) long term goal (LTG);10 days  -NW --     Progress/Outcome (Gait Training Goal 1, PT) goal met  -NW goal met  -MAIK        Stairs Goal 1 (PT)    Activity/Assistive Device (Stairs Goal 1, PT) stairs, all skills;ascending stairs;descending stairs;decrease fall risk;improve balance and speed  -NW --     [place] Level/Cues Needed (Stairs Goal 1, PT) supervision required  -NW contact guard required  Goal: SBA  -MAIK     Number of Stairs (Stairs Goal 1, PT) 4  -NW 4 x2  Goal: 4  -MAIK     Time Frame (Stairs Goal 1, PT) long term goal (LTG);10 days  -NW --     Progress/Outcome (Stairs Goal 1, PT) goal met  -NW goal met  -MAIK               User Key  (r) = Recorded By, (t) =  Taken By, (c) = Cosigned By      Initials Name Provider Type Discipline    Alesia Servin, PTA Physical Therapist Assistant PT    Ivory Tamayo PTA Physical Therapist Assistant PT                        PT Discharge Summary  Anticipated Discharge Disposition (PT): home  Reason for Discharge: Discharge from facility  Outcomes Achieved: Refer to plan of care for updates on goals achieved  Discharge Destination: Home      Ivory Wu PTA   7/29/2024

## 2024-07-29 NOTE — PLAN OF CARE
Goal Outcome Evaluation:         Patient stands and sits with supervision. Ambulated 300+ with SBA. Shuffles feet at times. Patient continues to use Ue's and becomes disgruntled when cued not to. Reviewed sternal restrictions again with no response from patient. Also discussed the importance of continued walking post D/C.

## 2024-07-29 NOTE — DISCHARGE INSTRUCTIONS
Someone from the [facility] Call Center will be contacting you after discharge to check on your recovery.

## 2024-07-29 NOTE — THERAPY TREATMENT NOTE
Acute Care - Physical Therapy Treatment Note  [facility]     Patient Name: Vj Garcia  : 1950  MRN: [id number]  Today's Date: 2024      Visit Dx:     ICD-10-CM ICD-9-CM   1. Impaired mobility [Z74.09]  Z74.09 799.89   2. Unstable angina  I20.0 411.1   3. Coronary artery disease involving native coronary artery of native heart with angina pectoris  I25.119 414.01     413.9   4. Left ventricular aneurysm  I25.3 414.10     Patient Active Problem List   Diagnosis    Unstable angina    Ischemic cardiomyopathy    CAD (coronary artery disease)    Left ventricular aneurysm    Coronary artery disease involving native coronary artery of native heart with angina pectoris    Tobacco use    Overweight with body mass index (BMI) of 28 to 28.9 in adult    Stage 3a chronic kidney disease     Past Medical History:   Diagnosis Date    COPD (chronic obstructive pulmonary disease)     Elevated cholesterol     Enlarged prostate     History of heart attack     Hypertension     Urination frequency     Weak urine stream      Past Surgical History:   Procedure Laterality Date    ATRIAL APPENDAGE EXCLUSION LEFT WITH TRANSESOPHAGEAL ECHOCARDIOGRAM N/A 2024    Procedure: ATRIAL APPENDAGE EXCLUSION LEFT WITH TRANSESOPHAGEAL ECHOCARDIOGRAM;  Surgeon: Kobe Maciel MD;  Location:  [facility] OR;  Service: Cardiothoracic;  Laterality: N/A;    BACK SURGERY      x3    CARDIAC CATHETERIZATION N/A 2024    Procedure: Left Heart Cath;  Surgeon: Brennan Khoury DO;  Location:  [facility] CATH INVASIVE LOCATION;  Service: Cardiovascular;  Laterality: N/A;    CORONARY ARTERY BYPASS GRAFT N/A 2024    Procedure: CORONARY ARTERY BYPASS GRAFTING X2, LEFT INTERNAL MAMMARY ARTERY GRAFT, RIGHT LEG OPEN VEIN HARVEST, MITRAL VALVE REAPIR, LEFT VENTRICULAR ANEURYSM REPAIR,  ATRIAL APPENDAGE EXCLUSION LEFT 45 ATRICLIP WITH TRANSESOPHAGEAL ECHOCARDIOGRAM;  Surgeon: Kobe Maciel MD;  Location:  [facility] OR;  Service: Cardiothoracic;   Laterality: N/A;    EXCISION      fatty patch on back    HERNIA REPAIR      MITRAL VALVE REPAIR/REPLACEMENT N/A 7/23/2024    Procedure: POSSIBLE MITRAL VALVE REPAIR/REPLACEMENT;  Surgeon: Kobe Maciel MD;  Location: [facility] OR;  Service: Cardiothoracic;  Laterality: N/A;     PT Assessment (Last 12 Hours)       PT Evaluation and Treatment       Row Name 07/29/24 1054          Physical Therapy Time and Intention    Subjective Information no complaints  -MAIK     Document Type therapy note (daily note)  -MAIK     Mode of Treatment physical therapy  -AMIK     Comment Reviewed sternal restrictions, importance of continuing to walk after D/C  -MAIK       Row Name 07/29/24 1054          General Information    Patient/Family/Caregiver Comments/Observations spouse  -     Existing Precautions/Restrictions fall;sternal  -MAIK       Row Name 07/29/24 1054          Pain    Pretreatment Pain Rating 0/10 - no pain  -MAIK     Posttreatment Pain Rating 0/10 - no pain  -MAIK       Row Name 07/29/24 1054          Sit-Stand Transfer    Sit-Stand Sullivan (Transfers) supervision  -       Row Name 07/29/24 1054          Stand-Sit Transfer    Stand-Sit Sullivan (Transfers) supervision  -       Row Name 07/29/24 1054          Gait/Stairs (Locomotion)    Sullivan Level (Gait) standby assist  -     Distance in Feet (Gait) 350  -MAIK     Deviations/Abnormal Patterns (Gait) --  shuffles feet at times  -       Row Name             Wound 07/23/24 1103 midline sternal Incision    Wound - Properties Group Placement Date: 07/23/24  - Placement Time: 1103  -LH Orientation: midline  -LH Location: sternal  -LH Primary Wound Type: Incision  -LH    Retired Wound - Properties Group Placement Date: 07/23/24  - Placement Time: 1103  -LH Orientation: midline  - Location: sternal  -LH Primary Wound Type: Incision  -LH    Retired Wound - Properties Group Date first assessed: 07/23/24  - Time first assessed: 1103  -LH Location: sternal  -LH  Primary Wound Type: Incision  -LH      Row Name             Wound 07/23/24 1055 Right distal leg Incision    Wound - Properties Group Placement Date: 07/23/24  - Placement Time: 1055  -LH Side: Right  -LH Orientation: distal  -LH Location: leg  -LH Primary Wound Type: Incision  -LH Additional Comments: mastisol, steri strips, mepilex, ace  -LH    Retired Wound - Properties Group Placement Date: 07/23/24  - Placement Time: 1055  -LH Side: Right  -LH Orientation: distal  -LH Location: leg  -LH Primary Wound Type: Incision  -LH Additional Comments: mastisol, steri strips, mepilex, ace  -LH    Retired Wound - Properties Group Date first assessed: 07/23/24  - Time first assessed: 1055  -LH Side: Right  -LH Location: leg  -LH Primary Wound Type: Incision  -LH Additional Comments: mastisol, steri strips, mepilex, ace  -LH      Row Name 07/29/24 1054          Positioning and Restraints    Pre-Treatment Position sitting in chair/recliner  -MAIK     In Chair reclined;call light within reach;encouraged to call for assist;with family/caregiver  -MAIK               User Key  (r) = Recorded By, (t) = Taken By, (c) = Cosigned By      Initials Name Provider Type    Alesia Servin PTA Physical Therapist Assistant     Phyllis Chappell, RN Registered Nurse                    Physical Therapy Education       Title: PT OT SLP Therapies (In Progress)       Topic: Physical Therapy (In Progress)       Point: Mobility training (In Progress)       Learning Progress Summary             Patient Acceptance, E,D, NR by MAIK at 7/26/2024 1156    Comment: stairs    Acceptance, E,TB, VU,DU by CHINO at 7/24/2024 0807    Comment: Educated on role of PT in pt care.                         Point: Home exercise program (Not Started)       Learner Progress:  Not documented in this visit.              Point: Body mechanics (Not Started)       Learner Progress:  Not documented in this visit.              Point: Precautions (In Progress)       Learning  Progress Summary             Patient Acceptance, E,D, NR,NL by MAIK at 7/29/2024 1202    Comment: sternal restrictions    Acceptance, E,D, NR by MAIK at 7/25/2024 1243    Comment: sternal restrictions                                         User Key       Initials Effective Dates Name Provider Type Discipline    MAIK 02/03/23 -  Alesia Metzger PTA Physical Therapist Assistant PT    CN 06/24/24 -  Sarah Mitchell, PT Student PT Student PT                  PT Recommendation and Plan         Outcome Measures       Row Name 07/28/24 1126 07/27/24 1331          How much help from another person do you currently need...    Turning from your back to your side while in flat bed without using bedrails? 3  -WK 3  -WK     Moving from lying on back to sitting on the side of a flat bed without bedrails? 3  -WK 3  -WK     Moving to and from a bed to a chair (including a wheelchair)? 3  -WK 3  -WK     Standing up from a chair using your arms (e.g., wheelchair, bedside chair)? 3  -WK 3  -WK     Climbing 3-5 steps with a railing? 3  -WK 3  -WK     To walk in hospital room? 3  -WK 3  -WK     AM-PAC 6 Clicks Score (PT) 18  -WK 18  -WK     Highest Level of Mobility Goal 6 --> Walk 10 steps or more  -WK 6 --> Walk 10 steps or more  -WK        Functional Assessment    Outcome Measure Options AM-PAC 6 Clicks Basic Mobility (PT)  -WK AM-PAC 6 Clicks Basic Mobility (PT)  -WK               User Key  (r) = Recorded By, (t) = Taken By, (c) = Cosigned By      Initials Name Provider Type    Nataliia Multani PTA Physical Therapist Assistant                     Time Calculation:    PT Charges       Row Name 07/29/24 1205             Time Calculation    Start Time 1054  -MAIK      Stop Time 1119  -MAIK      Time Calculation (min) 25 min  -MAIK                User Key  (r) = Recorded By, (t) = Taken By, (c) = Cosigned By      Initials Name Provider Type    Alesia Servin PTA Physical Therapist Assistant                  Therapy Charges for  Today       Code Description Service Date Service Provider Modifiers Qty    [id number] HC GAIT TRAINING EA 15 MIN 7/29/2024 Alesia Metzger, PTA GP 2            PT G-Codes  Outcome Measure Options: AM-PAC 6 Clicks Basic Mobility (PT)  AM-PAC 6 Clicks Score (PT): 18    Alesia Metzger, PTA  7/29/2024

## 2024-07-30 ENCOUNTER — TRANSITIONAL CARE MANAGEMENT TELEPHONE ENCOUNTER (OUTPATIENT)
Dept: CALL CENTER | Facility: HOSPITAL | Age: 74
End: 2024-07-30
Payer: MEDICARE

## 2024-07-30 NOTE — OUTREACH NOTE
Call Center TCM Note      Flowsheet Row Responses   [facility] facility patient discharged from? [place]   Does the patient have one of the following disease processes/diagnoses(primary or secondary)? Cardiothoracic surgery   TCM attempt successful? No  [No Vr from PCP]   Unsuccessful attempts Attempt 1            Essence Winters RN    7/30/2024, 13:40 CDT

## 2024-07-30 NOTE — OUTREACH NOTE
Prep Survey      Flowsheet Row Responses   [facility] facility patient discharged from? [place]   Is LACE score < 7 ? No   Eligibility [facility]   Date of Admission 07/23/24   Date of Discharge 07/29/24   Discharge Disposition Home or Self Care   Discharge diagnosis Posterior Basal Aneurysm Repair, MV repair, CABGX2   Does the patient have one of the following disease processes/diagnoses(primary or secondary)? Cardiothoracic surgery   Does the patient have Home health ordered? No   Is there a DME ordered? No   Comments regarding appointments New PCP appt   Prep survey completed? Yes            DAVE MUNIZ - Registered Nurse

## 2024-07-30 NOTE — OUTREACH NOTE
Call Center TCM Note      Flowsheet Row Responses   [facility] [place] facility patient discharged from? [place]   Does the patient have one of the following disease processes/diagnoses(primary or secondary)? Cardiothoracic surgery   TCM attempt successful? No   Unsuccessful attempts Attempt 2            Essence Winters RN    7/30/2024, 14:07 CDT

## 2024-07-31 ENCOUNTER — TRANSITIONAL CARE MANAGEMENT TELEPHONE ENCOUNTER (OUTPATIENT)
Dept: CALL CENTER | Facility: HOSPITAL | Age: 74
End: 2024-07-31
Payer: MEDICARE

## 2024-07-31 NOTE — OUTREACH NOTE
"Call Center TCM Note      Flowsheet Row Responses   [facility] patient discharged from? [place]   Does the patient have one of the following disease processes/diagnoses(primary or secondary)? Cardiothoracic surgery   TCM attempt successful? Yes   Call start time 1432   Call end time 1342   Discharge diagnosis Posterior Basal Aneurysm Repair, MV repair, CABGX2   Is patient permission given to speak with other caregiver? Yes   Person spoke with today (if not patient) and relationship Patient and daughter, Tia Dixon.   Medication alerts for this patient Amiodarone, Eliquis, Norco, Protonix, Micro K   Meds reviewed with patient/caregiver? Yes   Is the patient having any side effects they believe may be caused by any medication additions or changes? No   Does the patient have all medications related to this admission filled (includes all antibiotics, pain medications, cardiac medications, etc.) Yes   Is the patient taking all medications as directed (includes completed medication regime)? Yes   Comments NEW PATIENT PCP APPT. on 8/9/24 at 1:15 PM with Dr. Glenn Poon.   Does the patient have an appointment with their PCP within 7-14 days of discharge? Yes   Has home health visited the patient within 72 hours of discharge? N/A   Psychosocial issues? No   Comments Patient states he is not having any appetite. Reassured patient this is normal after surgery. Recommended protein drinks to help get nutrition and stay hydrated. Patient states he is tired and questions if this is normal. Reassured patient this is normal and ok to nap during the day when feeling tired. Patient is checking his b/p, pulse, weight and temperature often throughout the day. Spoke with daughter about logging for the doctors to review at appts. Patient reports 2 of his incisions have \"seeping\" small amount. Reviewed s/s of infection with patient.   Did the patient receive a copy of their discharge instructions? Yes   Nursing " interventions Reviewed instructions with patient   What is the patient's perception of their health status since discharge? Improving   Nursing interventions Nurse provided patient education   Is the patient/caregiver able to teach back normal signs of recovery? Nausea and lack of appetite, Pain or discomfort at incisional site   Nursing interventions Reassured on normal signs of recovery   Is the patient /caregiver able to teach back basic post-op care? Practice cough and deep breath every 4 hours while awake, Hold pillow to support chest when coughing, Drive as instructed by MD in discharge instructions, Shower daily, No tub bath, swimming, or hot tub until instructed by MD, Use a clean wash cloth and antibacterial bar or liquid soap to clean incisions, If the steri-strips are falling off, it is okay to remove them. (If applicable), Lifting as instructed by MD in discharge instructions   Is the patient/caregiver able to teach back signs and symptoms of incisional infection? Increased redness, swelling or pain at the incisonal site, Increased drainage or bleeding, Incisional warmth, Pus or odor from incision, Fever   Is the patient/caregiver able to teach back steps to recovery at home? Set small, achievable goals for return to baseline health, Rest and rebuild strength, gradually increase activity, Weigh daily, Eat a well-balance diet   If the patient is a current smoker, are they able to teach back resources for cessation? Not a smoker   Is the patient/caregiver able to teach back the hierarchy of who to call/visit for symptoms/problems? PCP, Specialist, Home health nurse, Urgent Care, ED, 911 Yes   TCM call completed? Yes   Wrap up additional comments CTS post op appt on 8/5/24 at 1:30 PM with RAMIRO Holbrook. Cardiology f/u appt on 8/26/24 for Echo.  NEW PATIENT PCP APPT. on 8/9/24 at 1:15 PM with Dr. Glenn Poon.   Call end time 1342   Would this patient benefit from a Referral to [facility]  Work? No   Is the patient interested in additional calls from an ambulatory ? No            Nena Luu RN    7/31/2024, 13:50 CDT

## 2024-08-01 ENCOUNTER — NURSE TRIAGE (OUTPATIENT)
Dept: CALL CENTER | Facility: HOSPITAL | Age: 74
End: 2024-08-01
Payer: MEDICARE

## 2024-08-01 ENCOUNTER — TELEPHONE (OUTPATIENT)
Dept: CARDIAC SURGERY | Facility: CLINIC | Age: 74
End: 2024-08-01
Payer: MEDICARE

## 2024-08-01 NOTE — TELEPHONE ENCOUNTER
He can use melatonin over-the-counter for sleep.  We do not like for patient is to use strong sleep aids immediately after surgery.

## 2024-08-01 NOTE — TELEPHONE ENCOUNTER
UpDate---Dr Glenn Poon is pts new PCP, but he has not see him yet.  First appt is Aug 9 per chart. Thank you.

## 2024-08-01 NOTE — TELEPHONE ENCOUNTER
Received a call from Kamila with [facility]. She states that pt has found a new PCP and he believes their name is Dr. Bashir. Pt has not seen them yet. Kamila reports pt is having a lot of trouble sleeping and is very restless and is requesting something to help him sleep. Can reach pt at [phone].

## 2024-08-01 NOTE — TELEPHONE ENCOUNTER
Pt called with c/o insomnia and can't sleep more than 2 hours at a time.  He states that is a stretch really, he is exhausted and stressed over the not sleeping issue at hand.  Pt is in the process of switching PCPs at this time, Dr Pedro is no longer his PCP and does not want to call him or see him at this time.  He states the thinks Dr Bashir is his new PCP and sees them Aug 9 but wondered if Dr Maciel could help him with something for sleep until he gets his new PCP.  Pt just had open heart surgery last week.  I called Dr Maciel's office, spoke with Prisca, she is sending a message to Tosha RUBIO and Dr Maciel about this so they can call the patient to see about getting him something for sleep soon.  Card advice given and reviewed with patient over phone and suggested a fan in the room/bedroom to help with distraction of it being so quiet as he c/o on the phone.  He will try this and keeping his room dark and cooler as well.  He is active and gets up every morning at 6am and is moving around quite a bit he states.  I gave his name and number to Prisca for them to call this patient asap.  Pt to call back for any other concerns or issues.

## 2024-08-01 NOTE — TELEPHONE ENCOUNTER
"Reason for Disposition  • Requesting medication for sleep (\"sleeping pill\")    Additional Information  • Negative: Difficulty breathing  • Negative: Depression is suspected  • Negative: Traumatic Brain Injury (TBI) is suspected  • Negative: Suspected alcohol withdrawal or unhealthy use of alcohol (drinking too much)  • Negative: Suspected substance use (drug use), addiction, or withdrawal  • Negative: [1] Pain is causing insomnia AND [2] pain is not a chronic symptom (recurrent or ongoing AND present > 4 weeks)  • Negative: [1] Insomnia persists > 1 week AND [2] no improvement after using Care Advice  • Negative: Insomnia interferes with work or school  • Negative: [1] Pain is causing insomnia AND [2] pain is a chronic symptom (recurrent or ongoing AND present > 4 weeks)    Answer Assessment - Initial Assessment Questions  1. DESCRIPTION: \"Tell me about your sleeping problem.\"       I just can't sleep anymore, restless.  He states he is exhausted.  2. ONSET: \"How long have you been having trouble sleeping?\" (e.g., days, weeks, months)      A week or so  3. RECURRENT: \"Have you had sleeping problems before?\"  If Yes, ask: \"What happened that time?\" \"What helped your sleeping problem go away in the past?\"       no  4. STRESS: \"Is there anything in your life that is making you feel stressed or tense?\"      A little stress noted lately; open heart surgery last week   5. PAIN: \"Do you have any pain that is keeping you awake?\" (e.g., back pain, headache, abdomen pain)      no  6. CAFFEINE ABUSE: \"Do you drink caffeinated beverages, and how much each day?\" (e.g., coffee, tea, michelle)      no  7. ALCOHOL USE OR SUBSTANCE USE (DRUG USE): \"Do you drink alcohol or use any illegal drugs?\"      no  8. OTHER SYMPTOMS: \"Do you have any other symptoms?\"  (e.g., difficulty breathing)      no    Protocols used: Insomnia-ADULT-AH    "

## 2024-08-03 ENCOUNTER — APPOINTMENT (OUTPATIENT)
Dept: GENERAL RADIOLOGY | Facility: HOSPITAL | Age: 74
End: 2024-08-03
Payer: MEDICARE

## 2024-08-03 ENCOUNTER — NURSE TRIAGE (OUTPATIENT)
Dept: CALL CENTER | Facility: HOSPITAL | Age: 74
End: 2024-08-03
Payer: MEDICARE

## 2024-08-03 ENCOUNTER — HOSPITAL ENCOUNTER (OUTPATIENT)
Facility: HOSPITAL | Age: 74
Setting detail: OBSERVATION
Discharge: HOME | End: 2024-08-04
Attending: INTERNAL MEDICINE | Admitting: FAMILY MEDICINE
Payer: MEDICARE

## 2024-08-03 DIAGNOSIS — R79.89 ELEVATED TROPONIN: Primary | ICD-10-CM

## 2024-08-03 DIAGNOSIS — I48.91 ATRIAL FIBRILLATION, UNSPECIFIED TYPE: ICD-10-CM

## 2024-08-03 DIAGNOSIS — R42 DIZZINESS: ICD-10-CM

## 2024-08-03 LAB
ALBUMIN SERPL-MCNC: 3.1 G/DL (ref 3.5–5.2)
ALBUMIN/GLOB SERPL: 0.8 G/DL
ALP SERPL-CCNC: 118 U/L (ref 39–117)
ALT SERPL W P-5'-P-CCNC: 47 U/L (ref 1–41)
ANION GAP SERPL CALCULATED.3IONS-SCNC: 9 MMOL/L (ref 5–15)
AST SERPL-CCNC: 39 U/L (ref 1–40)
BASOPHILS # BLD AUTO: 0.04 10*3/MM3 (ref 0–0.2)
BASOPHILS NFR BLD AUTO: 0.4 % (ref 0–1.5)
BILIRUB SERPL-MCNC: 0.7 MG/DL (ref 0–1.2)
BUN SERPL-MCNC: 10 MG/DL (ref 8–23)
BUN/CREAT SERPL: 9.3 (ref 7–25)
CALCIUM SPEC-SCNC: 8.4 MG/DL (ref 8.6–10.5)
CHLORIDE SERPL-SCNC: 97 MMOL/L (ref 98–107)
CO2 SERPL-SCNC: 28 MMOL/L (ref 22–29)
CREAT SERPL-MCNC: 1.08 MG/DL (ref 0.76–1.27)
DEPRECATED RDW RBC AUTO: 47 FL (ref 37–54)
EGFRCR SERPLBLD CKD-EPI 2021: 72.9 ML/MIN/1.73
EOSINOPHIL # BLD AUTO: 0.38 10*3/MM3 (ref 0–0.4)
EOSINOPHIL NFR BLD AUTO: 3.5 % (ref 0.3–6.2)
ERYTHROCYTE [DISTWIDTH] IN BLOOD BY AUTOMATED COUNT: 13.9 % (ref 12.3–15.4)
GEN 5 2HR TROPONIN T REFLEX: 367 NG/L
GLOBULIN UR ELPH-MCNC: 3.9 GM/DL
GLUCOSE SERPL-MCNC: 113 MG/DL (ref 65–99)
HCT VFR BLD AUTO: 30.6 % (ref 37.5–51)
HGB BLD-MCNC: 9.9 G/DL (ref 13–17.7)
IMM GRANULOCYTES # BLD AUTO: 0.05 10*3/MM3 (ref 0–0.05)
IMM GRANULOCYTES NFR BLD AUTO: 0.5 % (ref 0–0.5)
LYMPHOCYTES # BLD AUTO: 1.43 10*3/MM3 (ref 0.7–3.1)
LYMPHOCYTES NFR BLD AUTO: 13.3 % (ref 19.6–45.3)
MCH RBC QN AUTO: 29.7 PG (ref 26.6–33)
MCHC RBC AUTO-ENTMCNC: 32.4 G/DL (ref 31.5–35.7)
MCV RBC AUTO: 91.9 FL (ref 79–97)
MONOCYTES # BLD AUTO: 0.83 10*3/MM3 (ref 0.1–0.9)
MONOCYTES NFR BLD AUTO: 7.7 % (ref 5–12)
NEUTROPHILS NFR BLD AUTO: 7.99 10*3/MM3 (ref 1.7–7)
NEUTROPHILS NFR BLD AUTO: 74.6 % (ref 42.7–76)
NRBC BLD AUTO-RTO: 0 /100 WBC (ref 0–0.2)
NT-PROBNP SERPL-MCNC: 4797 PG/ML (ref 0–900)
PLATELET # BLD AUTO: 380 10*3/MM3 (ref 140–450)
PMV BLD AUTO: 8.7 FL (ref 6–12)
POTASSIUM SERPL-SCNC: 4.3 MMOL/L (ref 3.5–5.2)
PROT SERPL-MCNC: 7 G/DL (ref 6–8.5)
RBC # BLD AUTO: 3.33 10*6/MM3 (ref 4.14–5.8)
SODIUM SERPL-SCNC: 134 MMOL/L (ref 136–145)
TROPONIN T DELTA: 15 NG/L
TROPONIN T SERPL HS-MCNC: 352 NG/L
WBC NRBC COR # BLD AUTO: 10.72 10*3/MM3 (ref 3.4–10.8)

## 2024-08-03 PROCEDURE — 93005 ELECTROCARDIOGRAM TRACING: CPT | Performed by: INTERNAL MEDICINE

## 2024-08-03 PROCEDURE — 96374 THER/PROPH/DIAG INJ IV PUSH: CPT

## 2024-08-03 PROCEDURE — 71045 X-RAY EXAM CHEST 1 VIEW: CPT

## 2024-08-03 PROCEDURE — 99285 EMERGENCY DEPT VISIT HI MDM: CPT

## 2024-08-03 PROCEDURE — 83880 ASSAY OF NATRIURETIC PEPTIDE: CPT | Performed by: INTERNAL MEDICINE

## 2024-08-03 PROCEDURE — 93005 ELECTROCARDIOGRAM TRACING: CPT

## 2024-08-03 PROCEDURE — 85025 COMPLETE CBC W/AUTO DIFF WBC: CPT | Performed by: INTERNAL MEDICINE

## 2024-08-03 PROCEDURE — 80053 COMPREHEN METABOLIC PANEL: CPT | Performed by: INTERNAL MEDICINE

## 2024-08-03 PROCEDURE — G0378 HOSPITAL OBSERVATION PER HR: HCPCS

## 2024-08-03 PROCEDURE — 25010000002 FUROSEMIDE PER 20 MG: Performed by: INTERNAL MEDICINE

## 2024-08-03 PROCEDURE — 84484 ASSAY OF TROPONIN QUANT: CPT | Performed by: INTERNAL MEDICINE

## 2024-08-03 RX ORDER — ACETAMINOPHEN 650 MG/1
650 SUPPOSITORY RECTAL EVERY 4 HOURS PRN
Status: DISCONTINUED | OUTPATIENT
Start: 2024-08-03 | End: 2024-08-04 | Stop reason: HOSPADM

## 2024-08-03 RX ORDER — AMIODARONE HYDROCHLORIDE 200 MG/1
400 TABLET ORAL DAILY
Status: DISCONTINUED | OUTPATIENT
Start: 2024-08-03 | End: 2024-08-04 | Stop reason: HOSPADM

## 2024-08-03 RX ORDER — SODIUM CHLORIDE 0.9 % (FLUSH) 0.9 %
10 SYRINGE (ML) INJECTION AS NEEDED
Status: DISCONTINUED | OUTPATIENT
Start: 2024-08-03 | End: 2024-08-04 | Stop reason: HOSPADM

## 2024-08-03 RX ORDER — ATORVASTATIN CALCIUM 40 MG/1
40 TABLET, FILM COATED ORAL NIGHTLY
Status: DISCONTINUED | OUTPATIENT
Start: 2024-08-03 | End: 2024-08-04 | Stop reason: HOSPADM

## 2024-08-03 RX ORDER — SODIUM CHLORIDE 9 MG/ML
40 INJECTION, SOLUTION INTRAVENOUS AS NEEDED
Status: DISCONTINUED | OUTPATIENT
Start: 2024-08-03 | End: 2024-08-04 | Stop reason: HOSPADM

## 2024-08-03 RX ORDER — AMOXICILLIN 250 MG
2 CAPSULE ORAL 2 TIMES DAILY PRN
Status: DISCONTINUED | OUTPATIENT
Start: 2024-08-03 | End: 2024-08-04 | Stop reason: HOSPADM

## 2024-08-03 RX ORDER — HYDROCODONE BITARTRATE AND ACETAMINOPHEN 5; 325 MG/1; MG/1
1 TABLET ORAL EVERY 6 HOURS PRN
Status: DISCONTINUED | OUTPATIENT
Start: 2024-08-03 | End: 2024-08-04 | Stop reason: HOSPADM

## 2024-08-03 RX ORDER — ACETAMINOPHEN 160 MG/5ML
650 SOLUTION ORAL EVERY 4 HOURS PRN
Status: DISCONTINUED | OUTPATIENT
Start: 2024-08-03 | End: 2024-08-04 | Stop reason: HOSPADM

## 2024-08-03 RX ORDER — BISACODYL 5 MG/1
5 TABLET, DELAYED RELEASE ORAL DAILY PRN
Status: DISCONTINUED | OUTPATIENT
Start: 2024-08-03 | End: 2024-08-04 | Stop reason: HOSPADM

## 2024-08-03 RX ORDER — PANTOPRAZOLE SODIUM 40 MG/1
40 TABLET, DELAYED RELEASE ORAL
Status: DISCONTINUED | OUTPATIENT
Start: 2024-08-04 | End: 2024-08-04 | Stop reason: HOSPADM

## 2024-08-03 RX ORDER — FUROSEMIDE 10 MG/ML
20 INJECTION INTRAMUSCULAR; INTRAVENOUS ONCE
Status: COMPLETED | OUTPATIENT
Start: 2024-08-03 | End: 2024-08-03

## 2024-08-03 RX ORDER — NITROGLYCERIN 0.4 MG/1
0.4 TABLET SUBLINGUAL
Status: DISCONTINUED | OUTPATIENT
Start: 2024-08-03 | End: 2024-08-04 | Stop reason: HOSPADM

## 2024-08-03 RX ORDER — ACETAMINOPHEN 325 MG/1
650 TABLET ORAL EVERY 4 HOURS PRN
Status: DISCONTINUED | OUTPATIENT
Start: 2024-08-03 | End: 2024-08-04 | Stop reason: HOSPADM

## 2024-08-03 RX ORDER — SODIUM CHLORIDE 0.9 % (FLUSH) 0.9 %
10 SYRINGE (ML) INJECTION EVERY 12 HOURS SCHEDULED
Status: DISCONTINUED | OUTPATIENT
Start: 2024-08-03 | End: 2024-08-04 | Stop reason: HOSPADM

## 2024-08-03 RX ORDER — LISINOPRIL 5 MG/1
5 TABLET ORAL DAILY
Status: DISCONTINUED | OUTPATIENT
Start: 2024-08-03 | End: 2024-08-04 | Stop reason: HOSPADM

## 2024-08-03 RX ORDER — ASPIRIN 81 MG/1
81 TABLET ORAL DAILY
Status: DISCONTINUED | OUTPATIENT
Start: 2024-08-03 | End: 2024-08-04 | Stop reason: HOSPADM

## 2024-08-03 RX ORDER — POLYETHYLENE GLYCOL 3350 17 G/17G
17 POWDER, FOR SOLUTION ORAL DAILY PRN
Status: DISCONTINUED | OUTPATIENT
Start: 2024-08-03 | End: 2024-08-04 | Stop reason: HOSPADM

## 2024-08-03 RX ORDER — BISACODYL 10 MG
10 SUPPOSITORY, RECTAL RECTAL DAILY PRN
Status: DISCONTINUED | OUTPATIENT
Start: 2024-08-03 | End: 2024-08-04 | Stop reason: HOSPADM

## 2024-08-03 RX ORDER — CARVEDILOL 6.25 MG/1
6.25 TABLET ORAL EVERY 12 HOURS SCHEDULED
Status: DISCONTINUED | OUTPATIENT
Start: 2024-08-03 | End: 2024-08-04 | Stop reason: HOSPADM

## 2024-08-03 RX ADMIN — FUROSEMIDE 20 MG: 10 INJECTION, SOLUTION INTRAVENOUS at 16:20

## 2024-08-03 RX ADMIN — APIXABAN 5 MG: 5 TABLET, FILM COATED ORAL at 21:25

## 2024-08-03 RX ADMIN — CARVEDILOL 6.25 MG: 6.25 TABLET, FILM COATED ORAL at 21:25

## 2024-08-03 RX ADMIN — HYDROCODONE BITARTRATE AND ACETAMINOPHEN 1 TABLET: 5; 325 TABLET ORAL at 21:27

## 2024-08-03 NOTE — TELEPHONE ENCOUNTER
"Daughter is caller  Her father has called her. He had CABG He says he is having an irregular heart rate. He will not call an ambulance She is on the way to his home.  She says he is not having CP or SOB  Surgery 07/23/2024  CABG Dr Kobe Maciel  Daughter is driving to his home and asking if she should call 911  Advised to call back when she gets to her father.    Daughter is at the home, calling back    He is weak and dizzy eyes are not focusing 116/75 HR 69 O2 sat 98% T 98  He has been dizzy all day when standing and He has a Cardio mobile device that says his heart is irregular and probably a fib.  Care advice per guideline    Reason for Disposition   RN needs further essential information from caller in order to complete triage    Additional Information   Negative: [1] Caller is not with the adult (patient) AND [2] reporting urgent symptoms   Negative: Lab result questions   Negative: Medication questions   Negative: Caller can't be reached by phone   Negative: Caller has already spoken to PCP or another triager    Answer Assessment - Initial Assessment Questions  1. REASON FOR CALL or QUESTION: \"What is your reason for calling today?\" or \"How can I best help you?\" or \"What question do you have that I can help answer?\"      *No Answer*    Protocols used: Information Only Call-ADULT-    "

## 2024-08-03 NOTE — TELEPHONE ENCOUNTER
"Reason for Disposition  • Dizziness, lightheadedness, or weakness    Additional Information  • Negative: Passed out (i.e., lost consciousness, collapsed and was not responding)  • Negative: Shock suspected (e.g., cold/pale/clammy skin, too weak to stand, low BP, rapid pulse)  • Negative: Difficult to awaken or acting confused (e.g., disoriented, slurred speech)  • Negative: Visible sweat on face or sweat dripping down face  • Negative: Unable to walk, or can only walk with assistance (e.g., requires support)  • Negative: [1] Received SHOCK from implantable cardiac defibrillator AND [2] persisting symptoms (i.e., palpitations, lightheadedness)  • Negative: [1] Dizziness, lightheadedness, or weakness AND [2] heart beating very rapidly (e.g., > 140 / minute)  • Negative: [1] Dizziness, lightheadedness, or weakness AND [2] heart beating very slowly (e.g., < 50 / minute)  • Negative: Sounds like a life-threatening emergency to the triager  • Negative: Chest pain  • Negative: Implantable Cardiac Defibrillator (ICD) or a pacemaker symptoms or questions  • Negative: Difficulty breathing    Answer Assessment - Initial Assessment Questions  1. DESCRIPTION: \"Please describe your heart rate or heartbeat that you are having\" (e.g., fast/slow, regular/irregular, skipped or extra beats, \"palpitations\")    Heart is beating irrgegular according to Cardiomobile device  2. ONSET: \"When did it start?\" (Minutes, hours or days)      This morning  3. DURATION: \"How long does it last\" (e.g., seconds, minutes, hours)     Constant today  4. PATTERN \"Does it come and go, or has it been constant since it started?\"  \"Does it get worse with exertion?\"   \"Are you feeling it now?\"   Constant today  5. TAP: \"Using your hand, can you tap out what you are feeling on a chair or table in front of you, so that I can hear?\" (Note: not all patients can do this)        *No Answer*  6. HEART RATE: \"Can you tell me your heart rate?\" \"How many beats in 15 " "seconds?\"  (Note: not all patients can do this)                       HR 69 rate has been up to 105  7. RECURRENT SYMPTOM: \"Have you ever had this before?\" If Yes, ask: \"When was the last time?\" and \"What happened that time?\"     Irregular HR a fib after surgery  8. CAUSE: \"What do you think is causing the palpitations?\"      Thinks he is a fib  9. CARDIAC HISTORY: \"Do you have any history of heart disease?\" (e.g., heart attack, angina, bypass surgery, angioplasty, arrhythmia)     CABG  10. OTHER SYMPTOMS: \"Do you have any other symptoms?\" (e.g., dizziness, chest pain, sweating, difficulty breathing)  Dizziness when standing    eyes are not focusing  having SOB since surgery  11. PREGNANCY: \"Is there any chance you are pregnant?\" \"When was your last menstrual period?\"        *No Answer*    Protocols used: Heart Rate and Heartbeat Questions-ADULT-AH    "

## 2024-08-03 NOTE — ED PROVIDER NOTES
Subjective   History of Present Illness  74-year-old male who presents emergency department with dizziness.  He states that he is here because his heart is out of rhythm.  The patient recently had a CABG procedure by Dr. Maciel on July 23.  He states his discharge was delayed 1 day because of the A-fib.  He says that he was in bed this morning and felt palpitations.  When he got up he was dizzy and had blurred vision.  He states that dizziness and blurred vision has continued.  He does not appear to be in a tacky arrhythmia.  He has no lower extremity swelling.  He has no acute bowel or kidney changes.    Procedure Laterality Anesthesia   CORONARY ARTERY BYPASS GRAFTING X2, LEFT INTERNAL MAMMARY ARTERY GRAFT, RIGHT LEG OPEN VEIN HARVEST, MITRAL VALVE REAPIR, LEFT VENTRICULAR ANEURYSM REPAIR,  ATRIAL APPENDAGE EXCLUSION LEFT 45 ATRICLIP WITH TRANSESOPHAGEAL ECHOCARDIOGRAM N/A General   POSSIBLE MITRAL VALVE REPAIR/REPLACEMENT N/A General   ATRIAL APPENDAGE EXCLUSION LEFT WITH TRANSESOPHAGEAL ECHOCARDIOGRAM       Normal sinus rhythm  Inferior infarct (cited on or before 26-JUL-2024)  Abnormal ECG  When compared with ECG of 27-JUL-2024 17:38,  Sinus rhythm has replaced Atrial fibrillation  T wave inversion now evident in Lateral leads    Date of Admission: 7/23/2024  Date of Discharge:  7/29/2024  Primary Care Physician: Stephane Pedro, DO     Discharge Diagnoses:       Active Hospital Problems     Diagnosis      **Unstable angina      Coronary artery disease involving native coronary artery of native heart with angina pectoris      Tobacco use      Overweight with body mass index (BMI) of 28 to 28.9 in adult      Stage 3a chronic kidney disease      Left ventricular aneurysm      CAD (coronary artery disease)      Review of Systems   Neurological:  Positive for dizziness.       Past Medical History:   Diagnosis Date    COPD (chronic obstructive pulmonary disease)     Elevated cholesterol     Enlarged prostate      "History of heart attack     Hypertension     Urination frequency     Weak urine stream        No Known Allergies    Past Surgical History:   Procedure Laterality Date    ATRIAL APPENDAGE EXCLUSION LEFT WITH TRANSESOPHAGEAL ECHOCARDIOGRAM N/A 2024    Procedure: ATRIAL APPENDAGE EXCLUSION LEFT WITH TRANSESOPHAGEAL ECHOCARDIOGRAM;  Surgeon: Kobe Maciel MD;  Location:  [facility];  Service: Cardiothoracic;  Laterality: N/A;    BACK SURGERY      x3    CARDIAC CATHETERIZATION N/A 2024    Procedure: Left Heart Cath;  Surgeon: Brennan Khoury DO;  Location:  [facility]ATH [facility]AS[facility]ATION;  Service: Cardiovascular;  Laterality: N/A;    CORONARY ARTERY BYPASS GRAFT N/A 2024    Procedure: CORONARY ARTERY BYPASS GRAFTING X2, LEFT INTERNAL MAMMARY ARTERY GRAFT, RIGHT LEG OPEN VEIN HARVEST, MITRAL VALVE REAPIR, LEFT VENTRICULAR ANEURYSM REPAIR,  ATRIAL APPENDAGE EXCLUSION LEFT 45 ATRICLIP WITH TRANSESOPHAGEAL ECHOCARDIOGRAM;  Surgeon: Kobe Maciel MD;  Location:  [facility];  Service: Cardiothoracic;  Laterality: N/A;    EXCISION      fatty patch on back    HERNIA REPAIR      MITRAL VALVE REPAIR/REPLACEMENT N/A 2024    Procedure: POSSIBLE MITRAL VALVE REPAIR/REPLACEMENT;  Surgeon: Kobe Maciel MD;  Location:  [facility];  Service: Cardiothoracic;  Laterality: N/A;       Family History   Problem Relation Age of Onset    Heart disease Mother        Social History     Socioeconomic History    Marital status: Single   Tobacco Use    Smoking status: Former     Types: Cigarettes     Start date: 7/15/2024     Quit date: 1972     Years since quittin.6    Smokeless tobacco: Never   Vaping Use    Vaping status: Never Used   Substance and Sexual Activity    Alcohol use: Yes     Comment: \"whiskey every now and then\"    Drug use: Not Currently    Sexual activity: Yes           Objective   Physical Exam  Vitals reviewed.   Constitutional:       Appearance: Normal appearance. He is not ill-appearing. "   HENT:      Head: Normocephalic and atraumatic.      Right Ear: External ear normal.      Left Ear: External ear normal.      Nose: Nose normal.   Eyes:      General: No scleral icterus.     Conjunctiva/sclera: Conjunctivae normal.   Cardiovascular:      Rate and Rhythm: Normal rate and regular rhythm.      Heart sounds: Normal heart sounds.   Pulmonary:      Effort: Pulmonary effort is normal.      Breath sounds: Normal breath sounds.   Abdominal:      General: There is no distension.      Palpations: Abdomen is soft.   Musculoskeletal:         General: No swelling or tenderness.      Cervical back: Normal range of motion and neck supple.   Skin:     General: Skin is warm and dry.      Comments: Midline chest incision appears to be clean and dry.  Right lower extremity incision from vein grafting appears to be clean and dry with no surrounding erythema or discharge.   Neurological:      Mental Status: He is alert and oriented to person, place, and time.      Cranial Nerves: No cranial nerve deficit.   Psychiatric:         Mood and Affect: Mood normal.         Behavior: Behavior normal.         Procedures       Lab Results (last 24 hours)       Procedure Component Value Units Date/Time    CBC & Differential [[id number]]  (Abnormal) Collected: 08/03/24 1529    Specimen: Blood Updated: 08/03/24 1540    Narrative:      The following orders were created for panel order CBC & Differential.  Procedure                               Abnormality         Status                     ---------                               -----------         ------                     CBC Auto Differential[[id number]]        Abnormal            Final result                 Please view results for these tests on the individual orders.    Comprehensive Metabolic Panel [[id number]]  (Abnormal) Collected: 08/03/24 1529    Specimen: Blood Updated: 08/03/24 1600     Glucose 113 mg/dL      BUN 10 mg/dL      Creatinine 1.08 mg/dL      Sodium 134 mmol/L       Potassium 4.3 mmol/L      Chloride 97 mmol/L      CO2 28.0 mmol/L      Calcium 8.4 mg/dL      Total Protein 7.0 g/dL      Albumin 3.1 g/dL      ALT (SGPT) 47 U/L      AST (SGOT) 39 U/L      Alkaline Phosphatase 118 U/L      Total Bilirubin 0.7 mg/dL      Globulin 3.9 gm/dL      A/G Ratio 0.8 g/dL      BUN/Creatinine Ratio 9.3     Anion Gap 9.0 mmol/L      eGFR 72.9 mL/min/1.73     Narrative:      GFR Normal >60  Chronic Kidney Disease <60  Kidney Failure <15    The GFR formula is only valid for adults with stable renal function between ages 18 and 70.    High Sensitivity Troponin T [[id number]]  (Abnormal) Collected: 08/03/24 1529    Specimen: Blood Updated: 08/03/24 1606     HS Troponin T 352 ng/L     Narrative:      High Sensitive Troponin T Reference Range:  <14.0 ng/L- Negative Female for AMI  <22.0 ng/L- Negative Male for AMI  >=14 - Abnormal Female indicating possible myocardial injury.  >=22 - Abnormal Male indicating possible myocardial injury.   Clinicians would have to utilize clinical acumen, EKG, Troponin, and serial changes to determine if it is an Acute Myocardial Infarction or myocardial injury due to an underlying chronic condition.         BNP [[id number]]  (Abnormal) Collected: 08/03/24 1529    Specimen: Blood Updated: 08/03/24 1556     proBNP 4,797.0 pg/mL     Narrative:      This assay is used as an aid in the diagnosis of individuals suspected of having heart failure. It can be used as an aid in the diagnosis of acute decompensated heart failure (ADHF) in patients presenting with signs and symptoms of ADHF to the emergency department (ED). In addition, NT-proBNP of <300 pg/mL indicates ADHF is not likely.    Age Range Result Interpretation  NT-proBNP Concentration (pg/mL:      <50             Positive            >450                   Gray                 300-450                    Negative             <300    50-75           Positive            >900                  Antunez                 300-900                  Negative            <300      >75             Positive            >1800                  Gray                300-1800                  Negative            <300    CBC Auto Differential [[id number]]  (Abnormal) Collected: 08/03/24 1529    Specimen: Blood Updated: 08/03/24 1540     WBC 10.72 10*3/mm3      RBC 3.33 10*6/mm3      Hemoglobin 9.9 g/dL      Hematocrit 30.6 %      MCV 91.9 fL      MCH 29.7 pg      MCHC 32.4 g/dL      RDW 13.9 %      RDW-SD 47.0 fl      MPV 8.7 fL      Platelets 380 10*3/mm3      Neutrophil % 74.6 %      Lymphocyte % 13.3 %      Monocyte % 7.7 %      Eosinophil % 3.5 %      Basophil % 0.4 %      Immature Grans % 0.5 %      Neutrophils, Absolute 7.99 10*3/mm3      Lymphocytes, Absolute 1.43 10*3/mm3      Monocytes, Absolute 0.83 10*3/mm3      Eosinophils, Absolute 0.38 10*3/mm3      Basophils, Absolute 0.04 10*3/mm3      Immature Grans, Absolute 0.05 10*3/mm3      nRBC 0.0 /100 WBC     High Sensitivity Troponin T 2Hr [[id number]]  (Abnormal) Collected: 08/03/24 1734    Specimen: Blood Updated: 08/03/24 1816     HS Troponin T 367 ng/L      Troponin T Delta 15 ng/L     Narrative:      High Sensitive Troponin T Reference Range:  <14.0 ng/L- Negative Female for AMI  <22.0 ng/L- Negative Male for AMI  >=14 - Abnormal Female indicating possible myocardial injury.  >=22 - Abnormal Male indicating possible myocardial injury.   Clinicians would have to utilize clinical acumen, EKG, Troponin, and serial changes to determine if it is an Acute Myocardial Infarction or myocardial injury due to an underlying chronic condition.               XR Chest 1 View   Final Result       1. Postoperative chest and stable small left pleural effusion with   associated atelectasis.       This report was signed and finalized on 8/3/2024 3:55 PM by Dimas Gomez.                ED Course  ED Course as of 08/03/24 1915   Sat Aug 03, 2024   1607 Second troponin pending [AJ]   1830 I spoke with  Dr. Burrell. He notes that it would be appropriate to OBS the patient to follow HR and labs in the am.  [AJ]   1914 I spoke with the patient and updated him regarding the plan.  I spoke to the hospitalist.  He is agreeable to admission. [AJ]      ED Course User Index  [AJ] Dread Stevens DO                                             Medical Decision Making  Differential diagnosis includes CHF, arrhythmia, NSTEMI    Problems Addressed:  Atrial fibrillation, unspecified type: complicated acute illness or injury  Dizziness: complicated acute illness or injury  Elevated troponin: complicated acute illness or injury    Amount and/or Complexity of Data Reviewed  Labs: ordered.     Details: CBC CMP troponin  Radiology: ordered.     Details: Chest x-ray  ECG/medicine tests: ordered.    Risk  Prescription drug management.  Decision regarding hospitalization.        Final diagnoses:   Elevated troponin   Atrial fibrillation, unspecified type   Dizziness       ED Disposition  ED Disposition       ED Disposition   Decision to Admit    Condition   --    Comment   Level of Care: Telemetry [5]   Diagnosis: Elevated troponin [[id number]]   Admitting Physician: UVALDO GOMEZ [[id number]]                 No follow-up provider specified.       Medication List      No changes were made to your prescriptions during this visit.            Dread Stevens DO  08/03/24 1525       Dread Stevens DO  08/03/24 1527       Dread Stevens DO  08/03/24 1715       Dread Stevens DO  08/03/24 1915

## 2024-08-04 ENCOUNTER — APPOINTMENT (OUTPATIENT)
Dept: CARDIOLOGY | Facility: HOSPITAL | Age: 74
End: 2024-08-04
Payer: MEDICARE

## 2024-08-04 ENCOUNTER — READMISSION MANAGEMENT (OUTPATIENT)
Dept: CALL CENTER | Facility: HOSPITAL | Age: 74
End: 2024-08-04
Payer: MEDICARE

## 2024-08-04 VITALS
DIASTOLIC BLOOD PRESSURE: 75 MMHG | OXYGEN SATURATION: 95 % | BODY MASS INDEX: 27.2 KG/M2 | RESPIRATION RATE: 16 BRPM | HEART RATE: 72 BPM | SYSTOLIC BLOOD PRESSURE: 126 MMHG | TEMPERATURE: 98.2 F | HEIGHT: 70 IN | WEIGHT: 190 LBS

## 2024-08-04 PROBLEM — R42 POSTURAL DIZZINESS WITH NEAR SYNCOPE: Status: ACTIVE | Noted: 2024-08-04

## 2024-08-04 PROBLEM — I25.5 ISCHEMIC CARDIOMYOPATHY: Status: RESOLVED | Noted: 2024-07-10 | Resolved: 2024-08-04

## 2024-08-04 PROBLEM — Z95.1 STATUS POST THREE VESSEL CORONARY ARTERY BYPASS: Status: ACTIVE | Noted: 2024-08-04

## 2024-08-04 PROBLEM — R55 POSTURAL DIZZINESS WITH NEAR SYNCOPE: Status: ACTIVE | Noted: 2024-08-04

## 2024-08-04 PROBLEM — I25.10 CAD (CORONARY ARTERY DISEASE): Status: RESOLVED | Noted: 2024-07-12 | Resolved: 2024-08-04

## 2024-08-04 LAB
ANION GAP SERPL CALCULATED.3IONS-SCNC: 13 MMOL/L (ref 5–15)
BUN SERPL-MCNC: 12 MG/DL (ref 8–23)
BUN/CREAT SERPL: 10.3 (ref 7–25)
CALCIUM SPEC-SCNC: 8.3 MG/DL (ref 8.6–10.5)
CHLORIDE SERPL-SCNC: 98 MMOL/L (ref 98–107)
CO2 SERPL-SCNC: 25 MMOL/L (ref 22–29)
CREAT SERPL-MCNC: 1.17 MG/DL (ref 0.76–1.27)
DEPRECATED RDW RBC AUTO: 46.4 FL (ref 37–54)
EGFRCR SERPLBLD CKD-EPI 2021: 66.2 ML/MIN/1.73
ERYTHROCYTE [DISTWIDTH] IN BLOOD BY AUTOMATED COUNT: 13.8 % (ref 12.3–15.4)
GLUCOSE SERPL-MCNC: 84 MG/DL (ref 65–99)
HCT VFR BLD AUTO: 29.1 % (ref 37.5–51)
HGB BLD-MCNC: 9.2 G/DL (ref 13–17.7)
MCH RBC QN AUTO: 29.3 PG (ref 26.6–33)
MCHC RBC AUTO-ENTMCNC: 31.6 G/DL (ref 31.5–35.7)
MCV RBC AUTO: 92.7 FL (ref 79–97)
PLATELET # BLD AUTO: 333 10*3/MM3 (ref 140–450)
PMV BLD AUTO: 9 FL (ref 6–12)
POTASSIUM SERPL-SCNC: 4 MMOL/L (ref 3.5–5.2)
RBC # BLD AUTO: 3.14 10*6/MM3 (ref 4.14–5.8)
SODIUM SERPL-SCNC: 136 MMOL/L (ref 136–145)
TROPONIN T SERPL HS-MCNC: 352 NG/L
WBC NRBC COR # BLD AUTO: 10.58 10*3/MM3 (ref 3.4–10.8)

## 2024-08-04 PROCEDURE — 80048 BASIC METABOLIC PNL TOTAL CA: CPT | Performed by: STUDENT IN AN ORGANIZED HEALTH CARE EDUCATION/TRAINING PROGRAM

## 2024-08-04 PROCEDURE — 36415 COLL VENOUS BLD VENIPUNCTURE: CPT | Performed by: STUDENT IN AN ORGANIZED HEALTH CARE EDUCATION/TRAINING PROGRAM

## 2024-08-04 PROCEDURE — 99214 OFFICE O/P EST MOD 30 MIN: CPT | Performed by: INTERNAL MEDICINE

## 2024-08-04 PROCEDURE — 85027 COMPLETE CBC AUTOMATED: CPT | Performed by: STUDENT IN AN ORGANIZED HEALTH CARE EDUCATION/TRAINING PROGRAM

## 2024-08-04 PROCEDURE — 84484 ASSAY OF TROPONIN QUANT: CPT | Performed by: STUDENT IN AN ORGANIZED HEALTH CARE EDUCATION/TRAINING PROGRAM

## 2024-08-04 PROCEDURE — G0378 HOSPITAL OBSERVATION PER HR: HCPCS

## 2024-08-04 PROCEDURE — 93246 EXT ECG>7D<15D RECORDING: CPT

## 2024-08-04 RX ADMIN — ASPIRIN 81 MG: 81 TABLET, COATED ORAL at 08:30

## 2024-08-04 RX ADMIN — APIXABAN 5 MG: 5 TABLET, FILM COATED ORAL at 08:30

## 2024-08-04 RX ADMIN — PANTOPRAZOLE SODIUM 40 MG: 40 TABLET, DELAYED RELEASE ORAL at 06:14

## 2024-08-04 RX ADMIN — CARVEDILOL 6.25 MG: 6.25 TABLET, FILM COATED ORAL at 08:30

## 2024-08-04 RX ADMIN — AMIODARONE HYDROCHLORIDE 400 MG: 200 TABLET ORAL at 08:30

## 2024-08-04 RX ADMIN — Medication 10 ML: at 08:30

## 2024-08-04 RX ADMIN — LISINOPRIL 5 MG: 5 TABLET ORAL at 08:31

## 2024-08-04 NOTE — CONSULTS
LOS: 0 days   Patient Care Team:  Stephane Pedro DO as PCP - General (Family Medicine)    Chief Complaint: Shortness of breath     Subjective    Vj Garcia is a 74 y.o. male who is being seen in consultation.  Patient has presented saying he has not taken his medicines at the right time  States he has shortness of breath  Troponins elevated with essentially flat  Says he is back to normal  Mentions he feels fine and wants to be discharged home  Denies any chest pain  Mentions he feels his heart races at times  Denies any presyncope syncope  No orthopnea  No paroxysmal nocturnal dyspnea  No bleeding issues  No falls  He has had recent two-vessel aortocoronary bypass surgery, mitral valve repair and left atrial clip placement      Telemetry: no malignant arrhythmia. No significant pauses.    Review of Systems   Constitutional: No chills   Has fatigue   No fever.   HENT: Negative.    Eyes: Negative.    Respiratory: Negative for cough,   No chest wall soreness,   Shortness of breath,   no wheezing, no stridor.    Cardiovascular: As above  Gastrointestinal: Negative for abdominal distention,  No abdominal pain,   No blood in stool,   No constipation,   No diarrhea,   No nausea   No vomiting.   Endocrine: Negative.    Genitourinary: Negative for difficulty urinating, dysuria, flank pain and hematuria.   Musculoskeletal: Negative.    Skin: Negative for rash and wound.   Allergic/Immunologic: Negative.    Neurological: Negative for dizziness, syncope, weakness,   No light-headedness  No  headaches.   Hematological: Does not bruise/bleed easily.   Psychiatric/Behavioral: Negative for agitation or behavioral problems,   No confusion,   the patient is  nervous/anxious.       History:   Past Medical History:   Diagnosis Date    COPD (chronic obstructive pulmonary disease)     Elevated cholesterol     Enlarged prostate     History of heart attack     Hypertension     Urination frequency     Weak urine stream   "    Past Surgical History:   Procedure Laterality Date    ATRIAL APPENDAGE EXCLUSION LEFT WITH TRANSESOPHAGEAL ECHOCARDIOGRAM N/A 2024    Procedure: ATRIAL APPENDAGE EXCLUSION LEFT WITH TRANSESOPHAGEAL ECHOCARDIOGRAM;  Surgeon: Kobe Maciel MD;  Location:  [facility];  Service: Cardiothoracic;  Laterality: N/A;    BACK SURGERY      x3    CARDIAC CATHETERIZATION N/A 2024    Procedure: Left Heart Cath;  Surgeon: Brennan Khoury DO;  Location:  [facility];  Service: Cardiovascular;  Laterality: N/A;    CORONARY ARTERY BYPASS GRAFT N/A 2024    Procedure: CORONARY ARTERY BYPASS GRAFTING X2, LEFT INTERNAL MAMMARY ARTERY GRAFT, RIGHT LEG OPEN VEIN HARVEST, MITRAL VALVE REAPIR, LEFT VENTRICULAR ANEURYSM REPAIR,  ATRIAL APPENDAGE EXCLUSION LEFT 45 ATRICLIP WITH TRANSESOPHAGEAL ECHOCARDIOGRAM;  Surgeon: Kobe Maciel MD;  Location:  [facility];  Service: Cardiothoracic;  Laterality: N/A;    EXCISION      fatty patch on back    HERNIA REPAIR      MITRAL VALVE REPAIR/REPLACEMENT N/A 2024    Procedure: POSSIBLE MITRAL VALVE REPAIR/REPLACEMENT;  Surgeon: Kobe Maciel MD;  Location:  [facility];  Service: Cardiothoracic;  Laterality: N/A;     Social History     Socioeconomic History    Marital status: Single   Tobacco Use    Smoking status: Former     Types: Cigarettes     Start date: 7/15/2024     Quit date: 1972     Years since quittin.6    Smokeless tobacco: Never   Vaping Use    Vaping status: Never Used   Substance and Sexual Activity    Alcohol use: Yes     Comment: \"whiskey every now and then\"    Drug use: Not Currently    Sexual activity: Yes     Family History   Problem Relation Age of Onset    Heart disease Mother        Labs:  WBC WBC   Date Value Ref Range Status   2024 10.58 3.40 - 10.80 10*3/mm3 Final   2024 10.72 3.40 - 10.80 10*3/mm3 Final      HGB Hemoglobin   Date Value Ref Range Status   2024 9.2 (L) 13.0 - 17.7 g/dL Final   2024 " "9.9 (L) 13.0 - 17.7 g/dL Final      HCT Hematocrit   Date Value Ref Range Status   08/04/2024 29.1 (L) 37.5 - 51.0 % Final   08/03/2024 30.6 (L) 37.5 - 51.0 % Final      Platelets Platelets   Date Value Ref Range Status   08/04/2024 333 140 - 450 10*3/mm3 Final   08/03/2024 380 140 - 450 10*3/mm3 Final      MCV MCV   Date Value Ref Range Status   08/04/2024 92.7 79.0 - 97.0 fL Final   08/03/2024 91.9 79.0 - 97.0 fL Final        Results from last 7 days   Lab Units 08/04/24  0409 08/03/24  1529 07/29/24  0336   SODIUM mmol/L 136 134* 133*   POTASSIUM mmol/L 4.0 4.3 4.2   CHLORIDE mmol/L 98 97* 97*   CO2 mmol/L 25.0 28.0 26.0   BUN mg/dL 12 10 27*   CREATININE mg/dL 1.17 1.08 1.12   CALCIUM mg/dL 8.3* 8.4* 8.3*   BILIRUBIN mg/dL  --  0.7  --    ALK PHOS U/L  --  118*  --    ALT (SGPT) U/L  --  47*  --    AST (SGOT) U/L  --  39  --    GLUCOSE mg/dL 84 113* 107*     Lab Results   Component Value Date    TROPONINT 352 (C) 08/04/2024     PT/INR:  No results found for: \"PROTIME\"/No results found for: \"INR\"    Imaging Results (Last 72 Hours)       Procedure Component Value Units Date/Time    XR Chest 1 View [[id number]] Collected: 08/03/24 1553     Updated: 08/03/24 1558    Narrative:      EXAM: XR CHEST 1 VW-      DATE: 8/3/2024 2:49 PM     HISTORY: Rhythem change       COMPARISON: 7/29/2024.     TECHNIQUE:  Frontal view(s) of the chest submitted.     FINDINGS:    The patient is status post median sternotomy and CABG and left atrial  appendage occlusion. There is stable ill-defined opacity at the left  lung base likely represents a small left pleural effusion with  associated atelectasis. Lungs are otherwise grossly clear. No  pneumothorax is seen. There is right glenohumeral joint osteoarthritis  and bilateral AC joint arthropathy.          Impression:         1. Postoperative chest and stable small left pleural effusion with  associated atelectasis.     This report was signed and finalized on 8/3/2024 3:55 PM by " Dimas Gomez.               Objective     No Known Allergies    Medication Review: Performed  Current Facility-Administered Medications   Medication Dose Route Frequency Provider Last Rate Last Admin    acetaminophen (TYLENOL) tablet 650 mg  650 mg Oral Q4H PRN Libby Castellanos MD        Or    acetaminophen (TYLENOL) 160 MG/5ML oral solution 650 mg  650 mg Oral Q4H PRN Libby Castellanos MD        Or    acetaminophen (TYLENOL) suppository 650 mg  650 mg Rectal Q4H PRN Libby Castellanos MD        amiodarone (PACERONE) tablet 400 mg  400 mg Oral Daily Libby Castellanos MD        apixaban (ELIQUIS) tablet 5 mg  5 mg Oral BID Libby Castellanos MD   5 mg at 08/03/24 2125    aspirin EC tablet 81 mg  81 mg Oral Daily Libby Castellanos MD        atorvastatin (LIPITOR) tablet 40 mg  40 mg Oral Nightly Libby Castellanos MD        sennosides-docusate (PERICOLACE) 8.6-50 MG per tablet 2 tablet  2 tablet Oral BID PRN Libby Castellanos MD        And    polyethylene glycol (MIRALAX) packet 17 g  17 g Oral Daily PRN Libby Castellanos MD        And    bisacodyl (DULCOLAX) EC tablet 5 mg  5 mg Oral Daily PRN Libby Castellanos MD        And    bisacodyl (DULCOLAX) suppository 10 mg  10 mg Rectal Daily PRN Libby Castellanos MD        Calcium Replacement - Follow Nurse / [facility]PA Driven Protocol   Does not apply PRN Libby Castellanos MD        carvedilol (COREG) tablet 6.25 mg  6.25 mg Oral Q12H Libby Castellanos MD   6.25 mg at 08/03/24 2125    HYDROcodone-acetaminophen (NORCO) 5-325 MG per tablet 1 tablet  1 tablet Oral Q6H PRN Libby Castellanos MD   1 tablet at 08/03/24 2127    lisinopril (PRINIVIL,ZESTRIL) tablet 5 mg  5 mg Oral Daily Libby Castellanos MD        Magnesium Cardiology Dose Replacement - Follow Nurse / [facility]PA Driven Protocol   Does not apply PRN Libby Castellanos MD        nitroglycerin (NITROSTAT) SL tablet 0.4 mg  0.4 mg Sublingual Q5 Min PRN Libby Castellanos MD        pantoprazole (PROTONIX) EC tablet 40 mg  40 mg Oral Q AM Libby Castellanos MD   40 mg  "at 08/04/24 0614    Phosphorus Replacement - Follow Nurse / [facility]PA Driven Protocol   Does not apply PRN Libby Castellanos MD        Potassium Replacement - Follow Nurse / [facility]PA Driven Protocol   Does not apply PRN Libby Castellanos MD        sodium chloride 0.9 % flush 10 mL  10 mL Intravenous Q12H Libby Castellanos MD        sodium chloride 0.9 % flush 10 mL  10 mL Intravenous PRN Libby Castellanos MD        sodium chloride 0.9 % infusion 40 mL  40 mL Intravenous PRN Libby Castellanos MD           Vital Sign Min/Max for last 24 hours  Temp  Min: 97.9 °F (36.6 °C)  Max: 99 °F (37.2 °C)   BP  Min: 116/58  Max: 157/77   Pulse  Min: 68  Max: 78   Resp  Min: 16  Max: 18   SpO2  Min: 93 %  Max: 100 %   No data recorded   Weight  Min: 86.2 kg (190 lb)  Max: 87.1 kg (192 lb)     Flowsheet Rows      Flowsheet Row First Filed Value   Admission Height 177.8 cm (70\") Documented at 08/03/2024 1501   Admission Weight 87.1 kg (192 lb) Documented at 08/03/2024 1501            Results for orders placed during the hospital encounter of 07/23/24    Adult Transthoracic Echo Complete W/ Cont if Necessary Per Protocol    Interpretation Summary    Left ventricular systolic function is moderately decreased. Left ventricular ejection fraction appears to be 36 - 40%.    The left ventricular cavity is mildly dilated.    Left ventricular wall thickness is consistent with mild concentric hypertrophy.    Mildly reduced right ventricular systolic function noted.    Mild aortic valve regurgitation is present.      Physical Exam:    General Appearance: Awake, alert, in no acute distress  Eyes: Pupils equal and reactive    Ears: Appear intact with no abnormalities noted  Nose: Nares normal, no drainage  Neck: supple, trachea midline, no carotid bruit and no JVD  Back: no kyphosis present,    Lungs: respirations regular, respirations even and respirations unlabored  Heart: normal S1, S2, 2/6 systolic murmur left sternal border  No gallops or rubs  no rub and no " click  Abdomen: normal bowel sounds, no tenderness   Skin: no bleeding, bruising or rash  Extremities: no cyanosis  Psychiatric/Behavioral: Negative for agitation, behavioral problems, confusion, the patient does  appear to be nervous/anxious.       Results Review:   I reviewed the patient's new clinical results.  I reviewed the patient's new imaging results and agree with the interpretation.  I reviewed the patient's other test results and agree with the interpretation  I personally viewed and interpreted the patient's EKG/Telemetry data    Discussed with patient  Updated patient regarding any new or relevant abnormalities on review of records or any new findings on physical exam.   Mentioned to patient about purpose of visit and desirable health short and long term goals and objectives.     Reviewed available prior notes, consults, prior visits, laboratory findings, radiology and cardiology relevant reports.   Updated chart as applicable.   I have reviewed the patient's medical history in detail and updated the computerized patient record as relevant.          Assessment & Plan       Elevated troponin  Coronary artery disease  Multivessel coronary artery disease  Mitral regurgitation s/p mitral valve repair  Recent aortocoronary bypass surgery with left internal mammary artery graft to the left anterior descending coronary artery and saphenous venous graft to distal obtuse marginal branch  Left atrial appendage exclusion with atriclip    Plan    Patient mentions he is back to normal  Does not want any additional cardiac testing  Wants to be discharged home this morning  His cardiac enzymes essentially have a flat trend  Will give him 2-week outpatient cardiac monitor  Keep follow-up with primary cardiologist Dr. Khoury on September 16  Monitor blood pressure and heart rate closely at home  Strict compliance to current medications  Low-sodium diet  Telemetry  Deep vein thrombosis  prophylaxis/precautions  Appropriate diet, fluid, sodium, caffeine, stimulants intake   Questions were encouraged, asked and answered to the patient's  understanding and satisfaction.  Compliance to diet and medications       Ja Rasheed MD  08/04/24  07:14 CDT    EMR Dragon/Transcription was used to dictate part of this note

## 2024-08-04 NOTE — OUTREACH NOTE
CT Surgery Week 2 Survey      Flowsheet Row Responses   [facility] facility patient discharged from? [facility] [employer]   Does the patient have one of the following disease processes/diagnoses(primary or secondary)? Cardiothoracic surgery   Week 2 attempt successful? No   Unsuccessful attempts Attempt 1   Revoke Readmitted            Opal QUINTERO - Registered Nurse

## 2024-08-04 NOTE — ED NOTES
Nursing report ED to floor  Vj Garcia  74 y.o.  male    HPI:   Chief Complaint   Patient presents with    Dizziness       Admitting doctor:   Libby Castellanos MD    Consulting provider(s):  Consults       No orders found from 7/5/2024 to 8/4/2024.             Admitting diagnosis:   The primary encounter diagnosis was Elevated troponin. Diagnoses of Atrial fibrillation, unspecified type and Dizziness were also pertinent to this visit.    Code status:   Current Code Status       Date Active Code Status Order ID Comments User Context       Prior            Allergies:   Patient has no known allergies.    Intake and Output  No intake or output data in the 24 hours ending 08/03/24 1946    Weight:       08/03/24  1501   Weight: 87.1 kg (192 lb)       Most recent vitals:   Vitals:    08/03/24 1701 08/03/24 1746 08/03/24 1831 08/03/24 1931   BP: 124/72 126/68 136/74 157/77   BP Location:       Patient Position:       Pulse: 69 70 70 77   Resp:       Temp:       TempSrc:       SpO2: 96% 94% 95% 93%   Weight:       Height:         Oxygen Therapy: .    Active LDAs/IV Access:   Lines, Drains & Airways       Active LDAs       Name Placement date Placement time Site Days    Peripheral IV 08/03/24 Right Antecubital 08/03/24  --  Antecubital  less than 1    Pacer Wires 07/23/24  1330  Atrial and Ventricular  11                    Labs (abnormal labs have a star):   Labs Reviewed   COMPREHENSIVE METABOLIC PANEL - Abnormal; Notable for the following components:       Result Value    Glucose 113 (*)     Sodium 134 (*)     Chloride 97 (*)     Calcium 8.4 (*)     Albumin 3.1 (*)     ALT (SGPT) 47 (*)     Alkaline Phosphatase 118 (*)     All other components within normal limits    Narrative:     GFR Normal >60  Chronic Kidney Disease <60  Kidney Failure <15    The GFR formula is only valid for adults with stable renal function between ages 18 and 70.   TROPONIN - Abnormal; Notable for the following components:    HS Troponin T 352  (*)     All other components within normal limits    Narrative:     High Sensitive Troponin T Reference Range:  <14.0 ng/L- Negative Female for AMI  <22.0 ng/L- Negative Male for AMI  >=14 - Abnormal Female indicating possible myocardial injury.  >=22 - Abnormal Male indicating possible myocardial injury.   Clinicians would have to utilize clinical acumen, EKG, Troponin, and serial changes to determine if it is an Acute Myocardial Infarction or myocardial injury due to an underlying chronic condition.        BNP (IN-HOUSE) - Abnormal; Notable for the following components:    proBNP 4,797.0 (*)     All other components within normal limits    Narrative:     This assay is used as an aid in the diagnosis of individuals suspected of having heart failure. It can be used as an aid in the diagnosis of acute decompensated heart failure (ADHF) in patients presenting with signs and symptoms of ADHF to the emergency department (ED). In addition, NT-proBNP of <300 pg/mL indicates ADHF is not likely.    Age Range Result Interpretation  NT-proBNP Concentration (pg/mL:      <50             Positive            >450                   Gray                 300-450                    Negative             <300    50-75           Positive            >900                  Gray                300-900                  Negative            <300      >75             Positive            >1800                  Gray                300-1800                  Negative            <300   CBC WITH AUTO DIFFERENTIAL - Abnormal; Notable for the following components:    RBC 3.33 (*)     Hemoglobin 9.9 (*)     Hematocrit 30.6 (*)     Lymphocyte % 13.3 (*)     Neutrophils, Absolute 7.99 (*)     All other components within normal limits   HIGH SENSITIVITIY TROPONIN T 2HR - Abnormal; Notable for the following components:    HS Troponin T 367 (*)     Troponin T Delta 15 (*)     All other components within normal limits    Narrative:     High Sensitive Troponin  T Reference Range:  <14.0 ng/L- Negative Female for AMI  <22.0 ng/L- Negative Male for AMI  >=14 - Abnormal Female indicating possible myocardial injury.  >=22 - Abnormal Male indicating possible myocardial injury.   Clinicians would have to utilize clinical acumen, EKG, Troponin, and serial changes to determine if it is an Acute Myocardial Infarction or myocardial injury due to an underlying chronic condition.        CBC AND DIFFERENTIAL    Narrative:     The following orders were created for panel order CBC & Differential.  Procedure                               Abnormality         Status                     ---------                               -----------         ------                     CBC Auto Differential[[id number]]        Abnormal            Final result                 Please view results for these tests on the individual orders.       Meds given in ED:   Medications   furosemide (LASIX) injection 20 mg (20 mg Intravenous Given 8/3/24 1620)     No current facility-administered medications for this encounter.       NIH Stroke Scale:       Isolation/Infection(s):  No active isolations   No active infections     COVID Testing  Collected .  Resulted .    Nursing report ED to floor:  Mental status: .  Ambulatory status: .  Precautions: .    ED nurse phone extentsion- .. [phone]

## 2024-08-04 NOTE — DISCHARGE SUMMARY
[facility] Medicine Services  DISCHARGE SUMMARY       Date of Admission: 8/3/2024  Date of Discharge:  8/4/2024  Primary Care Physician: Stephane Pedro DO    Presenting Problem/History of Present Illness:  Dizziness and palpitations    Final Discharge Diagnoses:  Active Hospital Problems    Diagnosis     **Elevated troponin     Status post two vessel coronary artery bypass, Posterior Basal LV Aneurysm Repair, MV repair  7/23/2024     Postural dizziness with near syncope     Stage 3a chronic kidney disease        Consults: Cardiology per Dr. Rasheed    Procedures Performed: None    Pertinent Test Results:   Results for orders placed during the hospital encounter of 07/23/24    Adult Transthoracic Echo Complete W/ Cont if Necessary Per Protocol    Interpretation Summary    Left ventricular systolic function is moderately decreased. Left ventricular ejection fraction appears to be 36 - 40%.    The left ventricular cavity is mildly dilated.    Left ventricular wall thickness is consistent with mild concentric hypertrophy.    Mildly reduced right ventricular systolic function noted.    Mild aortic valve regurgitation is present.      Imaging Results (All)       Procedure Component Value Units Date/Time    XR Chest 1 View [[id number]] Collected: 08/03/24 1553     Updated: 08/03/24 1558    Narrative:      EXAM: XR CHEST 1 VW-      DATE: 8/3/2024 2:49 PM     HISTORY: Rhythem change       COMPARISON: 7/29/2024.     TECHNIQUE:  Frontal view(s) of the chest submitted.     FINDINGS:    The patient is status post median sternotomy and CABG and left atrial  appendage occlusion. There is stable ill-defined opacity at the left  lung base likely represents a small left pleural effusion with  associated atelectasis. Lungs are otherwise grossly clear. No  pneumothorax is seen. There is right glenohumeral joint osteoarthritis  and bilateral AC joint arthropathy.          Impression:         1.  Postoperative chest and stable small left pleural effusion with  associated atelectasis.     This report was signed and finalized on 8/3/2024 3:55 PM by Dimas Gomez.             LAB RESULTS:      Lab 08/04/24  0409 08/03/24  1529 07/29/24  0336   WBC 10.58 10.72 8.52   HEMOGLOBIN 9.2* 9.9* 8.8*   HEMATOCRIT 29.1* 30.6* 27.4*   PLATELETS 333 380 183   NEUTROS ABS  --  7.99*  --    IMMATURE GRANS (ABS)  --  0.05  --    LYMPHS ABS  --  1.43  --    MONOS ABS  --  0.83  --    EOS ABS  --  0.38  --    MCV 92.7 91.9 91.6         Lab 08/04/24  0409 08/03/24  1529 07/29/24  0336   SODIUM 136 134* 133*   POTASSIUM 4.0 4.3 4.2   CHLORIDE 98 97* 97*   CO2 25.0 28.0 26.0   ANION GAP 13.0 9.0 10.0   BUN 12 10 27*   CREATININE 1.17 1.08 1.12   EGFR 66.2 72.9 69.8   GLUCOSE 84 113* 107*   CALCIUM 8.3* 8.4* 8.3*         Lab 08/03/24  1529   TOTAL PROTEIN 7.0   ALBUMIN 3.1*   GLOBULIN 3.9   ALT (SGPT) 47*   AST (SGOT) 39   BILIRUBIN 0.7   ALK PHOS 118*         Lab 08/04/24  0011 08/03/24  1734 08/03/24  1529   PROBNP  --   --  4,797.0*   HSTROP T 352* 367* 352*                 Brief Urine Lab Results  (Last result in the past 365 days)        Color   Clarity   Blood   Leuk Est   Nitrite   Protein   CREAT   Urine HCG        07/22/24 1250 Yellow   Clear   Negative   Negative   Negative   Negative                 Microbiology Results (last 10 days)       ** No results found for the last 240 hours. **          Microbiology Results (last 10 days)       ** No results found for the last 240 hours. **             Hospital Course:   Vj Garcia is a 74-year-old white male with a history of COPD, elevated cholesterol, BPH, coronary artery disease, hypertension, urinary frequency and status post two vessel coronary artery bypass, Posterior Basal LV Aneurysm Repair, MV repair 7/23/2024 per Dr. Maciel.  Patient was discharged home on 7/29/2024, with plans to follow-up with RAMIRO Holbrook on Monday.  Patient [facility] to [facility]  emergency department on 8/3/2024 with complaints of dizziness and palpitations and he was concerned his atrial fibrillation was getting out of control.  Started noticing the fluttering/palpitations of his heart in the morning and had an episode of postural dizziness with some blurry vision.  Patient denies any syncope, fall, or loss of consciousness with hitting his head..  Patient denies any chest pain or shortness of breath, denies any cough, febrile episodes or chills.  ED course vital signs are stable, troponin elevation of 352/367/352.  proBNP of 4797, CBC-hemoglobin 9.9, platelet 380, WBC 10.7, CMP-, Ca 8.4, .  Significant review labs EKG normal sinus rhythm of 71.  Chest x-ray postoperative stable and chest with small left pleural effusion with associated atelectasis.  The patient was admitted for observation, elevated troponin and cardiology to assess due to recent CABG and troponin elevation.    Elevated troponin/status post two-vessel coronary artery bypass, posterior basal LV aneurysm repair, MV repair-patient was evaluated overnight without complaints of shortness of breath, palpitations or chest pain.  Overnight telemetry revealed sinus rhythm 64-88.  Dr. Rasheed with cardiology was consulted with recommendations for patient to keep his scheduled follow-up with Tosha Hart and for a 14-day Zio patch to be placed for evaluation of atrial fibrillation.    CKD stage III-patient is maintained kidney function within normal limits during hospitalization and at discharge.    Postural dizziness with near syncope-patient's vital signs have remained stable, normal sinus rhythm, no orthostasis and this morning patient's dizziness has completely resolved.    This morning patient is resting comfortably on the side of the bed with his wife at bedside.  Patient is alert and oriented and able to participate in assessment and discharge planning.  Patient is on room air without complaints of shortness of  "breath or chest pain.  Patient states he feels great and is ready to be discharged home.  Last understanding of Dr. Rasheed's plan of care and for patient to keep his scheduled follow-up with RAMIRO Holbrook in the morning.  All patient's questions were answered to the best of my ability and patient and his wife are agreeable for discharge home today.    Patient has been evaluated today 08/04/24 and is stable for discharge. Patient agrees with plan to discharge on 08/04/24    Physical Exam on Discharge:  /75 (BP Location: Right arm, Patient Position: Lying)   Pulse 72   Temp 98.2 °F (36.8 °C) (Oral)   Resp 16   Ht 177.8 cm (70\")   Wt 86.2 kg (190 lb)   SpO2 95%   BMI 27.26 kg/m²   Physical Exam  Vitals and nursing note reviewed.   Constitutional:       Appearance: Normal appearance.      Comments: Patient is resting comfortably side of the bed with his wife at bedside.   HENT:      Head: Normocephalic.      Nose: Nose normal.      Mouth/Throat:      Mouth: Mucous membranes are moist.      Pharynx: Oropharynx is clear.   Eyes:      Pupils: Pupils are equal, round, and reactive to light.   Cardiovascular:      Rate and Rhythm: Normal rate and regular rhythm.      Pulses: Normal pulses.      Heart sounds: Normal heart sounds.   Pulmonary:      Effort: Pulmonary effort is normal.   Abdominal:      General: Abdomen is flat.      Palpations: Abdomen is soft.   Genitourinary:     Comments: Voiding per urinal  Musculoskeletal:         General: Normal range of motion.   Skin:     General: Skin is warm and dry.      Capillary Refill: Capillary refill takes less than 2 seconds.      Comments: Postoperative midsternal incision D/C/I   Neurological:      General: No focal deficit present.      Mental Status: He is alert and oriented to person, place, and time.   Psychiatric:         Mood and Affect: Mood normal.         Behavior: Behavior normal.         Thought Content: Thought content normal.         Judgment: " Judgment normal.       Condition on Discharge: Stable for discharge home with cardiac surgery on Monday    Discharge Disposition:  Home or Self Care    Discharge Medications:     Discharge Medications        Continue These Medications        Instructions Start Date   amiodarone 400 MG tablet  Commonly known as: PACERONE   400 mg, Oral, Daily      apixaban 5 MG tablet tablet  Commonly known as: ELIQUIS   5 mg, Oral, 2 Times Daily      aspirin 81 MG EC tablet   81 mg, Oral, Daily      atorvastatin 40 MG tablet  Commonly known as: LIPITOR   40 mg, Oral, Daily      carvedilol 6.25 MG tablet  Commonly known as: COREG   6.25 mg, Oral, Every 12 Hours Scheduled      HYDROcodone-acetaminophen 5-325 MG per tablet  Commonly known as: Norco   1 tablet, Oral, Every 6 Hours PRN      lisinopril 5 MG tablet  Commonly known as: PRINIVIL,ZESTRIL   5 mg, Oral, Daily      multivitamin tablet tablet   1 tablet, Oral, Daily      pantoprazole 40 MG EC tablet  Commonly known as: PROTONIX   40 mg, Oral, Every Early Morning             Stop These Medications      furosemide 20 MG tablet  Commonly known as: LASIX     potassium chloride 10 MEQ CR capsule  Commonly known as: MICRO-K              Discharge Diet:   Diet Instructions       Diet: Cardiac Diets; Healthy Heart (2-3 Na+); Thin (IDDSI 0)      Discharge Diet: Cardiac Diets    Cardiac Diet: Healthy Heart (2-3 Na+)    Fluid Consistency: Thin (IDDSI 0)            Activity at Discharge:   Activity Instructions       Other Activity Restrictions      Type of Restriction: Other    Explain Other Restrictions: Continue previous striction's from cardiothoracic surgery.            Discharge Instructions:   1.  Patient was instructed to return for medical attention for any new or worsening chest pain, shortness of breath, palpitations or edema.  2.  Patient is instructed to follow-up with his primary care provider as previously scheduled.  3.  Patient is to keep his scheduled follow-up with Tosha  RAMIRO Hart in the morning.  4.  Patient was instructed to discontinue his Lasix as previously instructed by cardiothoracic surgery.  5.  Patient was instructed by cardiology technician on the proper management, care and return of Zio patch, per Dr. Rasheed's orders.    Follow-up Appointments:   Future Appointments   Date Time Provider Department Center   8/5/2024  1:30 PM Tosha Hart APRN [facility]  [facility]   8/9/2024  1:15 PM SILKE Poon DO [facility]   8/26/2024 11:00 AM [facility] ECHO ROOM 2  [facility]ARDI [facility]   9/9/2024  1:15 PM Kobe Maciel MD [facility]  [facility]   9/16/2024 10:45 AM Brennan Khoury DO [facility]   10/10/2024 10:00 AM Brennan Khoury DO [facility]       Test Results Pending at Discharge: Zio patch 14 days    Electronically signed by RAMIRO Collier, 08/04/24, 09:11 CDT.    Time: 35   minutes.

## 2024-08-04 NOTE — PLAN OF CARE
Goal Outcome Evaluation:  Plan of Care Reviewed With: patient, spouse           Outcome Evaluation: pt is alert and oriented x 4. Pleasant and cooperative with cares. While going over med list, pt is only taking coreg 1 time a day in the morning. Educated about home meds. NSR 67-88 Will continue to monitor.

## 2024-08-04 NOTE — H&P
[facility] Medicine Services  HISTORY AND PHYSICAL    Date of Admission: 8/3/2024  Primary Care Physician: Stephane Pedro,     Subjective   Primary Historian: Patient and family    Chief Complaint: Dizziness and palpitations      Dizziness      The patient is a 74-year-old male with a PMH significant for A-fib on anticoagulation, CAD s/p recent CABG 7/23/2024, COPD, CKD, HTN presented to the ED on 8/3/2024 on account of dizziness and palpitations and concerned his A-fib may be getting uncontrolled.  Patient started noticing the fluttering/palpitations of his heart in the morning and had an episode of dizziness with some blurry vision but denies any falls or loss of consciousness or hitting his head chest pain or shortness of breath.  He denies any change in his urinary or bowel habits, denies any cough fever or chills.  In the ED vital signs are stable mild labs-troponin 352/367, proBNP 4797, CBC-Hgb 9.9, , WBC 10.7, CMP-sodium 134, calcium 8.4  4 significant reviewed labs EKG NSR with rate of 71.  Chest x-ray postoperative chest stable small left pleural effusion with associated atelectasis  The patient will be admitted for observation for elevated troponin and will consult cardiology on account of recent CABG. Patient is full code at this time and home medications will be resumed once verified.        Review of Systems   Neurological:  Positive for dizziness.      Otherwise complete ROS reviewed and negative except as mentioned in the HPI.    Past Medical History:   Past Medical History:   Diagnosis Date    COPD (chronic obstructive pulmonary disease)     Elevated cholesterol     Enlarged prostate     History of heart attack     Hypertension     Urination frequency     Weak urine stream      Past Surgical History:  Past Surgical History:   Procedure Laterality Date    ATRIAL APPENDAGE EXCLUSION LEFT WITH TRANSESOPHAGEAL ECHOCARDIOGRAM N/A 7/23/2024     Procedure: ATRIAL APPENDAGE EXCLUSION LEFT WITH TRANSESOPHAGEAL ECHOCARDIOGRAM;  Surgeon: Kobe Maciel MD;  Location:  [facility] OR;  Service: Cardiothoracic;  Laterality: N/A;    BACK SURGERY      x3    CARDIAC CATHETERIZATION N/A 07/12/2024    Procedure: Left Heart Cath;  Surgeon: Brennan Khoury DO;  Location:  [facility] CATH INVASIVE LOCATION;  Service: Cardiovascular;  Laterality: N/A;    CORONARY ARTERY BYPASS GRAFT N/A 7/23/2024    Procedure: CORONARY ARTERY BYPASS GRAFTING X2, LEFT INTERNAL MAMMARY ARTERY GRAFT, RIGHT LEG OPEN VEIN HARVEST, MITRAL VALVE REAPIR, LEFT VENTRICULAR ANEURYSM REPAIR,  ATRIAL APPENDAGE EXCLUSION LEFT 45 ATRICLIP WITH TRANSESOPHAGEAL ECHOCARDIOGRAM;  Surgeon: Kobe Maciel MD;  Location:  [facility] OR;  Service: Cardiothoracic;  Laterality: N/A;    EXCISION      fatty patch on back    HERNIA REPAIR      MITRAL VALVE REPAIR/REPLACEMENT N/A 7/23/2024    Procedure: POSSIBLE MITRAL VALVE REPAIR/REPLACEMENT;  Surgeon: Kobe Maciel MD;  Location:  [facility] OR;  Service: Cardiothoracic;  Laterality: N/A;     Social History:  reports that he quit smoking about 52 years ago. His smoking use included cigarettes. He started smoking about 2 weeks ago. He has never used smokeless tobacco. He reports current alcohol use. He reports that he does not currently use drugs.    Family History: family history includes Heart disease in his mother.       Allergies:  No Known Allergies    Medications:  Prior to Admission medications    Medication Sig Start Date End Date Taking? Authorizing Provider   amiodarone (PACERONE) 400 MG tablet Take 1 tablet by mouth Daily for 30 days. 7/29/24 8/28/24  Tosha Hart APRN   apixaban (ELIQUIS) 5 MG tablet tablet Take 1 tablet by mouth 2 (Two) Times a Day. 7/29/24   Tosha Hart APRN   aspirin 81 MG EC tablet Take 1 tablet by mouth Daily. 7/10/24   Brennan Khoury DO   atorvastatin (LIPITOR) 40 MG tablet Take 1 tablet by mouth Daily. 7/16/24    "Mag Awan APRN   carvedilol (COREG) 6.25 MG tablet Take 1 tablet by mouth Every 12 (Twelve) Hours. 7/29/24   Tosha Hart APRN   furosemide (LASIX) 20 MG tablet Take 1 tablet by mouth Daily for 5 days. 7/29/24 8/3/24  Tosha Hart APRN   HYDROcodone-acetaminophen (Norco) 5-325 MG per tablet Take 1 tablet by mouth Every 6 (Six) Hours As Needed for Moderate Pain. 7/29/24   Tosha Hart APRN   lisinopril (PRINIVIL,ZESTRIL) 5 MG tablet Take 1 tablet by mouth Daily. 7/10/24   Brennan Khoury,    multivitamin (MULTI VITAMIN DAILY PO) Take 1 tablet by mouth Daily.    Provider, MD Regulo   pantoprazole (PROTONIX) 40 MG EC tablet Take 1 tablet by mouth Every Morning. 7/30/24   Tosha Hart APRN   potassium chloride (MICRO-K) 10 MEQ CR capsule Take 1 capsule by mouth Daily for 5 days. 7/29/24 8/3/24  Tosha Hart APRN     I have utilized all available immediate resources to obtain, update, or review the patient's current medications (including all prescriptions, over-the-counter products, herbals, cannabis/cannabidiol products, and vitamin/mineral/dietary (nutritional) supplements).    Objective     Vital Signs: /77   Pulse 77   Temp 97.9 °F (36.6 °C) (Oral)   Resp 18   Ht 177.8 cm (70\")   Wt 87.1 kg (192 lb)   SpO2 93%   BMI 27.55 kg/m²   Physical Exam  Constitutional:       Appearance: Normal appearance. He is normal weight.   HENT:      Head: Normocephalic.      Ears:      Comments: Hard of hearing     Nose: Nose normal.      Mouth/Throat:      Mouth: Mucous membranes are moist.   Eyes:      Pupils: Pupils are equal, round, and reactive to light.   Cardiovascular:      Rate and Rhythm: Normal rate and regular rhythm.      Pulses: Normal pulses.      Heart sounds: Normal heart sounds.   Pulmonary:      Effort: Pulmonary effort is normal.      Breath sounds: Normal breath sounds.   Abdominal:      General: Bowel sounds are normal.      Palpations: Abdomen is soft. "   Musculoskeletal:         General: Normal range of motion.      Cervical back: Neck supple.   Skin:     General: Skin is warm and dry.   Neurological:      General: No focal deficit present.      Mental Status: He is alert and oriented to person, place, and time.   Psychiatric:         Mood and Affect: Mood normal.      Results Reviewed:  Lab Results (last 24 hours)       Procedure Component Value Units Date/Time    High Sensitivity Troponin T 2Hr [[id number]]  (Abnormal) Collected: 08/03/24 1734    Specimen: Blood Updated: 08/03/24 1816     HS Troponin T 367 ng/L      Troponin T Delta 15 ng/L     Narrative:      High Sensitive Troponin T Reference Range:  <14.0 ng/L- Negative Female for AMI  <22.0 ng/L- Negative Male for AMI  >=14 - Abnormal Female indicating possible myocardial injury.  >=22 - Abnormal Male indicating possible myocardial injury.   Clinicians would have to utilize clinical acumen, EKG, Troponin, and serial changes to determine if it is an Acute Myocardial Infarction or myocardial injury due to an underlying chronic condition.         High Sensitivity Troponin T [[id number]]  (Abnormal) Collected: 08/03/24 1529    Specimen: Blood Updated: 08/03/24 1606     HS Troponin T 352 ng/L     Narrative:      High Sensitive Troponin T Reference Range:  <14.0 ng/L- Negative Female for AMI  <22.0 ng/L- Negative Male for AMI  >=14 - Abnormal Female indicating possible myocardial injury.  >=22 - Abnormal Male indicating possible myocardial injury.   Clinicians would have to utilize clinical acumen, EKG, Troponin, and serial changes to determine if it is an Acute Myocardial Infarction or myocardial injury due to an underlying chronic condition.         Comprehensive Metabolic Panel [[id number]]  (Abnormal) Collected: 08/03/24 1529    Specimen: Blood Updated: 08/03/24 1600     Glucose 113 mg/dL      BUN 10 mg/dL      Creatinine 1.08 mg/dL      Sodium 134 mmol/L      Potassium 4.3 mmol/L      Chloride 97 mmol/L       CO2 28.0 mmol/L      Calcium 8.4 mg/dL      Total Protein 7.0 g/dL      Albumin 3.1 g/dL      ALT (SGPT) 47 U/L      AST (SGOT) 39 U/L      Alkaline Phosphatase 118 U/L      Total Bilirubin 0.7 mg/dL      Globulin 3.9 gm/dL      A/G Ratio 0.8 g/dL      BUN/Creatinine Ratio 9.3     Anion Gap 9.0 mmol/L      eGFR 72.9 mL/min/1.73     Narrative:      GFR Normal >60  Chronic Kidney Disease <60  Kidney Failure <15    The GFR formula is only valid for adults with stable renal function between ages 18 and 70.    BNP [[id number]]  (Abnormal) Collected: 08/03/24 1529    Specimen: Blood Updated: 08/03/24 1556     proBNP 4,797.0 pg/mL     Narrative:      This assay is used as an aid in the diagnosis of individuals suspected of having heart failure. It can be used as an aid in the diagnosis of acute decompensated heart failure (ADHF) in patients presenting with signs and symptoms of ADHF to the emergency department (ED). In addition, NT-proBNP of <300 pg/mL indicates ADHF is not likely.    Age Range Result Interpretation  NT-proBNP Concentration (pg/mL:      <50             Positive            >450                   Gray                 300-450                    Negative             <300    50-75           Positive            >900                  Gray                300-900                  Negative            <300      >75             Positive            >1800                  Gray                300-1800                  Negative            <300    CBC & Differential [[id number]]  (Abnormal) Collected: 08/03/24 1529    Specimen: Blood Updated: 08/03/24 1540    Narrative:      The following orders were created for panel order CBC & Differential.  Procedure                               Abnormality         Status                     ---------                               -----------         ------                     CBC Auto Differential[[id number]]        Abnormal            Final result                 Please view results  for these tests on the individual orders.    CBC Auto Differential [[id number]]  (Abnormal) Collected: 08/03/24 1529    Specimen: Blood Updated: 08/03/24 1540     WBC 10.72 10*3/mm3      RBC 3.33 10*6/mm3      Hemoglobin 9.9 g/dL      Hematocrit 30.6 %      MCV 91.9 fL      MCH 29.7 pg      MCHC 32.4 g/dL      RDW 13.9 %      RDW-SD 47.0 fl      MPV 8.7 fL      Platelets 380 10*3/mm3      Neutrophil % 74.6 %      Lymphocyte % 13.3 %      Monocyte % 7.7 %      Eosinophil % 3.5 %      Basophil % 0.4 %      Immature Grans % 0.5 %      Neutrophils, Absolute 7.99 10*3/mm3      Lymphocytes, Absolute 1.43 10*3/mm3      Monocytes, Absolute 0.83 10*3/mm3      Eosinophils, Absolute 0.38 10*3/mm3      Basophils, Absolute 0.04 10*3/mm3      Immature Grans, Absolute 0.05 10*3/mm3      nRBC 0.0 /100 WBC           Imaging Results (Last 24 Hours)       Procedure Component Value Units Date/Time    XR Chest 1 View [[id number]] Collected: 08/03/24 1553     Updated: 08/03/24 1558    Narrative:      EXAM: XR CHEST 1 VW-      DATE: 8/3/2024 2:49 PM     HISTORY: Rhythem change       COMPARISON: 7/29/2024.     TECHNIQUE:  Frontal view(s) of the chest submitted.     FINDINGS:    The patient is status post median sternotomy and CABG and left atrial  appendage occlusion. There is stable ill-defined opacity at the left  lung base likely represents a small left pleural effusion with  associated atelectasis. Lungs are otherwise grossly clear. No  pneumothorax is seen. There is right glenohumeral joint osteoarthritis  and bilateral AC joint arthropathy.          Impression:         1. Postoperative chest and stable small left pleural effusion with  associated atelectasis.     This report was signed and finalized on 8/3/2024 3:55 PM by Dimas Gomez.             I have personally reviewed and interpreted the radiology studies and ECG obtained at time of admission.     Assessment / Plan   Assessment:   Active Hospital Problems    Diagnosis      **Elevated troponin        Treatment Plan  The patient will be admitted to my service here at [facility].  - Elevated troponin with no chest pain or shortness of breath had some palpitations, EKG sinus rhythm, continue serial troponin monitoring, continue telemetry monitoring with as needed EKG for chest pain.  Cardiology consulted on account of recent CABG  - CAD s/p CABG on 7/23/2024 continue p.o. carvedilol, p.o. Lipitor, p.o. lisinopril with telemetry monitoring  - A-fib with RVR appears to be rate controlled, continue telemetry monitoring, continue p.o. amiodarone and p.o. Eliquis  - GERD continue p.o. pantoprazole  - Pleural effusion on chest x-ray appears to be stable s/p IV Lasix in the ED, continue to monitor  Continue current medical management and supportive care      Medical Decision Making  Number and Complexity of problems: 2  Differential Diagnosis: Stated above    Conditions and Status        Condition is unchanged.     MDM Data  External documents reviewed: Indicated  Cardiac tracing (EKG, telemetry) interpretation: Reviewed  Radiology interpretation: Reviewed  Labs reviewed: Reviewed     Decision rules/scores evaluated (example [id number], Wells, etc): Patient on anticoagulation already     Discussed with: Patient and family     Care Planning  Shared decision making: Patient and family  Code status and discussions: Full code    Disposition  Social Determinants of Health that impact treatment or disposition: Hopefully home tomorrow  Estimated length of stay is 1 to 2 days.     I confirmed that the patient's advanced care plan is present, code status is documented, and a surrogate decision maker is listed in the patient's medical record.     The patient's surrogate decision maker is family  The patient was seen and examined by me on 8/3/2024 at 1950.    Electronically signed by Libby Castellanos MD, 08/03/24, 20:12 CDT.

## 2024-08-05 ENCOUNTER — OFFICE VISIT (OUTPATIENT)
Dept: CARDIAC SURGERY | Facility: CLINIC | Age: 74
End: 2024-08-05
Payer: MEDICARE

## 2024-08-05 ENCOUNTER — TELEPHONE (OUTPATIENT)
Dept: CARDIOLOGY | Facility: CLINIC | Age: 74
End: 2024-08-05
Payer: MEDICARE

## 2024-08-05 ENCOUNTER — TRANSITIONAL CARE MANAGEMENT TELEPHONE ENCOUNTER (OUTPATIENT)
Dept: CALL CENTER | Facility: HOSPITAL | Age: 74
End: 2024-08-05
Payer: MEDICARE

## 2024-08-05 VITALS
BODY MASS INDEX: 27.17 KG/M2 | SYSTOLIC BLOOD PRESSURE: 122 MMHG | WEIGHT: 189.8 LBS | HEART RATE: 71 BPM | DIASTOLIC BLOOD PRESSURE: 79 MMHG | HEIGHT: 70 IN | OXYGEN SATURATION: 97 %

## 2024-08-05 DIAGNOSIS — I25.119 CORONARY ARTERY DISEASE INVOLVING NATIVE CORONARY ARTERY OF NATIVE HEART WITH ANGINA PECTORIS: Primary | ICD-10-CM

## 2024-08-05 DIAGNOSIS — N18.31 STAGE 3A CHRONIC KIDNEY DISEASE: ICD-10-CM

## 2024-08-05 DIAGNOSIS — I25.3 LEFT VENTRICULAR ANEURYSM: ICD-10-CM

## 2024-08-05 PROCEDURE — 1160F RVW MEDS BY RX/DR IN RCRD: CPT | Performed by: NURSE PRACTITIONER

## 2024-08-05 PROCEDURE — 99024 POSTOP FOLLOW-UP VISIT: CPT | Performed by: NURSE PRACTITIONER

## 2024-08-05 PROCEDURE — 1159F MED LIST DOCD IN RCRD: CPT | Performed by: NURSE PRACTITIONER

## 2024-08-05 NOTE — PROGRESS NOTES
Subjective   Chief Complaint   Patient presents with    Post-op Follow-up     Patient had CABG x 2 on 7/23. Labs today.       Patient ID: Vj Garcia is a 74 y.o. male who is here for follow-up having had Posterior Basal LV Aneurysm Repair (Resection of Aneurysm Wall with Linear Closure, MV repair (Hollis Stitch at A2/P2), CABG x2 (LIMA to LAD, SVG to distal OM), LAAE with 45 mm Atriclip by Dr. Maciel on 7/23/2024     History of Present Illness  Mr. Garcia is a 74-year-old male who underwent the above listed procedure by Dr. Maciel on 7/23/2024.  Postoperative recovery was significant for pain control and diuresis.  Repeat transthoracic echo postoperatively revealed EF 36 to 40%.  He did have intermittent atrial fibrillation postoperatively and converted to sinus rhythm with amiodarone protocol.  Weight at the time of discharge was 4 pounds above his baseline and he was sent home with Lasix 20 mg daily for 5 days.  Weight today is down almost 10 pounds since discharge.  He continues on Eliquis and aspirin.  On 8/3/2024, he presented to the ER with complaints of dizziness and palpitations and was concerned he was back in atrial fibrillation.  EKG revealed normal sinus rhythm with rates in the 70s.  He does admit that he forgot to take his medications the day prior.  He was kept for observation and ultimately discharged home with a 14-day Zio patch per Dr. Rasheed on 8/4/2024.  He reports that this morning he developed tightness in his throat after taking Protonix.  This has somewhat improved but he continues to have hoarseness.  Significant decrease in appetite postoperatively and patient states he has been able to take a few sips of milkshakes otherwise nothing sounds good to eat.  He is ambulating without difficulty.  Occasional sternal click while lying flat in the bed.    The following portions of the patient's history were reviewed and updated as appropriate: allergies, current medications, past family history,  "past medical history, past social history, past surgical history and problem list.    Review of Systems   Constitutional:  Positive for fatigue. Negative for chills, diaphoresis and fever.   HENT:  Negative for trouble swallowing and voice change.    Eyes:  Negative for visual disturbance.   Respiratory:  Negative for chest tightness and shortness of breath.    Cardiovascular:  Negative for chest pain, palpitations and leg swelling.   Gastrointestinal:  Negative for abdominal pain, diarrhea, nausea and vomiting.   Musculoskeletal:  Negative for arthralgias and myalgias.   Skin:  Negative for color change, pallor, rash and wound.   Neurological:  Negative for dizziness, syncope and light-headedness.   Psychiatric/Behavioral:  Negative for agitation, confusion and sleep disturbance.        Objective   Visit Vitals  /79 (BP Location: Right arm, Patient Position: Sitting, Cuff Size: Adult)   Pulse 71   Ht 177.8 cm (70\")   Wt 86.1 kg (189 lb 12.8 oz)   SpO2 97%   BMI 27.23 kg/m²       Physical Exam  Vitals reviewed.   Constitutional:       General: He is not in acute distress.  HENT:      Head: Normocephalic.   Eyes:      Pupils: Pupils are equal, round, and reactive to light.   Cardiovascular:      Rate and Rhythm: Normal rate and regular rhythm.      Heart sounds: Normal heart sounds. No murmur heard.  Pulmonary:      Breath sounds: Normal breath sounds. No wheezing or rales.   Abdominal:      General: There is no distension.      Palpations: Abdomen is soft.      Tenderness: There is no abdominal tenderness.   Musculoskeletal:         General: No swelling or tenderness.   Skin:     General: Skin is warm and dry.      Comments: Sternum is stable, no clicks.  Sternal incision is clean dry and intact and healing nicely.  Saphenectomy site is clean dry and intact.   Neurological:      General: No focal deficit present.      Mental Status: He is alert and oriented to person, place, and time.   Psychiatric:         " Mood and Affect: Mood normal.         Thought Content: Thought content normal.         Judgment: Judgment normal.             Assessment & Plan       Diagnoses and all orders for this visit:    1. Coronary artery disease involving native coronary artery of native heart with angina pectoris (Primary)    2. Left ventricular aneurysm    3. Stage 3a chronic kidney disease           Overall, Vj Garcia is doing well.  Continue Zio patch as ordered by Dr. Rasheed.  His wife and daughter are present today and his daughter states she has  his medications in a pillbox so that hopefully he does not miss taking any more medications.  We will decrease amiodarone to 200 mg daily to be taken for the next 4 weeks and then okay to discontinue.  Discontinue Protonix, if patient has any issues with reflux I have advised him to get omeprazole or Pepcid over-the-counter.  We discussed the importance of good nutrition postoperatively to aid in wound healing.  He should drink protein shakes 3 times daily and he verbalizes understanding to this.  We discussed current sternotomy precautions and how these will not be advanced yet.  I have advised him he should sleep propped up or in the chair to avoid lying flat or turning over in the bed at night given sternal click.  I do not appreciate a sternal click on exam today.  Sternal incision is healing nicely.  Following post op cardiac surgery home instructions. Provided support and encouragement. All questions have been answered to the best of my ability. Will discuss starting cardiac rehabilitation at official 1 month post op visit. Patient has follow up with Dr. Maciel in a few weeks. Patient has follow up with Cardiology in a few weeks.  He has appointment with Dr. Poon later this week.  Patient has been instructed to contact our office with any questions or concerns should they arise prior to the next office visit.  I will see him back in 2 weeks for recheck or sooner if  needed.    Vj Garcia is a non-smoker and therefore does not need tobacco cessation education/counseling.      Advance Care Planning   ACP discussion was held with the patient during this visit. Patient does not have an advance directive, declines further assistance.

## 2024-08-05 NOTE — TELEPHONE ENCOUNTER
Caller: Carolyn Dixon    Relationship: Emergency Contact    Best call back number: [phone]     What is the best time to reach you: ANYTIME    Who are you requesting to speak with (clinical staff, provider,  specific staff member): ANYONE    Do you know the name of the person who called:     What was the call regarding: CAROLYN CALLED IN STATING PATIENT HAS AN ECHO ON 8.26.24. DR. MCDONALD DONE OPEN HEART SURGERY ON 7.23.24. CAROLYN ASKS IF ECHO IS OKAY BEING ON 8.26.24 OR IF PATIENT NEEDS TO PUSH IT OUT CLOSER TO APPOINTMENT WITH DR. MARTINEZ ON 9.16.24    Is it okay if the provider responds through [employer]: NO, PLEASE CALL.

## 2024-08-05 NOTE — OUTREACH NOTE
Call Center TCM Note      Flowsheet Row Responses   [facility] patient discharged from? [place]   Does the patient have one of the following disease processes/diagnoses(primary or secondary)? Other   TCM attempt successful? No  [no verbal release on file]   Unsuccessful attempts Attempt 1   Call Status Left message            Phyllis Apple RN    8/5/2024, 12:55 CDT

## 2024-08-05 NOTE — OUTREACH NOTE
Call Center TCM Note      Flowsheet Row Responses   [facility] patient discharged from? [place]   Does the patient have one of the following disease processes/diagnoses(primary or secondary)? Other   TCM attempt successful? No   Unsuccessful attempts Attempt 2            Tosha Dunbar RN    8/5/2024, 15:23 EDT

## 2024-08-06 ENCOUNTER — TRANSITIONAL CARE MANAGEMENT TELEPHONE ENCOUNTER (OUTPATIENT)
Dept: CALL CENTER | Facility: HOSPITAL | Age: 74
End: 2024-08-06
Payer: MEDICARE

## 2024-08-06 NOTE — OUTREACH NOTE
Call Center TCM Note      Flowsheet Row Responses   [facility] patient discharged from? [place]   Does the patient have one of the following disease processes/diagnoses(primary or secondary)? Other   TCM attempt successful? No   Unsuccessful attempts Attempt 3  [New patient appt, no PCP verbal listed for office]            Meena Jameson RN    8/6/2024, 13:13 CDT

## 2024-08-08 ENCOUNTER — TELEPHONE (OUTPATIENT)
Dept: CARDIAC SURGERY | Facility: CLINIC | Age: 74
End: 2024-08-08

## 2024-08-08 NOTE — TELEPHONE ENCOUNTER
He may speak to his PCP but he may just need time to get back on regular schedule, practice good sleep hygiene.

## 2024-08-08 NOTE — TELEPHONE ENCOUNTER
"Pt calling re: sleep medication. He states he is having a lot of trouble sleeping since his surgery and would like something prescribed to help him with this. I advised we typically do not prescribes any sleep aid medication after surgery, we suggest melatonin. Patient states he has been \"eating those like candy\" and they have done nothing for him. I advised that Dr Maciel/Tosha CHAMBERS are out of the office for the next week, so it would likely be Tosha QUINTERO or Tosha STERN that returns his call. He uses [facility] in [place]. He can be reached at [phone].   "

## 2024-08-09 ENCOUNTER — OFFICE VISIT (OUTPATIENT)
Dept: INTERNAL MEDICINE | Facility: CLINIC | Age: 74
End: 2024-08-09
Payer: MEDICARE

## 2024-08-09 VITALS
DIASTOLIC BLOOD PRESSURE: 68 MMHG | SYSTOLIC BLOOD PRESSURE: 82 MMHG | WEIGHT: 188.8 LBS | HEIGHT: 70 IN | BODY MASS INDEX: 27.03 KG/M2 | OXYGEN SATURATION: 96 % | HEART RATE: 129 BPM | TEMPERATURE: 97.6 F

## 2024-08-09 DIAGNOSIS — N18.31 STAGE 3A CHRONIC KIDNEY DISEASE: ICD-10-CM

## 2024-08-09 DIAGNOSIS — F17.201 TOBACCO ABUSE, IN REMISSION: ICD-10-CM

## 2024-08-09 DIAGNOSIS — I48.0 PAF (PAROXYSMAL ATRIAL FIBRILLATION): ICD-10-CM

## 2024-08-09 DIAGNOSIS — G47.09 OTHER INSOMNIA: ICD-10-CM

## 2024-08-09 DIAGNOSIS — Z76.89 ENCOUNTER TO ESTABLISH CARE WITH NEW DOCTOR: Primary | ICD-10-CM

## 2024-08-09 DIAGNOSIS — I25.119 CORONARY ARTERY DISEASE INVOLVING NATIVE CORONARY ARTERY OF NATIVE HEART WITH ANGINA PECTORIS: ICD-10-CM

## 2024-08-09 DIAGNOSIS — I51.89 SYSTOLIC DYSFUNCTION WITHOUT HEART FAILURE: ICD-10-CM

## 2024-08-09 DIAGNOSIS — I25.5 ISCHEMIC CARDIOMYOPATHY: ICD-10-CM

## 2024-08-09 DIAGNOSIS — Z95.1 STATUS POST TWO VESSEL CORONARY ARTERY BYPASS: ICD-10-CM

## 2024-08-09 LAB
QT INTERVAL: 424 MS
QTC INTERVAL: 450 MS
QTC INTERVAL: 460 MS
QTC INTERVAL: 460 MS

## 2024-08-09 RX ORDER — ZOLPIDEM TARTRATE 5 MG/1
5 TABLET ORAL NIGHTLY PRN
Qty: 14 TABLET | Refills: 0 | Status: SHIPPED | OUTPATIENT
Start: 2024-08-09

## 2024-08-09 NOTE — PROGRESS NOTES
"    Chief Complaint  Establish Care (R leg numbness since his surgery, took artery out.//Length of time before his eating and sleeping habits get back to normal.//Both hands have been swollen since surgery.)    Subjective        Vj Garcia presents to [facility]  History of Present Illness  See below.     Objective   Vital Signs:  BP (!) 82/68 (BP Location: Left arm, Patient Position: Sitting, Cuff Size: Adult)   Pulse (!) 129   Temp 97.6 °F (36.4 °C) (Temporal)   Ht 177.8 cm (70\")   Wt 85.6 kg (188 lb 12.8 oz)   SpO2 96%   BMI 27.09 kg/m²   Estimated body mass index is 27.09 kg/m² as calculated from the following:    Height as of this encounter: 177.8 cm (70\").    Weight as of this encounter: 85.6 kg (188 lb 12.8 oz).         Physical Exam  Constitutional:       Comments: Seen and discussed with his friend.   HENT:      Head: Normocephalic and atraumatic.   Eyes:      Conjunctiva/sclera: Conjunctivae normal.      Pupils: Pupils are equal, round, and reactive to light.   Neck:      Comments: No JVD.  Cardiovascular:      Rate and Rhythm: Normal rate and regular rhythm.      Heart sounds: Normal heart sounds.      Comments: NSR by palpation and auscultation.  Wearing a Zio patch over the left chest.  Sternotomy is well-healing.  He also looks euvolemic on exam.  Pulmonary:      Effort: Pulmonary effort is normal. No respiratory distress.      Breath sounds: Normal breath sounds.   Musculoskeletal:         General: No swelling.      Cervical back: Neck supple.   Skin:     General: Skin is warm and dry.      Findings: No rash.      Comments: Right lower extremity endoscopic saphenous harvest site is well-healing with appropriate scabbing.   Neurological:      General: No focal deficit present.      Mental Status: He is alert and oriented to person, place, and time.   Psychiatric:         Mood and Affect: Mood normal.         Behavior: Behavior normal.         Thought Content: " Thought content normal.         Judgment: Judgment normal.        Result Review :  Results for orders placed during the hospital encounter of 07/23/24    Adult Transthoracic Echo Complete W/ Cont if Necessary Per Protocol    Interpretation Summary    Left ventricular systolic function is moderately decreased. Left ventricular ejection fraction appears to be 36 - 40%.    The left ventricular cavity is mildly dilated.    Left ventricular wall thickness is consistent with mild concentric hypertrophy.    Mildly reduced right ventricular systolic function noted.    Mild aortic valve regurgitation is present.    Cardiac catheterization on 7/12 by Dr. Khoury showed multivessel coronary disease with a left ventricular aneurysm.  Moderate mitral regurgitation.  EF 36-40%.    1 view chest x-ray on 8/3 showed postoperative chest with stable small left pleural effusion with associated atelectasis.    Most recent BMP was on 8/4.  Creatinine 1.17.  Highest creatinine over the last 2 months was 1.58.  Baseline appears to be 1.2-1.3.    Most recent hepatic function panel was on 8/3 and showed a slight elevation of ALT at 47 and an albumin 3.1.  Otherwise normal.    CBC on 8/4 showed hemoglobin of 9.2.  It was 9.9 the day prior.  He had a normal hemoglobin presurgically.    Hemoglobin A1c 6.0 on 7/12.  TSH normal on 5/21.  Total cholesterol 161, HDL 25, , triglycerides 148 on 7/13.         Assessment and Plan   Diagnoses and all orders for this visit:    1. Encounter to establish care with new doctor (Primary)    2. Ischemic cardiomyopathy    3. Systolic dysfunction without heart failure    4. Coronary artery disease involving native coronary artery of native heart with angina pectoris    5. Status post two vessel coronary artery bypass, Posterior Basal LV Aneurysm Repair, MV repair  7/23/2024    6. PAF (paroxysmal atrial fibrillation)    7. Tobacco abuse, in remission    8. Stage 3a chronic kidney disease    9. Other  insomnia  -     zolpidem (Ambien) 5 MG tablet; Take 1 tablet by mouth At Night As Needed for Sleep.  Dispense: 14 tablet; Refill: 0         Presents today to establish care.  He is a previous patient of Dr. Pedro, but only saw him briefly.  He states that before May, he had not seen a regular medical doctor in many years.    He states that he had an episode of dehydration, heat exhaustion disking up ground to plant soybeans in May.  He went to the emergency department at [facility].  He was released and told to follow-up with the doctor.  He picked up Dr. Pedro.  He ultimately had some chest pain along the way and was [place] [facility]  He had some suspicious findings for coronary disease and ultimately was referred to see Dr. Khoury his office on 7/10.  He was set up for left heart catheterization and on 7/12 was found to have three-vessel coronary disease.  He has required 3 hospitalizations at [facility] since mid July.  He was brought back in on 7/23 and underwent LV aneurysm repair with CABG x 2, and left atrial appendage exclusion with Dr. Maciel.  He then required readmission on 8/3 as he was having difficulty with dizziness and near syncope.    He blames the most recent episode of going back to the hospital on irregular heart rhythm.  It sounds like he has a cardia device at home that told him he was having irregular heart rhythm again.  He did have some paroxysmal atrial fibrillation after his bypass grafting.  He has been on amiodarone and Eliquis.  He is on fairly low doses of carvedilol and lisinopril.  We made to decrease those further.  His blood pressure is 82/68 in the office today.  However, he is mentating fine and states that he feels fine.  He states his blood pressure has been running in the 110s over 70s at home.  I gave him a blood pressure log and asked him to keep a very close eye on this over the next week.  He is also going to monitor for dizziness and orthostatic changes.   He is going to check in with me at the end of next week by telephone.  I offered to call him, but he wants to call me.    He has an EF of 36-40%.  He is not presently on diuretics and looks euvolemic.  He is set to see RAMIRO Holbrook again on 8/19.  He has another echocardiogram scheduled for 8/26.  He is set to follow-up with Dr. Maciel on 9/9 and Dr. Khoury on 9/16.    He did stop smoking prior to his bypass surgery.  He has not had a cigarette since 7/15.  He feels like he has permanently quit.    He states that he has had significant difficulties with insomnia ever since transition out of the intensive care unit after his CABG.  He was instructed to take melatonin.  He has been doing this for over a week and has found it not effective at all.  He is only getting 2-3 hours of sleep per night.  He has a hard time falling asleep and then is awake again fairly quickly.  I am willing to let him trial a small dose of Ambien in the short-term.  His insurance was not going to cover temazepam.  He will check in with me next week on how this is going.      He has quite a bit of care gaps to catch up on.  I told him that we would hold off on this today as he is still feeling overwhelmed after his recent interventions.  Eventually, we will need to look into vaccinations, colorectal cancer screening, PSA testing.  He will be prepared to talk about this in more detail when he comes back the week of October 14.  I selected that date because he will have seen Dr. Khoury again on 10/10.  He knows that he can reach out sooner with problems and does plan to contact me next week about his blood pressure and sleep.      Follow Up   Return in about 2 months (around 10/14/2024) for Medicare Wellness.  Patient was given instructions and counseling regarding his condition or for health maintenance advice. Please see specific information pulled into the AVS if appropriate.      KAY Poon, DO       Electronically  signed by SILKE Poon DO, 08/09/24, 1:31 PM CDT.

## 2024-08-19 ENCOUNTER — LAB (OUTPATIENT)
Dept: LAB | Facility: HOSPITAL | Age: 74
End: 2024-08-19
Payer: MEDICARE

## 2024-08-19 ENCOUNTER — HOSPITAL ENCOUNTER (OUTPATIENT)
Dept: GENERAL RADIOLOGY | Facility: HOSPITAL | Age: 74
Discharge: HOME | End: 2024-08-19
Payer: MEDICARE

## 2024-08-19 ENCOUNTER — OFFICE VISIT (OUTPATIENT)
Dept: CARDIAC SURGERY | Facility: CLINIC | Age: 74
End: 2024-08-19
Payer: MEDICARE

## 2024-08-19 VITALS
OXYGEN SATURATION: 98 % | WEIGHT: 187 LBS | BODY MASS INDEX: 26.77 KG/M2 | HEIGHT: 70 IN | DIASTOLIC BLOOD PRESSURE: 61 MMHG | HEART RATE: 104 BPM | SYSTOLIC BLOOD PRESSURE: 105 MMHG

## 2024-08-19 DIAGNOSIS — I25.119 CORONARY ARTERY DISEASE INVOLVING NATIVE CORONARY ARTERY OF NATIVE HEART WITH ANGINA PECTORIS: ICD-10-CM

## 2024-08-19 DIAGNOSIS — I25.119 CORONARY ARTERY DISEASE INVOLVING NATIVE CORONARY ARTERY OF NATIVE HEART WITH ANGINA PECTORIS: Primary | ICD-10-CM

## 2024-08-19 DIAGNOSIS — R11.2 NAUSEA AND VOMITING, UNSPECIFIED VOMITING TYPE: ICD-10-CM

## 2024-08-19 DIAGNOSIS — N18.31 STAGE 3A CHRONIC KIDNEY DISEASE: ICD-10-CM

## 2024-08-19 DIAGNOSIS — R06.02 SHORTNESS OF BREATH: ICD-10-CM

## 2024-08-19 LAB
ANION GAP SERPL CALCULATED.3IONS-SCNC: 12 MMOL/L (ref 5–15)
BASOPHILS # BLD AUTO: 0.05 10*3/MM3 (ref 0–0.2)
BASOPHILS NFR BLD AUTO: 0.6 % (ref 0–1.5)
BUN SERPL-MCNC: 15 MG/DL (ref 8–23)
BUN/CREAT SERPL: 11.5 (ref 7–25)
CALCIUM SPEC-SCNC: 9 MG/DL (ref 8.6–10.5)
CHLORIDE SERPL-SCNC: 96 MMOL/L (ref 98–107)
CO2 SERPL-SCNC: 25 MMOL/L (ref 22–29)
CREAT SERPL-MCNC: 1.31 MG/DL (ref 0.76–1.27)
DEPRECATED RDW RBC AUTO: 51.8 FL (ref 37–54)
EGFRCR SERPLBLD CKD-EPI 2021: 57.8 ML/MIN/1.73
EOSINOPHIL # BLD AUTO: 0.18 10*3/MM3 (ref 0–0.4)
EOSINOPHIL NFR BLD AUTO: 2.1 % (ref 0.3–6.2)
ERYTHROCYTE [DISTWIDTH] IN BLOOD BY AUTOMATED COUNT: 15 % (ref 12.3–15.4)
GLUCOSE SERPL-MCNC: 132 MG/DL (ref 65–99)
HCT VFR BLD AUTO: 36.2 % (ref 37.5–51)
HGB BLD-MCNC: 11.1 G/DL (ref 13–17.7)
IMM GRANULOCYTES # BLD AUTO: 0.03 10*3/MM3 (ref 0–0.05)
IMM GRANULOCYTES NFR BLD AUTO: 0.4 % (ref 0–0.5)
LYMPHOCYTES # BLD AUTO: 1.35 10*3/MM3 (ref 0.7–3.1)
LYMPHOCYTES NFR BLD AUTO: 16 % (ref 19.6–45.3)
MCH RBC QN AUTO: 28.6 PG (ref 26.6–33)
MCHC RBC AUTO-ENTMCNC: 30.7 G/DL (ref 31.5–35.7)
MCV RBC AUTO: 93.3 FL (ref 79–97)
MONOCYTES # BLD AUTO: 0.33 10*3/MM3 (ref 0.1–0.9)
MONOCYTES NFR BLD AUTO: 3.9 % (ref 5–12)
NEUTROPHILS NFR BLD AUTO: 6.48 10*3/MM3 (ref 1.7–7)
NEUTROPHILS NFR BLD AUTO: 77 % (ref 42.7–76)
NRBC BLD AUTO-RTO: 0 /100 WBC (ref 0–0.2)
PLATELET # BLD AUTO: 288 10*3/MM3 (ref 140–450)
PMV BLD AUTO: 9.1 FL (ref 6–12)
POTASSIUM SERPL-SCNC: 5.4 MMOL/L (ref 3.5–5.2)
RBC # BLD AUTO: 3.88 10*6/MM3 (ref 4.14–5.8)
SODIUM SERPL-SCNC: 133 MMOL/L (ref 136–145)
WBC NRBC COR # BLD AUTO: 8.42 10*3/MM3 (ref 3.4–10.8)

## 2024-08-19 PROCEDURE — 71046 X-RAY EXAM CHEST 2 VIEWS: CPT

## 2024-08-19 PROCEDURE — 99024 POSTOP FOLLOW-UP VISIT: CPT | Performed by: NURSE PRACTITIONER

## 2024-08-19 PROCEDURE — 80048 BASIC METABOLIC PNL TOTAL CA: CPT

## 2024-08-19 PROCEDURE — 85025 COMPLETE CBC W/AUTO DIFF WBC: CPT

## 2024-08-19 PROCEDURE — 1160F RVW MEDS BY RX/DR IN RCRD: CPT | Performed by: NURSE PRACTITIONER

## 2024-08-19 PROCEDURE — 36415 COLL VENOUS BLD VENIPUNCTURE: CPT

## 2024-08-19 PROCEDURE — 1159F MED LIST DOCD IN RCRD: CPT | Performed by: NURSE PRACTITIONER

## 2024-08-19 NOTE — PROGRESS NOTES
Subjective   Chief Complaint   Patient presents with    Post-op Follow-up     Patient had CABG x 2 on 7/23       Patient ID: Vj Garcia is a 74 y.o. male who is here for follow-up having had Posterior Basal LV Aneurysm Repair (Resection of Aneurysm Wall with Linear Closure, MV repair (Hollis Stitch at A2/P2), CABG x2 (LIMA to LAD, SVG to distal OM), LAAE with 45 mm Atriclip by Dr. Maciel on 7/23/2024     History of Present Illness  Mr. Garcia is a 74-year-old male who underwent the above listed procedure by Dr. Maciel on 7/23/2024.  Postoperative recovery was significant for pain control and diuresis.  Repeat transthoracic echo postoperatively revealed EF 36 to 40%.  He did have intermittent atrial fibrillation postoperatively and converted to sinus rhythm with amiodarone protocol.  Weight at the time of discharge was 4 pounds above his baseline and he was sent home with Lasix 20 mg daily for 5 days.  Weight today is down almost 10 pounds since discharge.  He continues on Eliquis and aspirin.  On 8/3/2024, he presented to the ER with complaints of dizziness and palpitations and was concerned he was back in atrial fibrillation.  EKG revealed normal sinus rhythm with rates in the 70s.  He does admit that he forgot to take his medications the day prior.  He was kept for observation and ultimately discharged home with a 14-day Zio patch per Dr. Rasheed on 8/4/2024.  He is here today for recheck after previous office visit.  Initially plan to return to clinic to follow-up on sternal click, fatigue and status of increasing ambulation.  Today he reports he is having difficulty urinating, difficulty having bowel movements, difficulty breathing, difficulty eating, difficulty sleeping.  Weight today is down an additional 2 pounds since last office visit.  He is not being diuresed.  He remains well below his baseline weight.  He states he has not eating or drinking.  He has had 1 cup of coffee today and no other fluids.  He is  "having minimal bowel movements and is only going with the help of an enema every 3 days.  Heart rate remains elevated in the 120s.  Zio patch remains in place and he is due to have this taken off today.      The following portions of the patient's history were reviewed and updated as appropriate: allergies, current medications, past family history, past medical history, past social history, past surgical history and problem list.    Review of Systems   Constitutional:  Positive for fatigue. Negative for chills, diaphoresis and fever.   HENT:  Negative for trouble swallowing and voice change.    Eyes:  Negative for visual disturbance.   Respiratory:  Positive for shortness of breath. Negative for chest tightness.    Cardiovascular:  Negative for chest pain, palpitations and leg swelling.   Gastrointestinal:  Positive for constipation, nausea and vomiting. Negative for abdominal pain and diarrhea.   Genitourinary:  Negative for dysuria and hematuria.        Decreased urinary output   Musculoskeletal:  Negative for arthralgias and myalgias.   Skin:  Negative for color change, pallor, rash and wound.   Neurological:  Negative for dizziness, syncope and light-headedness.   Psychiatric/Behavioral:  Negative for agitation, confusion and sleep disturbance.        Objective   Visit Vitals  /61 (BP Location: Right arm, Patient Position: Sitting, Cuff Size: Adult)   Pulse 104   Ht 177.8 cm (70\")   Wt 84.8 kg (187 lb)   SpO2 98%   BMI 26.83 kg/m²         Physical Exam  Vitals reviewed.   Constitutional:       General: He is not in acute distress.  HENT:      Head: Normocephalic.   Eyes:      Pupils: Pupils are equal, round, and reactive to light.   Cardiovascular:      Rate and Rhythm: Normal rate and regular rhythm.      Heart sounds: Normal heart sounds. No murmur heard.  Pulmonary:      Breath sounds: Normal breath sounds. No wheezing or rales.   Abdominal:      General: There is no distension.      Palpations: Abdomen " is soft.      Tenderness: There is no abdominal tenderness.   Musculoskeletal:         General: No swelling or tenderness.   Skin:     General: Skin is warm and dry.      Comments: Sternum is stable, no clicks.  Sternal incision is clean dry and intact and healing nicely.  Saphenectomy site is clean dry and intact.   Neurological:      General: No focal deficit present.      Mental Status: He is alert and oriented to person, place, and time.   Psychiatric:         Mood and Affect: Mood normal.         Thought Content: Thought content normal.         Judgment: Judgment normal.             Assessment & Plan           Diagnoses and all orders for this visit:    1. Coronary artery disease involving native coronary artery of native heart with angina pectoris (Primary)  -     ECG 12 Lead; Future  -     CBC & Differential; Future  -     Prealbumin; Future  -     Comprehensive Metabolic Panel; Future    2. Stage 3a chronic kidney disease    3. Nausea and vomiting, unspecified vomiting type  -     CT abdomen pelvis w contrast; Future    4. Shortness of breath  -     CT chest w contrast; Future           Overall, Vj Garcia is doing okay.  Chest x-ray reveals no pneumothorax and trace to small left pleural effusion.  Lab results reviewed and he appears dehydrated.  We discussed increasing fluid intake and using Gatorade at least twice a day to hydrate.  EKG reviewed today by myself and Dr. Maciel revealing tachycardia.  No obvious atrial fibrillation.  Will discontinue amiodarone and lisinopril at this point.  Continue beta-blocker.  We discussed the importance of good nutrition postoperatively to aid in wound healing.  He should try to drink protein shakes 3 times daily.  We discussed current sternotomy precautions and how these will not be advanced yet. Sternal incision is healing nicely.  Following post op cardiac surgery home instructions. Will discuss starting cardiac rehabilitation at official 1 month post op visit.  Patient has follow up with Dr. Maciel in a few weeks. Patient has follow up with Cardiology in a few weeks. Patient has been instructed to contact our office with any questions or concerns should they arise prior to the next office visit.  I would like to see him back this Thursday, 8/22/2024 for recheck with repeat labs including prealbumin and will also obtain CT chest, abdomen, and pelvis with oral and IV contrast prior to this appointment.  He is to call me sooner with any further issues.  We discussed the need for ER evaluation for IV fluids should he not be able to tolerate 2 bottles of Gatorade today.  I will call him tomorrow afternoon to get an update.  He verbalized understanding to this.  We also discussed follow-up with Dr. Poon regarding insomnia.    Vj Garcia is a non-smoker and therefore does not need tobacco cessation education/counseling.      Advance Care Planning   ACP discussion was held with the patient during this visit. Patient does not have an advance directive, declines further assistance.

## 2024-08-20 ENCOUNTER — TELEPHONE (OUTPATIENT)
Dept: CARDIAC SURGERY | Facility: CLINIC | Age: 74
End: 2024-08-20
Payer: MEDICARE

## 2024-08-20 NOTE — TELEPHONE ENCOUNTER
Called to check on patient but had to leave a message.  I did discuss with his friend who is listed in his chart as , she states he has drank 2 Gatorade since office visit yesterday and he tolerated this without remark.  She states he is really not feeling much better.  She is going to try to get him to drink 3 Gatorade today.  I advised her if he is not able to tolerate this, he should go to the ER for IV fluids.  She verbalized understanding and states she will relay the message.

## 2024-08-21 ENCOUNTER — APPOINTMENT (OUTPATIENT)
Dept: CARDIOLOGY | Facility: HOSPITAL | Age: 74
DRG: 274 | End: 2024-08-21
Payer: MEDICARE

## 2024-08-21 ENCOUNTER — HOSPITAL ENCOUNTER (INPATIENT)
Facility: HOSPITAL | Age: 74
LOS: 3 days | Discharge: HOME | DRG: 274 | End: 2024-08-24
Attending: INTERNAL MEDICINE | Admitting: INTERNAL MEDICINE
Payer: MEDICARE

## 2024-08-21 ENCOUNTER — APPOINTMENT (OUTPATIENT)
Dept: CT IMAGING | Facility: HOSPITAL | Age: 74
DRG: 274 | End: 2024-08-21
Payer: MEDICARE

## 2024-08-21 ENCOUNTER — APPOINTMENT (OUTPATIENT)
Dept: GENERAL RADIOLOGY | Facility: HOSPITAL | Age: 74
DRG: 274 | End: 2024-08-21
Payer: MEDICARE

## 2024-08-21 DIAGNOSIS — Z95.1 HX OF CABG: ICD-10-CM

## 2024-08-21 DIAGNOSIS — R00.0 WIDE-COMPLEX TACHYCARDIA: ICD-10-CM

## 2024-08-21 DIAGNOSIS — J90 PLEURAL EFFUSION: Primary | ICD-10-CM

## 2024-08-21 DIAGNOSIS — R00.0 TACHYCARDIA: ICD-10-CM

## 2024-08-21 LAB
ALBUMIN SERPL-MCNC: 3.7 G/DL (ref 3.5–5.2)
ALBUMIN/GLOB SERPL: 1.1 G/DL
ALP SERPL-CCNC: 140 U/L (ref 39–117)
ALT SERPL W P-5'-P-CCNC: 30 U/L (ref 1–41)
ANION GAP SERPL CALCULATED.3IONS-SCNC: 12 MMOL/L (ref 5–15)
AST SERPL-CCNC: 24 U/L (ref 1–40)
BASOPHILS # BLD AUTO: 0.06 10*3/MM3 (ref 0–0.2)
BASOPHILS NFR BLD AUTO: 0.7 % (ref 0–1.5)
BH CV ECHO MEAS - AO MAX PG: 2.7 MMHG
BH CV ECHO MEAS - AO MEAN PG: 2 MMHG
BH CV ECHO MEAS - AO ROOT DIAM: 3.1 CM
BH CV ECHO MEAS - AO V2 MAX: 82.8 CM/SEC
BH CV ECHO MEAS - AO V2 VTI: 10.7 CM
BH CV ECHO MEAS - AVA(I,D): 2.48 CM2
BH CV ECHO MEAS - EDV(CUBED): 175.6 ML
BH CV ECHO MEAS - EDV(MOD-SP4): 113 ML
BH CV ECHO MEAS - EF(MOD-SP4): 23.4 %
BH CV ECHO MEAS - ESV(CUBED): 117.6 ML
BH CV ECHO MEAS - ESV(MOD-SP4): 86.6 ML
BH CV ECHO MEAS - FS: 12.5 %
BH CV ECHO MEAS - IVS/LVPW: 0.82 CM
BH CV ECHO MEAS - IVSD: 0.9 CM
BH CV ECHO MEAS - LA DIMENSION: 4.6 CM
BH CV ECHO MEAS - LAT PEAK E' VEL: 5.8 CM/SEC
BH CV ECHO MEAS - LV DIASTOLIC VOL/BSA (35-75): 55.7 CM2
BH CV ECHO MEAS - LV MASS(C)D: 219.7 GRAMS
BH CV ECHO MEAS - LV MAX PG: 1.61 MMHG
BH CV ECHO MEAS - LV MEAN PG: 1 MMHG
BH CV ECHO MEAS - LV SYSTOLIC VOL/BSA (12-30): 42.7 CM2
BH CV ECHO MEAS - LV V1 MAX: 63.4 CM/SEC
BH CV ECHO MEAS - LV V1 VTI: 7.7 CM
BH CV ECHO MEAS - LVIDD: 5.6 CM
BH CV ECHO MEAS - LVIDS: 4.9 CM
BH CV ECHO MEAS - LVOT AREA: 3.5 CM2
BH CV ECHO MEAS - LVOT DIAM: 2.1 CM
BH CV ECHO MEAS - LVPWD: 1.1 CM
BH CV ECHO MEAS - MED PEAK E' VEL: 3.2 CM/SEC
BH CV ECHO MEAS - MR MAX PG: 80.3 MMHG
BH CV ECHO MEAS - MR MAX VEL: 448 CM/SEC
BH CV ECHO MEAS - MR MEAN PG: 50 MMHG
BH CV ECHO MEAS - MR MEAN VEL: 336 CM/SEC
BH CV ECHO MEAS - MR VTI: 120 CM
BH CV ECHO MEAS - MV DEC TIME: 0.1 SEC
BH CV ECHO MEAS - MV E MAX VEL: 110 CM/SEC
BH CV ECHO MEAS - RAP SYSTOLE: 3 MMHG
BH CV ECHO MEAS - RVSP: 21.7 MMHG
BH CV ECHO MEAS - SV(LVOT): 26.6 ML
BH CV ECHO MEAS - SV(MOD-SP4): 26.4 ML
BH CV ECHO MEAS - SVI(LVOT): 13.1 ML/M2
BH CV ECHO MEAS - SVI(MOD-SP4): 13 ML/M2
BH CV ECHO MEAS - TR MAX PG: 18.7 MMHG
BH CV ECHO MEAS - TR MAX VEL: 216 CM/SEC
BH CV ECHO MEASUREMENTS AVERAGE E/E' RATIO: 24.44
BILIRUB SERPL-MCNC: 0.6 MG/DL (ref 0–1.2)
BILIRUB UR QL STRIP: NEGATIVE
BUN SERPL-MCNC: 18 MG/DL (ref 8–23)
BUN/CREAT SERPL: 13.1 (ref 7–25)
CALCIUM SPEC-SCNC: 8.5 MG/DL (ref 8.6–10.5)
CHLORIDE SERPL-SCNC: 96 MMOL/L (ref 98–107)
CLARITY UR: CLEAR
CO2 SERPL-SCNC: 24 MMOL/L (ref 22–29)
COLOR UR: YELLOW
CREAT SERPL-MCNC: 1.37 MG/DL (ref 0.76–1.27)
DEPRECATED RDW RBC AUTO: 52.4 FL (ref 37–54)
EGFRCR SERPLBLD CKD-EPI 2021: 54.8 ML/MIN/1.73
EOSINOPHIL # BLD AUTO: 0.29 10*3/MM3 (ref 0–0.4)
EOSINOPHIL NFR BLD AUTO: 3.4 % (ref 0.3–6.2)
ERYTHROCYTE [DISTWIDTH] IN BLOOD BY AUTOMATED COUNT: 15.2 % (ref 12.3–15.4)
GEN 5 2HR TROPONIN T REFLEX: 82 NG/L
GLOBULIN UR ELPH-MCNC: 3.5 GM/DL
GLUCOSE SERPL-MCNC: 126 MG/DL (ref 65–99)
GLUCOSE UR STRIP-MCNC: NEGATIVE MG/DL
HCT VFR BLD AUTO: 34.7 % (ref 37.5–51)
HGB BLD-MCNC: 10.7 G/DL (ref 13–17.7)
HGB UR QL STRIP.AUTO: NEGATIVE
IMM GRANULOCYTES # BLD AUTO: 0.03 10*3/MM3 (ref 0–0.05)
IMM GRANULOCYTES NFR BLD AUTO: 0.4 % (ref 0–0.5)
KETONES UR QL STRIP: NEGATIVE
LEFT ATRIUM VOLUME INDEX: 26.9 ML/M2
LEFT ATRIUM VOLUME: 54.7 ML
LEUKOCYTE ESTERASE UR QL STRIP.AUTO: NEGATIVE
LIPASE SERPL-CCNC: 54 U/L (ref 13–60)
LYMPHOCYTES # BLD AUTO: 1.44 10*3/MM3 (ref 0.7–3.1)
LYMPHOCYTES NFR BLD AUTO: 17 % (ref 19.6–45.3)
MAGNESIUM SERPL-MCNC: 2.3 MG/DL (ref 1.6–2.4)
MCH RBC QN AUTO: 29.1 PG (ref 26.6–33)
MCHC RBC AUTO-ENTMCNC: 30.8 G/DL (ref 31.5–35.7)
MCV RBC AUTO: 94.3 FL (ref 79–97)
MONOCYTES # BLD AUTO: 0.52 10*3/MM3 (ref 0.1–0.9)
MONOCYTES NFR BLD AUTO: 6.1 % (ref 5–12)
NEUTROPHILS NFR BLD AUTO: 6.13 10*3/MM3 (ref 1.7–7)
NEUTROPHILS NFR BLD AUTO: 72.4 % (ref 42.7–76)
NITRITE UR QL STRIP: NEGATIVE
NRBC BLD AUTO-RTO: 0 /100 WBC (ref 0–0.2)
NT-PROBNP SERPL-MCNC: 7349 PG/ML (ref 0–900)
PH UR STRIP.AUTO: 5.5 [PH] (ref 5–8)
PLATELET # BLD AUTO: 246 10*3/MM3 (ref 140–450)
PMV BLD AUTO: 9.3 FL (ref 6–12)
POTASSIUM SERPL-SCNC: 4.8 MMOL/L (ref 3.5–5.2)
PROT SERPL-MCNC: 7.2 G/DL (ref 6–8.5)
PROT UR QL STRIP: ABNORMAL
RBC # BLD AUTO: 3.68 10*6/MM3 (ref 4.14–5.8)
SODIUM SERPL-SCNC: 132 MMOL/L (ref 136–145)
SP GR UR STRIP: 1.01 (ref 1–1.03)
TROPONIN T DELTA: -3 NG/L
TROPONIN T SERPL HS-MCNC: 85 NG/L
UROBILINOGEN UR QL STRIP: ABNORMAL
WBC NRBC COR # BLD AUTO: 8.47 10*3/MM3 (ref 3.4–10.8)

## 2024-08-21 PROCEDURE — 71275 CT ANGIOGRAPHY CHEST: CPT

## 2024-08-21 PROCEDURE — 94640 AIRWAY INHALATION TREATMENT: CPT

## 2024-08-21 PROCEDURE — 80053 COMPREHEN METABOLIC PANEL: CPT | Performed by: NURSE PRACTITIONER

## 2024-08-21 PROCEDURE — 94664 DEMO&/EVAL PT USE INHALER: CPT

## 2024-08-21 PROCEDURE — 83690 ASSAY OF LIPASE: CPT | Performed by: NURSE PRACTITIONER

## 2024-08-21 PROCEDURE — 25010000002 FUROSEMIDE PER 20 MG: Performed by: NURSE PRACTITIONER

## 2024-08-21 PROCEDURE — 36415 COLL VENOUS BLD VENIPUNCTURE: CPT

## 2024-08-21 PROCEDURE — 83735 ASSAY OF MAGNESIUM: CPT | Performed by: NURSE PRACTITIONER

## 2024-08-21 PROCEDURE — 71045 X-RAY EXAM CHEST 1 VIEW: CPT

## 2024-08-21 PROCEDURE — 99285 EMERGENCY DEPT VISIT HI MDM: CPT

## 2024-08-21 PROCEDURE — 25510000001 IOPAMIDOL 61 % SOLUTION: Performed by: NURSE PRACTITIONER

## 2024-08-21 PROCEDURE — 81003 URINALYSIS AUTO W/O SCOPE: CPT | Performed by: NURSE PRACTITIONER

## 2024-08-21 PROCEDURE — 83880 ASSAY OF NATRIURETIC PEPTIDE: CPT | Performed by: NURSE PRACTITIONER

## 2024-08-21 PROCEDURE — 94799 UNLISTED PULMONARY SVC/PX: CPT

## 2024-08-21 PROCEDURE — 85025 COMPLETE CBC W/AUTO DIFF WBC: CPT | Performed by: NURSE PRACTITIONER

## 2024-08-21 PROCEDURE — 74177 CT ABD & PELVIS W/CONTRAST: CPT

## 2024-08-21 PROCEDURE — 25010000002 ONDANSETRON PER 1 MG: Performed by: NURSE PRACTITIONER

## 2024-08-21 PROCEDURE — 99222 1ST HOSP IP/OBS MODERATE 55: CPT | Performed by: STUDENT IN AN ORGANIZED HEALTH CARE EDUCATION/TRAINING PROGRAM

## 2024-08-21 PROCEDURE — 25810000003 SODIUM CHLORIDE 0.9 % SOLUTION: Performed by: NURSE PRACTITIONER

## 2024-08-21 PROCEDURE — 93306 TTE W/DOPPLER COMPLETE: CPT | Performed by: INTERNAL MEDICINE

## 2024-08-21 PROCEDURE — 84484 ASSAY OF TROPONIN QUANT: CPT | Performed by: NURSE PRACTITIONER

## 2024-08-21 PROCEDURE — 93306 TTE W/DOPPLER COMPLETE: CPT

## 2024-08-21 RX ORDER — IOPAMIDOL 612 MG/ML
100 INJECTION, SOLUTION INTRAVASCULAR
Status: COMPLETED | OUTPATIENT
Start: 2024-08-21 | End: 2024-08-21

## 2024-08-21 RX ORDER — FUROSEMIDE 10 MG/ML
40 INJECTION INTRAMUSCULAR; INTRAVENOUS ONCE
Status: COMPLETED | OUTPATIENT
Start: 2024-08-21 | End: 2024-08-21

## 2024-08-21 RX ORDER — AMOXICILLIN 250 MG
2 CAPSULE ORAL 2 TIMES DAILY PRN
Status: DISCONTINUED | OUTPATIENT
Start: 2024-08-21 | End: 2024-08-24 | Stop reason: HOSPADM

## 2024-08-21 RX ORDER — LISINOPRIL 2.5 MG/1
5 TABLET ORAL DAILY
Status: DISCONTINUED | OUTPATIENT
Start: 2024-08-21 | End: 2024-08-23

## 2024-08-21 RX ORDER — NITROGLYCERIN 0.4 MG/1
0.4 TABLET SUBLINGUAL
Status: DISCONTINUED | OUTPATIENT
Start: 2024-08-21 | End: 2024-08-24 | Stop reason: HOSPADM

## 2024-08-21 RX ORDER — SODIUM CHLORIDE 0.9 % (FLUSH) 0.9 %
10 SYRINGE (ML) INJECTION AS NEEDED
Status: DISCONTINUED | OUTPATIENT
Start: 2024-08-21 | End: 2024-08-24 | Stop reason: HOSPADM

## 2024-08-21 RX ORDER — IPRATROPIUM BROMIDE AND ALBUTEROL SULFATE 2.5; .5 MG/3ML; MG/3ML
3 SOLUTION RESPIRATORY (INHALATION)
Status: DISCONTINUED | OUTPATIENT
Start: 2024-08-21 | End: 2024-08-22

## 2024-08-21 RX ORDER — AMIODARONE HYDROCHLORIDE 200 MG/1
400 TABLET ORAL DAILY
Status: DISCONTINUED | OUTPATIENT
Start: 2024-08-21 | End: 2024-08-22

## 2024-08-21 RX ORDER — POLYETHYLENE GLYCOL 3350 17 G/17G
17 POWDER, FOR SOLUTION ORAL DAILY PRN
Status: DISCONTINUED | OUTPATIENT
Start: 2024-08-21 | End: 2024-08-24 | Stop reason: HOSPADM

## 2024-08-21 RX ORDER — ONDANSETRON 2 MG/ML
4 INJECTION INTRAMUSCULAR; INTRAVENOUS ONCE
Status: COMPLETED | OUTPATIENT
Start: 2024-08-21 | End: 2024-08-21

## 2024-08-21 RX ORDER — BISACODYL 5 MG/1
5 TABLET, DELAYED RELEASE ORAL DAILY PRN
Status: DISCONTINUED | OUTPATIENT
Start: 2024-08-21 | End: 2024-08-24 | Stop reason: HOSPADM

## 2024-08-21 RX ORDER — ATORVASTATIN CALCIUM 40 MG/1
40 TABLET, FILM COATED ORAL NIGHTLY
Status: DISCONTINUED | OUTPATIENT
Start: 2024-08-21 | End: 2024-08-24 | Stop reason: HOSPADM

## 2024-08-21 RX ORDER — CARVEDILOL 6.25 MG/1
6.25 TABLET ORAL EVERY 12 HOURS SCHEDULED
Status: DISCONTINUED | OUTPATIENT
Start: 2024-08-21 | End: 2024-08-24 | Stop reason: HOSPADM

## 2024-08-21 RX ORDER — ONDANSETRON 2 MG/ML
4 INJECTION INTRAMUSCULAR; INTRAVENOUS EVERY 6 HOURS PRN
Status: DISCONTINUED | OUTPATIENT
Start: 2024-08-21 | End: 2024-08-24 | Stop reason: HOSPADM

## 2024-08-21 RX ORDER — SODIUM CHLORIDE 9 MG/ML
40 INJECTION, SOLUTION INTRAVENOUS AS NEEDED
Status: DISCONTINUED | OUTPATIENT
Start: 2024-08-21 | End: 2024-08-24 | Stop reason: HOSPADM

## 2024-08-21 RX ORDER — BISACODYL 10 MG
10 SUPPOSITORY, RECTAL RECTAL DAILY PRN
Status: DISCONTINUED | OUTPATIENT
Start: 2024-08-21 | End: 2024-08-24 | Stop reason: HOSPADM

## 2024-08-21 RX ORDER — SODIUM CHLORIDE 0.9 % (FLUSH) 0.9 %
10 SYRINGE (ML) INJECTION EVERY 12 HOURS SCHEDULED
Status: DISCONTINUED | OUTPATIENT
Start: 2024-08-21 | End: 2024-08-24 | Stop reason: HOSPADM

## 2024-08-21 RX ORDER — ASPIRIN 81 MG/1
81 TABLET ORAL DAILY
Status: DISCONTINUED | OUTPATIENT
Start: 2024-08-21 | End: 2024-08-24 | Stop reason: HOSPADM

## 2024-08-21 RX ADMIN — SODIUM CHLORIDE 500 ML: 9 INJECTION, SOLUTION INTRAVENOUS at 08:54

## 2024-08-21 RX ADMIN — APIXABAN 5 MG: 5 TABLET, FILM COATED ORAL at 20:18

## 2024-08-21 RX ADMIN — IOPAMIDOL 100 ML: 612 INJECTION, SOLUTION INTRAVENOUS at 11:10

## 2024-08-21 RX ADMIN — ASPIRIN 81 MG: 81 TABLET, COATED ORAL at 16:14

## 2024-08-21 RX ADMIN — Medication 10 ML: at 20:26

## 2024-08-21 RX ADMIN — AMIODARONE HYDROCHLORIDE 400 MG: 200 TABLET ORAL at 16:14

## 2024-08-21 RX ADMIN — IPRATROPIUM BROMIDE AND ALBUTEROL SULFATE 3 ML: 2.5; .5 SOLUTION RESPIRATORY (INHALATION) at 15:18

## 2024-08-21 RX ADMIN — IPRATROPIUM BROMIDE AND ALBUTEROL SULFATE 3 ML: 2.5; .5 SOLUTION RESPIRATORY (INHALATION) at 21:19

## 2024-08-21 RX ADMIN — FUROSEMIDE 40 MG: 10 INJECTION, SOLUTION INTRAVENOUS at 13:28

## 2024-08-21 RX ADMIN — CARVEDILOL 6.25 MG: 6.25 TABLET, FILM COATED ORAL at 20:25

## 2024-08-21 RX ADMIN — ONDANSETRON 4 MG: 2 INJECTION INTRAMUSCULAR; INTRAVENOUS at 09:15

## 2024-08-21 NOTE — CONSULTS
Referring Provider: RAMIRO Beltran  Reason for Consultation: Tachycardia    Patient Care Team:  SILKE Poon DO as PCP - General (Internal Medicine)    Chief complaint nausea, vomiting, abdominal pain    Subjective .     History of present illness: Mr. Garcia is a 74-year old male who is well-known to the CT surgery service having underwent LV aneurysm repair, mitral valve repair, and CABG with left atrial appendage exclusion by Dr. Maciel on 7/23/2024.  He was evaluated in the office earlier this week and was found to be likely dehydrated and was advised hydration with Gatorade with planned follow-up with me in the office tomorrow.  In the interim he was asked to present to the ER if unable to tolerate liquids.  Today he reported to ER with complaints of nausea and vomiting.  Upon arrival to the ER he was found to have slight elevation in creatinine at 1.3 and was tachycardic in the 120s.  He was also found to have a left-sided pleural effusion.  Cardiothoracic surgery has been consulted for continuity of care.  Plans are to admit to hospitalist.  He is currently resting in bed in the ER with heart rate in the 120s to 130s.  He remains overall fatigued and states he is still unable to sleep more than an hour and 10 minutes each night.    History  There are no questions and answers to display.         Past Medical History:   Diagnosis Date    COPD (chronic obstructive pulmonary disease)     Elevated cholesterol     Enlarged prostate     History of heart attack     Hypertension     Urination frequency     Weak urine stream    ,   Past Surgical History:   Procedure Laterality Date    ATRIAL APPENDAGE EXCLUSION LEFT WITH TRANSESOPHAGEAL ECHOCARDIOGRAM N/A 7/23/2024    Procedure: ATRIAL APPENDAGE EXCLUSION LEFT WITH TRANSESOPHAGEAL ECHOCARDIOGRAM;  Surgeon: Kobe Maciel MD;  Location: [facility];  Service: Cardiothoracic;  Laterality: N/A;    BACK SURGERY      x3    CARDIAC CATHETERIZATION N/A  2024    Procedure: Left Heart Cath;  Surgeon: Brennan Khoury DO;  Location:  [facility] CATH [facility]AS[facility];  Service: Cardiovascular;  Laterality: N/A;    CORONARY ARTERY BYPASS GRAFT N/A 2024    Procedure: CORONARY ARTERY BYPASS GRAFTING X2, LEFT INTERNAL MAMMARY ARTERY GRAFT, RIGHT LEG OPEN VEIN HARVEST, MITRAL VALVE REAPIR, LEFT VENTRICULAR ANEURYSM REPAIR,  ATRIAL APPENDAGE EXCLUSION LEFT 45 ATRICLIP WITH TRANSESOPHAGEAL ECHOCARDIOGRAM;  Surgeon: Kobe Maciel MD;  Location:  [facility] OR;  Service: Cardiothoracic;  Laterality: N/A;    EXCISION      fatty patch on back    HERNIA REPAIR      MITRAL VALVE REPAIR/REPLACEMENT N/A 2024    Procedure: POSSIBLE MITRAL VALVE REPAIR/REPLACEMENT;  Surgeon: Kobe Maciel MD;  Location:  [facility] OR;  Service: Cardiothoracic;  Laterality: N/A;   ,   Family History   Problem Relation Age of Onset    Heart disease Mother    ,   Social History     Tobacco Use    Smoking status: Former     Types: Cigarettes     Start date: 7/15/2024     Quit date: 1972     Years since quittin.6    Smokeless tobacco: Never   Vaping Use    Vaping status: Never Used   Substance Use Topics    Alcohol use: Not Currently    Drug use: Not Currently   , (Not in a hospital admission)  , Allergies: Patient has no known allergies.    Review of Systems  Review of Systems   Constitutional:  Positive for activity change, appetite change and fatigue. Negative for fever.   HENT:  Negative for trouble swallowing and voice change.    Respiratory:  Positive for shortness of breath. Negative for chest tightness.    Cardiovascular:  Positive for palpitations. Negative for chest pain and leg swelling.   Gastrointestinal:  Positive for constipation, nausea and vomiting. Negative for abdominal pain and diarrhea.   Genitourinary:  Negative for difficulty urinating, dysuria and hematuria.   Musculoskeletal:  Negative for arthralgias and myalgias.   Skin:  Negative for color change, pallor,  "rash and wound.   Allergic/Immunologic: Negative for immunocompromised state.   Neurological:  Positive for weakness. Negative for dizziness, syncope and light-headedness.   Psychiatric/Behavioral:  Positive for sleep disturbance. Negative for agitation and confusion.         Objective     Vital Signs   Visit Vitals  /73   Pulse (!) 126   Temp 97.8 °F (36.6 °C) (Oral)   Resp 20   Ht 177.8 cm (70\")   Wt 84.8 kg (187 lb)   SpO2 93%   BMI 26.83 kg/m²       Physical Exam  Vitals reviewed.   Constitutional:       Appearance: He is ill-appearing.   HENT:      Head: Normocephalic.   Eyes:      Pupils: Pupils are equal, round, and reactive to light.   Cardiovascular:      Rate and Rhythm: Regular rhythm. Tachycardia present.      Heart sounds: Normal heart sounds. No murmur heard.  Pulmonary:      Breath sounds: Normal breath sounds. No wheezing or rales.      Comments: Diminished breath sounds on the left  Abdominal:      General: There is no distension.      Palpations: Abdomen is soft.      Tenderness: There is no abdominal tenderness.   Musculoskeletal:         General: No swelling or tenderness.   Skin:     General: Skin is warm and dry.   Neurological:      General: No focal deficit present.      Mental Status: He is alert and oriented to person, place, and time.   Psychiatric:         Mood and Affect: Mood normal.         Behavior: Behavior normal.         Thought Content: Thought content normal.         Judgment: Judgment normal.           LAB:   CBC:  Results from last 7 days   Lab Units 08/21/24  0847 08/19/24  1209   WBC 10*3/mm3 8.47 8.42   HEMATOCRIT % 34.7* 36.2*   PLATELETS 10*3/mm3 246 288          BMP:)  Results from last 7 days   Lab Units 08/21/24  0847 08/19/24  1209   SODIUM mmol/L 132* 133*   POTASSIUM mmol/L 4.8 5.4*   CHLORIDE mmol/L 96* 96*   CO2 mmol/L 24.0 25.0   GLUCOSE mg/dL 126* 132*   BUN mg/dL 18 15   CREATININE mg/dL 1.37* 1.31*           COAG:      Invalid input(s): \"PT\"        " IMAGES:       Imaging Results (Last 24 Hours)       Procedure Component Value Units Date/Time    CT Angiogram Chest [[id number]] Collected: 08/21/24 1133     Updated: 08/21/24 1142    Narrative:      CT ANGIOGRAM CHEST- 8/21/2024 9:58 AM      HISTORY: shortness of breath; tachycardia; recent cabg      COMPARISON: Chest CT dated 7/13/2024     DOSE LENGTH PRODUCT: 664.92 mGy.cm. Automated exposure control was also  utilized to decrease patient radiation dose.     TECHNIQUE: Helical tomographic images of the chest were obtained after  the administration of intravenous contrast following angiogram protocol.  Additionally, 3D and multiplanar reformatted images were provided.       FINDINGS:    Pulmonary arteries: No filling defects in the central pulmonary  arteries. The most distal segmental and subsegmental pulmonary arteries  are not yet opacified with contrast.     AORTA AND GREAT VESSELS: The aorta is not well opacified but  demonstrates no aneurysmal dilation. No flow-limiting stenosis  identified at the great vessel origins.     VISUALIZED NECK BASE: The imaged portion of the base of the neck appears  unremarkable.     LUNGS: Bilateral interstitial edema. Minimal groundglass opacities,  especially in the dependent portions of the lower lobes. Lingula and  left lower lobe discoid atelectasis. Bilateral layering pleural  effusions, which the left-sided pleural effusion is large. Right-sided  pleural effusion is small in size. The airways are clear. No  pneumothorax.     HEART: The heart is normal in size. There is no pericardial effusion.  Previous coronary bypass.     MEDIASTINUM AND LYMPH NODEs: Expected postoperative changes following  recent coronary bypass. There is no mediastinal hematoma or organized  collection.     SKELETAL AND SOFT TISSUES: Chest wall soft tissues are unremarkable. No  acute bony abnormality. Sternal wires appear intact.     UPPER ABDOMEN: The imaged portion of the upper abdomen  demonstrates no  acute process.       Impression:      1.  No central pulmonary embolus. The segmental and subsegmental  pulmonary arteries are not yet well opacified.  2.  Volume overload with interstitial edema as well as early airspace  filling edema. There are bilateral layering pleural effusions of which  of the left-sided pleural effusion is large.     This report was signed and finalized on 8/21/2024 11:39 AM by Dr Mikie Cifuentes.       CT Abdomen Pelvis With Contrast [[id number]] Collected: 08/21/24 1125     Updated: 08/21/24 1139    Narrative:      EXAMINATION: CT ABDOMEN PELVIS W CONTRAST-      8/21/2024 9:58 AM     HISTORY: abdominal pain; vomiting     In order to have a CT radiation dose as low as reasonably achievable  Automated Exposure Control was utilized for adjustment of the mA and/or  KV according to patient size.     Total DLP = 664.92 mGy.cm     CT scan of the abdomen and pelvis is performed after intravenous  contrast enhancement.     The images are acquired in axial plane and subsequent reconstruction in  coronal and sagittal planes.     There is no previous similar study for comparison.     Limited visualized chest show bilateral pleural effusion left more than  the right. The chest is separately reviewed and reported in CT scan of  the chest performed today.     There is fatty infiltration of the liver. The spleen is normal     No radiopaque gallstones.     Pancreas is normal.     The adrenal glands bilaterally are normal.     There is well-defined sharply marginated low density exophytic mass from  the posterior aspect of the upper pole of the right kidney measuring 4.3  cm in maximum dimension in axial plane images. CT density suggest a  cyst. No radiopaque calculi in either kidney. No hydronephrosis on  either side. Limited visualized ureters are nondilated. The urinary  bladder is moderately distended. No intrinsic abnormality.     Prostate is moderately enlarged.     There is small  fat-containing inguinal hernias. There is high position  of the left testes which may represent retractile testis.     The stomach is decompressed with moderate wall thickening. No focal  abnormality. There is moderate diffuse thickening of the wall of the  decompressed ileum with suggestion of edema of the mucosa. No  surrounding fat infiltration. No significant narrowing or obstruction.  Appendix is normal. Moderate gas and stool is seen in the colon. No  finding to suggest obstruction.     There is a trace free fluid in the lower pelvis particularly on the  right side.     Atheromatous changes of the abdominal aorta and iliac arteries. No  aneurysmal dilatation. Atheromatous plaques are seen at the origin of  the celiac and superior mesenteric arteries. No significant stenosis.     No evidence of abdominal or pelvic lymphadenopathy.     There is no evidence of peritoneal/omental infiltration nodularity or  masses.     The images reviewed in bone window show no acute bony abnormality. There  is bilateral spondylolysis L5 and grade 1 spondylolisthesis L5-S1. Old  healed rib fractures bilaterally are noted.       Impression:      1. Nonspecific acute enteritis/ileitis. No obstruction.  2. Appendix is normal.  3. Fatty infiltration of the liver.  4. Right renal cyst.  5. A high position/retractile left testis.  6. The chest is separately reviewed and reported                                      This report was signed and finalized on 8/21/2024 11:36 AM by Dr. Everton Ziegler MD.       XR Chest 1 View [[id number]] Collected: 08/21/24 0855     Updated: 08/21/24 0901    Narrative:      XR CHEST 1 VW- 8/21/2024 7:47 AM     HISTORY: tachycardia; cardiac work up; recent cabg       COMPARISON: Chest x-ray dated 8/19/2024     FINDINGS:  Upright frontal radiograph of the chest was obtained     Recent coronary bypass. Heart size is stable. Mild interstitial  coarsening on today's exam and there continues to be a trace  layering  pleural effusion on the left. No new consolidation. No pneumothorax. No  acute bony abnormality seen.       Impression:      1.  There is a mild interstitial edema as well as a residual trace  layering pleural effusion on the left.  2.  Recent coronary bypass.     This report was signed and finalized on 8/21/2024 8:58 AM by Dr Mikie Cifuentes.                            Assessment & Plan      Generalized weakness  Tachycardia, possibly underlying a flutter  S/P LV aneurysm repair, mitral valve repair, CABG, LAAE by Dr. Maciel on 7/23/2024  Left pleural effusion    Dr. Maciel has discussed with Dr. Contreras who will evaluate the patient for consideration for TERESA and cardioversion.  Obtain transthoracic echocardiogram  Chest x-ray tomorrow morning.  Will likely drain left pleural effusion at bedside with ultrasound guidance by Dr. Maciel tomorrow.  Patient is agreeable to this.  Discussed with patient and RAMIRO Beltran APRN  08/21/24  13:48 CDT

## 2024-08-21 NOTE — CONSULTS
"EP CONSULT NOTE    Subjective        History of Present Illness    EP Problems:  1.  Wide-complex tachycardia  2.  Left atrial appendage exclusion with atrial clip    Cardiology Problems:  1.  LV aneurysm status postresection  2.  Mitral valve repair  3.  CAD status post CABG  4.  Hypertension  5.  Chronic systolic heart failure  6.  Hyperlipidemia    Medical Problems:  1.  COPD  2.  BPH    Vj Garcia is a 74 y.o. male with problem list as above for whom EP is consulted regarding wide-complex tachycardia.  He underwent recent mitral valve clip, CABG, left atrial appendage occlusion, LV aneurysm repair by Dr. Maciel approximately 1 month ago.  He did well initially however came to the clinic 2 days ago and was noted to be fatigued, short of breath.  His ECG showed that his heart rate was in the 120s with wide-complex tachycardia.  This was felt to be possibly sinus tachycardia and is advised to increase his fluid intake.  He came to the ER today after having continued tachycardia and feeling overall poorly.  In the ER, he was found to be in continued wide-complex tachycardia with heart rates of approximately 130 bpm.  He was admitted to the hospital and EP was consulted for further evaluation.    Family History:  family history includes Heart disease in his mother.    Social History:  Social History     Tobacco Use    Smoking status: Former     Types: Cigarettes     Start date: 7/15/2024     Quit date: 1972     Years since quittin.6    Smokeless tobacco: Never   Vaping Use    Vaping status: Never Used   Substance Use Topics    Alcohol use: Not Currently    Drug use: Not Currently       Allergies:  Patient has no known allergies.      Objective   Vital Signs:  /85 (BP Location: Right arm, Patient Position: Lying)   Pulse (!) 131 Comment: Reported to RAYMOND Gutierrez  Temp 98.4 °F (36.9 °C) (Oral)   Resp 18   Ht 177.8 cm (70\")   Wt 84.8 kg (187 lb)   SpO2 97%   BMI 26.83 kg/m²   Estimated body mass index " "is 26.83 kg/m² as calculated from the following:    Height as of this encounter: 177.8 cm (70\").    Weight as of this encounter: 84.8 kg (187 lb).      Physical Exam  Vitals reviewed.   Constitutional:       Appearance: Normal appearance.   Cardiovascular:      Rate and Rhythm: Regular rhythm. Tachycardia present.      Pulses: Normal pulses.      Heart sounds: Normal heart sounds.   Pulmonary:      Effort: Pulmonary effort is normal.      Breath sounds: Normal breath sounds.   Musculoskeletal:         General: No swelling.   Neurological:      Mental Status: He is alert and oriented to person, place, and time.   Psychiatric:         Mood and Affect: Mood normal.         Judgment: Judgment normal.          Result Review :  The following data was reviewed by: Jerad Contreras MD on 08/21/2024:  CMP          8/4/2024    04:09 8/19/2024    12:09 8/21/2024    08:47   CMP   Glucose 84  132  126    BUN 12  15  18    Creatinine 1.17  1.31  1.37    EGFR 66.2  57.8  54.8    Sodium 136  133  132    Potassium 4.0  5.4  4.8    Chloride 98  96  96    Calcium 8.3  9.0  8.5    Total Protein   7.2    Albumin   3.7    Globulin   3.5    Total Bilirubin   0.6    Alkaline Phosphatase   140    AST (SGOT)   24    ALT (SGPT)   30    Albumin/Globulin Ratio   1.1    BUN/Creatinine Ratio 10.3  11.5  13.1    Anion Gap 13.0  12.0  12.0      CBC          8/4/2024    04:09 8/19/2024    12:09 8/21/2024    08:47   CBC   WBC 10.58  8.42  8.47    RBC 3.14  3.88  3.68    Hemoglobin 9.2  11.1  10.7    Hematocrit 29.1  36.2  34.7    MCV 92.7  93.3  94.3    MCH 29.3  28.6  29.1    MCHC 31.6  30.7  30.8    RDW 13.8  15.0  15.2    Platelets 333  288  246      TSH          5/21/2024    18:04   TSH   TSH 0.786      Limited transthoracic echo from today directly visualized and independently reviewed: EF of approximately 20%, lateral wall akinesis, mild MR, dilated LV    [id number] SCORE   [id number] Score: 3 (8/21/2024  6:29 PM)             Assessment and " Plan   Problems:  Wide-complex tachycardia  Chronic systolic heart failure    Vj Garcia is a 74 y.o. male with problem list as above for whom EP is consulted regarding wide-complex tachycardia.  Unclear whether this is SVT with aberrant conduction (resembles left bundle branch block significantly) versus ventricular tachycardia.  He is currently clinically stable with normal blood pressures and good perfusion.  Given the uncertainty in the rhythm, we will plan to perform an EP study tomorrow to determine whether this is VT versus SVT with aberrancy.  Can also test at the same time whether the tachycardia is responsive to ATP.  Depending on the arrhythmias, treatment options may vary including consideration of an ICD.    I have discussed risks, benefits, and alternatives of an electrophysiology study with the patient.  Alternatives discussed include continued observation and medical management.  An electrophysiology study is a procedure with possible complications that include but are not limited to vascular access complications, internal bleeding, tamponade requiring pericardiocentesis or cardiac surgery, stroke, MI, embolism, myocardial injury, injury to the normal conduction system requiring a pacemaker, and ultimately death.  We also discussed that given the uncertain nature of the diagnosis, therapeutic efficacy can be variable.  Possibilities of recurrent arrhythmias and the possible need for additional procedures and/or medical therapy was discussed. Questions asked were appropriately answered.  No guarantees were made or implied.   Despite this, they would still like to proceed.    Plan:  -EP study tomorrow  -If he becomes hemodynamically unstable, would proceed with direct-current cardioversion  -No antiarrhythmics for now until arrhythmias are further defined  -Continue apixaban for now given possible atrial flutter  -Continue carvedilol given chronic systolic heart failure                   Part of  this note may be an electronic transcription/translation of spoken language to printed text using the Dragon Dictation System.

## 2024-08-21 NOTE — ED PROVIDER NOTES
Subjective   History of Present Illness  Patient is a 74-year-old male who presents to the ER with chief complaints of nausea and vomiting as well as abdominal discomfort.  Patient states he has had issues with eating, constipation, and decreased urine output since he had CABG approximately 1 month ago.  Per review of note, patient had a CABG x 2 on July 23, 2024 by Dr. Maciel.  Patient had Posterior Basal LV Aneurysm Repair (Resection of Aneurysm Wall with Linear Closure, MV repair (Hollis Stitch at A2/P2), CABG x2 (LIMA to LAD, SVG to distal OM), LAAE with 45 mm Atriclip.  He presented to the hospital 1 week prior to surgery with new onset heart failure symptoms that have been worsening over the past several weeks.  Approximately 5 weeks prior to his evaluation he had what he thought was a heatstroke which is not felt was likely STEMI.  (See note for complete details).  Today, as described above, he presents with nausea and vomiting.  He was attempting to drink Gatorade/contrast for a scheduled outpatient CT scan and began developing nausea with vomiting.  He presents for further evaluation.  Patient denies any cough or congestion.  He has had no recorded fevers.  He denies any chest pain. Reports chronic shortness of breath. Reports difficulty with staying asleep- continues to wake up each night unable to sleep.    Past medical history significant for COPD, hyperlipidemia, enlarged prostate, chronic kidney disease, CHF, MI, hypertension, urinary frequency, CABG x 2, aneurysm with repair, severe mitral regurgitation with mitral valve repair        Review of Systems   Constitutional:  Positive for appetite change and fatigue. Negative for fever.   HENT: Negative.  Negative for congestion.    Respiratory: Negative.  Positive for shortness of breath. Negative for cough.    Cardiovascular: Negative.  Negative for chest pain.   Gastrointestinal:  Positive for abdominal pain, constipation, nausea and vomiting. Negative  for diarrhea.   Genitourinary:  Positive for decreased urine volume and difficulty urinating. Negative for dysuria.   Musculoskeletal: Negative.    Skin:  Positive for pallor. Negative for rash.   Psychiatric/Behavioral:  Positive for sleep disturbance.    All other systems reviewed and are negative.      Past Medical History:   Diagnosis Date    COPD (chronic obstructive pulmonary disease)     Elevated cholesterol     Enlarged prostate     History of heart attack     Hypertension     Urination frequency     Weak urine stream        No Known Allergies    Past Surgical History:   Procedure Laterality Date    ATRIAL APPENDAGE EXCLUSION LEFT WITH TRANSESOPHAGEAL ECHOCARDIOGRAM N/A 7/23/2024    Procedure: ATRIAL APPENDAGE EXCLUSION LEFT WITH TRANSESOPHAGEAL ECHOCARDIOGRAM;  Surgeon: Kobe Maciel MD;  Location:  [facility] OR;  Service: Cardiothoracic;  Laterality: N/A;    BACK SURGERY      x3    CARDIAC CATHETERIZATION N/A 07/12/2024    Procedure: Left Heart Cath;  Surgeon: Brennan Khoury DO;  Location:  [facility]ATH [facility]VASIVE [facility]ATION;  Service: Cardiovascular;  Laterality: N/A;    CORONARY ARTERY BYPASS GRAFT N/A 7/23/2024    Procedure: CORONARY ARTERY BYPASS GRAFTING X2, LEFT INTERNAL MAMMARY ARTERY GRAFT, RIGHT LEG OPEN VEIN HARVEST, MITRAL VALVE REAPIR, LEFT VENTRICULAR ANEURYSM REPAIR,  ATRIAL APPENDAGE EXCLUSION LEFT 45 ATRICLIP WITH TRANSESOPHAGEAL ECHOCARDIOGRAM;  Surgeon: Kobe Maciel MD;  Location:  [facility] OR;  Service: Cardiothoracic;  Laterality: N/A;    EXCISION      fatty patch on back    HERNIA REPAIR      MITRAL VALVE REPAIR/REPLACEMENT N/A 7/23/2024    Procedure: POSSIBLE MITRAL VALVE REPAIR/REPLACEMENT;  Surgeon: Kobe Maciel MD;  Location:  [facility] OR;  Service: Cardiothoracic;  Laterality: N/A;       Family History   Problem Relation Age of Onset    Heart disease Mother        Social History     Socioeconomic History    Marital status: Single   Tobacco Use    Smoking status: Former      Types: Cigarettes     Start date: 7/15/2024     Quit date: 1972     Years since quittin.6    Smokeless tobacco: Never   Vaping Use    Vaping status: Never Used   Substance and Sexual Activity    Alcohol use: Not Currently    Drug use: Not Currently    Sexual activity: Yes           Objective   Physical Exam  Vitals and nursing note reviewed.   Constitutional:       General: He is not in acute distress.     Appearance: He is well-developed. He is ill-appearing. He is not diaphoretic.   HENT:      Head: Atraumatic.      Right Ear: External ear normal.      Left Ear: External ear normal.      Nose: Nose normal.      Mouth/Throat:      Pharynx: Oropharynx is clear.   Eyes:      General: No scleral icterus.     Extraocular Movements: Extraocular movements intact.      Conjunctiva/sclera: Conjunctivae normal.   Neck:      Thyroid: No thyromegaly.      Vascular: No JVD.   Cardiovascular:      Rate and Rhythm: Regular rhythm. Tachycardia present.      Heart sounds: Normal heart sounds. No murmur heard.  Pulmonary:      Effort: Pulmonary effort is normal. No respiratory distress.      Breath sounds: Rales present. No wheezing.   Chest:      Chest wall: No tenderness.   Abdominal:      General: Bowel sounds are normal. There is no distension.      Palpations: Abdomen is soft. There is no mass.      Tenderness: There is no abdominal tenderness. There is no guarding or rebound.   Musculoskeletal:         General: Normal range of motion.      Cervical back: Normal range of motion and neck supple.   Lymphadenopathy:      Cervical: No cervical adenopathy.   Skin:     General: Skin is warm and dry.      Capillary Refill: Capillary refill takes less than 2 seconds.      Coloration: Skin is pale.      Findings: No erythema or rash.   Neurological:      Mental Status: He is alert and oriented to person, place, and time.      Cranial Nerves: No cranial nerve deficit.      Coordination: Coordination normal.      Deep Tendon  Reflexes: Reflexes are normal and symmetric.   Psychiatric:         Behavior: Behavior normal.         Thought Content: Thought content normal.         Judgment: Judgment normal.         Procedures           ED Course                                             Medical Decision Making  Patient is a 74-year-old male who presents to the ER with chief complaints of nausea and vomiting as well as abdominal discomfort.  Patient states he has had issues with eating, constipation, and decreased urine output since he had CABG approximately 1 month ago.  Per review of note, patient had a CABG x 2 on July 23, 2024 by Dr. Maciel.  Patient had Posterior Basal LV Aneurysm Repair (Resection of Aneurysm Wall with Linear Closure, MV repair (Hollis Stitch at A2/P2), CABG x2 (LIMA to LAD, SVG to distal OM), LAAE with 45 mm Atriclip.  He presented to the hospital 1 week prior to surgery with new onset heart failure symptoms that have been worsening over the past several weeks.  Approximately 5 weeks prior to his evaluation he had what he thought was a heatstroke which is not felt was likely STEMI.  (See note for complete details).  Today, as described above, he presents with nausea and vomiting.  He was attempting to drink Gatorade/contrast for a scheduled outpatient CT scan and began developing nausea with vomiting.  He presents for further evaluation.  Patient denies any cough or congestion.  He has had no recorded fevers.  He denies any chest pain. Reports chronic shortness of breath. Reports difficulty with staying asleep- continues to wake up each night unable to sleep.    Past medical history significant for COPD, hyperlipidemia, enlarged prostate, chronic kidney disease, CHF, MI, hypertension, urinary frequency, CABG x 2, aneurysm with repair, severe mitral regurgitation with mitral valve repair    Differential diagnosis: Bowel obstruction, constipation, electrolyte abnormality, urine retention, chf, and other    Bladder scan  was 125 per nursing staff    Labs Reviewed  COMPREHENSIVE METABOLIC PANEL - Abnormal; Notable for the following components:     Glucose                       126 (*)                Creatinine                    1.37 (*)               Sodium                        132 (*)                Chloride                      96 (*)                 Calcium                       8.5 (*)                Alkaline Phosphatase          140 (*)                eGFR                          54.8 (*)            All other components within normal limits         Narrative: GFR Normal >60                  Chronic Kidney Disease <60                  Kidney Failure <15                                    The GFR formula is only valid for adults with stable renal function between ages 18 and 70.  URINALYSIS W/ MICROSCOPIC IF INDICATED (NO CULTURE) - Abnormal; Notable for the following components:     Protein, UA                   Trace (*)            All other components within normal limits         Narrative: Urine microscopic not indicated.  SINGLE HS TROPONIN T - Abnormal; Notable for the following components:     HS Troponin T                 85 (*)              All other components within normal limits         Narrative: High Sensitive Troponin T Reference Range:                  <14.0 ng/L- Negative Female for AMI                  <22.0 ng/L- Negative Male for AMI                  >=14 - Abnormal Female indicating possible myocardial injury.                  >=22 - Abnormal Male indicating possible myocardial injury.                   Clinicians would have to utilize clinical acumen, EKG, Troponin, and serial changes to determine if it is an Acute Myocardial Infarction or myocardial injury due to an underlying chronic condition.                                       CBC WITH AUTO DIFFERENTIAL - Abnormal; Notable for the following components:     RBC                           3.68 (*)               Hemoglobin                    10.7 (*)                Hematocrit                    34.7 (*)               MCHC                          30.8 (*)               Lymphocyte %                  17.0 (*)            All other components within normal limits  HIGH SENSITIVITIY TROPONIN T 2HR - Abnormal; Notable for the following components:     HS Troponin T                 82 (*)              All other components within normal limits         Narrative: High Sensitive Troponin T Reference Range:                  <14.0 ng/L- Negative Female for AMI                  <22.0 ng/L- Negative Male for AMI                  >=14 - Abnormal Female indicating possible myocardial injury.                  >=22 - Abnormal Male indicating possible myocardial injury.                   Clinicians would have to utilize clinical acumen, EKG, Troponin, and serial changes to determine if it is an Acute Myocardial Infarction or myocardial injury due to an underlying chronic condition.                                       BNP (IN-HOUSE) - Abnormal; Notable for the following components:     proBNP                        7,349.0 (*)            All other components within normal limits         Narrative: This assay is used as an aid in the diagnosis of individuals suspected of having heart failure. It can be used as an aid in the diagnosis of acute decompensated heart failure (ADHF) in patients presenting with signs and symptoms of ADHF to the emergency department (ED). In addition, NT-proBNP of <300 pg/mL indicates ADHF is not likely.                                    Age Range Result Interpretation  NT-proBNP Concentration (pg/mL:                                                      <50             Positive            >450                                   Gray                 300-450                                    Negative             <300                                    50-75           Positive            >900                                  Antunez                300-900                                   Negative            <300                                                      >75             Positive            >1800                                  Antunez                300-1800                                  Negative            <300  LIPASE - Normal  MAGNESIUM - Normal  CBC AND DIFFERENTIAL     CT Angiogram Chest   Final Result    1.  No central pulmonary embolus. The segmental and subsegmental    pulmonary arteries are not yet well opacified.    2.  Volume overload with interstitial edema as well as early airspace    filling edema. There are bilateral layering pleural effusions of which    of the left-sided pleural effusion is large.         This report was signed and finalized on 8/21/2024 11:39 AM by Dr Mikie Cifuentes.          CT Abdomen Pelvis With Contrast   Final Result    1. Nonspecific acute enteritis/ileitis. No obstruction.    2. Appendix is normal.    3. Fatty infiltration of the liver.    4. Right renal cyst.    5. A high position/retractile left testis.    6. The chest is separately reviewed and reported                                                                This report was signed and finalized on 8/21/2024 11:36 AM by Dr. Everton Ziegler MD.          XR Chest 1 View   Final Result    1.  There is a mild interstitial edema as well as a residual trace    layering pleural effusion on the left.    2.  Recent coronary bypass.         This report was signed and finalized on 8/21/2024 8:58 AM by Dr Mikie Cifuentes.         Patient presented to the ER with complaints of abdominal discomfort, decreased intake, vomiting, and decreased urine output.  He had a CT scan of the abdomen and pelvis ordered outpatient for further evaluation however began vomiting while doing the p.o. contrast.  Patient had a recent CABG performed and has had issues with p.o. intake since surgery.  Per workup patient's BNP is elevated, he has a slight elevation on his BUN and creatinine as  well.  CTA of the chest reveals a large left pleural effusion.  See labs and CT findings above.  Patient has also been tachycardic in the 120s with an AV block.  This appears similar to EKG performed previously.    Discussed case with Dr. Maciel.  He recommends one dose of lasix in the ER. Recommends cardiac echo to evaluate prior to any further diuresing.  Requesting hospitalist to admit and cardiology to consult. Dr. Maciel states he may drain the large pleural effusion after echo has been done either today or tomorrow.  There are also concerns patient may be in atrial flutter.  Dr. Maciel spoke with Dr. Gustafson who plans to do a TERESA and possible cardioversion.  Discussed case with hospitalist who is agreeable for admission.  Please see their notes for details.    Amount and/or Complexity of Data Reviewed  Labs: ordered. Decision-making details documented in ED Course.  Radiology: ordered. Decision-making details documented in ED Course.  ECG/medicine tests: ordered. Decision-making details documented in ED Course.  Discussion of management or test interpretation with external provider(s): Discussed with Dr. Maciel on-call for CT surgery  He spoke with Dr. Contreras with cardiology/electrophysiology  Discussed with hospitalist    Risk  Prescription drug management.        Final diagnoses:   Pleural effusion   Tachycardia   Hx of CABG       ED Disposition  ED Disposition       ED Disposition   Decision to Admit    Condition   --    Comment   Level of Care: Telemetry [5]   Diagnosis: Pleural effusion [288490]   Admitting Physician: ELIZ WALLS [[id number]]   Attending Physician: ELIZ WALLS [[id number]]   Certification: I Certify That Inpatient Hospital Services Are Medically Necessary For Greater Than 2 Midnights                 No follow-up provider specified.       Medication List      No changes were made to your prescriptions during this visit.            Sil Garcia, APRN  08/21/24 1370

## 2024-08-21 NOTE — H&P
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  If in the ER he was found to be tachycardic in atrial fibrillation in the rates of 120 also was found to have left-sided pleural effusion, decision was made to admit him for further evaluation and management as well as as a consult to cardiothoracic surgeon, and cardiology.        Review of Systems   Otherwise complete ROS reviewed and negative except as mentioned in the HPI.    Past Medical History:   Past Medical History:   Diagnosis Date    COPD (chronic obstructive pulmonary disease)     Elevated cholesterol     Enlarged prostate     History of heart attack     Hypertension     Urination frequency     Weak urine stream      Past Surgical History:  Past Surgical History:   Procedure Laterality Date    ATRIAL APPENDAGE EXCLUSION LEFT WITH TRANSESOPHAGEAL ECHOCARDIOGRAM N/A 7/23/2024    Procedure: ATRIAL APPENDAGE EXCLUSION LEFT WITH TRANSESOPHAGEAL ECHOCARDIOGRAM;  Surgeon: Kobe Maciel MD;  Location: [facility] OR;  Service: Cardiothoracic;  Laterality: N/A;    BACK SURGERY      x3    CARDIAC CATHETERIZATION N/A 07/12/2024    Procedure: Left Heart Cath;  Surgeon: Brennan Khoury DO;  Location: [facility] CATH INVASIVE LOCATION;   Service: Cardiovascular;  Laterality: N/A;    CORONARY ARTERY BYPASS GRAFT N/A 7/23/2024    Procedure: CORONARY ARTERY BYPASS GRAFTING X2, LEFT INTERNAL MAMMARY ARTERY GRAFT, RIGHT LEG OPEN VEIN HARVEST, MITRAL VALVE REAPIR, LEFT VENTRICULAR ANEURYSM REPAIR,  ATRIAL APPENDAGE EXCLUSION LEFT 45 ATRICLIP WITH TRANSESOPHAGEAL ECHOCARDIOGRAM;  Surgeon: Kobe Maciel MD;  Location:  [facility] OR;  Service: Cardiothoracic;  Laterality: N/A;    EXCISION      fatty patch on back    HERNIA REPAIR      MITRAL VALVE REPAIR/REPLACEMENT N/A 7/23/2024    Procedure: POSSIBLE MITRAL VALVE REPAIR/REPLACEMENT;  Surgeon: Kobe Maciel MD;  Location:  [facility] OR;  Service: Cardiothoracic;  Laterality: N/A;     Social History:  reports that he quit smoking about 52 years ago. His smoking use included cigarettes. He started smoking about 5 weeks ago. He has never used smokeless tobacco. He reports that he does not currently use alcohol. He reports that he does not currently use drugs.    Family History: family history includes Heart disease in his mother.       Allergies:  No Known Allergies    Medications:  Prior to Admission medications    Medication Sig Start Date End Date Taking? Authorizing Provider   amiodarone (PACERONE) 400 MG tablet Take 1 tablet by mouth Daily for 30 days. 7/29/24 8/28/24  Tosha Hart APRN   apixaban (ELIQUIS) 5 MG tablet tablet Take 1 tablet by mouth 2 (Two) Times a Day. 7/29/24   Tosha Hart APRN   aspirin 81 MG EC tablet Take 1 tablet by mouth Daily. 7/10/24   Brennan Khoury, DO   atorvastatin (LIPITOR) 40 MG tablet Take 1 tablet by mouth Daily. 7/16/24   Mag Awan APRN   carvedilol (COREG) 6.25 MG tablet Take 1 tablet by mouth Every 12 (Twelve) Hours. 7/29/24   Tosha Hart APRN   lisinopril (PRINIVIL,ZESTRIL) 5 MG tablet Take 1 tablet by mouth Daily. 7/10/24   Brennan Khoury, DO   multivitamin (MULTI VITAMIN DAILY PO) Take 1 tablet by mouth Daily.    Provider,  "MD Regulo     I have utilized all available immediate resources to obtain, update, or review the patient's current medications (including all prescriptions, over-the-counter products, herbals, cannabis/cannabidiol products, and vitamin/mineral/dietary (nutritional) supplements).    Objective     Vital Signs: /89 (BP Location: Left arm, Patient Position: Lying)   Pulse (!) 128 Comment: Reported to RAYMOND Gutierrez  Temp 97.6 °F (36.4 °C) (Oral)   Resp 18   Ht 177.8 cm (70\")   Wt 84.8 kg (187 lb)   SpO2 96%   BMI 26.83 kg/m²   Physical Exam   General: Patient is alert, awake, oriented x 3 in no distress at time of examination  HEENT: Normocephalic, atraumatic, pupils are equal reactive to light and accommodate  Neck: Supple, no JVD, no carotid bruits  CVS: S1, S2, regular rhythm and rate  Lungs: Clear to auscultation bilaterally  Abdomen: Soft, nontender, nondistended bowel sounds are present  Extremities: No cyanosis, no clubbing, no edema pulses are intact  Neurologic: No focal deficits  Psychiatric: Normal affect      Results Reviewed:  Lab Results (last 24 hours)       Procedure Component Value Units Date/Time    BNP [[id number]]  (Abnormal) Collected: 08/21/24 1056    Specimen: Blood Updated: 08/21/24 1250     proBNP 7,349.0 pg/mL     Narrative:      This assay is used as an aid in the diagnosis of individuals suspected of having heart failure. It can be used as an aid in the diagnosis of acute decompensated heart failure (ADHF) in patients presenting with signs and symptoms of ADHF to the emergency department (ED). In addition, NT-proBNP of <300 pg/mL indicates ADHF is not likely.    Age Range Result Interpretation  NT-proBNP Concentration (pg/mL:      <50             Positive            >450                   Gray                 300-450                    Negative             <300    50-75           Positive            >900                  Gray                300-900                  Negative       "      <300      >75             Positive            >1800                  Gray                300-1800                  Negative            <300    High Sensitivity Troponin T 2Hr [[id number]]  (Abnormal) Collected: 08/21/24 1056    Specimen: Blood Updated: 08/21/24 1126     HS Troponin T 82 ng/L      Troponin T Delta -3 ng/L     Narrative:      High Sensitive Troponin T Reference Range:  <14.0 ng/L- Negative Female for AMI  <22.0 ng/L- Negative Male for AMI  >=14 - Abnormal Female indicating possible myocardial injury.  >=22 - Abnormal Male indicating possible myocardial injury.   Clinicians would have to utilize clinical acumen, EKG, Troponin, and serial changes to determine if it is an Acute Myocardial Infarction or myocardial injury due to an underlying chronic condition.         Urinalysis With Microscopic If Indicated (No Culture) - Urine, Clean Catch [[id number]]  (Abnormal) Collected: 08/21/24 1056    Specimen: Urine, Clean Catch Updated: 08/21/24 1109     Color, UA Yellow     Appearance, UA Clear     pH, UA 5.5     Specific Gravity, UA 1.014     Glucose, UA Negative     Ketones, UA Negative     Bilirubin, UA Negative     Blood, UA Negative     Protein, UA Trace     Leuk Esterase, UA Negative     Nitrite, UA Negative     Urobilinogen, UA 1.0 E.U./dL    Narrative:      Urine microscopic not indicated.    Single High Sensitivity Troponin T [[id number]]  (Abnormal) Collected: 08/21/24 0847    Specimen: Blood Updated: 08/21/24 0926     HS Troponin T 85 ng/L     Narrative:      High Sensitive Troponin T Reference Range:  <14.0 ng/L- Negative Female for AMI  <22.0 ng/L- Negative Male for AMI  >=14 - Abnormal Female indicating possible myocardial injury.  >=22 - Abnormal Male indicating possible myocardial injury.   Clinicians would have to utilize clinical acumen, EKG, Troponin, and serial changes to determine if it is an Acute Myocardial Infarction or myocardial injury due to an underlying chronic condition.          Magnesium [[id number]]  (Normal) Collected: 08/21/24 0847    Specimen: Blood Updated: 08/21/24 0925     Magnesium 2.3 mg/dL     Comprehensive Metabolic Panel [[id number]]  (Abnormal) Collected: 08/21/24 0847    Specimen: Blood Updated: 08/21/24 0925     Glucose 126 mg/dL      BUN 18 mg/dL      Creatinine 1.37 mg/dL      Sodium 132 mmol/L      Potassium 4.8 mmol/L      Chloride 96 mmol/L      CO2 24.0 mmol/L      Calcium 8.5 mg/dL      Total Protein 7.2 g/dL      Albumin 3.7 g/dL      ALT (SGPT) 30 U/L      AST (SGOT) 24 U/L      Alkaline Phosphatase 140 U/L      Total Bilirubin 0.6 mg/dL      Globulin 3.5 gm/dL      A/G Ratio 1.1 g/dL      BUN/Creatinine Ratio 13.1     Anion Gap 12.0 mmol/L      eGFR 54.8 mL/min/1.73     Narrative:      GFR Normal >60  Chronic Kidney Disease <60  Kidney Failure <15    The GFR formula is only valid for adults with stable renal function between ages 18 and 70.    Lipase [[id number]]  (Normal) Collected: 08/21/24 0847    Specimen: Blood Updated: 08/21/24 0920     Lipase 54 U/L     CBC & Differential [[id number]]  (Abnormal) Collected: 08/21/24 0847    Specimen: Blood Updated: 08/21/24 0903    Narrative:      The following orders were created for panel order CBC & Differential.  Procedure                               Abnormality         Status                     ---------                               -----------         ------                     CBC Auto Differential[[id number]]        Abnormal            Final result                 Please view results for these tests on the individual orders.    CBC Auto Differential [[id number]]  (Abnormal) Collected: 08/21/24 0847    Specimen: Blood Updated: 08/21/24 0903     WBC 8.47 10*3/mm3      RBC 3.68 10*6/mm3      Hemoglobin 10.7 g/dL      Hematocrit 34.7 %      MCV 94.3 fL      MCH 29.1 pg      MCHC 30.8 g/dL      RDW 15.2 %      RDW-SD 52.4 fl      MPV 9.3 fL      Platelets 246 10*3/mm3      Neutrophil % 72.4 %      Lymphocyte % 17.0 %       Monocyte % 6.1 %      Eosinophil % 3.4 %      Basophil % 0.7 %      Immature Grans % 0.4 %      Neutrophils, Absolute 6.13 10*3/mm3      Lymphocytes, Absolute 1.44 10*3/mm3      Monocytes, Absolute 0.52 10*3/mm3      Eosinophils, Absolute 0.29 10*3/mm3      Basophils, Absolute 0.06 10*3/mm3      Immature Grans, Absolute 0.03 10*3/mm3      nRBC 0.0 /100 WBC           Imaging Results (Last 24 Hours)       Procedure Component Value Units Date/Time    CT Angiogram Chest [[id number]] Collected: 08/21/24 1133     Updated: 08/21/24 1142    Narrative:      CT ANGIOGRAM CHEST- 8/21/2024 9:58 AM      HISTORY: shortness of breath; tachycardia; recent cabg      COMPARISON: Chest CT dated 7/13/2024     DOSE LENGTH PRODUCT: 664.92 mGy.cm. Automated exposure control was also  utilized to decrease patient radiation dose.     TECHNIQUE: Helical tomographic images of the chest were obtained after  the administration of intravenous contrast following angiogram protocol.  Additionally, 3D and multiplanar reformatted images were provided.       FINDINGS:    Pulmonary arteries: No filling defects in the central pulmonary  arteries. The most distal segmental and subsegmental pulmonary arteries  are not yet opacified with contrast.     AORTA AND GREAT VESSELS: The aorta is not well opacified but  demonstrates no aneurysmal dilation. No flow-limiting stenosis  identified at the great vessel origins.     VISUALIZED NECK BASE: The imaged portion of the base of the neck appears  unremarkable.     LUNGS: Bilateral interstitial edema. Minimal groundglass opacities,  especially in the dependent portions of the lower lobes. Lingula and  left lower lobe discoid atelectasis. Bilateral layering pleural  effusions, which the left-sided pleural effusion is large. Right-sided  pleural effusion is small in size. The airways are clear. No  pneumothorax.     HEART: The heart is normal in size. There is no pericardial effusion.  Previous coronary bypass.      MEDIASTINUM AND LYMPH NODEs: Expected postoperative changes following  recent coronary bypass. There is no mediastinal hematoma or organized  collection.     SKELETAL AND SOFT TISSUES: Chest wall soft tissues are unremarkable. No  acute bony abnormality. Sternal wires appear intact.     UPPER ABDOMEN: The imaged portion of the upper abdomen demonstrates no  acute process.       Impression:      1.  No central pulmonary embolus. The segmental and subsegmental  pulmonary arteries are not yet well opacified.  2.  Volume overload with interstitial edema as well as early airspace  filling edema. There are bilateral layering pleural effusions of which  of the left-sided pleural effusion is large.     This report was signed and finalized on 8/21/2024 11:39 AM by Dr Mikie Cifuentes.       CT Abdomen Pelvis With Contrast [[id number]] Collected: 08/21/24 1125     Updated: 08/21/24 1139    Narrative:      EXAMINATION: CT ABDOMEN PELVIS W CONTRAST-      8/21/2024 9:58 AM     HISTORY: abdominal pain; vomiting     In order to have a CT radiation dose as low as reasonably achievable  Automated Exposure Control was utilized for adjustment of the mA and/or  KV according to patient size.     Total DLP = 664.92 mGy.cm     CT scan of the abdomen and pelvis is performed after intravenous  contrast enhancement.     The images are acquired in axial plane and subsequent reconstruction in  coronal and sagittal planes.     There is no previous similar study for comparison.     Limited visualized chest show bilateral pleural effusion left more than  the right. The chest is separately reviewed and reported in CT scan of  the chest performed today.     There is fatty infiltration of the liver. The spleen is normal     No radiopaque gallstones.     Pancreas is normal.     The adrenal glands bilaterally are normal.     There is well-defined sharply marginated low density exophytic mass from  the posterior aspect of the upper pole of the right  kidney measuring 4.3  cm in maximum dimension in axial plane images. CT density suggest a  cyst. No radiopaque calculi in either kidney. No hydronephrosis on  either side. Limited visualized ureters are nondilated. The urinary  bladder is moderately distended. No intrinsic abnormality.     Prostate is moderately enlarged.     There is small fat-containing inguinal hernias. There is high position  of the left testes which may represent retractile testis.     The stomach is decompressed with moderate wall thickening. No focal  abnormality. There is moderate diffuse thickening of the wall of the  decompressed ileum with suggestion of edema of the mucosa. No  surrounding fat infiltration. No significant narrowing or obstruction.  Appendix is normal. Moderate gas and stool is seen in the colon. No  finding to suggest obstruction.     There is a trace free fluid in the lower pelvis particularly on the  right side.     Atheromatous changes of the abdominal aorta and iliac arteries. No  aneurysmal dilatation. Atheromatous plaques are seen at the origin of  the celiac and superior mesenteric arteries. No significant stenosis.     No evidence of abdominal or pelvic lymphadenopathy.     There is no evidence of peritoneal/omental infiltration nodularity or  masses.     The images reviewed in bone window show no acute bony abnormality. There  is bilateral spondylolysis L5 and grade 1 spondylolisthesis L5-S1. Old  healed rib fractures bilaterally are noted.       Impression:      1. Nonspecific acute enteritis/ileitis. No obstruction.  2. Appendix is normal.  3. Fatty infiltration of the liver.  4. Right renal cyst.  5. A high position/retractile left testis.  6. The chest is separately reviewed and reported                                      This report was signed and finalized on 8/21/2024 11:36 AM by Dr. Everton Ziegler MD.       XR Chest 1 View [[id number]] Collected: 08/21/24 0855     Updated: 08/21/24 0901    Narrative:       XR CHEST 1 VW- 8/21/2024 7:47 AM     HISTORY: tachycardia; cardiac work up; recent cabg       COMPARISON: Chest x-ray dated 8/19/2024     FINDINGS:  Upright frontal radiograph of the chest was obtained     Recent coronary bypass. Heart size is stable. Mild interstitial  coarsening on today's exam and there continues to be a trace layering  pleural effusion on the left. No new consolidation. No pneumothorax. No  acute bony abnormality seen.       Impression:      1.  There is a mild interstitial edema as well as a residual trace  layering pleural effusion on the left.  2.  Recent coronary bypass.     This report was signed and finalized on 8/21/2024 8:58 AM by Dr Mikie Cifuentes.             I have personally reviewed and interpreted the radiology studies and ECG obtained at time of admission.     Assessment / Plan   Assessment:   Active Hospital Problems    Diagnosis     **Pleural effusion        Treatment Plan  The patient will be admitted to my service here at [facility].  1.  Generalized weakness  2.  Left pleural effusion  3.  Status post left ventricular aneurysm repair in July 2024.  4.  Status post CABG in July 2024  5.  Acute kidney injury  6.  Atrial flutter      Patient will be admitted, will continue home medication, will continue anticoagulation with Eliquis, cardiology, pretransfusion consults were placed, patient will have ultrasound guidance thoracentesis of his left pleural effusion in a.m.    Medical Decision Making  Number and Complexity of problems: 6  Differential Diagnosis: None    Conditions and Status        Condition is unchanged.     [facility] Data  External documents reviewed: n/a  Cardiac tracing (EKG, telemetry) interpretation: atrial flutter  Radiology interpretation: reviewed  Labs reviewed: yes  Any tests that were considered but not ordered: none     Decision rules/scores evaluated (example [id number], [place], etc): 3     Discussed with: patient , wife , nursing staff       Care Planning  Shared decision making: patient , wife  Code status and discussions: full    Disposition  Social Determinants of Health that impact treatment or disposition: none  Estimated length of stay is 2 to 3 days.     I confirmed that the patient's advanced care plan is present, code status is documented, and a surrogate decision maker is listed in the patient's medical record.     The patient's surrogate decision maker is patient.     The patient was seen and examined by me on 8/21/2024 at 1440.    Electronically signed by Naldo David MD, 08/21/24, 14:52 CDT.

## 2024-08-21 NOTE — PLAN OF CARE
Goal Outcome Evaluation:  Plan of Care Reviewed With: patient        Progress: no change  Outcome Evaluation: Pt. admitted to  this evening. No c/o pain. VSS. On room air. Incision from recent CABG C/D/I and healing. Echo completed and results pending. Aflutter 130's on tele, MD's are aware. Plans for possible thoracentisis to drain pleural effusion and cardioversion tomorrow??? Will continue to monitor.

## 2024-08-22 ENCOUNTER — ANESTHESIA (OUTPATIENT)
Dept: CARDIOLOGY | Facility: HOSPITAL | Age: 74
End: 2024-08-22
Payer: MEDICARE

## 2024-08-22 ENCOUNTER — ANESTHESIA EVENT (OUTPATIENT)
Dept: CARDIOLOGY | Facility: HOSPITAL | Age: 74
End: 2024-08-22
Payer: MEDICARE

## 2024-08-22 ENCOUNTER — APPOINTMENT (OUTPATIENT)
Dept: GENERAL RADIOLOGY | Facility: HOSPITAL | Age: 74
DRG: 274 | End: 2024-08-22
Payer: MEDICARE

## 2024-08-22 PROBLEM — R00.0 WIDE-COMPLEX TACHYCARDIA: Status: ACTIVE | Noted: 2024-08-21

## 2024-08-22 LAB
ANION GAP SERPL CALCULATED.3IONS-SCNC: 12 MMOL/L (ref 5–15)
ARTERIAL PATENCY WRIST A: POSITIVE
ATMOSPHERIC PRESS: 755 MMHG
BASE EXCESS BLDA CALC-SCNC: -3.4 MMOL/L (ref 0–2)
BASOPHILS # BLD AUTO: 0.08 10*3/MM3 (ref 0–0.2)
BASOPHILS NFR BLD AUTO: 1 % (ref 0–1.5)
BDY SITE: ABNORMAL
BODY TEMPERATURE: 37
BUN SERPL-MCNC: 20 MG/DL (ref 8–23)
BUN/CREAT SERPL: 12.7 (ref 7–25)
CALCIUM SPEC-SCNC: 8.7 MG/DL (ref 8.6–10.5)
CHLORIDE SERPL-SCNC: 97 MMOL/L (ref 98–107)
CO2 SERPL-SCNC: 23 MMOL/L (ref 22–29)
CREAT SERPL-MCNC: 1.58 MG/DL (ref 0.76–1.27)
DEPRECATED RDW RBC AUTO: 49.8 FL (ref 37–54)
EGFRCR SERPLBLD CKD-EPI 2021: 46.2 ML/MIN/1.73
EOSINOPHIL # BLD AUTO: 0.27 10*3/MM3 (ref 0–0.4)
EOSINOPHIL NFR BLD AUTO: 3.2 % (ref 0.3–6.2)
ERYTHROCYTE [DISTWIDTH] IN BLOOD BY AUTOMATED COUNT: 15 % (ref 12.3–15.4)
GAS FLOW AIRWAY: 4 LPM
GLUCOSE SERPL-MCNC: 105 MG/DL (ref 65–99)
HCO3 BLDA-SCNC: 21 MMOL/L (ref 20–26)
HCT VFR BLD AUTO: 34.1 % (ref 37.5–51)
HGB BLD-MCNC: 10.6 G/DL (ref 13–17.7)
IMM GRANULOCYTES # BLD AUTO: 0.02 10*3/MM3 (ref 0–0.05)
IMM GRANULOCYTES NFR BLD AUTO: 0.2 % (ref 0–0.5)
LYMPHOCYTES # BLD AUTO: 1.72 10*3/MM3 (ref 0.7–3.1)
LYMPHOCYTES NFR BLD AUTO: 20.6 % (ref 19.6–45.3)
Lab: ABNORMAL
MCH RBC QN AUTO: 28.4 PG (ref 26.6–33)
MCHC RBC AUTO-ENTMCNC: 31.1 G/DL (ref 31.5–35.7)
MCV RBC AUTO: 91.4 FL (ref 79–97)
MODALITY: ABNORMAL
MONOCYTES # BLD AUTO: 0.6 10*3/MM3 (ref 0.1–0.9)
MONOCYTES NFR BLD AUTO: 7.2 % (ref 5–12)
NEUTROPHILS NFR BLD AUTO: 5.64 10*3/MM3 (ref 1.7–7)
NEUTROPHILS NFR BLD AUTO: 67.8 % (ref 42.7–76)
NRBC BLD AUTO-RTO: 0 /100 WBC (ref 0–0.2)
PCO2 BLDA: 34.8 MM HG (ref 35–45)
PCO2 TEMP ADJ BLD: 34.8 MM HG (ref 35–45)
PH BLDA: 7.39 PH UNITS (ref 7.35–7.45)
PH, TEMP CORRECTED: 7.39 PH UNITS (ref 7.35–7.45)
PLATELET # BLD AUTO: 238 10*3/MM3 (ref 140–450)
PMV BLD AUTO: 9 FL (ref 6–12)
PO2 BLDA: 124 MM HG (ref 83–108)
PO2 TEMP ADJ BLD: 124 MM HG (ref 83–108)
POTASSIUM SERPL-SCNC: 5.2 MMOL/L (ref 3.5–5.2)
RBC # BLD AUTO: 3.73 10*6/MM3 (ref 4.14–5.8)
SAO2 % BLDCOA: 99.5 % (ref 94–99)
SODIUM SERPL-SCNC: 132 MMOL/L (ref 136–145)
VENTILATOR MODE: ABNORMAL
WBC NRBC COR # BLD AUTO: 8.33 10*3/MM3 (ref 3.4–10.8)

## 2024-08-22 PROCEDURE — 93653 COMPRE EP EVAL TX SVT: CPT | Performed by: STUDENT IN AN ORGANIZED HEALTH CARE EDUCATION/TRAINING PROGRAM

## 2024-08-22 PROCEDURE — 25010000002 HEPARIN (PORCINE) 2000-0.9 UNIT/L-% SOLUTION: Performed by: STUDENT IN AN ORGANIZED HEALTH CARE EDUCATION/TRAINING PROGRAM

## 2024-08-22 PROCEDURE — 85025 COMPLETE CBC W/AUTO DIFF WBC: CPT | Performed by: INTERNAL MEDICINE

## 2024-08-22 PROCEDURE — 93010 ELECTROCARDIOGRAM REPORT: CPT | Performed by: EMERGENCY MEDICINE

## 2024-08-22 PROCEDURE — 82803 BLOOD GASES ANY COMBINATION: CPT

## 2024-08-22 PROCEDURE — C1766 INTRO/SHEATH,STRBLE,NON-PEEL: HCPCS | Performed by: STUDENT IN AN ORGANIZED HEALTH CARE EDUCATION/TRAINING PROGRAM

## 2024-08-22 PROCEDURE — 25010000002 PROPOFOL 1000 MG/100ML EMULSION: Performed by: NURSE ANESTHETIST, CERTIFIED REGISTERED

## 2024-08-22 PROCEDURE — 02K83ZZ MAP CONDUCTION MECHANISM, PERCUTANEOUS APPROACH: ICD-10-PCS | Performed by: STUDENT IN AN ORGANIZED HEALTH CARE EDUCATION/TRAINING PROGRAM

## 2024-08-22 PROCEDURE — 25010000002 HEPARIN (PORCINE) PER 1000 UNITS: Performed by: NURSE ANESTHETIST, CERTIFIED REGISTERED

## 2024-08-22 PROCEDURE — 93662 INTRACARDIAC ECG (ICE): CPT | Performed by: STUDENT IN AN ORGANIZED HEALTH CARE EDUCATION/TRAINING PROGRAM

## 2024-08-22 PROCEDURE — 5A2204Z RESTORATION OF CARDIAC RHYTHM, SINGLE: ICD-10-PCS | Performed by: STUDENT IN AN ORGANIZED HEALTH CARE EDUCATION/TRAINING PROGRAM

## 2024-08-22 PROCEDURE — 36600 WITHDRAWAL OF ARTERIAL BLOOD: CPT

## 2024-08-22 PROCEDURE — 25010000002 VASOPRESSIN 20 UNIT/ML SOLUTION: Performed by: NURSE ANESTHETIST, CERTIFIED REGISTERED

## 2024-08-22 PROCEDURE — C1894 INTRO/SHEATH, NON-LASER: HCPCS | Performed by: STUDENT IN AN ORGANIZED HEALTH CARE EDUCATION/TRAINING PROGRAM

## 2024-08-22 PROCEDURE — 93005 ELECTROCARDIOGRAM TRACING: CPT | Performed by: STUDENT IN AN ORGANIZED HEALTH CARE EDUCATION/TRAINING PROGRAM

## 2024-08-22 PROCEDURE — B244ZZZ ULTRASONOGRAPHY OF RIGHT HEART: ICD-10-PCS | Performed by: STUDENT IN AN ORGANIZED HEALTH CARE EDUCATION/TRAINING PROGRAM

## 2024-08-22 PROCEDURE — C1730 CATH, EP, 19 OR FEW ELECT: HCPCS | Performed by: STUDENT IN AN ORGANIZED HEALTH CARE EDUCATION/TRAINING PROGRAM

## 2024-08-22 PROCEDURE — 80048 BASIC METABOLIC PNL TOTAL CA: CPT | Performed by: INTERNAL MEDICINE

## 2024-08-22 PROCEDURE — C1732 CATH, EP, DIAG/ABL, 3D/VECT: HCPCS | Performed by: STUDENT IN AN ORGANIZED HEALTH CARE EDUCATION/TRAINING PROGRAM

## 2024-08-22 PROCEDURE — 4A023FZ MEASUREMENT OF CARDIAC RHYTHM, PERCUTANEOUS APPROACH: ICD-10-PCS | Performed by: STUDENT IN AN ORGANIZED HEALTH CARE EDUCATION/TRAINING PROGRAM

## 2024-08-22 PROCEDURE — 02583ZZ DESTRUCTION OF CONDUCTION MECHANISM, PERCUTANEOUS APPROACH: ICD-10-PCS | Performed by: STUDENT IN AN ORGANIZED HEALTH CARE EDUCATION/TRAINING PROGRAM

## 2024-08-22 PROCEDURE — 71045 X-RAY EXAM CHEST 1 VIEW: CPT

## 2024-08-22 PROCEDURE — 99232 SBSQ HOSP IP/OBS MODERATE 35: CPT | Performed by: STUDENT IN AN ORGANIZED HEALTH CARE EDUCATION/TRAINING PROGRAM

## 2024-08-22 PROCEDURE — 4A0234Z MEASUREMENT OF CARDIAC ELECTRICAL ACTIVITY, PERCUTANEOUS APPROACH: ICD-10-PCS | Performed by: STUDENT IN AN ORGANIZED HEALTH CARE EDUCATION/TRAINING PROGRAM

## 2024-08-22 PROCEDURE — C1759 CATH, INTRA ECHOCARDIOGRAPHY: HCPCS | Performed by: STUDENT IN AN ORGANIZED HEALTH CARE EDUCATION/TRAINING PROGRAM

## 2024-08-22 PROCEDURE — 25810000003 SODIUM CHLORIDE 0.9 % SOLUTION: Performed by: NURSE PRACTITIONER

## 2024-08-22 RX ORDER — BUPIVACAINE HCL/0.9 % NACL/PF 0.125 %
PLASTIC BAG, INJECTION (ML) EPIDURAL AS NEEDED
Status: DISCONTINUED | OUTPATIENT
Start: 2024-08-22 | End: 2024-08-22 | Stop reason: SURG

## 2024-08-22 RX ORDER — SODIUM CHLORIDE 0.9 % (FLUSH) 0.9 %
10 SYRINGE (ML) INJECTION EVERY 12 HOURS SCHEDULED
Status: CANCELLED | OUTPATIENT
Start: 2024-08-22

## 2024-08-22 RX ORDER — IPRATROPIUM BROMIDE AND ALBUTEROL SULFATE 2.5; .5 MG/3ML; MG/3ML
3 SOLUTION RESPIRATORY (INHALATION) EVERY 6 HOURS PRN
Status: DISCONTINUED | OUTPATIENT
Start: 2024-08-22 | End: 2024-08-24 | Stop reason: HOSPADM

## 2024-08-22 RX ORDER — AMIODARONE HYDROCHLORIDE 200 MG/1
400 TABLET ORAL 2 TIMES DAILY WITH MEALS
Status: DISCONTINUED | OUTPATIENT
Start: 2024-08-22 | End: 2024-08-24 | Stop reason: HOSPADM

## 2024-08-22 RX ORDER — LIDOCAINE HYDROCHLORIDE 20 MG/ML
INJECTION, SOLUTION INFILTRATION; PERINEURAL
Status: DISCONTINUED | OUTPATIENT
Start: 2024-08-22 | End: 2024-08-22 | Stop reason: HOSPADM

## 2024-08-22 RX ORDER — HEPARIN SODIUM 1000 [USP'U]/ML
INJECTION, SOLUTION INTRAVENOUS; SUBCUTANEOUS AS NEEDED
Status: DISCONTINUED | OUTPATIENT
Start: 2024-08-22 | End: 2024-08-22 | Stop reason: SURG

## 2024-08-22 RX ORDER — SODIUM CHLORIDE 9 MG/ML
50 INJECTION, SOLUTION INTRAVENOUS CONTINUOUS
Status: DISCONTINUED | OUTPATIENT
Start: 2024-08-22 | End: 2024-08-24 | Stop reason: HOSPADM

## 2024-08-22 RX ORDER — PROPOFOL 10 MG/ML
INJECTION, EMULSION INTRAVENOUS AS NEEDED
Status: DISCONTINUED | OUTPATIENT
Start: 2024-08-22 | End: 2024-08-22 | Stop reason: SURG

## 2024-08-22 RX ORDER — HEPARIN SODIUM 200 [USP'U]/100ML
INJECTION, SOLUTION INTRAVENOUS
Status: DISCONTINUED | OUTPATIENT
Start: 2024-08-22 | End: 2024-08-22 | Stop reason: HOSPADM

## 2024-08-22 RX ORDER — SODIUM CHLORIDE 0.9 % (FLUSH) 0.9 %
10 SYRINGE (ML) INJECTION AS NEEDED
Status: CANCELLED | OUTPATIENT
Start: 2024-08-22

## 2024-08-22 RX ORDER — SODIUM CHLORIDE 9 MG/ML
100 INJECTION, SOLUTION INTRAVENOUS CONTINUOUS
Status: CANCELLED | OUTPATIENT
Start: 2024-08-22

## 2024-08-22 RX ORDER — LIDOCAINE HYDROCHLORIDE 10 MG/ML
5 INJECTION, SOLUTION INFILTRATION; PERINEURAL ONCE
Status: DISCONTINUED | OUTPATIENT
Start: 2024-08-22 | End: 2024-08-24 | Stop reason: HOSPADM

## 2024-08-22 RX ORDER — SODIUM CHLORIDE 9 MG/ML
40 INJECTION, SOLUTION INTRAVENOUS AS NEEDED
Status: CANCELLED | OUTPATIENT
Start: 2024-08-22

## 2024-08-22 RX ADMIN — APIXABAN 5 MG: 5 TABLET, FILM COATED ORAL at 20:21

## 2024-08-22 RX ADMIN — HEPARIN SODIUM 5000 UNITS: 1000 INJECTION, SOLUTION INTRAVENOUS; SUBCUTANEOUS at 13:08

## 2024-08-22 RX ADMIN — PROPOFOL 100 MCG/KG/MIN: 10 INJECTION, EMULSION INTRAVENOUS at 12:37

## 2024-08-22 RX ADMIN — PROPOFOL 50 MG: 10 INJECTION, EMULSION INTRAVENOUS at 12:36

## 2024-08-22 RX ADMIN — Medication 150 MCG: at 13:31

## 2024-08-22 RX ADMIN — CARVEDILOL 6.25 MG: 6.25 TABLET, FILM COATED ORAL at 20:21

## 2024-08-22 RX ADMIN — SODIUM CHLORIDE 50 ML/HR: 9 INJECTION, SOLUTION INTRAVENOUS at 08:15

## 2024-08-22 RX ADMIN — Medication 10 ML: at 20:21

## 2024-08-22 RX ADMIN — ATORVASTATIN CALCIUM 40 MG: 40 TABLET ORAL at 20:21

## 2024-08-22 NOTE — ANESTHESIA PREPROCEDURE EVALUATION
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  LV function looks to be around 40%.    Neuro/Psych- negative ROS  (-) seizures, TIA, CVA  GI/Hepatic/Renal/Endo    (+) renal disease- CRI  (-) GERD, liver disease, diabetes    Musculoskeletal (-) negative ROS    Abdominal    Substance History - negative use     OB/GYN          Other                    Anesthesia Plan    ASA 4     MAC     intravenous induction     Anesthetic plan, risks, benefits, and alternatives have been provided, discussed and informed consent has been obtained with: patient.      CODE STATUS:    Level Of Support Discussed With: Patient  Code Status (Patient has no pulse and is not breathing): CPR (Attempt to Resuscitate)  Medical Interventions (Patient has pulse or is breathing): Full Support

## 2024-08-22 NOTE — PROGRESS NOTES
"Patient name: Vj Garcia  Patient : 1950  VISIT # [id number]  MR #[id number]    Procedure:  Procedure Date:  POD:* No surgery date entered *    Subjective   Chief Complaint   Patient presents with    Vomiting     Tolerating room air.  Creatinine today up to 1.5, sodium 132.  N.p.o. for EP study.    ROS: No fevers or chills.  Ongoing shortness of breath.  No chest pain.       Objective     Visit Vitals  /72 (BP Location: Left arm, Patient Position: Sitting)   Pulse 100   Temp 98.4 °F (36.9 °C) (Oral)   Resp 18   Ht 177.8 cm (70\")   Wt 84.8 kg (187 lb)   SpO2 93%   BMI 26.83 kg/m²       Intake/Output Summary (Last 24 hours) at 2024 0806  Last data filed at 2024 0300  Gross per 24 hour   Intake 550 ml   Output --   Net 550 ml       Lab:     CBC:  Results from last 7 days   Lab Units 24  0342 24  0847 24  1209   WBC 10*3/mm3 8.33 8.47 8.42   HEMATOCRIT % 34.1* 34.7* 36.2*   PLATELETS 10*3/mm3 238 246 288          BMP:  Results from last 7 days   Lab Units 24  0342 24  0847 24  1209   SODIUM mmol/L 132* 132* 133*   POTASSIUM mmol/L 5.2 4.8 5.4*   CHLORIDE mmol/L 97* 96* 96*   CO2 mmol/L 23.0 24.0 25.0   GLUCOSE mg/dL 105* 126* 132*   BUN mg/dL 20 18 15   CREATININE mg/dL 1.58* 1.37* 1.31*          COAG:      Invalid input(s): \"PT\"    IMAGES:       Imaging Results (Last 24 Hours)       Procedure Component Value Units Date/Time    CT Angiogram Chest [[id number]] Collected: 24 1133     Updated: 24 1142    Narrative:      CT ANGIOGRAM CHEST- 2024 9:58 AM      HISTORY: shortness of breath; tachycardia; recent cabg      COMPARISON: Chest CT dated 2024     DOSE LENGTH PRODUCT: 664.92 mGy.cm. Automated exposure control was also  utilized to decrease patient radiation dose.     TECHNIQUE: Helical tomographic images of the chest were obtained after  the administration of intravenous contrast following angiogram protocol.  Additionally, 3D and " multiplanar reformatted images were provided.       FINDINGS:    Pulmonary arteries: No filling defects in the central pulmonary  arteries. The most distal segmental and subsegmental pulmonary arteries  are not yet opacified with contrast.     AORTA AND GREAT VESSELS: The aorta is not well opacified but  demonstrates no aneurysmal dilation. No flow-limiting stenosis  identified at the great vessel origins.     VISUALIZED NECK BASE: The imaged portion of the base of the neck appears  unremarkable.     LUNGS: Bilateral interstitial edema. Minimal groundglass opacities,  especially in the dependent portions of the lower lobes. Lingula and  left lower lobe discoid atelectasis. Bilateral layering pleural  effusions, which the left-sided pleural effusion is large. Right-sided  pleural effusion is small in size. The airways are clear. No  pneumothorax.     HEART: The heart is normal in size. There is no pericardial effusion.  Previous coronary bypass.     MEDIASTINUM AND LYMPH NODEs: Expected postoperative changes following  recent coronary bypass. There is no mediastinal hematoma or organized  collection.     SKELETAL AND SOFT TISSUES: Chest wall soft tissues are unremarkable. No  acute bony abnormality. Sternal wires appear intact.     UPPER ABDOMEN: The imaged portion of the upper abdomen demonstrates no  acute process.       Impression:      1.  No central pulmonary embolus. The segmental and subsegmental  pulmonary arteries are not yet well opacified.  2.  Volume overload with interstitial edema as well as early airspace  filling edema. There are bilateral layering pleural effusions of which  of the left-sided pleural effusion is large.     This report was signed and finalized on 8/21/2024 11:39 AM by Dr Mikie Cifuentes.       CT Abdomen Pelvis With Contrast [[id number]] Collected: 08/21/24 1125     Updated: 08/21/24 1139    Narrative:      EXAMINATION: CT ABDOMEN PELVIS W CONTRAST-      8/21/2024 9:58 AM     HISTORY:  abdominal pain; vomiting     In order to have a CT radiation dose as low as reasonably achievable  Automated Exposure Control was utilized for adjustment of the mA and/or  KV according to patient size.     Total DLP = 664.92 mGy.cm     CT scan of the abdomen and pelvis is performed after intravenous  contrast enhancement.     The images are acquired in axial plane and subsequent reconstruction in  coronal and sagittal planes.     There is no previous similar study for comparison.     Limited visualized chest show bilateral pleural effusion left more than  the right. The chest is separately reviewed and reported in CT scan of  the chest performed today.     There is fatty infiltration of the liver. The spleen is normal     No radiopaque gallstones.     Pancreas is normal.     The adrenal glands bilaterally are normal.     There is well-defined sharply marginated low density exophytic mass from  the posterior aspect of the upper pole of the right kidney measuring 4.3  cm in maximum dimension in axial plane images. CT density suggest a  cyst. No radiopaque calculi in either kidney. No hydronephrosis on  either side. Limited visualized ureters are nondilated. The urinary  bladder is moderately distended. No intrinsic abnormality.     Prostate is moderately enlarged.     There is small fat-containing inguinal hernias. There is high position  of the left testes which may represent retractile testis.     The stomach is decompressed with moderate wall thickening. No focal  abnormality. There is moderate diffuse thickening of the wall of the  decompressed ileum with suggestion of edema of the mucosa. No  surrounding fat infiltration. No significant narrowing or obstruction.  Appendix is normal. Moderate gas and stool is seen in the colon. No  finding to suggest obstruction.     There is a trace free fluid in the lower pelvis particularly on the  right side.     Atheromatous changes of the abdominal aorta and iliac arteries.  No  aneurysmal dilatation. Atheromatous plaques are seen at the origin of  the celiac and superior mesenteric arteries. No significant stenosis.     No evidence of abdominal or pelvic lymphadenopathy.     There is no evidence of peritoneal/omental infiltration nodularity or  masses.     The images reviewed in bone window show no acute bony abnormality. There  is bilateral spondylolysis L5 and grade 1 spondylolisthesis L5-S1. Old  healed rib fractures bilaterally are noted.       Impression:      1. Nonspecific acute enteritis/ileitis. No obstruction.  2. Appendix is normal.  3. Fatty infiltration of the liver.  4. Right renal cyst.  5. A high position/retractile left testis.  6. The chest is separately reviewed and reported                                      This report was signed and finalized on 8/21/2024 11:36 AM by Dr. Everton Ziegler MD.       XR Chest 1 View [[id number]] Collected: 08/21/24 0855     Updated: 08/21/24 0901    Narrative:      XR CHEST 1 VW- 8/21/2024 7:47 AM     HISTORY: tachycardia; cardiac work up; recent cabg       COMPARISON: Chest x-ray dated 8/19/2024     FINDINGS:  Upright frontal radiograph of the chest was obtained     Recent coronary bypass. Heart size is stable. Mild interstitial  coarsening on today's exam and there continues to be a trace layering  pleural effusion on the left. No new consolidation. No pneumothorax. No  acute bony abnormality seen.       Impression:      1.  There is a mild interstitial edema as well as a residual trace  layering pleural effusion on the left.  2.  Recent coronary bypass.     This report was signed and finalized on 8/21/2024 8:58 AM by Dr Mikie Cifuentes.             CXR: No imaging this morning    Physical Exam:  General: Alert, oriented. No apparent distress.   Cardiovascular: Tachycardia without murmur, rubs, or gallops.    Pulmonary: Clear to auscultation bilaterally without wheezing, rubs, or rales.  Chest: Sternotomy incision clean, dry, and  intact. Sternum stable. No clicks.   Abdomen: Soft, nondistended, and nontender.  Extremities: Warm, moves all extremities. No edema  Neurologic:  Grossly intact with no focal deficits.            Impression:  Generalized weakness  Tachycardia  S/P LV aneurysm repair, mitral valve repair, CABG, LAAE by Dr. Maciel on 7/23/2024  Left pleural effusion        Plan:  Plan for left thoracentesis with ultrasound guidance at bedside either later today or tomorrow by Dr. Maciel  Start normal saline at 50 mL/h  Tachycardia management per EP  Discussed with patient and nursing      RAMIRO Holbrook  08/22/24  08:06 CDT

## 2024-08-22 NOTE — CASE MANAGEMENT/SOCIAL WORK
Discharge Planning Assessment  [facility]     Patient Name: Vj Garcia  MRN: [id number]  Today's Date: 8/22/2024    Admit Date: 8/21/2024        Discharge Needs Assessment       Row Name 08/22/24 1009       Living Environment    People in Home friend(s)    Name(s) of People in Home Darling    Current Living Arrangements home    Primary Care Provided by self    Provides Primary Care For other (see comments)  brother    Family Caregiver if Needed friend(s)    Family Caregiver Names Darling    Able to Return to Prior Arrangements yes       Resource/Environmental Concerns    Resource/Environmental Concerns none       Transition Planning    Patient/Family Anticipates Transition to home with family    Transportation Anticipated family or friend will provide       Discharge Needs Assessment    Readmission Within the Last 30 Days no previous admission in last 30 days    Equipment Currently Used at Home none    Concerns to be Addressed no discharge needs identified    Equipment Needed After Discharge none    Discharge Coordination/Progress spoke to patient who lives with friend and 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                 Continued Care and Services - Admitted Since 8/21/2024    No active coordination exists for this encounter.       Expected Discharge Date and Time       Expected Discharge Date Expected Discharge Time    Aug 24, 2024            Demographic Summary    No documentation.                  Functional Status    No documentation.                  Psychosocial    No documentation.                  Abuse/Neglect    No documentation.                  Legal    No documentation.                  Substance Abuse    No documentation.                  Patient Forms    No documentation.                     Tosha Akbar RN

## 2024-08-22 NOTE — PROGRESS NOTES
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 If in the ER he was found to be tachycardic in atrial fibrillation in the rates of 120 also was found to have left-sided pleural effusion, decision was made to admit him for further evaluation and management as well as as a consult to cardiothoracic surgeon, and cardiology.       Patient denies any nausea or vomiting, no chest pain or shortness of breath.    Review of Systems   All pertinent negatives and positives are as above. All other systems have been reviewed and are negative unless otherwise stated.     Objective    Temp:  [97.6 °F (36.4 °C)-98.4 °F (36.9 °C)] 97.6 °F (36.4 °C)  Heart Rate:  [] 123  Resp:  [16-20] 16  BP: (104-134)/(72-99) 128/99  Physical Exam  General: Patient is alert, awake, oriented x 3 in no distress at time of examination  HEENT: Normocephalic, atraumatic, pupils are equal reactive to light and accommodate  Neck: Supple, no JVD, no carotid bruits  CVS: S1, S2, regular rhythm and rate  Lungs: Clear to auscultation bilaterally  Abdomen: Soft, nontender, nondistended bowel sounds are present  Extremities: No cyanosis, no clubbing, no edema pulses are intact  Neurologic: No focal  "deficits  Psychiatric: Normal affect      Results Review:  I have reviewed the labs, radiology results, and diagnostic studies.    Laboratory Data:   Results from last 7 days   Lab Units 08/22/24  0342 08/21/24  0847 08/19/24  1209   WBC 10*3/mm3 8.33 8.47 8.42   HEMOGLOBIN g/dL 10.6* 10.7* 11.1*   HEMATOCRIT % 34.1* 34.7* 36.2*   PLATELETS 10*3/mm3 238 246 288        Results from last 7 days   Lab Units 08/22/24  0342 08/21/24  0847 08/19/24  1209   SODIUM mmol/L 132* 132* 133*   POTASSIUM mmol/L 5.2 4.8 5.4*   CHLORIDE mmol/L 97* 96* 96*   CO2 mmol/L 23.0 24.0 25.0   BUN mg/dL 20 18 15   CREATININE mg/dL 1.58* 1.37* 1.31*   CALCIUM mg/dL 8.7 8.5* 9.0   BILIRUBIN mg/dL  --  0.6  --    ALK PHOS U/L  --  140*  --    ALT (SGPT) U/L  --  30  --    AST (SGOT) U/L  --  24  --    GLUCOSE mg/dL 105* 126* 132*       Culture Data:   No results found for: \"BLOODCX\", \"URINECX\", \"WOUNDCX\", \"MRSACX\", \"RESPCX\", \"STOOLCX\"    Radiology Data:   Imaging Results (Last 24 Hours)       ** No results found for the last 24 hours. **            I have reviewed the patient's current medications.     Assessment/Plan   Assessment  Active Hospital Problems    Diagnosis     **Pleural effusion     Wide-complex tachycardia        Treatment Plan  1.  Generalized weakness  2.  Left pleural effusion  3.  Status post left ventricular aneurysm repair in July 2024.  4.  Status post CABG in July 2024  5.  Acute kidney injury  6.  Atrial flutter      Patient to have an EP study today, also 20 thoracentesis, continue  Number Eliquis, beta-blocker.    Medical Decision Making  Number and Complexity of problems: 6  Differential Diagnosis: none    Conditions and Status        Condition is unchanged.     MDM Data  External documents reviewed: n/a  Cardiac tracing (EKG, telemetry) interpretation: atrial flutter  Radiology interpretation: reviewed  Labs reviewed: yes  Any tests that were considered but not ordered: none     Decision rules/scores evaluated " (example [id number], Wells, etc): 3     Discussed with: patient , wife , nursing staff     Care Planning  Shared decision making: patient  Code status and discussions: full      Disposition  Social Determinants of Health that impact treatment or disposition: none  I expect the patient to be discharged to home in 2 days.     Electronically signed by Naldo David MD, 08/22/24, 12:42 CDT.

## 2024-08-22 NOTE — ANESTHESIA POSTPROCEDURE EVALUATION
"Patient: Vj Garcia    Procedure Summary       Date: 08/22/24 Room / Location: [facility] CATH LAB 4 /  [facility] CATH INVASIVE LOCATION    Anesthesia Start: 1233 Anesthesia Stop: 1411    Procedure: EP/CRM Study Diagnosis: Wide-complex tachycardia    Providers: Jerad Contreras MD Provider: Guillermo Iglesias CRNA    Anesthesia Type: MAC ASA Status: 4            Anesthesia Type: MAC    Vitals  Vitals Value Taken Time   /93 08/22/24 1411   Temp     Pulse 76 08/22/24 1414   Resp 13 08/22/24 1405   SpO2 99 % 08/22/24 1414   Vitals shown include unfiled device data.        Post Anesthesia Care and Evaluation    Patient location during evaluation: PHASE II  Patient participation: complete - patient participated  Level of consciousness: awake and alert  Pain score: 0  Pain management: adequate    Airway patency: patent  Anesthetic complications: No anesthetic complications  PONV Status: none  Cardiovascular status: acceptable  Respiratory status: acceptable  Hydration status: acceptable    Comments: Blood pressure 139/85, pulse 75, temperature 97.6 °F (36.4 °C), temperature source Oral, resp. rate 13, height 177.8 cm (70\"), weight 84.8 kg (187 lb), SpO2 97%.    Pt discharged from PACU based on sayda score >8    "

## 2024-08-22 NOTE — PLAN OF CARE
Goal Outcome Evaluation:  Plan of Care Reviewed With: patient        Progress: improving  Outcome Evaluation: Pt. went down for EP study with Dr. Contreras today, ablation and cardioversion completed for aflutter and vtach. R groin incision C/D/I and soft. Suture to be removed in AM. Pt. slightly confused after procedure and began c/o increased SOA. On 3L O2 with sats WNL. Dr. Maciel notified and stat CXR and ABG ordered. SOA seems to be possibly from anxiety. Will continue to monitor and notify MD of any changes.

## 2024-08-22 NOTE — PROGRESS NOTES
"EP Problems:  1.  Wide-complex tachycardia  2.  Left atrial appendage exclusion with atrial clip     Cardiology Problems:  1.  LV aneurysm status postresection  2.  Mitral valve repair  3.  CAD status post CABG  4.  Hypertension  5.  Chronic systolic heart failure  6.  Hyperlipidemia     Medical Problems:  1.  COPD  2.  BPH    Patient ID:  Vj Garcia is a 74 y.o. male with problem list as above as above who EP is following for wide-complex tachycardia.    Subjective:  Remains wide-complex tachycardia.  Feels poorly overall.  Hungry today.    Objective:  /91 (BP Location: Left arm, Patient Position: Lying)   Pulse (!) 128   Temp 97.8 °F (36.6 °C) (Oral)   Resp 18   Ht 177.8 cm (70\")   Wt 84.8 kg (187 lb)   SpO2 94%   BMI 26.83 kg/m²     Tachycardic, regular  Clear to auscultation bilaterally  Warm, well-perfused  Alert, oriented    Labs today:  Lab Results   Component Value Date    GLUCOSE 105 (H) 08/22/2024    CALCIUM 8.7 08/22/2024     (L) 08/22/2024    K 5.2 08/22/2024    CO2 23.0 08/22/2024    BUN 20 08/22/2024    CREATININE 1.58 (H) 08/22/2024    EGFR 46.2 (L) 08/22/2024     Lab Results   Component Value Date    WBC 8.33 08/22/2024    HGB 10.6 (L) 08/22/2024    HCT 34.1 (L) 08/22/2024     08/22/2024     Lab Results   Component Value Date    MG 2.3 08/21/2024     Telemetry today directly visualized and personally reviewed: Remains in wide-complex tachycardia, regular at a rate of ~130    Assessment:  Wide-complex tachycardia  Chronic systolic heart failure    Plan:  -Plan for an electrophysiology study today to better determine etiology of wide-complex tachycardia  -N.p.o. for now  -Continue carvedilol given systolic heart failure and wide-complex tachycardia  -Amiodarone for now    Part of this note may be an electronic transcription/translation of spoken language to printed text using the Dragon Dictation System.    "

## 2024-08-22 NOTE — PLAN OF CARE
Goal Outcome Evaluation:           Progress: no change    Outcome Evaluation: Pt slept well through the night. PT was -130 on tele. Pt complained of no pain. Sats within normal limits. NPO aftermidnight for possible thoracentists and cardioversion. Wife at bedside

## 2024-08-23 ENCOUNTER — APPOINTMENT (OUTPATIENT)
Dept: CARDIOLOGY | Facility: HOSPITAL | Age: 74
DRG: 274 | End: 2024-08-23
Payer: MEDICARE

## 2024-08-23 ENCOUNTER — APPOINTMENT (OUTPATIENT)
Dept: GENERAL RADIOLOGY | Facility: HOSPITAL | Age: 74
DRG: 274 | End: 2024-08-23
Payer: MEDICARE

## 2024-08-23 LAB
ANION GAP SERPL CALCULATED.3IONS-SCNC: 10 MMOL/L (ref 5–15)
ANION GAP SERPL CALCULATED.3IONS-SCNC: 11 MMOL/L (ref 5–15)
BH CV ECHO MEAS - AO MAX PG: 4.2 MMHG
BH CV ECHO MEAS - AO MEAN PG: 2 MMHG
BH CV ECHO MEAS - AO ROOT DIAM: 3.2 CM
BH CV ECHO MEAS - AO V2 MAX: 102 CM/SEC
BH CV ECHO MEAS - AO V2 VTI: 19.4 CM
BH CV ECHO MEAS - AVA(I,D): 2.6 CM2
BH CV ECHO MEAS - EDV(CUBED): 182.3 ML
BH CV ECHO MEAS - EDV(MOD-SP2): 144 ML
BH CV ECHO MEAS - EDV(MOD-SP4): 186 ML
BH CV ECHO MEAS - EF(MOD-SP2): 22.2 %
BH CV ECHO MEAS - EF(MOD-SP4): 17.2 %
BH CV ECHO MEAS - ESV(CUBED): 81.2 ML
BH CV ECHO MEAS - ESV(MOD-SP2): 112 ML
BH CV ECHO MEAS - ESV(MOD-SP4): 154 ML
BH CV ECHO MEAS - FS: 23.6 %
BH CV ECHO MEAS - IVS/LVPW: 1.01 CM
BH CV ECHO MEAS - IVSD: 0.89 CM
BH CV ECHO MEAS - LA DIMENSION: 4.6 CM
BH CV ECHO MEAS - LAT PEAK E' VEL: 4.9 CM/SEC
BH CV ECHO MEAS - LV DIASTOLIC VOL/BSA (35-75): 91.7 CM2
BH CV ECHO MEAS - LV MASS(C)D: 190.6 GRAMS
BH CV ECHO MEAS - LV MAX PG: 2.11 MMHG
BH CV ECHO MEAS - LV MEAN PG: 1 MMHG
BH CV ECHO MEAS - LV SYSTOLIC VOL/BSA (12-30): 75.9 CM2
BH CV ECHO MEAS - LV V1 MAX: 72.7 CM/SEC
BH CV ECHO MEAS - LV V1 VTI: 13.2 CM
BH CV ECHO MEAS - LVIDD: 5.7 CM
BH CV ECHO MEAS - LVIDS: 4.3 CM
BH CV ECHO MEAS - LVOT AREA: 3.8 CM2
BH CV ECHO MEAS - LVOT DIAM: 2.2 CM
BH CV ECHO MEAS - LVPWD: 0.88 CM
BH CV ECHO MEAS - MED PEAK E' VEL: 3.2 CM/SEC
BH CV ECHO MEAS - MR MAX PG: 78.9 MMHG
BH CV ECHO MEAS - MR MAX VEL: 444 CM/SEC
BH CV ECHO MEAS - MR MEAN PG: 51 MMHG
BH CV ECHO MEAS - MR MEAN VEL: 328 CM/SEC
BH CV ECHO MEAS - MR VTI: 150 CM
BH CV ECHO MEAS - MV A MAX VEL: 44 CM/SEC
BH CV ECHO MEAS - MV DEC TIME: 0.15 SEC
BH CV ECHO MEAS - MV E MAX VEL: 106 CM/SEC
BH CV ECHO MEAS - MV E/A: 2.41
BH CV ECHO MEAS - PI END-D VEL: 170 CM/SEC
BH CV ECHO MEAS - RAP SYSTOLE: 5 MMHG
BH CV ECHO MEAS - RVSP: 18.4 MMHG
BH CV ECHO MEAS - SV(LVOT): 50.2 ML
BH CV ECHO MEAS - SV(MOD-SP2): 32 ML
BH CV ECHO MEAS - SV(MOD-SP4): 32 ML
BH CV ECHO MEAS - SVI(LVOT): 24.7 ML/M2
BH CV ECHO MEAS - SVI(MOD-SP2): 15.8 ML/M2
BH CV ECHO MEAS - SVI(MOD-SP4): 15.8 ML/M2
BH CV ECHO MEAS - TR MAX PG: 13.4 MMHG
BH CV ECHO MEAS - TR MAX VEL: 183 CM/SEC
BH CV ECHO MEASUREMENTS AVERAGE E/E' RATIO: 26.17
BH CV XLRA - RV BASE: 3.7 CM
BH CV XLRA - RV LENGTH: 6 CM
BH CV XLRA - RV MID: 2.7 CM
BUN SERPL-MCNC: 25 MG/DL (ref 8–23)
BUN SERPL-MCNC: 26 MG/DL (ref 8–23)
BUN/CREAT SERPL: 16.4 (ref 7–25)
BUN/CREAT SERPL: 17 (ref 7–25)
CALCIUM SPEC-SCNC: 8.2 MG/DL (ref 8.6–10.5)
CALCIUM SPEC-SCNC: 8.7 MG/DL (ref 8.6–10.5)
CHLORIDE SERPL-SCNC: 96 MMOL/L (ref 98–107)
CHLORIDE SERPL-SCNC: 98 MMOL/L (ref 98–107)
CO2 SERPL-SCNC: 26 MMOL/L (ref 22–29)
CO2 SERPL-SCNC: 26 MMOL/L (ref 22–29)
CREAT SERPL-MCNC: 1.47 MG/DL (ref 0.76–1.27)
CREAT SERPL-MCNC: 1.59 MG/DL (ref 0.76–1.27)
DEPRECATED RDW RBC AUTO: 50.3 FL (ref 37–54)
DEPRECATED RDW RBC AUTO: 51.1 FL (ref 37–54)
EGFRCR SERPLBLD CKD-EPI 2021: 45.8 ML/MIN/1.73
EGFRCR SERPLBLD CKD-EPI 2021: 50.4 ML/MIN/1.73
ERYTHROCYTE [DISTWIDTH] IN BLOOD BY AUTOMATED COUNT: 15.4 % (ref 12.3–15.4)
ERYTHROCYTE [DISTWIDTH] IN BLOOD BY AUTOMATED COUNT: 15.4 % (ref 12.3–15.4)
GLUCOSE SERPL-MCNC: 107 MG/DL (ref 65–99)
GLUCOSE SERPL-MCNC: 96 MG/DL (ref 65–99)
HCT VFR BLD AUTO: 32.8 % (ref 37.5–51)
HCT VFR BLD AUTO: 34.2 % (ref 37.5–51)
HGB BLD-MCNC: 10.3 G/DL (ref 13–17.7)
HGB BLD-MCNC: 10.7 G/DL (ref 13–17.7)
LEFT ATRIUM VOLUME INDEX: 31.8 ML/M2
LEFT ATRIUM VOLUME: 64.5 ML
MCH RBC QN AUTO: 28.7 PG (ref 26.6–33)
MCH RBC QN AUTO: 28.8 PG (ref 26.6–33)
MCHC RBC AUTO-ENTMCNC: 31.3 G/DL (ref 31.5–35.7)
MCHC RBC AUTO-ENTMCNC: 31.4 G/DL (ref 31.5–35.7)
MCV RBC AUTO: 91.6 FL (ref 79–97)
MCV RBC AUTO: 91.7 FL (ref 79–97)
PLATELET # BLD AUTO: 186 10*3/MM3 (ref 140–450)
PLATELET # BLD AUTO: 227 10*3/MM3 (ref 140–450)
PMV BLD AUTO: 8.9 FL (ref 6–12)
PMV BLD AUTO: 9.3 FL (ref 6–12)
POTASSIUM SERPL-SCNC: 4.7 MMOL/L (ref 3.5–5.2)
POTASSIUM SERPL-SCNC: 5.2 MMOL/L (ref 3.5–5.2)
QT INTERVAL: 448 MS
QTC INTERVAL: 497 MS
RBC # BLD AUTO: 3.58 10*6/MM3 (ref 4.14–5.8)
RBC # BLD AUTO: 3.73 10*6/MM3 (ref 4.14–5.8)
SODIUM SERPL-SCNC: 133 MMOL/L (ref 136–145)
SODIUM SERPL-SCNC: 134 MMOL/L (ref 136–145)
WBC NRBC COR # BLD AUTO: 7.56 10*3/MM3 (ref 3.4–10.8)
WBC NRBC COR # BLD AUTO: 8.23 10*3/MM3 (ref 3.4–10.8)

## 2024-08-23 PROCEDURE — 85027 COMPLETE CBC AUTOMATED: CPT | Performed by: NURSE PRACTITIONER

## 2024-08-23 PROCEDURE — 93325 DOPPLER ECHO COLOR FLOW MAPG: CPT | Performed by: EMERGENCY MEDICINE

## 2024-08-23 PROCEDURE — 93321 DOPPLER ECHO F-UP/LMTD STD: CPT

## 2024-08-23 PROCEDURE — 93321 DOPPLER ECHO F-UP/LMTD STD: CPT | Performed by: EMERGENCY MEDICINE

## 2024-08-23 PROCEDURE — 71046 X-RAY EXAM CHEST 2 VIEWS: CPT

## 2024-08-23 PROCEDURE — 99233 SBSQ HOSP IP/OBS HIGH 50: CPT | Performed by: STUDENT IN AN ORGANIZED HEALTH CARE EDUCATION/TRAINING PROGRAM

## 2024-08-23 PROCEDURE — 80048 BASIC METABOLIC PNL TOTAL CA: CPT | Performed by: NURSE PRACTITIONER

## 2024-08-23 PROCEDURE — 25010000002 METOCLOPRAMIDE PER 10 MG: Performed by: NURSE PRACTITIONER

## 2024-08-23 PROCEDURE — 93308 TTE F-UP OR LMTD: CPT

## 2024-08-23 PROCEDURE — 25510000001 PERFLUTREN 6.52 MG/ML SUSPENSION: Performed by: INTERNAL MEDICINE

## 2024-08-23 PROCEDURE — 93325 DOPPLER ECHO COLOR FLOW MAPG: CPT

## 2024-08-23 PROCEDURE — 93308 TTE F-UP OR LMTD: CPT | Performed by: EMERGENCY MEDICINE

## 2024-08-23 RX ORDER — METOCLOPRAMIDE HYDROCHLORIDE 5 MG/ML
10 INJECTION INTRAMUSCULAR; INTRAVENOUS EVERY 6 HOURS
Status: COMPLETED | OUTPATIENT
Start: 2024-08-23 | End: 2024-08-24

## 2024-08-23 RX ORDER — TRAZODONE HYDROCHLORIDE 50 MG/1
50 TABLET, FILM COATED ORAL NIGHTLY
Status: DISCONTINUED | OUTPATIENT
Start: 2024-08-23 | End: 2024-08-24 | Stop reason: HOSPADM

## 2024-08-23 RX ADMIN — PERFLUTREN 9.78 MG: 6.52 INJECTION, SUSPENSION INTRAVENOUS at 10:30

## 2024-08-23 RX ADMIN — ATORVASTATIN CALCIUM 40 MG: 40 TABLET ORAL at 20:12

## 2024-08-23 RX ADMIN — TRAZODONE HYDROCHLORIDE 50 MG: 50 TABLET ORAL at 20:12

## 2024-08-23 RX ADMIN — CARVEDILOL 6.25 MG: 6.25 TABLET, FILM COATED ORAL at 20:12

## 2024-08-23 RX ADMIN — METOCLOPRAMIDE HYDROCHLORIDE 10 MG: 5 INJECTION INTRAMUSCULAR; INTRAVENOUS at 08:33

## 2024-08-23 RX ADMIN — LISINOPRIL 5 MG: 2.5 TABLET ORAL at 08:18

## 2024-08-23 RX ADMIN — Medication 10 ML: at 08:18

## 2024-08-23 RX ADMIN — METOCLOPRAMIDE HYDROCHLORIDE 10 MG: 5 INJECTION INTRAMUSCULAR; INTRAVENOUS at 14:35

## 2024-08-23 RX ADMIN — CARVEDILOL 6.25 MG: 6.25 TABLET, FILM COATED ORAL at 08:18

## 2024-08-23 RX ADMIN — METOCLOPRAMIDE HYDROCHLORIDE 10 MG: 5 INJECTION INTRAMUSCULAR; INTRAVENOUS at 20:12

## 2024-08-23 RX ADMIN — APIXABAN 5 MG: 5 TABLET, FILM COATED ORAL at 20:12

## 2024-08-23 RX ADMIN — APIXABAN 5 MG: 5 TABLET, FILM COATED ORAL at 08:18

## 2024-08-23 RX ADMIN — ASPIRIN 81 MG: 81 TABLET, COATED ORAL at 08:18

## 2024-08-23 NOTE — H&P (VIEW-ONLY)
"EP Problems:  1.  Wide-complex tachycardia  2.  Left atrial appendage exclusion with atrial clip     Cardiology Problems:  1.  LV aneurysm status postresection  2.  Mitral valve repair  3.  CAD status post CABG  4.  Hypertension  5.  Chronic systolic heart failure  6.  Hyperlipidemia     Medical Problems:  1.  COPD  2.  BPH    Patient ID:  Vj Garcia is a 74 y.o. male with problem list as above as above who EP is following for wide-complex tachycardia.    Subjective:  Maintaining sinus rhythm.  Feels much better today.    Objective:  /71 (BP Location: Right arm, Patient Position: Sitting)   Pulse 83   Temp 97.7 °F (36.5 °C) (Oral)   Resp 16   Ht 177.8 cm (70\")   Wt 84.8 kg (187 lb)   SpO2 92%   BMI 26.83 kg/m²     Regular rate and rhythm  Clear to auscultation bilaterally  Warm, well-perfused  Alert, oriented    Labs today:  Lab Results   Component Value Date    GLUCOSE 96 08/23/2024    CALCIUM 8.2 (L) 08/23/2024     (L) 08/23/2024    K 4.7 08/23/2024    CO2 26.0 08/23/2024    BUN 25 (H) 08/23/2024    CREATININE 1.47 (H) 08/23/2024    EGFR 50.4 (L) 08/23/2024     Lab Results   Component Value Date    WBC 7.56 08/23/2024    HGB 10.3 (L) 08/23/2024    HCT 32.8 (L) 08/23/2024     08/23/2024     Telemetry today directly visualized and personally reviewed: Sinus rhythm, normal AV conduction    Echocardiogram from today directly visualized and independently reviewed: LVEF remains severely reduced at approximately 20 to 25%.  The LV is dilated with evidence of eccentric hypertrophy.  Estimated filling pressures remain elevated on the echo.      Assessment:  Sustained ventricular tachycardia  Chronic systolic heart failure  Typical atrial flutter    He had sustained VT occurring greater than 48 hours following coronary revascularization which is monomorphic and felt to be consistent with scar mediated VT.  Given his structural heart disease, I do not think that this is a reversible etiology.  " We discussed that given his sustained VT, ICD implant for prevention of sudden cardiac death is appropriate.    I have discussed risks, benefits, and alternatives of an implantable cardiac defibrillator implant with the patient.  Alternatives discussed include continued observation and medical management.  An implantable cardiac defibrillator implant is an inherently high risk procedure with possible complications that include but are not limited to acute or chronic pain at the implant site, bleeding and hematoma, air embolism, pneumothorax, hemothorax, pericardial effusion requiring pericardiocentesis or cardiac surgery, lead dislodgement, lead or device malfunction, infection, inappropriate shocks, contrast induced nephropathy, and ultimately death.  We discussed that while defibrillators reduce mortality, they are not perfect devices and people can still pass away with from arrhythmias despite the presence of a defibrillator.  The possible need for additional procedures and/or medical therapy was additionally discussed. Questions asked were appropriately answered.  No guarantees were made or implied.     Despite this, they would still like to proceed.    We discussed the timing of the defibrillator with the patient.  He wants to go home and does not want to wear a wearable defibrillator at this time.  I think this is ultimately a reasonable decision given that the VT was indeed hemodynamically tolerated.  He does understand that there will be a period of time where he is at some risk of developing ventricular arrhythmias without protection of a defibrillator that could be life-threatening in nature.    Plan:  -Continue amiodarone and carvedilol at current doses  -Anticoagulation with apixaban for 30 days following atrial flutter ablation  -Schedule for single-chamber ICD implant as an outpatient (as above)  -No LifeVest as per patient preference    Part of this note may be an electronic transcription/translation  of spoken language to printed text using the Dragon Dictation System.    Device to be implanted: Single chamber ICD    NYHA class: II  Most recent EF: (21-25% by TTE):   QRS duration in milliseconds (ms): 116  QRS morphology: IVCD     ICD indication: Secondary prevention - Ischemic cardiomyopathy with prior MI >40 days prior to the planned procedure and an LV EF of 21-25% by a transthoracic echocardiogram (TTE) with NYHA class II symptoms on maximally tolerated guideline directed medical therapy.    None of the following is present:   NYHA class IV nonambulatory heart failure   Cardiogenic shock or symptomatic hypotension in a stable baseline rhythm   Clinical symptoms or signs that would make them a candidate for coronary revascularization   Non-cardiac disease associated with an expected survival of <1 year   Irreversible brain damage from preexisting cerebral disease

## 2024-08-23 NOTE — PROGRESS NOTES
[facility] Medicine Services  INPATIENT PROGRESS NOTE    Patient Name: Vj Garcia  Date of Admission: 8/21/2024  Today's Date: 08/23/24  Length of Stay: 2  Primary Care Physician: SILKE Poon DO    Subjective   Chief Complaint: nausea, vomiting, abdominal pain   Vomiting        Today  Patient has no new complaint, going for echocardiogram.    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 If in the ER he was found to be tachycardic in atrial fibrillation in the rates of 120 also was found to have left-sided pleural effusion, decision was made to admit him for further evaluation and management as well as as a consult to cardiothoracic surgeon, and cardiology.       Review of Systems   Gastrointestinal:  Positive for vomiting.      All pertinent negatives and positives are as above. All other systems have been reviewed and are negative unless otherwise stated.     Objective    Temp:  [97.7 °F (36.5 °C)-98.2 °F (36.8 °C)] 97.7 °F (36.5 °C)  Heart Rate:  [74-97] 83  Resp:  [16-18] 16  BP: (109-125)/(65-81) 123/71  Physical Exam  General: Patient is alert, awake, oriented x 3 in no distress at time of examination  HEENT: Normocephalic, atraumatic, pupils are equal reactive to light and accommodate  Neck: Supple, no JVD, no carotid bruits  CVS: S1, S2, regular rhythm and rate  Lungs: Clear to auscultation bilaterally  Abdomen: Soft, nontender, nondistended bowel sounds are present  Extremities: No cyanosis, no clubbing, no edema pulses are  "intact  Neurologic: No focal deficits  Psychiatric: Normal affect      Results Review:  I have reviewed the labs, radiology results, and diagnostic studies.    Laboratory Data:   Results from last 7 days   Lab Units 08/23/24  1239 08/23/24  0408 08/22/24  0342   WBC 10*3/mm3 7.56 8.23 8.33   HEMOGLOBIN g/dL 10.3* 10.7* 10.6*   HEMATOCRIT % 32.8* 34.2* 34.1*   PLATELETS 10*3/mm3 186 227 238        Results from last 7 days   Lab Units 08/23/24  1239 08/23/24  0408 08/22/24  0342 08/21/24  0847   SODIUM mmol/L 134* 133* 132* 132*   POTASSIUM mmol/L 4.7 5.2 5.2 4.8   CHLORIDE mmol/L 98 96* 97* 96*   CO2 mmol/L 26.0 26.0 23.0 24.0   BUN mg/dL 25* 26* 20 18   CREATININE mg/dL 1.47* 1.59* 1.58* 1.37*   CALCIUM mg/dL 8.2* 8.7 8.7 8.5*   BILIRUBIN mg/dL  --   --   --  0.6   ALK PHOS U/L  --   --   --  140*   ALT (SGPT) U/L  --   --   --  30   AST (SGOT) U/L  --   --   --  24   GLUCOSE mg/dL 96 107* 105* 126*       Culture Data:   No results found for: \"BLOODCX\", \"URINECX\", \"WOUNDCX\", \"MRSACX\", \"RESPCX\", \"STOOLCX\"    Radiology Data:   Imaging Results (Last 24 Hours)       Procedure Component Value Units Date/Time    XR Chest PA & Lateral [[id number]] Collected: 08/23/24 0638     Updated: 08/23/24 0643    Narrative:      EXAMINATION: XR CHEST PA AND LATERAL-     8/23/2024 2:52 AM     HISTORY: monitor pleural effusion; P51-[place] effusion, not elsewhere  classified; R00.0-Tachycardia, unspecified; Z95.1-Presence of  aortocoronary bypass graft; R00.0-Tachycardia, unspecified     2 views of the chest are compared with the previous study dated  8/22/2024.     The lungs are moderately well expanded similar to the previous study.     There is moderate progressive pulmonary venous congestion.     Left basal pleural effusion has minimally increased since the previous  study.     There is no pneumothorax.     There is moderate cardiomegaly. Atheromatous changes of the thoracic  aorta are noted. There is evidence of prior cardiac " surgery. Left atrial  appendage clamp is in place.     No acute bony abnormality. Hardware fusion of the lower cervical spine  is partially visualized.       Impression:      1. Moderate more progressive pulmonary venous congestion and left basal  pleural effusion since the previous study 1 day ago..              This report was signed and finalized on 8/23/2024 6:40 AM by Dr. Everton Ziegler MD.       XR Chest 1 View [[id number]] Collected: 08/22/24 1824     Updated: 08/22/24 1830    Narrative:      EXAMINATION: XR CHEST 1 VW-     8/22/2024 4:43 PM     HISTORY: increased shortness of breath; O09-Srftzkl effusion, not  elsewhere classified; R00.0-Tachycardia, unspecified; Z95.1-Presence of  aortocoronary bypass graft; R00.0-Tachycardia, unspecified.     1 view chest x-ray.     COMPARISON:  8/21/2024.     1 months status post CABG.     Stable heart size.  Median sternotomy changes noted.     Mildly increased interstitial prominence compatible with interstitial  edema.     The lungs are slightly better expanded and there has been partial  clearing of the LEFT lung base.  There is still a trace LEFT pleural fluid.     No pneumothorax.       Impression:      1. Diffuse interstitial prominence compatible with interstitial edema.  2. Slightly improved lung expansion with trace LEFT pleural fluid.           This report was signed and finalized on 8/22/2024 6:27 PM by Dr. Moi Patton MD.               I have reviewed the patient's current medications.     Assessment/Plan   Assessment  Active Hospital Problems    Diagnosis     **Pleural effusion     Wide-complex tachycardia        Treatment Plan    -Wide-complex tachycardia  EP is on consult and performed the following procedure-  Procedures  1. SVT ablation - Typical atrial flutter  2. Intracardiac echo   3.  External cardioversion   We will continue Eliquis and Coreg as per EP and follow keo the mmendations.    -Left pleural effusion  Cardiothoracic surgery is on  consult and managing conservatively with serial chest x-ray.  Echocardiogram was ordered and showed severely reduced ejection fraction with EF of 21 to 25%    -Acute kidney injury  Patient's creatinine is trending down on low volume IVF resuscitation.  We will continue careful fluid resuscitation, since patient has very low EF.    Other chronic medical conditions-  COPD  BPH  Hypercholesterolemia  Status post left ventricular aneurysm repair in July 2024.  CAD status post CABG in July 2024    DVT prophylaxis-Eliquis    Medical Decision Making  Number and Complexity of problems: 6  Differential Diagnosis: none    Conditions and Status        Condition is unchanged.     [facility] Data  External documents reviewed: n/a  Cardiac tracing (EKG, telemetry) interpretation: atrial flutter  Radiology interpretation: reviewed  Labs reviewed: yes  Any tests that were considered but not ordered: none     Decision rules/scores evaluated (example [id number], Wells, etc): 3     Discussed with: patient , wife , nursing staff     Care Planning  Shared decision making: patient  Code status and discussions: full      Disposition  Social Determinants of Health that impact treatment or disposition: none  I expect the patient to be discharged to home in 2 days.     Electronically signed by Chele Lopez MD, 08/23/24, 17:16 CDT.

## 2024-08-23 NOTE — PLAN OF CARE
Goal Outcome Evaluation:  Plan of Care Reviewed With: patient        Progress: no change  Outcome Evaluation: Repeat Echo completed today, see results. Patient has new IV in place. Still expressed some frustration with being in the hospital however rested throughout the afternoon with no further complaints. S 68-80 per tele.

## 2024-08-23 NOTE — PLAN OF CARE
Goal Outcome Evaluation:           Progress: improving  Outcome Evaluation: Pt was S 77-97 on tele. Pt slept on and off through the night. Pt took IV out, O2 sensor, and heart monitor off during the night, we talked to him and he agreeded to put his heart monitor back on and get his x-ray. Pt complained of no pain. Pt on room air. Pt alert and orientedx4.

## 2024-08-23 NOTE — PROGRESS NOTES
"Patient name: Vj Garcia  Patient : 1950  VISIT # [id number]  MR #[id number]    Procedure:  Procedure Date:  POD:1 Day Post-Op    Subjective   Chief Complaint   Patient presents with    Vomiting     Tolerating room air.  Creatinine today is 1.59.  He remains on normal saline at 50 mL/h.  Underwent EP study yesterday with a CTI ablation and cardioversion.  He has remained in sinus rhythm with rates 77-97.  Patient is frustrated this morning.  He is wanting to go home today.  He states he slept very little.  Nursing reports patient discontinued his IV during the night.    ROS: No fevers or chills.  Ongoing shortness of breath.  No chest pain.       Objective     Visit Vitals  /75 (BP Location: Left arm, Patient Position: Lying)   Pulse 82   Temp 98.2 °F (36.8 °C) (Oral)   Resp 16   Ht 177.8 cm (70\")   Wt 84.8 kg (187 lb)   SpO2 94%   BMI 26.83 kg/m²       Intake/Output Summary (Last 24 hours) at 2024 0820  Last data filed at 2024 1347  Gross per 24 hour   Intake 300 ml   Output --   Net 300 ml       Lab:     CBC:  Results from last 7 days   Lab Units 24  0408 24  0342 24  0847   WBC 10*3/mm3 8.23 8.33 8.47   HEMATOCRIT % 34.2* 34.1* 34.7*   PLATELETS 10*3/mm3 227 238 246          BMP:  Results from last 7 days   Lab Units 24  0408 24  0342 24  0847   SODIUM mmol/L 133* 132* 132*   POTASSIUM mmol/L 5.2 5.2 4.8   CHLORIDE mmol/L 96* 97* 96*   CO2 mmol/L 26.0 23.0 24.0   GLUCOSE mg/dL 107* 105* 126*   BUN mg/dL 26* 20 18   CREATININE mg/dL 1.59* 1.58* 1.37*          COAG:      Invalid input(s): \"PT\"    IMAGES:       Imaging Results (Last 24 Hours)       Procedure Component Value Units Date/Time    XR Chest PA & Lateral [[id number]] Collected: 24     Updated: 24    Narrative:      EXAMINATION: XR CHEST PA AND LATERAL-     2024 2:52 AM     HISTORY: monitor pleural effusion; X58-[place] effusion, not elsewhere  classified; " R00.0-Tachycardia, unspecified; Z95.1-Presence of  aortocoronary bypass graft; R00.0-Tachycardia, unspecified     2 views of the chest are compared with the previous study dated  8/22/2024.     The lungs are moderately well expanded similar to the previous study.     There is moderate progressive pulmonary venous congestion.     Left basal pleural effusion has minimally increased since the previous  study.     There is no pneumothorax.     There is moderate cardiomegaly. Atheromatous changes of the thoracic  aorta are noted. There is evidence of prior cardiac surgery. Left atrial  appendage clamp is in place.     No acute bony abnormality. Hardware fusion of the lower cervical spine  is partially visualized.       Impression:      1. Moderate more progressive pulmonary venous congestion and left basal  pleural effusion since the previous study 1 day ago..              This report was signed and finalized on 8/23/2024 6:40 AM by Dr. Everton Ziegler MD.       XR Chest 1 View [[id number]] Collected: 08/22/24 1824     Updated: 08/22/24 1830    Narrative:      EXAMINATION: XR CHEST 1 VW-     8/22/2024 4:43 PM     HISTORY: increased shortness of breath; D78-Gfwfucr effusion, not  elsewhere classified; R00.0-Tachycardia, unspecified; Z95.1-Presence of  aortocoronary bypass graft; R00.0-Tachycardia, unspecified.     1 view chest x-ray.     COMPARISON:  8/21/2024.     1 months status post CABG.     Stable heart size.  Median sternotomy changes noted.     Mildly increased interstitial prominence compatible with interstitial  edema.     The lungs are slightly better expanded and there has been partial  clearing of the LEFT lung base.  There is still a trace LEFT pleural fluid.     No pneumothorax.       Impression:      1. Diffuse interstitial prominence compatible with interstitial edema.  2. Slightly improved lung expansion with trace LEFT pleural fluid.           This report was signed and finalized on 8/22/2024 6:27 PM  by Dr. Moi Patton MD.             CXR: Ongoing left basilar pleural effusion.  Ongoing pulmonary vascular congestion.    Physical Exam:  General: Alert, oriented. No apparent distress.   Cardiovascular:  regular rate and rhythm without murmur, rubs, or gallops.    Pulmonary: Clear to auscultation bilaterally without wheezing, rubs, or rales.  Diminished breath sounds at the left base  Chest: Sternotomy incision clean, dry, and intact. Sternum stable. No clicks.   Abdomen: Soft, nondistended, and nontender.  Extremities: Warm, moves all extremities. No edema  Neurologic:  Grossly intact with no focal deficits.            Impression:  Generalized weakness  Tachycardia  S/P LV aneurysm repair, mitral valve repair, CABG, LAAE by Dr. Maciel on 7/23/2024  Left pleural effusion        Plan:  Will defer thoracentesis for now and continue monitoring with chest x-ray  Repeat limited echo this morning to assess function  Repeat labs today at 1300  Obtain new IV  We discussed the importance of increasing oral hydration with Gatorade and patient is agreeable to try this.  Start trazodone at night for sleep  Reglan 10 mg IV every 6 hours x 1 day for nausea  Tachycardia management per EP  Discussed with patient and nursing, and Dr. Maciel has discussed with Dr. Ben Hart, RAMIRO  08/23/24  08:20 CDT

## 2024-08-24 ENCOUNTER — READMISSION MANAGEMENT (OUTPATIENT)
Dept: CALL CENTER | Facility: HOSPITAL | Age: 74
End: 2024-08-24
Payer: MEDICARE

## 2024-08-24 ENCOUNTER — APPOINTMENT (OUTPATIENT)
Dept: GENERAL RADIOLOGY | Facility: HOSPITAL | Age: 74
DRG: 274 | End: 2024-08-24
Payer: MEDICARE

## 2024-08-24 VITALS
HEIGHT: 70 IN | TEMPERATURE: 97.3 F | RESPIRATION RATE: 16 BRPM | OXYGEN SATURATION: 95 % | DIASTOLIC BLOOD PRESSURE: 65 MMHG | HEART RATE: 76 BPM | SYSTOLIC BLOOD PRESSURE: 122 MMHG | BODY MASS INDEX: 26.77 KG/M2 | WEIGHT: 187 LBS

## 2024-08-24 LAB
ANION GAP SERPL CALCULATED.3IONS-SCNC: 12 MMOL/L (ref 5–15)
BUN SERPL-MCNC: 21 MG/DL (ref 8–23)
BUN/CREAT SERPL: 14.8 (ref 7–25)
CALCIUM SPEC-SCNC: 8.2 MG/DL (ref 8.6–10.5)
CHLORIDE SERPL-SCNC: 99 MMOL/L (ref 98–107)
CO2 SERPL-SCNC: 24 MMOL/L (ref 22–29)
CREAT SERPL-MCNC: 1.42 MG/DL (ref 0.76–1.27)
EGFRCR SERPLBLD CKD-EPI 2021: 52.5 ML/MIN/1.73
GLUCOSE SERPL-MCNC: 80 MG/DL (ref 65–99)
POTASSIUM SERPL-SCNC: 4.5 MMOL/L (ref 3.5–5.2)
SODIUM SERPL-SCNC: 135 MMOL/L (ref 136–145)

## 2024-08-24 PROCEDURE — 25010000002 METOCLOPRAMIDE PER 10 MG: Performed by: NURSE PRACTITIONER

## 2024-08-24 PROCEDURE — 71046 X-RAY EXAM CHEST 2 VIEWS: CPT

## 2024-08-24 PROCEDURE — 80048 BASIC METABOLIC PNL TOTAL CA: CPT | Performed by: INTERNAL MEDICINE

## 2024-08-24 RX ORDER — AMIODARONE HYDROCHLORIDE 400 MG/1
400 TABLET ORAL 2 TIMES DAILY WITH MEALS
Qty: 60 TABLET | Refills: 0 | Status: SHIPPED | OUTPATIENT
Start: 2024-08-24 | End: 2024-09-23

## 2024-08-24 RX ORDER — TRAZODONE HYDROCHLORIDE 50 MG/1
50 TABLET, FILM COATED ORAL NIGHTLY
Qty: 15 TABLET | Refills: 0 | Status: SHIPPED | OUTPATIENT
Start: 2024-08-24

## 2024-08-24 RX ADMIN — ASPIRIN 81 MG: 81 TABLET, COATED ORAL at 09:24

## 2024-08-24 RX ADMIN — METOCLOPRAMIDE HYDROCHLORIDE 10 MG: 5 INJECTION INTRAMUSCULAR; INTRAVENOUS at 03:27

## 2024-08-24 RX ADMIN — SACUBITRIL AND VALSARTAN 1 TABLET: 24; 26 TABLET, FILM COATED ORAL at 09:24

## 2024-08-24 RX ADMIN — Medication 10 ML: at 09:24

## 2024-08-24 RX ADMIN — APIXABAN 5 MG: 5 TABLET, FILM COATED ORAL at 09:24

## 2024-08-24 RX ADMIN — CARVEDILOL 6.25 MG: 6.25 TABLET, FILM COATED ORAL at 09:24

## 2024-08-24 NOTE — DISCHARGE SUMMARY
[facility] Medicine Services  DISCHARGE SUMMARY       Date of Admission: 8/21/2024  Date of Discharge:  8/24/2024  Primary Care Physician: SILKE Poon DO    Presenting Problem/History of Present Illness:  Nausea, vomiting and abdominal pain    Final Discharge Diagnoses:  Active Hospital Problems    Diagnosis     **Pleural effusion     Wide-complex tachycardia        Consults: Electrophysiology, cardiothoracic surgery    Procedures Performed: SVT ablation, external cardioversion, intracardiac echo    Pertinent Test Results:   Results for orders placed during the hospital encounter of 08/21/24    Adult Transthoracic Echo Limited W/ Cont if Necessary Per Protocol    Interpretation Summary    Left ventricular systolic function is severely decreased. Left ventricular ejection fraction appears to be 21 - 25%    The left ventricular cavity is mildly dilated.    Left ventricular diastolic function is consistent with (grade III w/high LAP) fixed restrictive pattern.    Moderately reduced right ventricular systolic function noted.    The aortic valve exhibits sclerosis. Mild aortic valve regurgitation is present.    Trace to mild mitral valve regurgitation is present. No significant mitral valve stenosis is present. Fixed posterior leaflet findings are consistent with surgical mitral valve repair.      Imaging Results (All)       Procedure Component Value Units Date/Time    XR Chest PA & Lateral [[id number]] Collected: 08/24/24 0826     Updated: 08/24/24 0831    Narrative:      EXAMINATION:  XR CHEST PA AND LATERAL-  8/24/2024 3:28 AM     HISTORY: G34-Yitiump effusion, not elsewhere classified;  R00.0-Tachycardia, unspecified; Z95.1-Presence of aortocoronary bypass  graft; R00.0-Tachycardia, unspecified.     COMPARISON: 8/23/2024.     TECHNIQUE: 2 views were obtained.     FINDINGS: There are bilateral pleural effusions that are small left  greater than right. There is linear  infiltrate in the left midlung field  and left lung base. Coarse markings and bronchial wall thickening  appears stable. There has been median sternotomy. There is a left atrial  appendage clamp.          Impression:      1. Small pleural effusions left greater than right.  2. Probable atelectasis in the mid and lower lung zone on the left.  Minimal right lung base atelectasis.  3. Coarse markings and bronchial wall thickening, stable.           This report was signed and finalized on 8/24/2024 8:28 AM by Dr. Brian Trejo MD.       XR Chest PA & Lateral [[id number]] Collected: 08/23/24 0638     Updated: 08/23/24 0643    Narrative:      EXAMINATION: XR CHEST PA AND LATERAL-     8/23/2024 2:52 AM     HISTORY: monitor pleural effusion; G72-Wpyudwj effusion, not elsewhere  classified; R00.0-Tachycardia, unspecified; Z95.1-Presence of  aortocoronary bypass graft; R00.0-Tachycardia, unspecified     2 views of the chest are compared with the previous study dated  8/22/2024.     The lungs are moderately well expanded similar to the previous study.     There is moderate progressive pulmonary venous congestion.     Left basal pleural effusion has minimally increased since the previous  study.     There is no pneumothorax.     There is moderate cardiomegaly. Atheromatous changes of the thoracic  aorta are noted. There is evidence of prior cardiac surgery. Left atrial  appendage clamp is in place.     No acute bony abnormality. Hardware fusion of the lower cervical spine  is partially visualized.       Impression:      1. Moderate more progressive pulmonary venous congestion and left basal  pleural effusion since the previous study 1 day ago..              This report was signed and finalized on 8/23/2024 6:40 AM by Dr. Everton Ziegler MD.       XR Chest 1 View [[id number]] Collected: 08/22/24 1824     Updated: 08/22/24 1830    Narrative:      EXAMINATION: XR CHEST 1 VW-     8/22/2024 4:43 PM     HISTORY: increased  shortness of breath; D56-Gdwgozd effusion, not  elsewhere classified; R00.0-Tachycardia, unspecified; Z95.1-Presence of  aortocoronary bypass graft; R00.0-Tachycardia, unspecified.     1 view chest x-ray.     COMPARISON:  8/21/2024.     1 months status post CABG.     Stable heart size.  Median sternotomy changes noted.     Mildly increased interstitial prominence compatible with interstitial  edema.     The lungs are slightly better expanded and there has been partial  clearing of the LEFT lung base.  There is still a trace LEFT pleural fluid.     No pneumothorax.       Impression:      1. Diffuse interstitial prominence compatible with interstitial edema.  2. Slightly improved lung expansion with trace LEFT pleural fluid.           This report was signed and finalized on 8/22/2024 6:27 PM by Dr. Moi Patton MD.       CT Angiogram Chest [[id number]] Collected: 08/21/24 1133     Updated: 08/21/24 1142    Narrative:      CT ANGIOGRAM CHEST- 8/21/2024 9:58 AM      HISTORY: shortness of breath; tachycardia; recent cabg      COMPARISON: Chest CT dated 7/13/2024     DOSE LENGTH PRODUCT: 664.92 mGy.cm. Automated exposure control was also  utilized to decrease patient radiation dose.     TECHNIQUE: Helical tomographic images of the chest were obtained after  the administration of intravenous contrast following angiogram protocol.  Additionally, 3D and multiplanar reformatted images were provided.       FINDINGS:    Pulmonary arteries: No filling defects in the central pulmonary  arteries. The most distal segmental and subsegmental pulmonary arteries  are not yet opacified with contrast.     AORTA AND GREAT VESSELS: The aorta is not well opacified but  demonstrates no aneurysmal dilation. No flow-limiting stenosis  identified at the great vessel origins.     VISUALIZED NECK BASE: The imaged portion of the base of the neck appears  unremarkable.     LUNGS: Bilateral interstitial edema. Minimal groundglass  opacities,  especially in the dependent portions of the lower lobes. Lingula and  left lower lobe discoid atelectasis. Bilateral layering pleural  effusions, which the left-sided pleural effusion is large. Right-sided  pleural effusion is small in size. The airways are clear. No  pneumothorax.     HEART: The heart is normal in size. There is no pericardial effusion.  Previous coronary bypass.     MEDIASTINUM AND LYMPH NODEs: Expected postoperative changes following  recent coronary bypass. There is no mediastinal hematoma or organized  collection.     SKELETAL AND SOFT TISSUES: Chest wall soft tissues are unremarkable. No  acute bony abnormality. Sternal wires appear intact.     UPPER ABDOMEN: The imaged portion of the upper abdomen demonstrates no  acute process.       Impression:      1.  No central pulmonary embolus. The segmental and subsegmental  pulmonary arteries are not yet well opacified.  2.  Volume overload with interstitial edema as well as early airspace  filling edema. There are bilateral layering pleural effusions of which  of the left-sided pleural effusion is large.     This report was signed and finalized on 8/21/2024 11:39 AM by Dr Mikie Cifuentes.       CT Abdomen Pelvis With Contrast [[id number]] Collected: 08/21/24 1125     Updated: 08/21/24 1139    Narrative:      EXAMINATION: CT ABDOMEN PELVIS W CONTRAST-      8/21/2024 9:58 AM     HISTORY: abdominal pain; vomiting     In order to have a CT radiation dose as low as reasonably achievable  Automated Exposure Control was utilized for adjustment of the mA and/or  KV according to patient size.     Total DLP = 664.92 mGy.cm     CT scan of the abdomen and pelvis is performed after intravenous  contrast enhancement.     The images are acquired in axial plane and subsequent reconstruction in  coronal and sagittal planes.     There is no previous similar study for comparison.     Limited visualized chest show bilateral pleural effusion left more  than  the right. The chest is separately reviewed and reported in CT scan of  the chest performed today.     There is fatty infiltration of the liver. The spleen is normal     No radiopaque gallstones.     Pancreas is normal.     The adrenal glands bilaterally are normal.     There is well-defined sharply marginated low density exophytic mass from  the posterior aspect of the upper pole of the right kidney measuring 4.3  cm in maximum dimension in axial plane images. CT density suggest a  cyst. No radiopaque calculi in either kidney. No hydronephrosis on  either side. Limited visualized ureters are nondilated. The urinary  bladder is moderately distended. No intrinsic abnormality.     Prostate is moderately enlarged.     There is small fat-containing inguinal hernias. There is high position  of the left testes which may represent retractile testis.     The stomach is decompressed with moderate wall thickening. No focal  abnormality. There is moderate diffuse thickening of the wall of the  decompressed ileum with suggestion of edema of the mucosa. No  surrounding fat infiltration. No significant narrowing or obstruction.  Appendix is normal. Moderate gas and stool is seen in the colon. No  finding to suggest obstruction.     There is a trace free fluid in the lower pelvis particularly on the  right side.     Atheromatous changes of the abdominal aorta and iliac arteries. No  aneurysmal dilatation. Atheromatous plaques are seen at the origin of  the celiac and superior mesenteric arteries. No significant stenosis.     No evidence of abdominal or pelvic lymphadenopathy.     There is no evidence of peritoneal/omental infiltration nodularity or  masses.     The images reviewed in bone window show no acute bony abnormality. There  is bilateral spondylolysis L5 and grade 1 spondylolisthesis L5-S1. Old  healed rib fractures bilaterally are noted.       Impression:      1. Nonspecific acute enteritis/ileitis. No  obstruction.  2. Appendix is normal.  3. Fatty infiltration of the liver.  4. Right renal cyst.  5. A high position/retractile left testis.  6. The chest is separately reviewed and reported                                      This report was signed and finalized on 8/21/2024 11:36 AM by Dr. Everton Ziegler MD.       XR Chest 1 View [[id number]] Collected: 08/21/24 0855     Updated: 08/21/24 0901    Narrative:      XR CHEST 1 VW- 8/21/2024 7:47 AM     HISTORY: tachycardia; cardiac work up; recent cabg       COMPARISON: Chest x-ray dated 8/19/2024     FINDINGS:  Upright frontal radiograph of the chest was obtained     Recent coronary bypass. Heart size is stable. Mild interstitial  coarsening on today's exam and there continues to be a trace layering  pleural effusion on the left. No new consolidation. No pneumothorax. No  acute bony abnormality seen.       Impression:      1.  There is a mild interstitial edema as well as a residual trace  layering pleural effusion on the left.  2.  Recent coronary bypass.     This report was signed and finalized on 8/21/2024 8:58 AM by Dr Mikie Cifuentes.             LAB RESULTS:      Lab 08/23/24  1239 08/23/24  0408 08/22/24  0342 08/21/24  0847 08/19/24  1209   WBC 7.56 8.23 8.33 8.47 8.42   HEMOGLOBIN 10.3* 10.7* 10.6* 10.7* 11.1*   HEMATOCRIT 32.8* 34.2* 34.1* 34.7* 36.2*   PLATELETS 186 227 238 246 288   NEUTROS ABS  --   --  5.64 6.13 6.48   IMMATURE GRANS (ABS)  --   --  0.02 0.03 0.03   LYMPHS ABS  --   --  1.72 1.44 1.35   MONOS ABS  --   --  0.60 0.52 0.33   EOS ABS  --   --  0.27 0.29 0.18   MCV 91.6 91.7 91.4 94.3 93.3         Lab 08/24/24  0357 08/23/24  1239 08/23/24  0408 08/22/24  0342 08/21/24  0847   SODIUM 135* 134* 133* 132* 132*   POTASSIUM 4.5 4.7 5.2 5.2 4.8   CHLORIDE 99 98 96* 97* 96*   CO2 24.0 26.0 26.0 23.0 24.0   ANION GAP 12.0 10.0 11.0 12.0 12.0   BUN 21 25* 26* 20 18   CREATININE 1.42* 1.47* 1.59* 1.58* 1.37*   EGFR 52.5* 50.4* 45.8* 46.2* 54.8*    GLUCOSE 80 96 107* 105* 126*   CALCIUM 8.2* 8.2* 8.7 8.7 8.5*   MAGNESIUM  --   --   --   --  2.3         Lab 08/21/24  0847   TOTAL PROTEIN 7.2   ALBUMIN 3.7   GLOBULIN 3.5   ALT (SGPT) 30   AST (SGOT) 24   BILIRUBIN 0.6   ALK PHOS 140*   LIPASE 54         Lab 08/21/24  1056 08/21/24  0847   PROBNP 7,349.0*  --    HSTROP T 82* 85*                 Lab 08/22/24  1912   PH, ARTERIAL 7.388   PCO2, ARTERIAL 34.8*   PO2 .0*   O2 SATURATION ART 99.5*   HCO3 ART 21.0   BASE EXCESS ART -3.4*     Brief Urine Lab Results  (Last result in the past 365 days)        Color   Clarity   Blood   Leuk Est   Nitrite   Protein   CREAT   Urine HCG        08/21/24 1056 Yellow   Clear   Negative   Negative   Negative   Trace                 Microbiology Results (last 10 days)       ** No results found for the last 240 hours. **            Hospital Course:     -Wide-complex tachycardia  EP was on consult and performed the following procedure-  Procedures  1. SVT ablation - Typical atrial flutter  2. Intracardiac echo   3.  External cardioversion   Patient had a sustained V. tach prompting recommendation of ICD implant to which patient declined to have inpatient and would rather have it arranged outpatient.  EP had extensive discussion with patient and wife, especially with regards to dangers of delayed ICD.  EP will arrange for outpatient ICD placement I recommended to discharge patient on Coreg and amiodarone and continue anticoagulation.     -Left pleural effusion  Cardiothoracic surgery was on consult and managed conservatively with serial chest x-ray.  Echocardiogram was ordered and showed severely reduced ejection fraction with EF of 21 to 25%  Patient will follow-up with cardiothoracic in the office with outpatient lab results as scheduled.     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  CAD status post CABG in July 2024    Physical Exam on Discharge:  /65 (BP Location: Left arm, Patient Position: Sitting)   Pulse 76   Temp 97.3 °F (36.3 °C) (Axillary)   Resp 16   Ht 177.8 cm (70\")   Wt 84.8 kg (187 lb)   SpO2 95%   BMI 26.83 kg/m²   Physical Exam  General: Patient is alert, awake, oriented x 3 in no distress at time of examination  HEENT: Normocephalic, atraumatic, pupils are equal reactive to light and accommodate  Neck: Supple, no JVD, no carotid bruits  CVS: S1, S2, regular rhythm and rate  Lungs: Clear to auscultation bilaterally  Abdomen: Soft, nontender, nondistended bowel sounds are present  Extremities: No cyanosis, no clubbing, no edema pulses are intact  Neurologic: No focal deficits  Psychiatric: Normal affect    Condition on Discharge: Stable    Discharge Disposition:  Home or Self Care    Discharge Medications:     Discharge Medications        New Medications        Instructions Start Date   sacubitril-valsartan 24-26 MG tablet  Commonly known as: ENTRESTO   1 tablet, Oral, Every 12 Hours Scheduled      traZODone 50 MG tablet  Commonly known as: DESYREL   50 mg, Oral, Nightly             Changes to Medications        Instructions Start Date   amiodarone 400 MG tablet  Commonly known as: PACERONE  What changed: when to take this   400 mg, Oral, 2 Times Daily With Meals             Continue These Medications        Instructions Start Date   apixaban 5 MG tablet tablet  Commonly known as: ELIQUIS   5 mg, Oral, 2 Times Daily      aspirin 81 MG EC tablet   81 mg, Oral, Daily      atorvastatin 40 MG tablet  Commonly known as: LIPITOR   40 mg, Oral, Daily      carvedilol 6.25 MG tablet  Commonly known as: COREG   6.25 mg, Oral, Every 12 Hours Scheduled      multivitamin tablet tablet   1 tablet, Oral, Daily               This patient has current or prior documentation of an " left ventricular ejection fraction (LVEF) of less than or equal to 40%.    The patient was prescribed or already taking an ACE/ARB.    The patient was prescribed already taking bisoprolol, carvedilol, or sustained release metoprolol succinate.     Discharge Diet:     Activity at Discharge:     Follow-up Appointments:   Future Appointments   Date Time Provider Department Center   8/26/2024 11:00 AM [facility] ECHO ROOM 2  [facility]ARDI [facility]   9/9/2024  1:15 PM Raheem, Kobe STERN MD [facility] CTS  [facility]   10/10/2024 10:00 AM Brennan Khoury DO [facility] CD [facility]   10/15/2024 10:30 AM SILKE Poon DO [facility]       Test Results Pending at Discharge: None    Electronically signed by Chele Lopez MD, 08/24/24, 11:29 CDT.    Time: 40 minutes.

## 2024-08-24 NOTE — PROGRESS NOTES
[facility] Cardiothoracic Surgery  PROGRESS NOTE     Subjective:  He feels much better today states he has not felt this good in days.  Got a full night sleep which is the first time in a long time.  He has been hemodynamically stable his creatinine is improving    Objective:      Intake/Output Summary (Last 24 hours) at 8/24/2024 1131  Last data filed at 8/24/2024 0900  Gross per 24 hour   Intake 480 ml   Output 150 ml   Net 330 ml         Wt current:       08/21/24  1626   Weight: 84.8 kg (187 lb)         PE:  Vitals:    08/24/24 0745   BP: 122/65   Pulse: 76   Resp: 16   Temp: 97.3 °F (36.3 °C)   SpO2: 95%     GENERAL: NAD, resting comfortably, normal color  CARDIOVASCULAR: regular, regular rate, sinus  PULMONARY: Normal bilateral breath sounds, no labored breathing  ABDOMEN: soft, nt/nd  EXTREMITIES: No peripheral edema, normal pulses, normal ROM        Lab Results (last 72 hours)       Procedure Component Value Units Date/Time    Basic Metabolic Panel [[id number]]  (Abnormal) Collected: 08/24/24 0357    Specimen: Blood Updated: 08/24/24 0532     Glucose 80 mg/dL      BUN 21 mg/dL      Creatinine 1.42 mg/dL      Sodium 135 mmol/L      Potassium 4.5 mmol/L      Chloride 99 mmol/L      CO2 24.0 mmol/L      Calcium 8.2 mg/dL      BUN/Creatinine Ratio 14.8     Anion Gap 12.0 mmol/L      eGFR 52.5 mL/min/1.73     Narrative:      GFR Normal >60  Chronic Kidney Disease <60  Kidney Failure <15    The GFR formula is only valid for adults with stable renal function between ages 18 and 70.    Basic Metabolic Panel [[id number]]  (Abnormal) Collected: 08/23/24 1239    Specimen: Blood Updated: 08/23/24 1331     Glucose 96 mg/dL      BUN 25 mg/dL      Creatinine 1.47 mg/dL      Sodium 134 mmol/L      Potassium 4.7 mmol/L      Chloride 98 mmol/L      CO2 26.0 mmol/L      Calcium 8.2 mg/dL      BUN/Creatinine Ratio 17.0     Anion Gap 10.0 mmol/L      eGFR 50.4 mL/min/1.73     Narrative:      GFR Normal  >60  Chronic Kidney Disease <60  Kidney Failure <15    The GFR formula is only valid for adults with stable renal function between ages 18 and 70.    CBC (No Diff) [[id number]]  (Abnormal) Collected: 08/23/24 1239    Specimen: Blood Updated: 08/23/24 1309     WBC 7.56 10*3/mm3      RBC 3.58 10*6/mm3      Hemoglobin 10.3 g/dL      Hematocrit 32.8 %      MCV 91.6 fL      MCH 28.8 pg      MCHC 31.4 g/dL      RDW 15.4 %      RDW-SD 51.1 fl      MPV 8.9 fL      Platelets 186 10*3/mm3     Basic Metabolic Panel [[id number]]  (Abnormal) Collected: 08/23/24 0408    Specimen: Blood Updated: 08/23/24 0504     Glucose 107 mg/dL      BUN 26 mg/dL      Creatinine 1.59 mg/dL      Sodium 133 mmol/L      Potassium 5.2 mmol/L      Chloride 96 mmol/L      CO2 26.0 mmol/L      Calcium 8.7 mg/dL      BUN/Creatinine Ratio 16.4     Anion Gap 11.0 mmol/L      eGFR 45.8 mL/min/1.73     Narrative:      GFR Normal >60  Chronic Kidney Disease <60  Kidney Failure <15    The GFR formula is only valid for adults with stable renal function between ages 18 and 70.    CBC (No Diff) [[id number]]  (Abnormal) Collected: 08/23/24 0408    Specimen: Blood Updated: 08/23/24 0449     WBC 8.23 10*3/mm3      RBC 3.73 10*6/mm3      Hemoglobin 10.7 g/dL      Hematocrit 34.2 %      MCV 91.7 fL      MCH 28.7 pg      MCHC 31.3 g/dL      RDW 15.4 %      RDW-SD 50.3 fl      MPV 9.3 fL      Platelets 227 10*3/mm3     Blood Gas, Arterial - [[id number]]  (Abnormal) Collected: 08/22/24 1912    Specimen: Arterial Blood Updated: 08/22/24 1915     Site Left Radial     Martin's Test Positive     pH, Arterial 7.388 pH units      pCO2, Arterial 34.8 mm Hg      Comment: 84 Value below reference range        pO2, Arterial 124.0 mm Hg      Comment: 83 Value above reference range        HCO3, Arterial 21.0 mmol/L      Base Excess, Arterial -3.4 mmol/L      Comment: 84 Value below reference range        O2 Saturation, Arterial 99.5 %      Comment: 83 Value above reference range         Temperature 37.0     Barometric Pressure for Blood Gas 755 mmHg      Modality Nasal Cannula     Flow Rate 4.0 lpm      Ventilator Mode NA     Collected by JOSUÉ     Comment: Meter: [id number]     :  KAILEYLAMONT        pCO2, Temperature Corrected 34.8 mm Hg      pH, Temp Corrected 7.388 pH Units      pO2, Temperature Corrected 124 mm Hg     Basic Metabolic Panel [[id number]]  (Abnormal) Collected: 08/22/24 0342    Specimen: Blood Updated: 08/22/24 0422     Glucose 105 mg/dL      BUN 20 mg/dL      Creatinine 1.58 mg/dL      Sodium 132 mmol/L      Potassium 5.2 mmol/L      Chloride 97 mmol/L      CO2 23.0 mmol/L      Calcium 8.7 mg/dL      BUN/Creatinine Ratio 12.7     Anion Gap 12.0 mmol/L      eGFR 46.2 mL/min/1.73     Narrative:      GFR Normal >60  Chronic Kidney Disease <60  Kidney Failure <15    The GFR formula is only valid for adults with stable renal function between ages 18 and 70.    CBC Auto Differential [[id number]]  (Abnormal) Collected: 08/22/24 0342    Specimen: Blood Updated: 08/22/24 0403     WBC 8.33 10*3/mm3      RBC 3.73 10*6/mm3      Hemoglobin 10.6 g/dL      Hematocrit 34.1 %      MCV 91.4 fL      MCH 28.4 pg      MCHC 31.1 g/dL      RDW 15.0 %      RDW-SD 49.8 fl      MPV 9.0 fL      Platelets 238 10*3/mm3      Neutrophil % 67.8 %      Lymphocyte % 20.6 %      Monocyte % 7.2 %      Eosinophil % 3.2 %      Basophil % 1.0 %      Immature Grans % 0.2 %      Neutrophils, Absolute 5.64 10*3/mm3      Lymphocytes, Absolute 1.72 10*3/mm3      Monocytes, Absolute 0.60 10*3/mm3      Eosinophils, Absolute 0.27 10*3/mm3      Basophils, Absolute 0.08 10*3/mm3      Immature Grans, Absolute 0.02 10*3/mm3      nRBC 0.0 /100 WBC     BNP [[id number]]  (Abnormal) Collected: 08/21/24 1056    Specimen: Blood Updated: 08/21/24 1250     proBNP 7,349.0 pg/mL     Narrative:      This assay is used as an aid in the diagnosis of individuals suspected of having heart failure. It can be used as an aid in the  diagnosis of acute decompensated heart failure (ADHF) in patients presenting with signs and symptoms of ADHF to the emergency department (ED). In addition, NT-proBNP of <300 pg/mL indicates ADHF is not likely.    Age Range Result Interpretation  NT-proBNP Concentration (pg/mL:      <50             Positive            >450                   Gray                 300-450                    Negative             <300    50-75           Positive            >900                  Gray                300-900                  Negative            <300      >75             Positive            >1800                  Gray                300-1800                  Negative            <300            Rhythm: Sinus 80s    CXR: Improved left pleural effusion much improved    Assessment & Plan     Mr. Garcia 74-year-old male who I performed LV aneurysm resection and CABG just over 1 month ago.  He Parul presented to the hospital with atrial flutter and wide-complex tachycardia.  He is now postop from ablation and cardioversion.  He feels much better and I believe likely his heart failure symptoms are secondary to his rhythm disturbances.  EF is decreased around 20 to 25% and there is plans for an outpatient AICD placement with Dr. Contreras.  We did start Entresto yesterday.  His kidney function is improving since being back in sinus rhythm.    Okay to discharge from my perspective  Follow-up with labs on Thursday given new Rx for Entresto    Kobe Maciel MD  Cardiothoracic Surgeon

## 2024-08-24 NOTE — PLAN OF CARE
Problem: Adult Inpatient Plan of Care  Goal: Plan of Care Review  Outcome: Ongoing, Progressing  Goal: Patient-Specific Goal (Individualized)  Outcome: Ongoing, Progressing  Goal: Absence of Hospital-Acquired Illness or Injury  Outcome: Ongoing, Progressing  Intervention: Identify and Manage Fall Risk  Recent Flowsheet Documentation  Taken 8/24/2024 0400 by Stephy Reyes RN  Safety Promotion/Fall Prevention: safety round/check completed  Taken 8/24/2024 0200 by Stephy Reyes RN  Safety Promotion/Fall Prevention: safety round/check completed  Taken 8/24/2024 0000 by Stephy Reyes RN  Safety Promotion/Fall Prevention: safety round/check completed  Taken 8/23/2024 1930 by Stephy Reyes RN  Safety Promotion/Fall Prevention: safety round/check completed  Intervention: Prevent Skin Injury  Recent Flowsheet Documentation  Taken 8/24/2024 0400 by Stephy Reyes RN  Body Position: position changed independently  Taken 8/24/2024 0200 by Stephy Reyes RN  Body Position: position changed independently  Taken 8/24/2024 0000 by Stephy Reyes RN  Body Position: position changed independently  Taken 8/23/2024 1930 by Stephy Reyes RN  Body Position: position changed independently  Goal: Optimal Comfort and Wellbeing  Outcome: Ongoing, Progressing  Goal: Readiness for Transition of Care  Outcome: Ongoing, Progressing     Problem: COPD (Chronic Obstructive Pulmonary Disease) Comorbidity  Goal: Maintenance of COPD Symptom Control  Outcome: Ongoing, Progressing     Problem: Hypertension Comorbidity  Goal: Blood Pressure in Desired Range  Outcome: Ongoing, Progressing     Problem: Fall Injury Risk  Goal: Absence of Fall and Fall-Related Injury  Outcome: Ongoing, Progressing  Intervention: Promote Injury-Free Environment  Recent Flowsheet Documentation  Taken 8/24/2024 0400 by Stephy Reyes RN  Safety Promotion/Fall Prevention: safety round/check completed  Taken 8/24/2024 0200 by Stephy Reyes RN  Safety  Promotion/Fall Prevention: safety round/check completed  Taken 8/24/2024 0000 by Stephy Reyes, RN  Safety Promotion/Fall Prevention: safety round/check completed  Taken 8/23/2024 1930 by Stephy Reyes, RN  Safety Promotion/Fall Prevention: safety round/check completed

## 2024-08-24 NOTE — OUTREACH NOTE
Prep Survey      Flowsheet Row Responses   [facility] facility patient discharged from? [place]   Is LACE score < 7 ? No   Eligibility [facility]   Date of Admission 08/21/24   Date of Discharge 08/24/24   Discharge Disposition Home or Self Care   Discharge diagnosis Pleural effusion-EP/CRM Study-Wide-complex tachycardia   Does the patient have one of the following disease processes/diagnoses(primary or secondary)? Other   Does the patient have Home health ordered? No   Is there a DME ordered? No   Prep survey completed? Yes            DAHIANA LINN - Registered Nurse

## 2024-08-25 ENCOUNTER — NURSE TRIAGE (OUTPATIENT)
Dept: CALL CENTER | Facility: HOSPITAL | Age: 74
End: 2024-08-25
Payer: MEDICARE

## 2024-08-25 NOTE — TELEPHONE ENCOUNTER
Reason for Disposition   [1] Prescription prescribed recently is not at pharmacy AND [2] triager has access to patient's EMR AND [3] prescription is recorded in the EMR    Additional Information   Negative: [1] Intentional drug overdose AND [2] suicidal thoughts or ideas   Negative: Drug overdose and triager unable to answer question   Negative: Caller requesting a renewal or refill of a medicine patient is currently taking   Negative: Caller requesting information unrelated to medicine   Negative: Caller requesting information about COVID-19 Vaccine   Negative: Caller requesting information about Emergency Contraception   Negative: Caller requesting information about Combined Birth Control Pills   Negative: Caller requesting information about Progestin Birth Control Pills   Negative: Caller requesting information about Post-Op pain or medicines   Negative: Caller requesting a prescription antibiotic (such as Penicillin) for Strep throat and has a positive culture result   Negative: Caller requesting a prescription anti-viral med (such as Tamiflu) and has influenza (flu) symptoms   Negative: Immunization reaction suspected   Negative: Rash while taking a medicine or within 3 days of stopping it   Negative: [1] Asthma and [2] having symptoms of asthma (cough, wheezing, etc.)   Negative: [1] Symptom of illness (e.g., headache, abdominal pain, earache, vomiting) AND [2] more than mild   Negative: Breastfeeding questions about mother's medicines and diet   Negative: MORE THAN A DOUBLE DOSE of a prescription or over-the-counter (OTC) drug   Negative: [1] DOUBLE DOSE (an extra dose or lesser amount) of prescription drug AND [2] any symptoms (e.g., dizziness, nausea, pain, sleepiness)   Negative: [1] DOUBLE DOSE (an extra dose or lesser amount) of over-the-counter (OTC) drug AND [2] any symptoms (e.g., dizziness, nausea, pain, sleepiness)   Negative: Took another person's prescription drug   Negative: [1] DOUBLE DOSE (an  "extra dose or lesser amount) of prescription drug AND [2] NO symptoms  (Exception: A double dose of antibiotics.)   Negative: Diabetes drug error or overdose (e.g., took wrong type of insulin or took extra dose)   Negative: [1] Prescription not at pharmacy AND [2] was prescribed by PCP recently (Exception: Triager has access to EMR and prescription is recorded there. Go to Home Care and confirm for pharmacy.)   Negative: [1] Pharmacy calling with prescription question AND [2] triager unable to answer question   Negative: [1] Caller has URGENT medicine question about med that PCP or specialist prescribed AND [2] triager unable to answer question   Negative: Medicine patch causing local rash or itching   Negative: [1] Caller has medicine question about med NOT prescribed by PCP AND [2] triager unable to answer question (e.g., compatibility with other med, storage)   Negative: Prescription request for new medicine (not a refill)   Negative: [1] Caller has NON-URGENT medicine question about med that PCP prescribed AND [2] triager unable to answer question   Negative: Caller wants to use a complementary or alternative medicine    Answer Assessment - Initial Assessment Questions  1. NAME of MEDICINE: \"What medicine(s) are you calling about?\"  sacubitril-valsartan (ENTRESTO)  2. QUESTION: \"What is your question?\" (e.g., double dose of medicine, side effect)    Requesting alternate pharmacy  3. PRESCRIBER: \"Who prescribed the medicine?\" Reason: if prescribed by specialist, call should be referred to that group.  Chele Lopez  4. SYMPTOMS: \"Do you have any symptoms?\" If Yes, ask: \"What symptoms are you having?\"  \"How bad are the symptoms (e.g., mild, moderate, severe)      *No Answer*  5. PREGNANCY:  \"Is there any chance that you are pregnant?\" \"When was your last menstrual period?\"      *No Answer*    Protocols used: Medication Question Call-ADULT-AH    "

## 2024-08-26 ENCOUNTER — PREP FOR SURGERY (OUTPATIENT)
Dept: OTHER | Facility: HOSPITAL | Age: 74
End: 2024-08-26
Payer: MEDICARE

## 2024-08-26 ENCOUNTER — HOSPITAL ENCOUNTER (OUTPATIENT)
Dept: CARDIOLOGY | Facility: HOSPITAL | Age: 74
Discharge: HOME | End: 2024-08-26
Payer: MEDICARE

## 2024-08-26 ENCOUNTER — TELEPHONE (OUTPATIENT)
Dept: CARDIAC SURGERY | Facility: CLINIC | Age: 74
End: 2024-08-26
Payer: MEDICARE

## 2024-08-26 ENCOUNTER — TRANSITIONAL CARE MANAGEMENT TELEPHONE ENCOUNTER (OUTPATIENT)
Dept: CALL CENTER | Facility: HOSPITAL | Age: 74
End: 2024-08-26
Payer: MEDICARE

## 2024-08-26 ENCOUNTER — TELEPHONE (OUTPATIENT)
Dept: CARDIOLOGY | Facility: CLINIC | Age: 74
End: 2024-08-26
Payer: MEDICARE

## 2024-08-26 DIAGNOSIS — I25.5 ISCHEMIC CARDIOMYOPATHY: ICD-10-CM

## 2024-08-26 DIAGNOSIS — I47.29 VENTRICULAR TACHYCARDIA (PAROXYSMAL): Primary | ICD-10-CM

## 2024-08-26 DIAGNOSIS — I47.20 VENTRICULAR TACHYCARDIA, SUSTAINED: ICD-10-CM

## 2024-08-26 DIAGNOSIS — I25.119 CORONARY ARTERY DISEASE INVOLVING NATIVE CORONARY ARTERY OF NATIVE HEART WITH ANGINA PECTORIS: Primary | ICD-10-CM

## 2024-08-26 RX ORDER — SODIUM CHLORIDE 9 MG/ML
40 INJECTION, SOLUTION INTRAVENOUS AS NEEDED
OUTPATIENT
Start: 2024-08-26

## 2024-08-26 RX ORDER — SODIUM CHLORIDE 0.9 % (FLUSH) 0.9 %
10 SYRINGE (ML) INJECTION AS NEEDED
OUTPATIENT
Start: 2024-08-26

## 2024-08-26 RX ORDER — SODIUM CHLORIDE 0.9 % (FLUSH) 0.9 %
10 SYRINGE (ML) INJECTION EVERY 12 HOURS SCHEDULED
OUTPATIENT
Start: 2024-08-26

## 2024-08-26 NOTE — TELEPHONE ENCOUNTER
Patient was discharged home over the weekend.  He needs follow-up with me on 8/29/2024 with labs before appointment.  Please call patient to schedule.

## 2024-08-26 NOTE — TELEPHONE ENCOUNTER
I discussed this with Dr. Maciel and he started him on Entresto and labs need to be closely monitored after for starting that medication.  He would like him to see me on Thursday with labs before appointment

## 2024-08-26 NOTE — TELEPHONE ENCOUNTER
Called pt and informed as directed.  Pt states he has not been able to start his Entresto yet as ins would not cover it over the weekend.  States his ins agent is looking into why this was not covered and pt anticipates being able to get med today.  Advised pt I would inform Tosha re: this and if this changed appt timeline in any way, we would call him back.  Also instructed pt to let us know if he is not able to get medication and start it today.  Appt sched for 8-29-24 w/ labs as directed and pt voiced understanding to all/jory

## 2024-08-26 NOTE — TELEPHONE ENCOUNTER
Called pt but he declined appt stating Dr Maciel told him he would be getting defibrillator put in with another provider and he wanted to wait until that was done for him to come to see Tosha.  Advised pt I would pass this info along to Tosha and asked him to call us when he knows timing for this procedure to be done/jory

## 2024-08-26 NOTE — OUTREACH NOTE
Call Center TCM Note      Flowsheet Row Responses   [facility], [place], operated by [facility] patient discharged from? [place]   Does the patient have one of the following disease processes/diagnoses(primary or secondary)? Other   TCM attempt successful? Yes   Call start time 1449   Call end time 1453   Discharge diagnosis Pleural effusion-EP/CRM Study-Wide-complex tachycardia   Meds reviewed with patient/caregiver? Yes   Is the patient having any side effects they believe may be caused by any medication additions or changes? No   Does the patient have all medications ordered at discharge? Yes   Is the patient taking all medications as directed (includes completed medication regime)? Yes   Does the patient have an appointment with their PCP within 7-14 days of discharge? No   Nursing Interventions Patient declined scheduling/rescheduling appointment at this time   Did the patient receive a copy of their discharge instructions? Yes   Nursing interventions Reviewed instructions with patient   What is the patient's perception of their health status since discharge? Improving   Is the patient/caregiver able to teach back signs and symptoms related to disease process for when to call PCP? Yes   Is the patient/caregiver able to teach back signs and symptoms related to disease process for when to call 911? Yes   Is the patient/caregiver able to teach back the hierarchy of who to call/visit for symptoms/problems? PCP, Specialist, Home health nurse, Urgent Care, ED, 911 Yes   If the patient is a current smoker, are they able to teach back resources for cessation? Not a smoker   TCM call completed? Yes   Call end time 1453   Would this patient benefit from a Referral to Amb Social Work? No   Is the patient interested in additional calls from an ambulatory ? No            Alma Franklin LPN    8/26/2024, 14:53 CDT

## 2024-08-26 NOTE — TELEPHONE ENCOUNTER
Patient contacted stating he is ready to do his defibrillator implant as soon as Dr. Contreras is available.

## 2024-08-27 PROBLEM — I25.5 ISCHEMIC CARDIOMYOPATHY: Status: ACTIVE | Noted: 2024-08-26

## 2024-08-27 PROBLEM — I47.20 VENTRICULAR TACHYCARDIA, SUSTAINED: Status: ACTIVE | Noted: 2024-08-26

## 2024-08-28 NOTE — PROGRESS NOTES
Subjective   Chief Complaint   Patient presents with    Post-op Follow-up     Patient had CABG x 2 on 7/23. Labs today.        Patient ID: Vj Garcia is a 74 y.o. male who is here for follow-up having had Posterior Basal LV Aneurysm Repair (Resection of Aneurysm Wall with Linear Closure, MV repair (Hollis Stitch at A2/P2), CABG x2 (LIMA to LAD, SVG to distal OM), LAAE with 45 mm Atriclip by Dr. Maciel on 7/23/2024     History of Present Illness  Mr. Garcia is a 74-year-old male who underwent the above listed procedure by Dr. Maciel on 7/23/2024.  Postoperative recovery was significant for pain control and diuresis.  Repeat transthoracic echo postoperatively revealed EF 36 to 40%.  He did have intermittent atrial fibrillation postoperatively and converted to sinus rhythm with amiodarone protocol.  He was seen in the office several times postoperatively and he did develop sustained tachycardia and was symptomatic from this including failure to thrive with overall weakness, weight loss, nausea, and vomiting.  He was admitted to the hospital on 8/21/2023 and underwent CTI ablation and cardioversion by Dr. Contreras on 8/22/2023.  Symptoms did overall improve.  Repeat transthoracic echo was performed prior to discharge revealing EF 20 to 25%.  There are plans in place for outpatient AICD by Dr. Contreras.  He was discharged home on Entresto.  He is here today for routine follow-up with repeat labs after starting Entresto.  He does have chronic kidney disease.  Creatinine at discharge was 1.42.  Today he states he is doing much better.  Fatigue is improving.  Sleep is overall improving.  Appetite is much improved.  He does complain of some difficulty with urination since starting Entresto.  He does have a history of prostate issues and has followed with urology in the past.  He states medications did not improve his symptoms.  He denies dysuria      The following portions of the patient's history were reviewed and updated as  "appropriate: allergies, current medications, past family history, past medical history, past social history, past surgical history and problem list.    Review of Systems   Constitutional:  Negative for chills, diaphoresis, fatigue and fever.   HENT:  Negative for trouble swallowing and voice change.    Eyes:  Negative for visual disturbance.   Respiratory:  Negative for chest tightness and shortness of breath.    Cardiovascular:  Negative for chest pain, palpitations and leg swelling.   Gastrointestinal:  Negative for abdominal pain, constipation, diarrhea, nausea and vomiting.   Genitourinary:  Negative for dysuria and hematuria.   Musculoskeletal:  Negative for arthralgias and myalgias.   Skin:  Negative for color change, pallor, rash and wound.   Neurological:  Negative for dizziness, syncope and light-headedness.   Psychiatric/Behavioral:  Negative for agitation, confusion and sleep disturbance.        Objective   Visit Vitals  /61 (BP Location: Right arm, Patient Position: Sitting, Cuff Size: Adult)   Pulse 85   Ht 177.8 cm (70\")   Wt 82.3 kg (181 lb 6.4 oz)   SpO2 98%   BMI 26.03 kg/m²           Physical Exam  Vitals reviewed.   Constitutional:       General: He is not in acute distress.  HENT:      Head: Normocephalic.   Eyes:      Pupils: Pupils are equal, round, and reactive to light.   Cardiovascular:      Rate and Rhythm: Normal rate and regular rhythm.      Heart sounds: Normal heart sounds. No murmur heard.  Pulmonary:      Breath sounds: Normal breath sounds. No wheezing or rales.   Abdominal:      General: There is no distension.      Palpations: Abdomen is soft.      Tenderness: There is no abdominal tenderness.   Musculoskeletal:         General: No swelling or tenderness.   Skin:     General: Skin is warm and dry.      Comments: Sternum is stable, no clicks.  Sternal incision is clean dry and intact and healing nicely.  Saphenectomy site is clean dry and intact.   Neurological:      General: " No focal deficit present.      Mental Status: He is alert and oriented to person, place, and time.   Psychiatric:         Mood and Affect: Mood normal.         Thought Content: Thought content normal.         Judgment: Judgment normal.             Assessment & Plan           Diagnoses and all orders for this visit:    1. Ventricular tachycardia, sustained (Primary)    2. Status post two vessel coronary artery bypass, Posterior Basal LV Aneurysm Repair, MV repair  7/23/2024    3. Stage 3a chronic kidney disease           Overall, jV Garcia is doing okay.   Entresto samples provided.  Renal function significantly improved.  Potassium is stable.  Blood pressure will likely not tolerate the addition of Flomax.  I have advised follow-up with his urologist to further discuss.  He is scheduled for ICD placement next week with Dr. Contreras.  We discussed the importance of good nutrition postoperatively to aid in wound healing.  He should try to drink protein shakes 3 times daily.  We discussed current sternotomy precautions and how these will not be advanced yet. Sternal incision is healing nicely.  Following post op cardiac surgery home instructions. Will discuss starting cardiac rehabilitation at official 1 month post op visit. Patient has follow up with Dr. Maciel in a few weeks. Patient has follow up with Cardiology in a few weeks. Patient has been instructed to contact our office with any questions or concerns should they arise prior to the next office visit.  Keep scheduled follow-up with Dr. Maciel on 9/9/2024.      Vj Garcia is a non-smoker and therefore does not need tobacco cessation education/counseling.      Advance Care Planning   ACP discussion was held with the patient during this visit. Patient does not have an advance directive, declines further assistance.

## 2024-08-29 ENCOUNTER — LAB (OUTPATIENT)
Dept: LAB | Facility: HOSPITAL | Age: 74
End: 2024-08-29
Payer: MEDICARE

## 2024-08-29 ENCOUNTER — OFFICE VISIT (OUTPATIENT)
Dept: CARDIAC SURGERY | Facility: CLINIC | Age: 74
End: 2024-08-29
Payer: MEDICARE

## 2024-08-29 VITALS
BODY MASS INDEX: 25.97 KG/M2 | WEIGHT: 181.4 LBS | HEIGHT: 70 IN | HEART RATE: 85 BPM | OXYGEN SATURATION: 98 % | SYSTOLIC BLOOD PRESSURE: 104 MMHG | DIASTOLIC BLOOD PRESSURE: 61 MMHG

## 2024-08-29 DIAGNOSIS — I25.119 CORONARY ARTERY DISEASE INVOLVING NATIVE CORONARY ARTERY OF NATIVE HEART WITH ANGINA PECTORIS: ICD-10-CM

## 2024-08-29 DIAGNOSIS — Z95.1 STATUS POST TWO VESSEL CORONARY ARTERY BYPASS: ICD-10-CM

## 2024-08-29 DIAGNOSIS — N18.31 STAGE 3A CHRONIC KIDNEY DISEASE: ICD-10-CM

## 2024-08-29 DIAGNOSIS — I47.20 VENTRICULAR TACHYCARDIA, SUSTAINED: Primary | ICD-10-CM

## 2024-08-29 LAB
ALBUMIN SERPL-MCNC: 3.5 G/DL (ref 3.5–5.2)
ALBUMIN/GLOB SERPL: 1.1 G/DL
ALP SERPL-CCNC: 129 U/L (ref 39–117)
ALT SERPL W P-5'-P-CCNC: 45 U/L (ref 1–41)
ANION GAP SERPL CALCULATED.3IONS-SCNC: 7 MMOL/L (ref 5–15)
AST SERPL-CCNC: 25 U/L (ref 1–40)
BASOPHILS # BLD AUTO: 0.04 10*3/MM3 (ref 0–0.2)
BASOPHILS NFR BLD AUTO: 0.5 % (ref 0–1.5)
BILIRUB SERPL-MCNC: 0.8 MG/DL (ref 0–1.2)
BUN SERPL-MCNC: 12 MG/DL (ref 8–23)
BUN/CREAT SERPL: 10.9 (ref 7–25)
CALCIUM SPEC-SCNC: 8.8 MG/DL (ref 8.6–10.5)
CHLORIDE SERPL-SCNC: 102 MMOL/L (ref 98–107)
CO2 SERPL-SCNC: 27 MMOL/L (ref 22–29)
CREAT SERPL-MCNC: 1.1 MG/DL (ref 0.76–1.27)
DEPRECATED RDW RBC AUTO: 55.5 FL (ref 37–54)
EGFRCR SERPLBLD CKD-EPI 2021: 71.3 ML/MIN/1.73
EOSINOPHIL # BLD AUTO: 0.34 10*3/MM3 (ref 0–0.4)
EOSINOPHIL NFR BLD AUTO: 4.5 % (ref 0.3–6.2)
ERYTHROCYTE [DISTWIDTH] IN BLOOD BY AUTOMATED COUNT: 15.8 % (ref 12.3–15.4)
GLOBULIN UR ELPH-MCNC: 3.2 GM/DL
GLUCOSE SERPL-MCNC: 117 MG/DL (ref 65–99)
HCT VFR BLD AUTO: 42.1 % (ref 37.5–51)
HGB BLD-MCNC: 12.7 G/DL (ref 13–17.7)
IMM GRANULOCYTES # BLD AUTO: 0.02 10*3/MM3 (ref 0–0.05)
IMM GRANULOCYTES NFR BLD AUTO: 0.3 % (ref 0–0.5)
LYMPHOCYTES # BLD AUTO: 0.95 10*3/MM3 (ref 0.7–3.1)
LYMPHOCYTES NFR BLD AUTO: 12.5 % (ref 19.6–45.3)
MCH RBC QN AUTO: 28.8 PG (ref 26.6–33)
MCHC RBC AUTO-ENTMCNC: 30.2 G/DL (ref 31.5–35.7)
MCV RBC AUTO: 95.5 FL (ref 79–97)
MONOCYTES # BLD AUTO: 0.47 10*3/MM3 (ref 0.1–0.9)
MONOCYTES NFR BLD AUTO: 6.2 % (ref 5–12)
NEUTROPHILS NFR BLD AUTO: 5.8 10*3/MM3 (ref 1.7–7)
NEUTROPHILS NFR BLD AUTO: 76 % (ref 42.7–76)
NRBC BLD AUTO-RTO: 0 /100 WBC (ref 0–0.2)
PLATELET # BLD AUTO: 208 10*3/MM3 (ref 140–450)
PMV BLD AUTO: 8.7 FL (ref 6–12)
POTASSIUM SERPL-SCNC: 4.3 MMOL/L (ref 3.5–5.2)
PREALB SERPL-MCNC: 15.8 MG/DL (ref 20–40)
PROT SERPL-MCNC: 6.7 G/DL (ref 6–8.5)
RBC # BLD AUTO: 4.41 10*6/MM3 (ref 4.14–5.8)
SODIUM SERPL-SCNC: 136 MMOL/L (ref 136–145)
WBC NRBC COR # BLD AUTO: 7.62 10*3/MM3 (ref 3.4–10.8)

## 2024-08-29 PROCEDURE — 36415 COLL VENOUS BLD VENIPUNCTURE: CPT

## 2024-08-29 PROCEDURE — 80053 COMPREHEN METABOLIC PANEL: CPT

## 2024-08-29 PROCEDURE — 85025 COMPLETE CBC W/AUTO DIFF WBC: CPT

## 2024-08-29 PROCEDURE — 84134 ASSAY OF PREALBUMIN: CPT

## 2024-08-29 NOTE — PROGRESS NOTES
Enter Query Response Below      Query Response: Acute kidney injury was supported with creatinine levels that trended up.           If applicable, please update the problem list.   Patient: Vj Garcia        : 1950  Account: [id number]           Admit Date: 2024        How to Respond to this query:       a. Click New Note     b. Answer query within the yellow box.                c. Update the Problem List, if applicable.      If you have any questions about this query contact me at: [email or web address]     :    74-year-old patient with history of CABG on 2024 was admitted with typical atrial flutter on 2024 also has a diagnosis of acute kidney injury on H&P, Hospitalist Progress Notes, and Discharge Summary. Creatinine this admit as follows:  24 08:47-Creatinine: 1.37  24 03:42-Creatinine: 1.58  24 04:08-Creatinine: 1.59  24 12:39-Creatinine: 1.47  24 03:57-Creatinine: 1.42  Cardiac EP Consult Note includes creatinine level of 1.17 on 2024.  Treatment has included monitoring labs, NS at 50 mL/hour,  mL bolus, and Lasix IV x one dose.    After study, was acute kidney injury (COLEMAN) clinically supported during this admission?    Acute kidney injury (COLEMAN) was supported with additional clinical indicators:____________  Acute kidney injury (COLEMAN) was not supported  Other- specify_____________  Unable to determine    COLEMAN is defined by KDIGO as any of the following:  Increase in creatinine > 0.3 mg/dl within 48 hours or less; or   Increase in creatinine level to > 1.5x baseline (historical or measure), which is known or presumed to have occurred within the prior 7 days   Urine volume <0.5 ml/kg/h for 6 hours.  Reference article: http://www.kdigo.org/clinical_practice_guidelines/pdf/KDIGO%20AKI%20Guideline.pdf (as published in [employer] Supplements, The Official Journal of the International Society of Nephrology, vol. 2, issue 1, March  2012.)    By submitting this query, we are merely seeking further clarification of documentation to accurately reflect all conditions that you are monitoring, evaluating, treating or that extend the hospitalization or utilize additional resources of care. Please utilize your independent clinical judgment when addressing the question(s) above.     This query and your response, once completed, will be entered into the legal medical record.    Sincerely,  Marilyn Phipps RN, BSN, CCDS  Clinical Documentation Integrity Program

## 2024-08-30 NOTE — PROGRESS NOTES
Please call patient and notify him that prealbumin is low at 15. Continue to increase calorie intake and if he is able to tolerate protein shakes now, would advise restarting these. I do expect this to improve as his appetite is now improving.

## 2024-09-03 ENCOUNTER — HOSPITAL ENCOUNTER (OUTPATIENT)
Facility: HOSPITAL | Age: 74
Setting detail: HOSPITAL OUTPATIENT SURGERY
Discharge: HOME | End: 2024-09-03
Attending: STUDENT IN AN ORGANIZED HEALTH CARE EDUCATION/TRAINING PROGRAM | Admitting: STUDENT IN AN ORGANIZED HEALTH CARE EDUCATION/TRAINING PROGRAM
Payer: MEDICARE

## 2024-09-03 ENCOUNTER — APPOINTMENT (OUTPATIENT)
Dept: GENERAL RADIOLOGY | Facility: HOSPITAL | Age: 74
End: 2024-09-03
Payer: MEDICARE

## 2024-09-03 VITALS
OXYGEN SATURATION: 97 % | HEART RATE: 74 BPM | DIASTOLIC BLOOD PRESSURE: 96 MMHG | RESPIRATION RATE: 17 BRPM | SYSTOLIC BLOOD PRESSURE: 161 MMHG

## 2024-09-03 DIAGNOSIS — I47.20 VENTRICULAR TACHYCARDIA, SUSTAINED: ICD-10-CM

## 2024-09-03 DIAGNOSIS — G89.18 POST-OP PAIN: Primary | ICD-10-CM

## 2024-09-03 DIAGNOSIS — I25.5 ISCHEMIC CARDIOMYOPATHY: ICD-10-CM

## 2024-09-03 LAB
ANION GAP SERPL CALCULATED.3IONS-SCNC: 9 MMOL/L (ref 5–15)
BUN SERPL-MCNC: 14 MG/DL (ref 8–23)
BUN/CREAT SERPL: 13.5 (ref 7–25)
CALCIUM SPEC-SCNC: 9 MG/DL (ref 8.6–10.5)
CHLORIDE SERPL-SCNC: 102 MMOL/L (ref 98–107)
CO2 SERPL-SCNC: 26 MMOL/L (ref 22–29)
CREAT SERPL-MCNC: 1.04 MG/DL (ref 0.76–1.27)
DEPRECATED RDW RBC AUTO: 51.6 FL (ref 37–54)
EGFRCR SERPLBLD CKD-EPI 2021: 76.3 ML/MIN/1.73
ERYTHROCYTE [DISTWIDTH] IN BLOOD BY AUTOMATED COUNT: 15.1 % (ref 12.3–15.4)
GLUCOSE SERPL-MCNC: 94 MG/DL (ref 65–99)
HCT VFR BLD AUTO: 39.8 % (ref 37.5–51)
HGB BLD-MCNC: 12.5 G/DL (ref 13–17.7)
INR PPP: 1.35 (ref 0.91–1.09)
MCH RBC QN AUTO: 29 PG (ref 26.6–33)
MCHC RBC AUTO-ENTMCNC: 31.4 G/DL (ref 31.5–35.7)
MCV RBC AUTO: 92.3 FL (ref 79–97)
PLATELET # BLD AUTO: 202 10*3/MM3 (ref 140–450)
PMV BLD AUTO: 8.8 FL (ref 6–12)
POTASSIUM SERPL-SCNC: 4.5 MMOL/L (ref 3.5–5.2)
PROTHROMBIN TIME: 17.2 SECONDS (ref 11.8–14.8)
RBC # BLD AUTO: 4.31 10*6/MM3 (ref 4.14–5.8)
SODIUM SERPL-SCNC: 137 MMOL/L (ref 136–145)
WBC NRBC COR # BLD AUTO: 6.19 10*3/MM3 (ref 3.4–10.8)

## 2024-09-03 PROCEDURE — 71045 X-RAY EXAM CHEST 1 VIEW: CPT

## 2024-09-03 PROCEDURE — 25010000002 CEFAZOLIN PER 500 MG: Performed by: STUDENT IN AN ORGANIZED HEALTH CARE EDUCATION/TRAINING PROGRAM

## 2024-09-03 PROCEDURE — 85027 COMPLETE CBC AUTOMATED: CPT | Performed by: STUDENT IN AN ORGANIZED HEALTH CARE EDUCATION/TRAINING PROGRAM

## 2024-09-03 PROCEDURE — 25010000002 FENTANYL CITRATE (PF) 50 MCG/ML SOLUTION: Performed by: STUDENT IN AN ORGANIZED HEALTH CARE EDUCATION/TRAINING PROGRAM

## 2024-09-03 PROCEDURE — 93005 ELECTROCARDIOGRAM TRACING: CPT | Performed by: STUDENT IN AN ORGANIZED HEALTH CARE EDUCATION/TRAINING PROGRAM

## 2024-09-03 PROCEDURE — 25010000002 VANCOMYCIN 10 G RECONSTITUTED SOLUTION: Performed by: STUDENT IN AN ORGANIZED HEALTH CARE EDUCATION/TRAINING PROGRAM

## 2024-09-03 PROCEDURE — C1894 INTRO/SHEATH, NON-LASER: HCPCS | Performed by: STUDENT IN AN ORGANIZED HEALTH CARE EDUCATION/TRAINING PROGRAM

## 2024-09-03 PROCEDURE — 99153 MOD SED SAME PHYS/QHP EA: CPT | Performed by: STUDENT IN AN ORGANIZED HEALTH CARE EDUCATION/TRAINING PROGRAM

## 2024-09-03 PROCEDURE — 80048 BASIC METABOLIC PNL TOTAL CA: CPT | Performed by: STUDENT IN AN ORGANIZED HEALTH CARE EDUCATION/TRAINING PROGRAM

## 2024-09-03 PROCEDURE — C1722 AICD, SINGLE CHAMBER: HCPCS | Performed by: STUDENT IN AN ORGANIZED HEALTH CARE EDUCATION/TRAINING PROGRAM

## 2024-09-03 PROCEDURE — 33249 INSJ/RPLCMT DEFIB W/LEAD(S): CPT | Performed by: STUDENT IN AN ORGANIZED HEALTH CARE EDUCATION/TRAINING PROGRAM

## 2024-09-03 PROCEDURE — C1892 INTRO/SHEATH,FIXED,PEEL-AWAY: HCPCS | Performed by: STUDENT IN AN ORGANIZED HEALTH CARE EDUCATION/TRAINING PROGRAM

## 2024-09-03 PROCEDURE — 99152 MOD SED SAME PHYS/QHP 5/>YRS: CPT | Performed by: STUDENT IN AN ORGANIZED HEALTH CARE EDUCATION/TRAINING PROGRAM

## 2024-09-03 PROCEDURE — 25010000002 MIDAZOLAM HCL (PF) 5 MG/5ML SOLUTION: Performed by: STUDENT IN AN ORGANIZED HEALTH CARE EDUCATION/TRAINING PROGRAM

## 2024-09-03 PROCEDURE — 85610 PROTHROMBIN TIME: CPT | Performed by: STUDENT IN AN ORGANIZED HEALTH CARE EDUCATION/TRAINING PROGRAM

## 2024-09-03 PROCEDURE — 25810000003 SODIUM CHLORIDE 0.9 % SOLUTION 500 ML FLEX CONT: Performed by: STUDENT IN AN ORGANIZED HEALTH CARE EDUCATION/TRAINING PROGRAM

## 2024-09-03 PROCEDURE — 25810000003 SODIUM CHLORIDE 0.9 % SOLUTION: Performed by: STUDENT IN AN ORGANIZED HEALTH CARE EDUCATION/TRAINING PROGRAM

## 2024-09-03 DEVICE — IMPLANTABLE DEVICE
Type: IMPLANTABLE DEVICE | Status: FUNCTIONAL
Brand: ACTICOR 7 VR-T DX

## 2024-09-03 RX ORDER — LIDOCAINE HYDROCHLORIDE 20 MG/ML
INJECTION, SOLUTION INFILTRATION; PERINEURAL
Status: DISCONTINUED | OUTPATIENT
Start: 2024-09-03 | End: 2024-09-03 | Stop reason: HOSPADM

## 2024-09-03 RX ORDER — FENTANYL CITRATE 50 UG/ML
INJECTION, SOLUTION INTRAMUSCULAR; INTRAVENOUS
Status: DISCONTINUED | OUTPATIENT
Start: 2024-09-03 | End: 2024-09-03 | Stop reason: HOSPADM

## 2024-09-03 RX ORDER — MIDAZOLAM HYDROCHLORIDE 5 MG/5ML
INJECTION, SOLUTION INTRAMUSCULAR; INTRAVENOUS
Status: DISCONTINUED | OUTPATIENT
Start: 2024-09-03 | End: 2024-09-03 | Stop reason: HOSPADM

## 2024-09-03 RX ORDER — SODIUM CHLORIDE 9 MG/ML
40 INJECTION, SOLUTION INTRAVENOUS AS NEEDED
Status: DISCONTINUED | OUTPATIENT
Start: 2024-09-03 | End: 2024-09-05 | Stop reason: HOSPADM

## 2024-09-03 RX ORDER — SODIUM CHLORIDE 0.9 % (FLUSH) 0.9 %
10 SYRINGE (ML) INJECTION EVERY 12 HOURS SCHEDULED
Status: DISCONTINUED | OUTPATIENT
Start: 2024-09-03 | End: 2024-09-05 | Stop reason: HOSPADM

## 2024-09-03 RX ORDER — HYDROCODONE BITARTRATE AND ACETAMINOPHEN 7.5; 325 MG/1; MG/1
1 TABLET ORAL EVERY 6 HOURS PRN
Qty: 15 TABLET | Refills: 0 | Status: SHIPPED | OUTPATIENT
Start: 2024-09-03

## 2024-09-03 RX ORDER — SODIUM CHLORIDE 0.9 % (FLUSH) 0.9 %
10 SYRINGE (ML) INJECTION AS NEEDED
Status: DISCONTINUED | OUTPATIENT
Start: 2024-09-03 | End: 2024-09-05 | Stop reason: HOSPADM

## 2024-09-03 RX ADMIN — SODIUM CHLORIDE 1250 MG: 9 INJECTION, SOLUTION INTRAVENOUS at 15:33

## 2024-09-03 RX ADMIN — SODIUM CHLORIDE 40 ML: 9 INJECTION, SOLUTION INTRAVENOUS at 15:36

## 2024-09-03 NOTE — INTERVAL H&P NOTE
H&P reviewed. The patient was examined and there are no changes to the H&P.      Since the time of the last clinic visit, denies significant change in symptoms.  Denies missing any doses of his medications.  No new ER visits or hospitalizations.    Exam:  Unchanged from last clinic visit.    Labs:  Reviewed.    ASA:  3  Mallampati:  4    Assessment/plan:  Vj Garcia is a 74 y.o. male who presents to the hospital for outpatient ICD implant for sustained VT.  There are no apparent contraindications to proceeding and he is medically appropriate for outpatient management with this procedure.      I have discussed risks, benefits, and alternatives of an implantable cardiac defibrillator implant with the patient.  Alternatives discussed include continued observation and medical management.  An implantable cardiac defibrillator implant is an inherently high risk procedure with possible complications that include but are not limited to acute or chronic pain at the implant site, bleeding and hematoma, air embolism, pneumothorax, hemothorax, pericardial effusion requiring pericardiocentesis or cardiac surgery, lead dislodgement, lead or device malfunction, infection, inappropriate shocks, contrast induced nephropathy, and ultimately death.  We discussed that while defibrillators reduce mortality, they are not perfect devices and people can still pass away with from arrhythmias despite the presence of a defibrillator.  The possible need for additional procedures and/or medical therapy was additionally discussed. Questions asked were appropriately answered.  No guarantees were made or implied.     Despite this, they would still like to proceed.    Plan:  - Proceed with device implant

## 2024-09-03 NOTE — Clinical Note
A sheath was successfully inserted using micropuncture technique with ultrasound guidance into the left subclavian vein. Sheath insertion not delayed.

## 2024-09-04 LAB
QT INTERVAL: 442 MS
QT INTERVAL: 444 MS
QTC INTERVAL: 467 MS
QTC INTERVAL: 486 MS

## 2024-09-05 ENCOUNTER — CALL CENTER PROGRAMS (OUTPATIENT)
Dept: CALL CENTER | Facility: HOSPITAL | Age: 74
End: 2024-09-05
Payer: MEDICARE

## 2024-09-05 NOTE — OUTREACH NOTE
PCI/Device Survey      Flowsheet Row Responses   Facility patient discharged from? [facility]   Procedure date 09/03/24   Procedure (if device, specify in description) Device   Device Description AICD   Performing MD Other (annotate)  [Dr. Contreras]   Attempt successful? No  [attempted all numbers listed]   Unsuccessful attempts Attempt 1            ABIGAIL MORALES - Registered Nurse

## 2024-09-05 NOTE — OUTREACH NOTE
PCI/Device Survey      Flowsheet Row Responses   Facility patient discharged from? [facility]   Procedure date 09/03/24   Procedure (if device, specify in description) Device   Device Description AICD   Performing MD Other (annotate)  [Dr. Contreras]   Attempt successful? No   Unsuccessful attempts Attempt 2            ABIGAIL MORALES - Registered Nurse

## 2024-09-09 ENCOUNTER — OFFICE VISIT (OUTPATIENT)
Dept: CARDIAC SURGERY | Facility: CLINIC | Age: 74
End: 2024-09-09
Payer: MEDICARE

## 2024-09-09 VITALS
OXYGEN SATURATION: 98 % | HEART RATE: 73 BPM | HEIGHT: 70 IN | SYSTOLIC BLOOD PRESSURE: 118 MMHG | WEIGHT: 181 LBS | DIASTOLIC BLOOD PRESSURE: 68 MMHG | BODY MASS INDEX: 25.91 KG/M2

## 2024-09-09 DIAGNOSIS — I25.3 LEFT VENTRICULAR ANEURYSM: Primary | ICD-10-CM

## 2024-09-09 DIAGNOSIS — I25.119 CORONARY ARTERY DISEASE INVOLVING NATIVE CORONARY ARTERY OF NATIVE HEART WITH ANGINA PECTORIS: ICD-10-CM

## 2024-09-09 DIAGNOSIS — I25.119 CORONARY ARTERY DISEASE INVOLVING NATIVE CORONARY ARTERY OF NATIVE HEART WITH ANGINA PECTORIS: Primary | ICD-10-CM

## 2024-09-09 PROCEDURE — 99024 POSTOP FOLLOW-UP VISIT: CPT | Performed by: SURGERY

## 2024-09-09 NOTE — PROGRESS NOTES
[facility] Cardiothoracic Surgery  PROGRESS NOTE     Procedure Performed: Posterior Basal LV Aneurysm Repair (Resection of Aneurysm Wall with Linear Closure, MV repair (Hollis Stitch at A2/P2), CABG x2 (LIMA to LAD, SVG to distal OM), LAAE with 45 mm Atriclip   Date of Procedure: 7/23/2024      History of Present Illness  The patient presents for evaluation of multiple medical concerns. He is accompanied by an adult female.    He reports feeling well overall, with improved energy levels. 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 However, he notes that his work capacity is currently at 60% of what it was prior to surgery. He has not yet attended cardiac rehabilitation. His next appointment with Dr. Contreras is scheduled for 09/25/2024, and he is due to see his cardiologist, Dr. Khoury, on 10/10/2024. He has been advised by Dr. Contreras to avoid lifting heavy objects for two weeks post-surgery, with the heaviest item he has lifted so far being a 2.5-gallon jug.    He uses trazodone to manage sleep disturbances, which he finds helpful. He also takes trazodone as needed when he has difficulty falling asleep.       Objective:      09/09/24  1304   Weight: 82.1 kg (181 lb)                  Physical Exam  PE:  Vitals:    09/09/24 1304   BP: 118/68   Pulse: 73   SpO2: 98%     GENERAL: NAD, resting comfortably, normal palor  CARDIOVASCULAR: regular, regular rate, sinus, well-healed sternotomy with stable sternum, left subclavicular pacemaker site with hematoma but no signs of infection  PULMONARY: Normal bilateral breath sounds, no labored breathing  ABDOMEN: soft, nt/nd  EXTREMITIES: mild peripheral edema, normal pulses, normal ROM                 Lab Results (last 72 hours)      ** No results found for the last 72 hours. **         Results  Laboratory Studies  Creatinine level has improved since surgery.       Assessment/Plan         Assessment & Plan    Mr. Garcia is a  74-year-old male he is now 6 weeks postop from LV aneurysm resection, CABG x 2, mitral valve repair with Hollis and left atrial appendage closure.  Postoperatively he did okay but had a readmission with a flutter, atypical ventricular tachycardia.  He underwent ablation of the a flutter and cardioversion of ventricular tachycardia and with this he is improved significantly, his energy is much better his creatinine is back to normal.  He is sleeping much better as well.  He feels better overall.  He is seeing Dr. Contreras in the interim and underwent ICD placement given his low LVEF, this was measured during the hospitalization with the rhythm disturbances.    Discussed with him at length overall how he is doing, I am very happy with his progress.  Would like for him to continue to follow with Dr. Contreras and Dr. Khoury.  I would prefer a repeat echo in a few months since back into sinus rhythm to see if better but will leave this ultimately up to Dr. Khoury.  Continue GDMT per him.  He is sleeping much better and I recommended that he stop the trazodone but can use over-the-counter doxylamine if needed.  He can progress to normal activity.  Will make referral to cardiac rehab at [facility].    I would like to see Mr. Garcia back in 1 year with a repeat echo.  Thank you for trusting me the care of Mr. Garcia.  Please do not hesitate to call questions or concerns.               Kobe Maciel MD   Cardiothoracic Surgeon      Patient or patient representative verbalized consent for the use of Ambient Listening during the visit with  Kobe Maciel MD for chart documentation. 9/9/2024  13:52 CDT

## 2024-09-09 NOTE — LETTER
September 9, 2024       No Recipients    Patient: Vj Garcia   YOB: 1950   Date of Visit: 9/9/2024       Dear SILKE Poon, ,    Vj Garcia was in my office today. Below are the relevant portions of my assessment and plan of care.    Mr. Garcia is a 74-year-old male he is now 6 weeks postop from LV aneurysm resection, CABG x 2, mitral valve repair with Hollis and left atrial appendage closure.  Postoperatively he did okay but had a readmission with a flutter, atypical ventricular tachycardia.  He underwent ablation of the a flutter and cardioversion of ventricular tachycardia and with this he is improved significantly, his energy is much better his creatinine is back to normal.  He is sleeping much better as well.  He feels better overall.  He is seeing Dr. Contreras in the interim and underwent ICD placement given his low LVEF, this was measured during the hospitalization with the rhythm disturbances.    Discussed with him at length overall how he is doing, I am very happy with his progress.  Would like for him to continue to follow with Dr. Contreras and Dr. Khoury.  I would prefer a repeat echo in a few months since back into sinus rhythm to see if better but will leave this ultimately up to Dr. Khoury.  Continue GDMT per him.  He is sleeping much better and I recommended that he stop the trazodone but can use over-the-counter doxylamine if needed.  He can progress to normal activity.  Will make referral to cardiac rehab at [facility].    I would like to see Mr. Garcia back in 1 year with a repeat echo.  Thank you for trusting me the care of Mr. Garcia.  Please do not hesitate to call questions or concerns.         Sincerely,        Kobe Maciel MD        CC:   No Recipients

## 2024-09-11 ENCOUNTER — TELEPHONE (OUTPATIENT)
Dept: CARDIOLOGY | Facility: CLINIC | Age: 74
End: 2024-09-11
Payer: MEDICARE

## 2024-09-11 NOTE — TELEPHONE ENCOUNTER
Patient states that his incision form his recent implant is not looking well. He doesn't think he should wait two more weeks to have it looked at and is wanting to be seen sooner.

## 2024-09-12 ENCOUNTER — OFFICE VISIT (OUTPATIENT)
Dept: CARDIOLOGY | Facility: CLINIC | Age: 74
End: 2024-09-12
Payer: MEDICARE

## 2024-09-12 VITALS
HEIGHT: 70 IN | WEIGHT: 187 LBS | BODY MASS INDEX: 26.77 KG/M2 | SYSTOLIC BLOOD PRESSURE: 126 MMHG | OXYGEN SATURATION: 97 % | DIASTOLIC BLOOD PRESSURE: 64 MMHG

## 2024-09-12 DIAGNOSIS — Z48.89 ENCOUNTER FOR POST SURGICAL WOUND CHECK: Primary | ICD-10-CM

## 2024-09-12 NOTE — PROGRESS NOTES
"[facility] -  CLINIC FOLLOW UP     Patient Care Team:  SILKE Poon DO as PCP - General (Internal Medicine)    Chief Complaint: wound check     Subjective   EP Problems:  1.  Wide-complex tachycardia  2.  Left atrial appendage exclusion with atrial clip     Cardiology Problems:  1.  LV aneurysm status postresection  2.  Mitral valve repair  3.  CAD status post CABG  4.  Hypertension  5.  Chronic systolic heart failure  6.  Hyperlipidemia     Medical Problems:  1.  COPD  2.  BPH      HPI: Today I had the pleasure of seeing Vj Garcia in the cardiology clinic for follow up. He is a 74 year old male with a history of CAD/CABG, HFrEF who was admitted in August for WCT. He had sustained VT occurring greater than 48 hours following coronary revascularization which was monomorphic and felt to be consistent with scar mediated VT.  Given his structural heart disease, Dr. Contreras did not think that this is a reversible etiology and the patient underwent a single chamber ICD implant. HE was continued on amiodarone and carvedilol.     He became concerned about his device site 2 days when he started noticing an area \"like it was about to burst\". He denies any bleeding.     Objective     Visit Vitals  /64   Ht 177.8 cm (70\")   Wt 84.8 kg (187 lb)   SpO2 97%   BMI 26.83 kg/m²           Constitutional:       Appearance: Healthy appearance.   Eyes:      Pupils: Pupils are equal, round, and reactive to light.   HENT:      Head: Normocephalic and atraumatic.    Mouth/Throat:      Pharynx: Oropharynx is clear.   Pulmonary:      Effort: Pulmonary effort is normal.   Chest:      Comments: Left subclavian device site, steri strips dry, clean, intact.   Hematoma over device site approx 3.5 inches wide  Extensive ecchymosis down to mid thoracic area  Musculoskeletal: Normal range of motion.      Cervical back: Normal range of motion. Skin:     General: Skin is warm.   Neurological:      General: No " focal deficit present.      Mental Status: Alert.   Psychiatric:         Behavior: Behavior is cooperative.             The following portions of the patient's history were reviewed and updated as appropriate: allergies, current medications, past medical history, past social history, past and problem list.     Review of Systems   Constitutional: Negative.    HENT: Negative.     Eyes: Negative.    Respiratory: Negative.     Cardiovascular: Negative.    Gastrointestinal: Negative.    Endocrine: Negative.    Genitourinary: Negative.    Musculoskeletal: Negative.    Skin: Negative.    Allergic/Immunologic: Negative.    Neurological: Negative.    Hematological: Negative.    Psychiatric/Behavioral: Negative.           Procedures      Medication Review: yes    Current Outpatient Medications:     amiodarone (PACERONE) 400 MG tablet, Take 1 tablet by mouth 2 (Two) Times a Day With Meals for 30 days., Disp: 60 tablet, Rfl: 0    apixaban (ELIQUIS) 5 MG tablet tablet, Take 1 tablet by mouth 2 (Two) Times a Day., Disp: 60 tablet, Rfl: 2    aspirin 81 MG EC tablet, Take 1 tablet by mouth Daily., Disp: 90 tablet, Rfl: 3    atorvastatin (LIPITOR) 40 MG tablet, Take 1 tablet by mouth Daily., Disp: 30 tablet, Rfl: 11    carvedilol (COREG) 6.25 MG tablet, Take 1 tablet by mouth Every 12 (Twelve) Hours., Disp: 60 tablet, Rfl: 2    HYDROcodone-acetaminophen (Norco) 7.5-325 MG per tablet, Take 1 tablet by mouth Every 6 (Six) Hours As Needed for Moderate Pain., Disp: 15 tablet, Rfl: 0    multivitamin (MULTI VITAMIN DAILY PO), Take 1 tablet by mouth Daily., Disp: , Rfl:     sacubitril-valsartan (ENTRESTO) 24-26 MG tablet, Take 1 tablet by mouth 2 (Two) Times a Day., Disp: 60 tablet, Rfl: 0    traZODone (DESYREL) 50 MG tablet, Take 1 tablet by mouth Every Night., Disp: 15 tablet, Rfl: 0   No Known Allergies    I have reviewed       CBC:  Lab Results - Last 18 Months   Lab Units 09/03/24  1348   WBC 10*3/mm3 6.19   HEMOGLOBIN g/dL 12.5*    HEMATOCRIT % 39.8   PLATELETS 10*3/mm3 202      BMP/CMP:  Lab Results - Last 18 Months   Lab Units 09/03/24  1348   SODIUM mmol/L 137   POTASSIUM mmol/L 4.5   CHLORIDE mmol/L 102   CO2 mmol/L 26.0   GLUCOSE mg/dL 94   BUN mg/dL 14   CREATININE mg/dL 1.04   CALCIUM mg/dL 9.0     BNP:   Lab Results - Last 18 Months   Lab Units 08/21/24  1056   PROBNP pg/mL 7,349.0*      THYROID:  Lab Results - Last 18 Months   Lab Units 05/21/24  1804   TSH uIU/mL 0.786       Results for orders placed during the hospital encounter of 08/21/24    Adult Transthoracic Echo Limited W/ Cont if Necessary Per Protocol    Interpretation Summary    Left ventricular systolic function is severely decreased. Left ventricular ejection fraction appears to be 21 - 25%    The left ventricular cavity is mildly dilated.    Left ventricular diastolic function is consistent with (grade III w/high LAP) fixed restrictive pattern.    Moderately reduced right ventricular systolic function noted.    The aortic valve exhibits sclerosis. Mild aortic valve regurgitation is present.    Trace to mild mitral valve regurgitation is present. No significant mitral valve stenosis is present. Fixed posterior leaflet findings are consistent with surgical mitral valve repair.     Assessment:   Diagnoses and all orders for this visit:    1. Encounter for post surgical wound check (Primary)    ICD: He has a hematoma. Steri strips intact without any drainage or bleeding from incision. Denies any fever aches or chills. There appears to be a 3mm skin tear over the device site with acceptable eschar. Extensive ecchymosis as expected. Hematoma is beginning to soften anteriorly, some firmness around medial and lateral edges. Reassured patient that this will take time to resolve. If hematoma is not improving by next week, may require to hold eliquis for a week.   -Keep follow up with device clinic.     I spent 15 minutes caring for Vj on this date of service. This time  includes time spent by me in the following activities:preparing for the visit, reviewing tests, obtaining and/or reviewing a separately obtained history, performing a medically appropriate examination and/or evaluation , counseling and educating the patient/family/caregiver, ordering medications, tests, or procedures, and referring and communicating with other health care professionals         Electronically signed by ANTHONY Ford

## 2024-09-17 RX ORDER — DOXYCYCLINE HYCLATE 100 MG
100 TABLET ORAL 2 TIMES DAILY
Qty: 20 TABLET | Refills: 0 | Status: SHIPPED | OUTPATIENT
Start: 2024-09-17 | End: 2024-09-23 | Stop reason: SINTOL

## 2024-09-23 ENCOUNTER — OFFICE VISIT (OUTPATIENT)
Dept: INTERNAL MEDICINE | Facility: CLINIC | Age: 74
End: 2024-09-23
Payer: MEDICARE

## 2024-09-23 VITALS
BODY MASS INDEX: 25.62 KG/M2 | SYSTOLIC BLOOD PRESSURE: 124 MMHG | WEIGHT: 179 LBS | OXYGEN SATURATION: 98 % | TEMPERATURE: 97.9 F | DIASTOLIC BLOOD PRESSURE: 64 MMHG | HEIGHT: 70 IN | HEART RATE: 70 BPM

## 2024-09-23 DIAGNOSIS — L85.3 XEROSIS OF SKIN: ICD-10-CM

## 2024-09-23 DIAGNOSIS — T50.905A DRUG-INDUCED PHOTOSENSITIVITY: Primary | ICD-10-CM

## 2024-09-23 DIAGNOSIS — I48.0 PAF (PAROXYSMAL ATRIAL FIBRILLATION): ICD-10-CM

## 2024-09-23 DIAGNOSIS — T82.837D HEMATOMA OF PACEMAKER POCKET, SUBSEQUENT ENCOUNTER: ICD-10-CM

## 2024-09-23 DIAGNOSIS — L56.8 DRUG-INDUCED PHOTOSENSITIVITY: Primary | ICD-10-CM

## 2024-09-23 DIAGNOSIS — I25.5 ISCHEMIC CARDIOMYOPATHY: ICD-10-CM

## 2024-09-23 PROCEDURE — 1160F RVW MEDS BY RX/DR IN RCRD: CPT | Performed by: INTERNAL MEDICINE

## 2024-09-23 PROCEDURE — G2211 COMPLEX E/M VISIT ADD ON: HCPCS | Performed by: INTERNAL MEDICINE

## 2024-09-23 PROCEDURE — 1159F MED LIST DOCD IN RCRD: CPT | Performed by: INTERNAL MEDICINE

## 2024-09-23 PROCEDURE — 99214 OFFICE O/P EST MOD 30 MIN: CPT | Performed by: INTERNAL MEDICINE

## 2024-09-23 PROCEDURE — 1126F AMNT PAIN NOTED NONE PRSNT: CPT | Performed by: INTERNAL MEDICINE

## 2024-09-23 RX ORDER — HYDROXYZINE HYDROCHLORIDE 25 MG/1
25 TABLET, FILM COATED ORAL EVERY 6 HOURS PRN
Qty: 30 TABLET | Refills: 1 | Status: SHIPPED | OUTPATIENT
Start: 2024-09-23

## 2024-09-25 ENCOUNTER — OFFICE VISIT (OUTPATIENT)
Dept: CARDIOLOGY | Facility: CLINIC | Age: 74
End: 2024-09-25
Payer: MEDICARE

## 2024-09-25 VITALS
HEART RATE: 79 BPM | DIASTOLIC BLOOD PRESSURE: 70 MMHG | SYSTOLIC BLOOD PRESSURE: 116 MMHG | HEIGHT: 70 IN | WEIGHT: 181 LBS | BODY MASS INDEX: 25.91 KG/M2 | OXYGEN SATURATION: 98 %

## 2024-09-25 DIAGNOSIS — R23.4 SCAB: ICD-10-CM

## 2024-09-25 DIAGNOSIS — Z48.89 ENCOUNTER FOR POST SURGICAL WOUND CHECK: ICD-10-CM

## 2024-09-25 DIAGNOSIS — Z95.810 ICD (IMPLANTABLE CARDIOVERTER-DEFIBRILLATOR) IN PLACE: Primary | ICD-10-CM

## 2024-09-25 RX ORDER — MUPIROCIN 20 MG/G
1 OINTMENT TOPICAL EVERY 12 HOURS SCHEDULED
Status: SHIPPED | OUTPATIENT
Start: 2024-09-25

## 2024-09-30 RX ORDER — ATORVASTATIN CALCIUM 40 MG/1
40 TABLET, FILM COATED ORAL DAILY
Qty: 30 TABLET | Refills: 11 | Status: CANCELLED | OUTPATIENT
Start: 2024-09-30

## 2024-09-30 RX ORDER — ATORVASTATIN CALCIUM 40 MG/1
40 TABLET, FILM COATED ORAL DAILY
Qty: 30 TABLET | Refills: 11 | Status: SHIPPED | OUTPATIENT
Start: 2024-09-30

## 2024-09-30 NOTE — TELEPHONE ENCOUNTER
AFTER LOOKING IN CHART-MED PRESCRIBED BY CARDIOLOGY-ROUTING ENC TO HEART GROUP PAD    Caller: Vj Garcia    Relationship: Self    Best call back number: [phone]     Requested Prescriptions:   Requested Prescriptions     Pending Prescriptions Disp Refills    atorvastatin (LIPITOR) 40 MG tablet 30 tablet 11     Sig: Take 1 tablet by mouth Daily.        Pharmacy where request should be sent: [facility] [address] [place] [address] [phone] [facility] [phone]      Last office visit with prescribing clinician: 9/9/2024   Last telemedicine visit with prescribing clinician: Visit date not found   Next office visit with prescribing clinician: 9/15/2025     Additional details provided by patient: PT STATES ORIG PRESCRIPTION WAS SENT TO [facility] BUT NEEDS TO GO TO [facility]    Does the patient have less than a 3 day supply:  [x] Yes  [] No    Would you like a call back once the refill request has been completed: [x] Yes [] No    If the office needs to give you a call back, can they leave a voicemail: [x] Yes [] No    Komal Mendez Rep   09/30/24 08:23 CDT

## 2024-10-10 ENCOUNTER — CLINICAL SUPPORT NO REQUIREMENTS (OUTPATIENT)
Dept: CARDIOLOGY | Facility: CLINIC | Age: 74
End: 2024-10-10
Payer: MEDICARE

## 2024-10-10 ENCOUNTER — OFFICE VISIT (OUTPATIENT)
Dept: CARDIOLOGY | Facility: CLINIC | Age: 74
End: 2024-10-10
Payer: MEDICARE

## 2024-10-10 VITALS
SYSTOLIC BLOOD PRESSURE: 140 MMHG | WEIGHT: 180 LBS | BODY MASS INDEX: 25.77 KG/M2 | DIASTOLIC BLOOD PRESSURE: 76 MMHG | HEART RATE: 80 BPM | HEIGHT: 70 IN

## 2024-10-10 DIAGNOSIS — I47.20 VENTRICULAR TACHYCARDIA, SUSTAINED: ICD-10-CM

## 2024-10-10 DIAGNOSIS — Z95.810 PRESENCE OF AUTOMATIC CARDIOVERTER/DEFIBRILLATOR (AICD): Primary | ICD-10-CM

## 2024-10-10 DIAGNOSIS — I25.5 ISCHEMIC CARDIOMYOPATHY: ICD-10-CM

## 2024-10-10 DIAGNOSIS — I25.5 ISCHEMIC CARDIOMYOPATHY: Primary | ICD-10-CM

## 2024-10-10 DIAGNOSIS — I50.22 CHRONIC SYSTOLIC CONGESTIVE HEART FAILURE: ICD-10-CM

## 2024-10-10 RX ORDER — AMIODARONE HYDROCHLORIDE 200 MG/1
200 TABLET ORAL 2 TIMES DAILY
Qty: 90 TABLET | Refills: 3 | Status: SHIPPED | OUTPATIENT
Start: 2024-10-10

## 2024-10-10 RX ORDER — SPIRONOLACTONE 25 MG/1
25 TABLET ORAL DAILY
Qty: 90 TABLET | Refills: 3 | Status: SHIPPED | OUTPATIENT
Start: 2024-10-10

## 2024-10-10 NOTE — Clinical Note
New implant AICD - Please read discussion about CRUZITO and inappropriate shocks - Patient did discuss with Dr. Khoury this morning. Please review and sign.

## 2024-10-10 NOTE — PROGRESS NOTES
Single Chamber AICD with Atrial Diagnostics Interrogation Report  IN OFFICE    October 10, 2024    Primary Cardiologist: Ben  : Biotronik Model: Acticor 7 VR-T DX  Implant date: 9.3.2024    Reason for evaluation:  New Implant Follow Up  Indication for AICD: Chronic Systolic Heart Failure and Sustained VT    Interrogation performed by:  Sadaf Moctezuma RN    Incision:  Left upper chest incision is well approximated without erythema, warmth, open areas, or drainage.  Pocket hematoma has almost resolved, very minimal pocket edema noted.  Resolving ecchymosis remains over device pocket.  Denies fevers/chills since date of procedure.  Patient reports that he did not complete the doxycycline (only had 2-3 days remaining when PCP discontinued it - Patient developed photosensitivity and had blisters on his head).    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  Patient asked if he could get the AICD removed because he doesn't plan to stop working.  RN advised patient to discuss with Dr. Khoury and Dr. Contreras, while educating him the that the AICD could be programmed OFF and programmed back on in the future if needed.  RN did educate patient that he could have fatal complications if the AICD is programmed off and he does have an episode of VT/VF.  Understanding verbalized.     Measurements  Atrial sensing:  P wave: 8.4-14.9 mV  Ventricular sensing - R wave: 24.2 mV  Ventricular threshold: 0.9 V @ 0.4 ms  Ventricular lead impedance: 600 ohms  Shock Impedance: RV 56 ohms    Manual sensing and threshold testing performed: Yes     Diagnostic Data  Paced: 0 %    Episodes/Alerts:  P-AFL x 11, longest duration 53 minutes, ventricular rates   bpm,     Battery status: Satisfactory, RUBIN, estimated 100% remaining battery capacity, 3.12V     Final Parameters  Mode: VDI  Lower rate: 40 bpm   Atrial - Sensitivity:  1 mV  Ventricular - Amplitude: 1.9 V   Pulse width: 0.4 ms   Sensitivity: 0.8 mV     Changes made: No changes.    Conclusions: Normal AICD Function, No Therapy Delivered, Stable Pacing and Sensing Thresholds, and Adequate Battery Reserve    Remote Monitor:  Yes, connected.    Follow up:  Every 3 months via remote monitoring, annually in office.     Final impression:  Data above reviewed.  Agree with findings and conclusions as per the above note.

## 2024-10-11 ENCOUNTER — HOSPITAL ENCOUNTER (OUTPATIENT)
Dept: CARDIOLOGY | Facility: HOSPITAL | Age: 74
Discharge: HOME | End: 2024-10-11
Payer: MEDICARE

## 2024-10-11 VITALS
DIASTOLIC BLOOD PRESSURE: 76 MMHG | BODY MASS INDEX: 25.88 KG/M2 | HEIGHT: 70 IN | WEIGHT: 180.78 LBS | SYSTOLIC BLOOD PRESSURE: 140 MMHG

## 2024-10-11 DIAGNOSIS — I25.5 ISCHEMIC CARDIOMYOPATHY: ICD-10-CM

## 2024-10-11 DIAGNOSIS — I50.22 CHRONIC SYSTOLIC CONGESTIVE HEART FAILURE: ICD-10-CM

## 2024-10-11 LAB
BH CV ECHO LEFT VENTRICLE GLOBAL LONGITUDINAL STRAIN: -8 %
BH CV ECHO MEAS - AI P1/2T: 570.8 MSEC
BH CV ECHO MEAS - AO MAX PG: 5.9 MMHG
BH CV ECHO MEAS - AO MEAN PG: 3 MMHG
BH CV ECHO MEAS - AO ROOT DIAM: 3.7 CM
BH CV ECHO MEAS - AO V2 MAX: 121.8 CM/SEC
BH CV ECHO MEAS - AO V2 VTI: 23.2 CM
BH CV ECHO MEAS - AVA(I,D): 4 CM2
BH CV ECHO MEAS - EDV(CUBED): 181.6 ML
BH CV ECHO MEAS - EDV(MOD-SP2): 192.2 ML
BH CV ECHO MEAS - EDV(MOD-SP4): 183.2 ML
BH CV ECHO MEAS - EF(MOD-SP2): 39.1 %
BH CV ECHO MEAS - EF(MOD-SP4): 24.5 %
BH CV ECHO MEAS - ESV(CUBED): 117.5 ML
BH CV ECHO MEAS - ESV(MOD-SP2): 117 ML
BH CV ECHO MEAS - ESV(MOD-SP4): 138.4 ML
BH CV ECHO MEAS - FS: 13.5 %
BH CV ECHO MEAS - IVS/LVPW: 0.77 CM
BH CV ECHO MEAS - IVSD: 0.97 CM
BH CV ECHO MEAS - LA DIMENSION: 4.6 CM
BH CV ECHO MEAS - LAT PEAK E' VEL: 7 CM/SEC
BH CV ECHO MEAS - LV DIASTOLIC VOL/BSA (35-75): 91.5 CM2
BH CV ECHO MEAS - LV MASS(C)D: 260.5 GRAMS
BH CV ECHO MEAS - LV MAX PG: 1.54 MMHG
BH CV ECHO MEAS - LV MEAN PG: 0.71 MMHG
BH CV ECHO MEAS - LV SYSTOLIC VOL/BSA (12-30): 69.1 CM2
BH CV ECHO MEAS - LV V1 MAX: 62.1 CM/SEC
BH CV ECHO MEAS - LV V1 VTI: 14.6 CM
BH CV ECHO MEAS - LVIDD: 5.7 CM
BH CV ECHO MEAS - LVIDS: 4.9 CM
BH CV ECHO MEAS - LVOT AREA: 6.4 CM2
BH CV ECHO MEAS - LVOT DIAM: 2.9 CM
BH CV ECHO MEAS - LVPWD: 1.27 CM
BH CV ECHO MEAS - MED PEAK E' VEL: 6 CM/SEC
BH CV ECHO MEAS - MV A MAX VEL: 89 CM/SEC
BH CV ECHO MEAS - MV DEC SLOPE: 233.4 CM/SEC2
BH CV ECHO MEAS - MV DEC TIME: 0.31 SEC
BH CV ECHO MEAS - MV E MAX VEL: 71.3 CM/SEC
BH CV ECHO MEAS - MV E/A: 0.8
BH CV ECHO MEAS - MV MAX PG: 3.8 MMHG
BH CV ECHO MEAS - MV MEAN PG: 1.25 MMHG
BH CV ECHO MEAS - MV V2 VTI: 31.2 CM
BH CV ECHO MEAS - MVA(VTI): 3 CM2
BH CV ECHO MEAS - PA V2 MAX: 92.5 CM/SEC
BH CV ECHO MEAS - PI END-D VEL: 109.7 CM/SEC
BH CV ECHO MEAS - RAP SYSTOLE: 3 MMHG
BH CV ECHO MEAS - RVDD: 2.9 CM
BH CV ECHO MEAS - SV(LVOT): 93.8 ML
BH CV ECHO MEAS - SV(MOD-SP2): 75.2 ML
BH CV ECHO MEAS - SV(MOD-SP4): 44.8 ML
BH CV ECHO MEAS - SVI(LVOT): 46.9 ML/M2
BH CV ECHO MEAS - SVI(MOD-SP2): 37.6 ML/M2
BH CV ECHO MEAS - SVI(MOD-SP4): 22.4 ML/M2
BH CV ECHO MEAS - TAPSE (>1.6): 1.2 CM
BH CV ECHO MEASUREMENTS AVERAGE E/E' RATIO: 10.97
BH CV XLRA - RV BASE: 2.8 CM
BH CV XLRA - RV MID: 1.8 CM
BH CV XLRA - TDI S': 10 CM/SEC
LEFT ATRIUM VOLUME INDEX: 33 ML/M2
LEFT ATRIUM VOLUME: 66 ML
SINUS: 3.6 CM
STJ: 2.8 CM

## 2024-10-11 PROCEDURE — 93306 TTE W/DOPPLER COMPLETE: CPT

## 2024-10-11 PROCEDURE — 93356 MYOCRD STRAIN IMG SPCKL TRCK: CPT

## 2024-10-15 ENCOUNTER — OFFICE VISIT (OUTPATIENT)
Dept: INTERNAL MEDICINE | Facility: CLINIC | Age: 74
End: 2024-10-15
Payer: MEDICARE

## 2024-10-15 VITALS
OXYGEN SATURATION: 97 % | DIASTOLIC BLOOD PRESSURE: 70 MMHG | BODY MASS INDEX: 26.2 KG/M2 | HEIGHT: 70 IN | WEIGHT: 183 LBS | TEMPERATURE: 96.4 F | HEART RATE: 69 BPM | SYSTOLIC BLOOD PRESSURE: 138 MMHG

## 2024-10-15 DIAGNOSIS — I25.119 CORONARY ARTERY DISEASE INVOLVING NATIVE CORONARY ARTERY OF NATIVE HEART WITH ANGINA PECTORIS: ICD-10-CM

## 2024-10-15 DIAGNOSIS — G47.09 OTHER INSOMNIA: ICD-10-CM

## 2024-10-15 DIAGNOSIS — Z95.1 STATUS POST TWO VESSEL CORONARY ARTERY BYPASS: ICD-10-CM

## 2024-10-15 DIAGNOSIS — I48.0 PAF (PAROXYSMAL ATRIAL FIBRILLATION): ICD-10-CM

## 2024-10-15 DIAGNOSIS — I51.89 SYSTOLIC DYSFUNCTION WITHOUT HEART FAILURE: ICD-10-CM

## 2024-10-15 DIAGNOSIS — Z00.00 ENCOUNTER FOR SUBSEQUENT ANNUAL WELLNESS VISIT IN MEDICARE PATIENT: Primary | ICD-10-CM

## 2024-10-15 DIAGNOSIS — F17.201 TOBACCO ABUSE, IN REMISSION: ICD-10-CM

## 2024-10-15 DIAGNOSIS — I25.5 ISCHEMIC CARDIOMYOPATHY: ICD-10-CM

## 2024-10-15 DIAGNOSIS — T82.837D HEMATOMA OF PACEMAKER POCKET, SUBSEQUENT ENCOUNTER: ICD-10-CM

## 2024-10-15 PROCEDURE — 1159F MED LIST DOCD IN RCRD: CPT | Performed by: INTERNAL MEDICINE

## 2024-10-15 PROCEDURE — 1170F FXNL STATUS ASSESSED: CPT | Performed by: INTERNAL MEDICINE

## 2024-10-15 PROCEDURE — 1160F RVW MEDS BY RX/DR IN RCRD: CPT | Performed by: INTERNAL MEDICINE

## 2024-10-15 PROCEDURE — G0439 PPPS, SUBSEQ VISIT: HCPCS | Performed by: INTERNAL MEDICINE

## 2024-10-15 PROCEDURE — 1126F AMNT PAIN NOTED NONE PRSNT: CPT | Performed by: INTERNAL MEDICINE

## 2024-10-15 PROCEDURE — 99214 OFFICE O/P EST MOD 30 MIN: CPT | Performed by: INTERNAL MEDICINE

## 2024-10-15 RX ORDER — TRAZODONE HYDROCHLORIDE 50 MG/1
50 TABLET, FILM COATED ORAL NIGHTLY PRN
Qty: 30 TABLET | Refills: 1 | Status: SHIPPED | OUTPATIENT
Start: 2024-10-15

## 2024-10-15 NOTE — PROGRESS NOTES
Subjective   The ABCs of the Annual Wellness Visit  Medicare Wellness Visit      Vj Garcia is a 74 y.o. patient who presents for a Medicare Wellness Visit.    The following portions of the patient's history were reviewed and   updated as appropriate: allergies, current medications, past family history, past medical history, past social history, past surgical history, and problem list.    Compared to one year ago, the patient's physical   health is worse.  Compared to one year ago, the patient's mental   health is the same.    Recent Hospitalizations:  This patient has had a [facility] admission record on file within the last 365 days.  Current Medical Providers:  Patient Care Team:  SILKE Poon DO as PCP - General (Internal Medicine)    Outpatient Medications Prior to Visit   Medication Sig Dispense Refill    amiodarone (PACERONE) 200 MG tablet Take 1 tablet by mouth 2 (Two) Times a Day. 90 tablet 3    apixaban (ELIQUIS) 5 MG tablet tablet Take 1 tablet by mouth 2 (Two) Times a Day. 60 tablet 2    aspirin 81 MG EC tablet Take 1 tablet by mouth Daily. 90 tablet 3    atorvastatin (LIPITOR) 40 MG tablet Take 1 tablet by mouth Daily. 30 tablet 11    carvedilol (COREG) 6.25 MG tablet Take 1 tablet by mouth Every 12 (Twelve) Hours. 60 tablet 2    HYDROcodone-acetaminophen (Norco) 7.5-325 MG per tablet Take 1 tablet by mouth Every 6 (Six) Hours As Needed for Moderate Pain. 15 tablet 0    hydrOXYzine (ATARAX) 25 MG tablet Take 1 tablet by mouth Every 6 (Six) Hours As Needed for Itching. 30 tablet 1    multivitamin (MULTI VITAMIN DAILY PO) Take 1 tablet by mouth Daily.      sacubitril-valsartan (ENTRESTO) 24-26 MG tablet Take 1 tablet by mouth 2 (Two) Times a Day. 60 tablet 0    spironolactone (ALDACTONE) 25 MG tablet Take 1 tablet by mouth Daily. 90 tablet 3     Facility-Administered Medications Prior to Visit   Medication Dose Route Frequency Provider Last Rate Last Admin    mupirocin  "(BACTROBAN) 2 % ointment 1 Application  1 Application Topical Q12H Sugey Salinas PA         Opioid medication/s are on active medication list.  and I have evaluated his active treatment plan and pain score trends (see table).  There were no vitals filed for this visit.  I have reviewed the chart for potential of high risk medication and harmful drug interactions in the elderly.        Aspirin is on active medication list. Aspirin use is indicated based on review of current medical condition/s. Pros and cons of this therapy have been discussed today. Benefits of this medication outweigh potential harm.  Patient has been encouraged to continue taking this medication.  .      Patient Active Problem List   Diagnosis    Unstable angina    Left ventricular aneurysm    Coronary artery disease involving native coronary artery of native heart with angina pectoris    Tobacco abuse, in remission    Overweight with body mass index (BMI) of 28 to 28.9 in adult    Stage 3a chronic kidney disease    Elevated troponin    Status post two vessel coronary artery bypass, Posterior Basal LV Aneurysm Repair, MV repair  7/23/2024    Postural dizziness with near syncope    PAF (paroxysmal atrial fibrillation)    Pleural effusion    Wide-complex tachycardia    Ventricular tachycardia, sustained    Ischemic cardiomyopathy    Encounter for post surgical wound check     Advance Care Planning Advance Directive is not on file.  ACP discussion was held with the patient during this visit. Patient does not have an advance directive, information provided.            Objective   Vitals:    10/15/24 1032   BP: 138/70   BP Location: Left arm   Patient Position: Sitting   Cuff Size: Adult   Pulse: 69   Temp: 96.4 °F (35.8 °C)   TempSrc: Temporal   SpO2: 97%   Weight: 83 kg (183 lb)   Height: 178 cm (70.08\")       Estimated body mass index is 26.2 kg/m² as calculated from the following:    Height as of this encounter: 178 cm (70.08\").    Weight as of " this encounter: 83 kg (183 lb).            Does the patient have evidence of cognitive impairment? No                                                                                                Health  Risk Assessment    Smoking Status:  Social History     Tobacco Use   Smoking Status Former    Types: Cigarettes    Start date: 7/15/2024    Quit date: 1972    Years since quittin.8   Smokeless Tobacco Never     Alcohol Consumption:  Social History     Substance and Sexual Activity   Alcohol Use Not Currently       Fall Risk Screen  ALISIAADI Fall Risk Assessment was completed, and patient is at HIGH risk for falls. Assessment completed on:10/15/2024    Depression Screening:      10/15/2024    10:33 AM   PHQ-2/PHQ-9 Depression Screening   Little interest or pleasure in doing things Not at all   Feeling down, depressed, or hopeless Not at all     Health Habits and Functional and Cognitive Screening:      10/15/2024    10:35 AM   Functional & Cognitive Status   Do you have difficulty preparing food and eating? No   Do you have difficulty bathing yourself, getting dressed or grooming yourself? No   Do you have difficulty using the toilet? No   Do you have difficulty moving around from place to place? No   Do you have trouble with steps or getting out of a bed or a chair? No   Current Diet Well Balanced Diet   Dental Exam Up to date   Eye Exam Not up to date   Exercise (times per week) 2 times per week   Current Exercises Include Yard Work;Walking   Do you need help using the phone?  No   Are you deaf or do you have serious difficulty hearing?  No   Do you need help to go to places out of walking distance? No   Do you need help shopping? No   Do you need help preparing meals?  No   Do you need help with housework?  No   Do you need help with laundry? No   Do you need help taking your medications? No   Do you need help managing money? No   Do you ever drive or ride in a car without wearing a seat belt? No   Have you  felt unusual stress, anger or loneliness in the last month? No   Who do you live with? Alone   If you need help, do you have trouble finding someone available to you? No   Have you been bothered in the last four weeks by sexual problems? No   Do you have difficulty concentrating, remembering or making decisions? No           Age-appropriate Screening Schedule:  Refer to the list below for future screening recommendations based on patient's age, sex and/or medical conditions. Orders for these recommended tests are listed in the plan section. The patient has been provided with a written plan.    Health Maintenance List  Health Maintenance   Topic Date Due    COLORECTAL CANCER SCREENING  Never done    Pneumococcal Vaccine 65+ (1 of 2 - PCV) Never done    TDAP/TD VACCINES (1 - Tdap) Never done    ZOSTER VACCINE (1 of 2) Never done    HEPATITIS C SCREENING  Never done    COVID-19 Vaccine (4 - 2023-24 season) 09/01/2024    INFLUENZA VACCINE  03/31/2025 (Originally 8/1/2024)    LIPID PANEL  07/13/2025    BMI FOLLOWUP  07/23/2025    ANNUAL WELLNESS VISIT  10/15/2025    AAA SCREEN (ONE-TIME)  Completed                                                                                                                                                CMS Preventative Services Quick Reference  Risk Factors Identified During Encounter  Immunizations Discussed/Encouraged: Influenza and Prevnar 20 (Pneumococcal 20-valent conjugate)  Dental Screening Recommended  Vision Screening Recommended    The above risks/problems have been discussed with the patient.  Pertinent information has been shared with the patient in the After Visit Summary.  An After Visit Summary and PPPS were made available to the patient.    Follow Up:   Next Medicare Wellness visit to be scheduled in 1 year.         Additional E&M Note during same encounter follows:  Patient has additional, significant, and separately identifiable condition(s)/problem(s) that require  "work above and beyond the Medicare Wellness Visit     Chief Complaint  Medicare Wellness-subsequent (Would like to discuss recent Echo results.  Would like to discuss stopping all of his medications. )    Subjective   HPI  Vj is also being seen today for an annual adult preventative physical exam.                 Objective   Vital Signs:  /70 (BP Location: Left arm, Patient Position: Sitting, Cuff Size: Adult)   Pulse 69   Temp 96.4 °F (35.8 °C) (Temporal)   Ht 178 cm (70.08\")   Wt 83 kg (183 lb)   SpO2 97%   BMI 26.20 kg/m²   Physical Exam  Constitutional:       Comments: Seen and discussed with his friend.   HENT:      Head: Normocephalic and atraumatic.   Eyes:      Conjunctiva/sclera: Conjunctivae normal.      Pupils: Pupils are equal, round, and reactive to light.   Cardiovascular:      Rate and Rhythm: Normal rate and regular rhythm.      Heart sounds: Normal heart sounds.      Comments: Looks euvolemic on exam.  Paced rhythm without irregularity.  The hematoma of the left infraclavicular AICD has significantly decreased in size.  No pain to palpation.  No fluctuance.  No erythema.  Pulmonary:      Effort: Pulmonary effort is normal. No respiratory distress.      Breath sounds: Normal breath sounds.   Musculoskeletal:         General: No swelling.      Cervical back: Neck supple.   Skin:     General: Skin is warm and dry.      Findings: No rash.   Neurological:      General: No focal deficit present.      Mental Status: He is alert and oriented to person, place, and time.   Psychiatric:         Mood and Affect: Mood normal.         Behavior: Behavior normal.         Thought Content: Thought content normal.         Judgment: Judgment normal.       Results for orders placed during the hospital encounter of 10/11/24    Adult Transthoracic Echo Complete W/ Cont if Necessary Per Protocol    Interpretation Summary    Left ventricular systolic function is severely decreased. Left ventricular ejection " fraction appears to be 31 - 35%.    Abnormal global longitudinal LV strain (GLS) = -8.0%    The left ventricular cavity is mildly dilated. Left ventricular wall thickness is consistent with mild concentric hypertrophy    Moderately reduced right ventricular systolic function noted.    Mild to moderate aortic valve regurgitation is present.    Mild mitral valve regurgitation is present. No significant mitral valve stenosis is present. Fixed posterior leaflet findings are consistent with surgical mitral valve repair.    There is mild to moderate pulmonic valve regurgitation present.             Assessment and Plan               Encounter for subsequent annual wellness visit in Medicare patient    Hematoma of pacemaker pocket, subsequent encounter    Ischemic cardiomyopathy    Systolic dysfunction without heart failure    Coronary artery disease involving native coronary artery of native heart with angina pectoris    Status post two vessel coronary artery bypass, Posterior Basal LV Aneurysm Repair, MV repair  7/23/2024    PAF (paroxysmal atrial fibrillation)    Tobacco abuse, in remission      Other insomnia    No orders of the defined types were placed in this encounter.    New Medications Ordered This Visit   Medications    traZODone (DESYREL) 50 MG tablet     Sig: Take 1 tablet by mouth At Night As Needed for Sleep.     Dispense:  30 tablet     Refill:  1        Presents today for subsequent Medicare wellness visit and follow-up.    He was in the office recently for photosensitivity reaction to doxycycline.  This has resolved.  The hematoma of his left infraclavicular AICD site has significantly decreased in size.  No fluctuance.  No warmth or erythema.    Continues to look euvolemic on exam.  He does not require the use of loop diuretics.  He recently had spironolactone added to his regimen by Dr. Khoury last week.    He remains leery of the multiple medications he is taking.  He made overtures during his visit  "today that he may ultimately stop some of these medications.  I explained to he and his friend that this would not be in his best interest.  He eventually told me that he is \"going to give it a year.\"  Stressed compliance given his issues in July.    He states that he overall feels well.  His appetite is coming back, but foods still do not taste very good.  [place] and admits that he is probably doing some things that Dr. King or been Dr. Maciel would not want him to do.  He spent most of the day yesterday on his bulldozer and tractor.  He does state that he recently asked for more instructions about his Biotronik device at his cardiology follow-up.  He states that he has not been provided these.  I will send a message to Dr. Contreras and Dr. Khoury.     Still concerned about sleep.  He states that he did not have any problems sleeping prior to his surgical interventions.  He has tried multiple medications without much help.  He is interested in trialing trazodone again.  He wants something that is not habit-forming and that will \"actually work.\"  He did not have any results with multiple doses of melatonin.  He tried Ambien briefly and had vivid dreams that he did not like.    Declines routine screenings such as colorectal cancer screening and PSA screening.    Declines vaccinations.  \"I have never had any since I was a kid.\"    Plan to have him back in 6 months for reevaluation.  He knows that he can reach out sooner if problems.    Follow Up   Return in about 6 months (around 4/15/2025) for Recheck.  Patient was given instructions and counseling regarding his condition or for health maintenance advice. Please see specific information pulled into the AVS if appropriate.  " yes...

## 2024-10-20 NOTE — PROGRESS NOTES
Subjective:     Encounter Date:10/10/2024      Patient ID: Vj Garcia is a 74 y.o. male.    Chief Complaint:  History of Present Illness  History of Present Illness  The patient presents for evaluation of his heart condition. He is accompanied by an adult female.    He underwent bypass surgery on 07/23/2024, which lasted approximately 8.5 hours. The procedure involved two bypasses, one stent, and the repair of a hole in his heart and a valve. Post-surgery, he has been experiencing sleep disturbances and irregular bowel movements, necessitating daily laxative use. He reports occasional dizziness and loss of taste, which has led to a decreased appetite and a weight loss of 36 pounds. However, he has regained 3 pounds in the last 2 weeks. He reports no current chest pain or heart-related discomfort.    He was diagnosed with atrial fibrillation post-surgery and is currently on Eliquis. A defibrillator was implanted on 09/03/2024, which caused soreness for a week but is now healing well. He had a hematoma for a while, which has since resolved. He was found to be in 2:1 atrial flutter with an atrial cycle length of 240 ms. He underwent CTI ablation with termination of typical flutter and direct cardioversion for V. tach.    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 He is not currently participating in cardiac rehab.        Review of Systems   Constitutional: Positive for weight loss. Negative for diaphoresis, fever and malaise/fatigue.   HENT:  Negative for congestion.    Eyes:  Negative for vision loss in left eye and vision loss in right eye.   Cardiovascular:  Positive for palpitations. Negative for chest pain, claudication, dyspnea on exertion, irregular heartbeat, leg swelling, orthopnea and syncope.   Respiratory:  Negative for cough, shortness of breath and wheezing.    Hematologic/Lymphatic: Negative for adenopathy.   Skin:  Negative for rash.    Musculoskeletal:  Negative for joint pain and joint swelling.   Gastrointestinal:  Negative for abdominal pain, diarrhea, nausea and vomiting.   Neurological:  Positive for dizziness. Negative for excessive daytime sleepiness, focal weakness, light-headedness, numbness and weakness.   Psychiatric/Behavioral:  Negative for depression. The patient does not have insomnia.            Current Outpatient Medications:     apixaban (ELIQUIS) 5 MG tablet tablet, Take 1 tablet by mouth 2 (Two) Times a Day., Disp: 60 tablet, Rfl: 2    aspirin 81 MG EC tablet, Take 1 tablet by mouth Daily., Disp: 90 tablet, Rfl: 3    atorvastatin (LIPITOR) 40 MG tablet, Take 1 tablet by mouth Daily., Disp: 30 tablet, Rfl: 11    carvedilol (COREG) 6.25 MG tablet, Take 1 tablet by mouth Every 12 (Twelve) Hours., Disp: 60 tablet, Rfl: 2    HYDROcodone-acetaminophen (Norco) 7.5-325 MG per tablet, Take 1 tablet by mouth Every 6 (Six) Hours As Needed for Moderate Pain., Disp: 15 tablet, Rfl: 0    hydrOXYzine (ATARAX) 25 MG tablet, Take 1 tablet by mouth Every 6 (Six) Hours As Needed for Itching., Disp: 30 tablet, Rfl: 1    multivitamin (MULTI VITAMIN DAILY PO), Take 1 tablet by mouth Daily., Disp: , Rfl:     sacubitril-valsartan (ENTRESTO) 24-26 MG tablet, Take 1 tablet by mouth 2 (Two) Times a Day., Disp: 60 tablet, Rfl: 0    amiodarone (PACERONE) 200 MG tablet, Take 1 tablet by mouth 2 (Two) Times a Day., Disp: 90 tablet, Rfl: 3    spironolactone (ALDACTONE) 25 MG tablet, Take 1 tablet by mouth Daily., Disp: 90 tablet, Rfl: 3    traZODone (DESYREL) 50 MG tablet, Take 1 tablet by mouth At Night As Needed for Sleep., Disp: 30 tablet, Rfl: 1    Current Facility-Administered Medications:     mupirocin (BACTROBAN) 2 % ointment 1 Application, 1 Application, Topical, Q12H, Sugey Salinas PA       Objective:      Vitals:    10/10/24 1001   BP: 140/76   Pulse: 80     Vitals and nursing note reviewed.   Constitutional:       Appearance: Normal and  healthy appearance. Well-developed and not in distress.   Eyes:      Extraocular Movements: Extraocular movements intact.      Pupils: Pupils are equal, round, and reactive to light.   HENT:      Head: Normocephalic and atraumatic.    Mouth/Throat:      Pharynx: Oropharynx is clear.   Neck:      Vascular: JVD normal.      Trachea: Trachea normal.   Pulmonary:      Effort: Pulmonary effort is normal.      Breath sounds: Normal breath sounds. No wheezing. No rhonchi. No rales.   Cardiovascular:      PMI at left midclavicular line. Normal rate. Regular rhythm. Normal S1. Normal S2.       Murmurs: There is a grade 2/6 systolic murmur.      No gallop.  No click. No rub.   Pulses:     Dorsalis pedis: 2+ bilaterally.     Posterior tibial: 2+ bilaterally.  Abdominal:      General: Bowel sounds are normal.      Palpations: Abdomen is soft.      Tenderness: There is no abdominal tenderness.   Musculoskeletal: Normal range of motion.      Cervical back: Normal range of motion and neck supple. Skin:     General: Skin is warm and dry.      Capillary Refill: Capillary refill takes less than 2 seconds.   Feet:      Right foot:      Skin integrity: Skin integrity normal.      Left foot:      Skin integrity: Skin integrity normal.   Neurological:      Mental Status: Alert and oriented to person, place and time.      Sensory: Sensation is intact.      Motor: Motor function is intact.      Coordination: Coordination is intact.   Psychiatric:         Speech: Speech normal.         Behavior: Behavior is cooperative.       Physical Exam  Vital Signs  Vitals show blood pressure at 140/76, heart rate at 80.    Lab Review:       Procedures  Results  Imaging  Ejection fraction dropped to 21-25%.    Assessment/Plan:     Problem List Items Addressed This Visit       Ischemic cardiomyopathy - Primary    Relevant Orders    Adult Transthoracic Echo Complete W/ Cont if Necessary Per Protocol (Completed)     Other Visit Diagnoses       Chronic  systolic congestive heart failure        Relevant Orders    Adult Transthoracic Echo Complete W/ Cont if Necessary Per Protocol (Completed)          Assessment & Plan  1. Post-bypass surgery status.  His ejection fraction has significantly decreased to 20 percent post-operation, indicating a high risk for ventricular tachycardia (V. tach). This is particularly concerning given his previous episodes of V. tach. His current medications, Coreg and Entresto, are aimed at improving his heart function. An additional ultrasound will be performed to assess his heart function. Depending on the results, adjustments to his defibrillator settings may be considered. A potassium-sparing diuretic, Aldactone, will be added to his current regimen of Entresto and Coreg. A prescription for amiodarone 200 mg twice a day has been provided. The prescription will be called into [facility].    2. Atrial fibrillation.  He is currently on Eliquis for atrial fibrillation. The blood thinner protects him from stroke risks associated with atrial fibrillation and atrial flutter. He is advised to continue taking Eliquis as prescribed.    3. Ventricular tachycardia.  He has had episodes of ventricular tachycardia, which are dangerous. The defibrillator is essential for his protection against V. tach. He is advised to avoid activities that could interfere with the defibrillator, such as welding and being near running engines. He is also advised to continue taking amiodarone 200 mg twice a day to manage V. tach and atrial fibrillation.    4. Weight loss and appetite issues.  He has experienced significant weight loss and loss of appetite post-surgery. He is advised to monitor his weight and try to maintain a balanced diet. If the loss of appetite persists, further evaluation may be necessary.    5. Dizziness.  He reports experiencing dizziness. This could be related to his medications or his heart condition. He is advised to monitor his  symptoms and report any worsening or new symptoms.    6. Bowel movement issues.  He is experiencing issues with bowel movements and is taking a laxative daily. He is advised to continue using the laxative as needed and to maintain a diet high in fiber to help with bowel regularity.          Recommendations/plans:    Transcribed from ambient dictation for Brennan Khoury DO by Brennan Khoury DO.  10/20/24   18:56 CDT    Patient or patient representative verbalized consent for the use of Ambient Listening during the visit with  Brennan Khoury DO for chart documentation. 10/20/2024  18:57 CDT

## 2024-10-22 NOTE — TELEPHONE ENCOUNTER
Caller: Vj Garcia    Relationship: Self    Best call back number: [phone]     Requested Prescriptions:   Requested Prescriptions     Pending Prescriptions Disp Refills    carvedilol (COREG) 6.25 MG tablet 60 tablet 2     Sig: Take 1 tablet by mouth Every 12 (Twelve) Hours.        Pharmacy where request should be sent: [facility] [address] [place] [address] [phone] [facility] [phone]      Last office visit with prescribing clinician: 10/10/2024   Last telemedicine visit with prescribing clinician: Visit date not found   Next office visit with prescribing clinician: Visit date not found     Additional details provided by patient:     Does the patient have less than a 3 day supply:  [x] Yes  [] No    Would you like a call back once the refill request has been completed: [] Yes [x] No    If the office needs to give you a call back, can they leave a voicemail: [x] Yes [] No    Komal Chadwick Rep   10/22/24 09:25 CDT

## 2024-10-23 RX ORDER — CARVEDILOL 6.25 MG/1
6.25 TABLET ORAL EVERY 12 HOURS SCHEDULED
Qty: 60 TABLET | Refills: 11 | Status: SHIPPED | OUTPATIENT
Start: 2024-10-23

## 2024-10-24 RX ORDER — APIXABAN 5 MG/1
5 TABLET, FILM COATED ORAL 2 TIMES DAILY
Qty: 180 TABLET | Refills: 3 | Status: SHIPPED | OUTPATIENT
Start: 2024-10-24

## 2024-11-03 LAB
QT INTERVAL: 416 MS
QTC INTERVAL: 604 MS

## 2024-11-04 NOTE — OUTREACH NOTE
Medical Week 2 Survey      Flowsheet Row Responses   [facility] patient discharged from? [facility]   Does the patient have one of the following disease processes/diagnoses(primary or secondary)? Other   Week 2 attempt successful? No   Unsuccessful attempts Attempt 1   Revoke Readmitted            DAVE MUNIZ - Registered Nurse   RUE/nonweight-bearing

## 2024-11-05 LAB
QT INTERVAL: 416 MS
QTC INTERVAL: 604 MS

## 2024-12-10 RX ORDER — TRAZODONE HYDROCHLORIDE 50 MG/1
50 TABLET, FILM COATED ORAL NIGHTLY PRN
Qty: 90 TABLET | Refills: 1 | Status: SHIPPED | OUTPATIENT
Start: 2024-12-10

## 2025-01-02 ENCOUNTER — HOSPITAL ENCOUNTER (INPATIENT)
Facility: HOSPITAL | Age: 75
LOS: 2 days | Discharge: HOME | DRG: 291 | End: 2025-01-05
Attending: STUDENT IN AN ORGANIZED HEALTH CARE EDUCATION/TRAINING PROGRAM | Admitting: INTERNAL MEDICINE
Payer: MEDICARE

## 2025-01-02 ENCOUNTER — APPOINTMENT (OUTPATIENT)
Dept: GENERAL RADIOLOGY | Facility: HOSPITAL | Age: 75
DRG: 291 | End: 2025-01-02
Payer: MEDICARE

## 2025-01-02 ENCOUNTER — TELEPHONE (OUTPATIENT)
Dept: CARDIOLOGY | Facility: CLINIC | Age: 75
End: 2025-01-02
Payer: MEDICARE

## 2025-01-02 DIAGNOSIS — I50.9 CONGESTIVE HEART FAILURE, UNSPECIFIED HF CHRONICITY, UNSPECIFIED HEART FAILURE TYPE: ICD-10-CM

## 2025-01-02 DIAGNOSIS — R79.89 ELEVATED TROPONIN: ICD-10-CM

## 2025-01-02 DIAGNOSIS — J18.9 PNEUMONIA OF RIGHT LUNG DUE TO INFECTIOUS ORGANISM, UNSPECIFIED PART OF LUNG: Primary | ICD-10-CM

## 2025-01-02 LAB
ALBUMIN SERPL-MCNC: 3.8 G/DL (ref 3.5–5.2)
ALBUMIN/GLOB SERPL: 1.1 G/DL
ALP SERPL-CCNC: 89 U/L (ref 39–117)
ALT SERPL W P-5'-P-CCNC: 26 U/L (ref 1–41)
ANION GAP SERPL CALCULATED.3IONS-SCNC: 13 MMOL/L (ref 5–15)
APTT PPP: 29.2 SECONDS (ref 24.5–36)
AST SERPL-CCNC: 40 U/L (ref 1–40)
B PARAPERT DNA SPEC QL NAA+PROBE: NOT DETECTED
B PERT DNA SPEC QL NAA+PROBE: NOT DETECTED
BASOPHILS # BLD AUTO: 0.08 10*3/MM3 (ref 0–0.2)
BASOPHILS NFR BLD AUTO: 0.6 % (ref 0–1.5)
BILIRUB SERPL-MCNC: 0.9 MG/DL (ref 0–1.2)
BUN SERPL-MCNC: 23 MG/DL (ref 8–23)
BUN/CREAT SERPL: 14.3 (ref 7–25)
C PNEUM DNA NPH QL NAA+NON-PROBE: NOT DETECTED
CALCIUM SPEC-SCNC: 8.5 MG/DL (ref 8.6–10.5)
CHLORIDE SERPL-SCNC: 105 MMOL/L (ref 98–107)
CO2 SERPL-SCNC: 21 MMOL/L (ref 22–29)
CREAT SERPL-MCNC: 1.61 MG/DL (ref 0.76–1.27)
D-LACTATE SERPL-SCNC: 3.3 MMOL/L (ref 0.5–2)
DEPRECATED RDW RBC AUTO: 49 FL (ref 37–54)
EGFRCR SERPLBLD CKD-EPI 2021: 45.2 ML/MIN/1.73
EOSINOPHIL # BLD AUTO: 0.11 10*3/MM3 (ref 0–0.4)
EOSINOPHIL NFR BLD AUTO: 0.8 % (ref 0.3–6.2)
ERYTHROCYTE [DISTWIDTH] IN BLOOD BY AUTOMATED COUNT: 14 % (ref 12.3–15.4)
FLUAV SUBTYP SPEC NAA+PROBE: NOT DETECTED
FLUBV RNA ISLT QL NAA+PROBE: NOT DETECTED
GLOBULIN UR ELPH-MCNC: 3.4 GM/DL
GLUCOSE SERPL-MCNC: 224 MG/DL (ref 65–99)
HADV DNA SPEC NAA+PROBE: NOT DETECTED
HCOV 229E RNA SPEC QL NAA+PROBE: NOT DETECTED
HCOV HKU1 RNA SPEC QL NAA+PROBE: NOT DETECTED
HCOV NL63 RNA SPEC QL NAA+PROBE: NOT DETECTED
HCOV OC43 RNA SPEC QL NAA+PROBE: NOT DETECTED
HCT VFR BLD AUTO: 39.2 % (ref 37.5–51)
HGB BLD-MCNC: 12.5 G/DL (ref 13–17.7)
HMPV RNA NPH QL NAA+NON-PROBE: NOT DETECTED
HOLD SPECIMEN: NORMAL
HPIV1 RNA ISLT QL NAA+PROBE: NOT DETECTED
HPIV2 RNA SPEC QL NAA+PROBE: NOT DETECTED
HPIV3 RNA NPH QL NAA+PROBE: NOT DETECTED
HPIV4 P GENE NPH QL NAA+PROBE: NOT DETECTED
IMM GRANULOCYTES # BLD AUTO: 0.07 10*3/MM3 (ref 0–0.05)
IMM GRANULOCYTES NFR BLD AUTO: 0.5 % (ref 0–0.5)
INR PPP: 1.5 (ref 0.91–1.09)
LYMPHOCYTES # BLD AUTO: 2.29 10*3/MM3 (ref 0.7–3.1)
LYMPHOCYTES NFR BLD AUTO: 16.2 % (ref 19.6–45.3)
M PNEUMO IGG SER IA-ACNC: NOT DETECTED
MCH RBC QN AUTO: 30.1 PG (ref 26.6–33)
MCHC RBC AUTO-ENTMCNC: 31.9 G/DL (ref 31.5–35.7)
MCV RBC AUTO: 94.5 FL (ref 79–97)
MONOCYTES # BLD AUTO: 1.04 10*3/MM3 (ref 0.1–0.9)
MONOCYTES NFR BLD AUTO: 7.3 % (ref 5–12)
NEUTROPHILS NFR BLD AUTO: 10.56 10*3/MM3 (ref 1.7–7)
NEUTROPHILS NFR BLD AUTO: 74.6 % (ref 42.7–76)
NRBC BLD AUTO-RTO: 0 /100 WBC (ref 0–0.2)
NT-PROBNP SERPL-MCNC: 9873 PG/ML (ref 0–900)
PLATELET # BLD AUTO: 228 10*3/MM3 (ref 140–450)
PMV BLD AUTO: 10.3 FL (ref 6–12)
POTASSIUM SERPL-SCNC: 4.2 MMOL/L (ref 3.5–5.2)
PROT SERPL-MCNC: 7.2 G/DL (ref 6–8.5)
PROTHROMBIN TIME: 18.9 SECONDS (ref 11.8–14.8)
RBC # BLD AUTO: 4.15 10*6/MM3 (ref 4.14–5.8)
RHINOVIRUS RNA SPEC NAA+PROBE: NOT DETECTED
RSV RNA NPH QL NAA+NON-PROBE: NOT DETECTED
SARS-COV-2 RNA RESP QL NAA+PROBE: NOT DETECTED
SODIUM SERPL-SCNC: 139 MMOL/L (ref 136–145)
TROPONIN T SERPL HS-MCNC: 603 NG/L
WBC NRBC COR # BLD AUTO: 14.15 10*3/MM3 (ref 3.4–10.8)
WHOLE BLOOD HOLD COAG: NORMAL
WHOLE BLOOD HOLD SPECIMEN: NORMAL

## 2025-01-02 PROCEDURE — 85730 THROMBOPLASTIN TIME PARTIAL: CPT | Performed by: STUDENT IN AN ORGANIZED HEALTH CARE EDUCATION/TRAINING PROGRAM

## 2025-01-02 PROCEDURE — 0202U NFCT DS 22 TRGT SARS-COV-2: CPT | Performed by: STUDENT IN AN ORGANIZED HEALTH CARE EDUCATION/TRAINING PROGRAM

## 2025-01-02 PROCEDURE — 94640 AIRWAY INHALATION TREATMENT: CPT

## 2025-01-02 PROCEDURE — 83605 ASSAY OF LACTIC ACID: CPT | Performed by: STUDENT IN AN ORGANIZED HEALTH CARE EDUCATION/TRAINING PROGRAM

## 2025-01-02 PROCEDURE — 93005 ELECTROCARDIOGRAM TRACING: CPT | Performed by: STUDENT IN AN ORGANIZED HEALTH CARE EDUCATION/TRAINING PROGRAM

## 2025-01-02 PROCEDURE — 84484 ASSAY OF TROPONIN QUANT: CPT | Performed by: STUDENT IN AN ORGANIZED HEALTH CARE EDUCATION/TRAINING PROGRAM

## 2025-01-02 PROCEDURE — 82375 ASSAY CARBOXYHB QUANT: CPT

## 2025-01-02 PROCEDURE — 25010000002 FUROSEMIDE PER 20 MG: Performed by: STUDENT IN AN ORGANIZED HEALTH CARE EDUCATION/TRAINING PROGRAM

## 2025-01-02 PROCEDURE — 82805 BLOOD GASES W/O2 SATURATION: CPT

## 2025-01-02 PROCEDURE — 99285 EMERGENCY DEPT VISIT HI MDM: CPT

## 2025-01-02 PROCEDURE — 25010000002 METHYLPREDNISOLONE PER 125 MG: Performed by: STUDENT IN AN ORGANIZED HEALTH CARE EDUCATION/TRAINING PROGRAM

## 2025-01-02 PROCEDURE — 85610 PROTHROMBIN TIME: CPT | Performed by: STUDENT IN AN ORGANIZED HEALTH CARE EDUCATION/TRAINING PROGRAM

## 2025-01-02 PROCEDURE — 71045 X-RAY EXAM CHEST 1 VIEW: CPT

## 2025-01-02 PROCEDURE — 83050 HGB METHEMOGLOBIN QUAN: CPT

## 2025-01-02 PROCEDURE — 94664 DEMO&/EVAL PT USE INHALER: CPT

## 2025-01-02 PROCEDURE — 36415 COLL VENOUS BLD VENIPUNCTURE: CPT

## 2025-01-02 PROCEDURE — 93005 ELECTROCARDIOGRAM TRACING: CPT

## 2025-01-02 PROCEDURE — 87040 BLOOD CULTURE FOR BACTERIA: CPT | Performed by: STUDENT IN AN ORGANIZED HEALTH CARE EDUCATION/TRAINING PROGRAM

## 2025-01-02 PROCEDURE — 94799 UNLISTED PULMONARY SVC/PX: CPT

## 2025-01-02 PROCEDURE — 85025 COMPLETE CBC W/AUTO DIFF WBC: CPT | Performed by: STUDENT IN AN ORGANIZED HEALTH CARE EDUCATION/TRAINING PROGRAM

## 2025-01-02 PROCEDURE — 94660 CPAP INITIATION&MGMT: CPT

## 2025-01-02 PROCEDURE — 25010000002 MAGNESIUM SULFATE 2 GM/50ML SOLUTION: Performed by: STUDENT IN AN ORGANIZED HEALTH CARE EDUCATION/TRAINING PROGRAM

## 2025-01-02 PROCEDURE — 80053 COMPREHEN METABOLIC PANEL: CPT | Performed by: STUDENT IN AN ORGANIZED HEALTH CARE EDUCATION/TRAINING PROGRAM

## 2025-01-02 PROCEDURE — 83880 ASSAY OF NATRIURETIC PEPTIDE: CPT | Performed by: STUDENT IN AN ORGANIZED HEALTH CARE EDUCATION/TRAINING PROGRAM

## 2025-01-02 PROCEDURE — 36600 WITHDRAWAL OF ARTERIAL BLOOD: CPT

## 2025-01-02 RX ORDER — MAGNESIUM SULFATE HEPTAHYDRATE 40 MG/ML
2 INJECTION, SOLUTION INTRAVENOUS ONCE
Status: COMPLETED | OUTPATIENT
Start: 2025-01-02 | End: 2025-01-03

## 2025-01-02 RX ORDER — NITROGLYCERIN 0.4 MG/1
0.4 TABLET SUBLINGUAL ONCE
Status: COMPLETED | OUTPATIENT
Start: 2025-01-02 | End: 2025-01-02

## 2025-01-02 RX ORDER — FUROSEMIDE 10 MG/ML
80 INJECTION INTRAMUSCULAR; INTRAVENOUS ONCE
Status: COMPLETED | OUTPATIENT
Start: 2025-01-02 | End: 2025-01-02

## 2025-01-02 RX ORDER — IPRATROPIUM BROMIDE AND ALBUTEROL SULFATE 2.5; .5 MG/3ML; MG/3ML
3 SOLUTION RESPIRATORY (INHALATION) ONCE
Status: COMPLETED | OUTPATIENT
Start: 2025-01-02 | End: 2025-01-02

## 2025-01-02 RX ORDER — METHYLPREDNISOLONE SODIUM SUCCINATE 125 MG/2ML
125 INJECTION, POWDER, LYOPHILIZED, FOR SOLUTION INTRAMUSCULAR; INTRAVENOUS ONCE
Status: COMPLETED | OUTPATIENT
Start: 2025-01-02 | End: 2025-01-02

## 2025-01-02 RX ADMIN — IPRATROPIUM BROMIDE AND ALBUTEROL SULFATE 3 ML: .5; 3 SOLUTION RESPIRATORY (INHALATION) at 23:07

## 2025-01-02 RX ADMIN — METHYLPREDNISOLONE SODIUM SUCCINATE 125 MG: 125 INJECTION, POWDER, FOR SOLUTION INTRAMUSCULAR; INTRAVENOUS at 22:59

## 2025-01-02 RX ADMIN — NITROGLYCERIN 0.4 MG: 0.4 TABLET SUBLINGUAL at 23:09

## 2025-01-02 RX ADMIN — MAGNESIUM SULFATE HEPTAHYDRATE 2 G: 2 INJECTION, SOLUTION INTRAVENOUS at 22:58

## 2025-01-02 RX ADMIN — FUROSEMIDE 80 MG: 10 INJECTION, SOLUTION INTRAVENOUS at 22:59

## 2025-01-02 NOTE — TELEPHONE ENCOUNTER
"Reviewed patient's remote transmission from [employer] that sent an alert for \"spontaneous High Mean Ventricular rate >100 bpm x 24 hours\"   Recording detail reveals ongoing VT at 126 bpm. Unable to determine exact Length of the episode except that the device shows his mean HR was >100 bpm for 24 hours as of 1/1/25 at 1:12 am.  VT 1 settings are set for 450 ms. Patient states that he stayed dizzy all day on 12/31/24 and into the night but that he when woke up new years day it seemed to have resolved.  He denies any other cardiac symptoms surrounding this timeframe.  He states that he has been compliant with all medications that are prescribed, however he is unable to confirm them by name. He states that his girlfriend puts them in a daily pill box for him and he is taking everything she has placed inside the box.  I have requested a quick check transmission from his device , but he is not home.  Once he arrives home it should send a transmission for review. Screenshots provided below.  Full report available for review in [employer].  Patient has an upcoming appointment with Sugey 1/21/25.                   "

## 2025-01-03 ENCOUNTER — APPOINTMENT (OUTPATIENT)
Dept: CARDIOLOGY | Facility: HOSPITAL | Age: 75
DRG: 291 | End: 2025-01-03
Payer: MEDICARE

## 2025-01-03 PROBLEM — J18.9 PNEUMONIA: Status: ACTIVE | Noted: 2025-01-03

## 2025-01-03 LAB
ANION GAP SERPL CALCULATED.3IONS-SCNC: 13 MMOL/L (ref 5–15)
ARTERIAL PATENCY WRIST A: POSITIVE
ATMOSPHERIC PRESS: 760 MMHG
AV MEAN PRESS GRAD SYS DOP V1V2: 2.5 MMHG
AV VMAX SYS DOP: 114 CM/SEC
BASE EXCESS BLDA CALC-SCNC: -4.1 MMOL/L (ref 0–2)
BASOPHILS # BLD AUTO: 0.02 10*3/MM3 (ref 0–0.2)
BASOPHILS NFR BLD AUTO: 0.2 % (ref 0–1.5)
BDY SITE: ABNORMAL
BH CV ECHO MEAS - AO MAX PG: 5.2 MMHG
BH CV ECHO MEAS - AO ROOT DIAM: 3.3 CM
BH CV ECHO MEAS - AO V2 VTI: 23.5 CM
BH CV ECHO MEAS - EDV(CUBED): 216 ML
BH CV ECHO MEAS - EDV(MOD-SP2): 205 ML
BH CV ECHO MEAS - EDV(MOD-SP4): 208.7 ML
BH CV ECHO MEAS - EF(MOD-SP2): 31.6 %
BH CV ECHO MEAS - EF(MOD-SP4): 37 %
BH CV ECHO MEAS - ESV(CUBED): 103.8 ML
BH CV ECHO MEAS - ESV(MOD-SP2): 140.2 ML
BH CV ECHO MEAS - ESV(MOD-SP4): 131.5 ML
BH CV ECHO MEAS - FS: 21.7 %
BH CV ECHO MEAS - IVS/LVPW: 1 CM
BH CV ECHO MEAS - IVSD: 1.2 CM
BH CV ECHO MEAS - LA DIMENSION: 4 CM
BH CV ECHO MEAS - LAT PEAK E' VEL: 7.9 CM/SEC
BH CV ECHO MEAS - LV DIASTOLIC VOL/BSA (35-75): 97.3 CM2
BH CV ECHO MEAS - LV MASS(C)D: 314 GRAMS
BH CV ECHO MEAS - LV SYSTOLIC VOL/BSA (12-30): 61.3 CM2
BH CV ECHO MEAS - LVIDD: 6 CM
BH CV ECHO MEAS - LVIDS: 4.7 CM
BH CV ECHO MEAS - LVOT AREA: 4.9 CM2
BH CV ECHO MEAS - LVOT DIAM: 2.5 CM
BH CV ECHO MEAS - LVPWD: 1.2 CM
BH CV ECHO MEAS - MED PEAK E' VEL: 3.9 CM/SEC
BH CV ECHO MEAS - MR MAX PG: 60.2 MMHG
BH CV ECHO MEAS - MR MAX VEL: 388 CM/SEC
BH CV ECHO MEAS - MV A MAX VEL: 76.3 CM/SEC
BH CV ECHO MEAS - MV DEC TIME: 0.2 SEC
BH CV ECHO MEAS - MV E MAX VEL: 91.3 CM/SEC
BH CV ECHO MEAS - MV E/A: 1.2
BH CV ECHO MEAS - PA V2 MAX: 80.1 CM/SEC
BH CV ECHO MEAS - SV(MOD-SP2): 64.8 ML
BH CV ECHO MEAS - SV(MOD-SP4): 77.2 ML
BH CV ECHO MEAS - SVI(MOD-BP): 32.9 ML/M2
BH CV ECHO MEAS - SVI(MOD-SP2): 30.2 ML/M2
BH CV ECHO MEAS - SVI(MOD-SP4): 36 ML/M2
BH CV ECHO MEAS - TAPSE (>1.6): 1.63 CM
BH CV ECHO MEAS - TR MAX PG: 20.1 MMHG
BH CV ECHO MEAS - TR MAX VEL: 224 CM/SEC
BH CV ECHO MEASUREMENTS AVERAGE E/E' RATIO: 15.47
BH CV XLRA - RV BASE: 3.4 CM
BH CV XLRA - TDI S': 11.4 CM/SEC
BODY TEMPERATURE: 37
BUN SERPL-MCNC: 25 MG/DL (ref 8–23)
BUN/CREAT SERPL: 16.3 (ref 7–25)
CALCIUM SPEC-SCNC: 8.5 MG/DL (ref 8.6–10.5)
CHLORIDE SERPL-SCNC: 102 MMOL/L (ref 98–107)
CO2 SERPL-SCNC: 20 MMOL/L (ref 22–29)
COHGB MFR BLD: 0.4 % (ref 0–5)
CREAT SERPL-MCNC: 1.53 MG/DL (ref 0.76–1.27)
D-LACTATE SERPL-SCNC: 1.6 MMOL/L (ref 0.5–2)
DEPRECATED RDW RBC AUTO: 47 FL (ref 37–54)
EGFRCR SERPLBLD CKD-EPI 2021: 48 ML/MIN/1.73
EOSINOPHIL # BLD AUTO: 0 10*3/MM3 (ref 0–0.4)
EOSINOPHIL NFR BLD AUTO: 0 % (ref 0.3–6.2)
EPAP: 6
ERYTHROCYTE [DISTWIDTH] IN BLOOD BY AUTOMATED COUNT: 13.7 % (ref 12.3–15.4)
GAS FLOW AIRWAY: ABNORMAL L/MIN
GEN 5 1HR TROPONIN T REFLEX: 607 NG/L
GLUCOSE SERPL-MCNC: 192 MG/DL (ref 65–99)
HCO3 BLDA-SCNC: 21.3 MMOL/L (ref 20–26)
HCT VFR BLD AUTO: 36.4 % (ref 37.5–51)
HCT VFR BLD CALC: 40.7 % (ref 38–51)
HGB BLD-MCNC: 11.7 G/DL (ref 13–17.7)
HGB BLDA-MCNC: 13.3 G/DL (ref 14–18)
IMM GRANULOCYTES # BLD AUTO: 0.05 10*3/MM3 (ref 0–0.05)
IMM GRANULOCYTES NFR BLD AUTO: 0.4 % (ref 0–0.5)
INHALED O2 CONCENTRATION: 60 %
IPAP: 14
LEFT ATRIUM VOLUME INDEX: 34 ML/M2
LV EF BIPLANE MOD: 34.2 %
LYMPHOCYTES # BLD AUTO: 0.51 10*3/MM3 (ref 0.7–3.1)
LYMPHOCYTES NFR BLD AUTO: 3.8 % (ref 19.6–45.3)
Lab: ABNORMAL
MCH RBC QN AUTO: 30.1 PG (ref 26.6–33)
MCHC RBC AUTO-ENTMCNC: 32.1 G/DL (ref 31.5–35.7)
MCV RBC AUTO: 93.6 FL (ref 79–97)
METHGB BLD QL: 0.4 % (ref 0–3)
MODALITY: ABNORMAL
MONOCYTES # BLD AUTO: 0.22 10*3/MM3 (ref 0.1–0.9)
MONOCYTES NFR BLD AUTO: 1.7 % (ref 5–12)
NEUTROPHILS NFR BLD AUTO: 12.45 10*3/MM3 (ref 1.7–7)
NEUTROPHILS NFR BLD AUTO: 93.9 % (ref 42.7–76)
NRBC BLD AUTO-RTO: 0 /100 WBC (ref 0–0.2)
OXYHGB MFR BLDV: 97.1 % (ref 94–99)
PCO2 BLDA: 39.3 MM HG (ref 35–45)
PCO2 TEMP ADJ BLD: 39.3 MM HG (ref 35–45)
PH BLDA: 7.34 PH UNITS (ref 7.35–7.45)
PH, TEMP CORRECTED: 7.34 PH UNITS (ref 7.35–7.45)
PLATELET # BLD AUTO: 161 10*3/MM3 (ref 140–450)
PMV BLD AUTO: 10.3 FL (ref 6–12)
PO2 BLDA: 108 MM HG (ref 83–108)
PO2 TEMP ADJ BLD: 108 MM HG (ref 83–108)
POTASSIUM BLDA-SCNC: 4 MMOL/L (ref 3.5–5.2)
POTASSIUM SERPL-SCNC: 4 MMOL/L (ref 3.5–5.2)
RBC # BLD AUTO: 3.89 10*6/MM3 (ref 4.14–5.8)
SAO2 % BLDCOA: 97.9 % (ref 94–99)
SET MECH RESP RATE: 8
SODIUM BLDA-SCNC: 140 MMOL/L (ref 136–145)
SODIUM SERPL-SCNC: 135 MMOL/L (ref 136–145)
TROPONIN T % DELTA: 1 %
TROPONIN T NUMERIC DELTA: 4 NG/L
VENTILATOR MODE: ABNORMAL
WBC NRBC COR # BLD AUTO: 13.25 10*3/MM3 (ref 3.4–10.8)

## 2025-01-03 PROCEDURE — 83605 ASSAY OF LACTIC ACID: CPT | Performed by: STUDENT IN AN ORGANIZED HEALTH CARE EDUCATION/TRAINING PROGRAM

## 2025-01-03 PROCEDURE — 25010000002 CEFTRIAXONE PER 250 MG: Performed by: STUDENT IN AN ORGANIZED HEALTH CARE EDUCATION/TRAINING PROGRAM

## 2025-01-03 PROCEDURE — 25010000002 FUROSEMIDE PER 20 MG: Performed by: STUDENT IN AN ORGANIZED HEALTH CARE EDUCATION/TRAINING PROGRAM

## 2025-01-03 PROCEDURE — 94799 UNLISTED PULMONARY SVC/PX: CPT

## 2025-01-03 PROCEDURE — 4B02XTZ MEASUREMENT OF CARDIAC DEFIBRILLATOR, EXTERNAL APPROACH: ICD-10-PCS | Performed by: STUDENT IN AN ORGANIZED HEALTH CARE EDUCATION/TRAINING PROGRAM

## 2025-01-03 PROCEDURE — 84484 ASSAY OF TROPONIN QUANT: CPT | Performed by: STUDENT IN AN ORGANIZED HEALTH CARE EDUCATION/TRAINING PROGRAM

## 2025-01-03 PROCEDURE — 25510000001 PERFLUTREN 6.52 MG/ML SUSPENSION: Performed by: INTERNAL MEDICINE

## 2025-01-03 PROCEDURE — 80048 BASIC METABOLIC PNL TOTAL CA: CPT

## 2025-01-03 PROCEDURE — 85025 COMPLETE CBC W/AUTO DIFF WBC: CPT

## 2025-01-03 PROCEDURE — 25010000002 HYDROCORTISONE SOD SUC (PF) 100 MG RECONSTITUTED SOLUTION

## 2025-01-03 PROCEDURE — 94664 DEMO&/EVAL PT USE INHALER: CPT

## 2025-01-03 PROCEDURE — 94761 N-INVAS EAR/PLS OXIMETRY MLT: CPT

## 2025-01-03 PROCEDURE — 25010000002 PROPOFOL 1000 MG/100ML EMULSION: Performed by: INTERNAL MEDICINE

## 2025-01-03 PROCEDURE — 93306 TTE W/DOPPLER COMPLETE: CPT

## 2025-01-03 PROCEDURE — 25010000002 CEFTRIAXONE PER 250 MG

## 2025-01-03 PROCEDURE — 5A2204Z RESTORATION OF CARDIAC RHYTHM, SINGLE: ICD-10-PCS | Performed by: STUDENT IN AN ORGANIZED HEALTH CARE EDUCATION/TRAINING PROGRAM

## 2025-01-03 PROCEDURE — 93306 TTE W/DOPPLER COMPLETE: CPT | Performed by: HOSPITALIST

## 2025-01-03 RX ORDER — AZITHROMYCIN 250 MG/1
500 TABLET, FILM COATED ORAL
Status: COMPLETED | OUTPATIENT
Start: 2025-01-03 | End: 2025-01-05

## 2025-01-03 RX ORDER — ATORVASTATIN CALCIUM 40 MG/1
40 TABLET, FILM COATED ORAL DAILY
Status: DISCONTINUED | OUTPATIENT
Start: 2025-01-03 | End: 2025-01-05 | Stop reason: HOSPADM

## 2025-01-03 RX ORDER — SPIRONOLACTONE 25 MG/1
25 TABLET ORAL DAILY
Status: DISCONTINUED | OUTPATIENT
Start: 2025-01-03 | End: 2025-01-05 | Stop reason: HOSPADM

## 2025-01-03 RX ORDER — NITROGLYCERIN 0.4 MG/1
0.4 TABLET SUBLINGUAL
Status: DISCONTINUED | OUTPATIENT
Start: 2025-01-03 | End: 2025-01-05 | Stop reason: SDUPTHER

## 2025-01-03 RX ORDER — ASPIRIN 81 MG/1
81 TABLET ORAL DAILY
Status: DISCONTINUED | OUTPATIENT
Start: 2025-01-03 | End: 2025-01-05 | Stop reason: HOSPADM

## 2025-01-03 RX ORDER — CHLORHEXIDINE GLUCONATE 500 MG/1
1 CLOTH TOPICAL EVERY 24 HOURS
Status: DISCONTINUED | OUTPATIENT
Start: 2025-01-04 | End: 2025-01-04

## 2025-01-03 RX ORDER — AMIODARONE HYDROCHLORIDE 200 MG/1
200 TABLET ORAL 2 TIMES DAILY
Status: DISCONTINUED | OUTPATIENT
Start: 2025-01-03 | End: 2025-01-05 | Stop reason: HOSPADM

## 2025-01-03 RX ORDER — ALBUTEROL SULFATE 0.83 MG/ML
2.5 SOLUTION RESPIRATORY (INHALATION) EVERY 6 HOURS PRN
Status: DISCONTINUED | OUTPATIENT
Start: 2025-01-03 | End: 2025-01-05 | Stop reason: HOSPADM

## 2025-01-03 RX ORDER — FUROSEMIDE 10 MG/ML
80 INJECTION INTRAMUSCULAR; INTRAVENOUS ONCE
Status: COMPLETED | OUTPATIENT
Start: 2025-01-03 | End: 2025-01-03

## 2025-01-03 RX ORDER — BUDESONIDE 0.5 MG/2ML
0.5 INHALANT ORAL
Status: DISCONTINUED | OUTPATIENT
Start: 2025-01-03 | End: 2025-01-05 | Stop reason: HOSPADM

## 2025-01-03 RX ORDER — SODIUM CHLORIDE 0.9 % (FLUSH) 0.9 %
10 SYRINGE (ML) INJECTION EVERY 12 HOURS SCHEDULED
Status: DISCONTINUED | OUTPATIENT
Start: 2025-01-03 | End: 2025-01-05 | Stop reason: HOSPADM

## 2025-01-03 RX ORDER — PROPOFOL 10 MG/ML
50 INJECTION, EMULSION INTRAVENOUS ONCE
Status: COMPLETED | OUTPATIENT
Start: 2025-01-03 | End: 2025-01-03

## 2025-01-03 RX ORDER — IPRATROPIUM BROMIDE AND ALBUTEROL SULFATE 2.5; .5 MG/3ML; MG/3ML
3 SOLUTION RESPIRATORY (INHALATION)
Status: DISCONTINUED | OUTPATIENT
Start: 2025-01-03 | End: 2025-01-05

## 2025-01-03 RX ORDER — TRAZODONE HYDROCHLORIDE 50 MG/1
50 TABLET, FILM COATED ORAL NIGHTLY PRN
COMMUNITY

## 2025-01-03 RX ORDER — SODIUM CHLORIDE 0.9 % (FLUSH) 0.9 %
10 SYRINGE (ML) INJECTION AS NEEDED
Status: DISCONTINUED | OUTPATIENT
Start: 2025-01-03 | End: 2025-01-05 | Stop reason: HOSPADM

## 2025-01-03 RX ORDER — AMOXICILLIN 250 MG
2 CAPSULE ORAL 2 TIMES DAILY
Status: DISCONTINUED | OUTPATIENT
Start: 2025-01-03 | End: 2025-01-05 | Stop reason: HOSPADM

## 2025-01-03 RX ORDER — HYDROCORTISONE SODIUM SUCCINATE 100 MG/2ML
50 INJECTION INTRAMUSCULAR; INTRAVENOUS EVERY 6 HOURS
Status: DISCONTINUED | OUTPATIENT
Start: 2025-01-03 | End: 2025-01-03

## 2025-01-03 RX ORDER — HYDROCORTISONE SODIUM SUCCINATE 100 MG/2ML
50 INJECTION INTRAMUSCULAR; INTRAVENOUS EVERY 6 HOURS
Status: DISCONTINUED | OUTPATIENT
Start: 2025-01-03 | End: 2025-01-05

## 2025-01-03 RX ORDER — CHLORHEXIDINE GLUCONATE 500 MG/1
1 CLOTH TOPICAL ONCE
Status: COMPLETED | OUTPATIENT
Start: 2025-01-03 | End: 2025-01-03

## 2025-01-03 RX ORDER — SODIUM CHLORIDE 9 MG/ML
40 INJECTION, SOLUTION INTRAVENOUS AS NEEDED
Status: DISCONTINUED | OUTPATIENT
Start: 2025-01-03 | End: 2025-01-05 | Stop reason: HOSPADM

## 2025-01-03 RX ORDER — BISACODYL 10 MG
10 SUPPOSITORY, RECTAL RECTAL DAILY PRN
Status: DISCONTINUED | OUTPATIENT
Start: 2025-01-03 | End: 2025-01-05 | Stop reason: HOSPADM

## 2025-01-03 RX ORDER — CARVEDILOL 6.25 MG/1
6.25 TABLET ORAL EVERY 12 HOURS SCHEDULED
Status: DISCONTINUED | OUTPATIENT
Start: 2025-01-03 | End: 2025-01-05 | Stop reason: HOSPADM

## 2025-01-03 RX ORDER — BISACODYL 5 MG/1
5 TABLET, DELAYED RELEASE ORAL DAILY PRN
Status: DISCONTINUED | OUTPATIENT
Start: 2025-01-03 | End: 2025-01-05 | Stop reason: HOSPADM

## 2025-01-03 RX ORDER — POLYETHYLENE GLYCOL 3350 17 G/17G
17 POWDER, FOR SOLUTION ORAL DAILY PRN
Status: DISCONTINUED | OUTPATIENT
Start: 2025-01-03 | End: 2025-01-05 | Stop reason: HOSPADM

## 2025-01-03 RX ADMIN — HYDROCORTISONE SODIUM SUCCINATE 50 MG: 100 INJECTION, POWDER, FOR SOLUTION INTRAMUSCULAR; INTRAVENOUS at 06:31

## 2025-01-03 RX ADMIN — IPRATROPIUM BROMIDE AND ALBUTEROL SULFATE 3 ML: 2.5; .5 SOLUTION RESPIRATORY (INHALATION) at 15:20

## 2025-01-03 RX ADMIN — APIXABAN 5 MG: 5 TABLET, FILM COATED ORAL at 20:16

## 2025-01-03 RX ADMIN — FUROSEMIDE 80 MG: 10 INJECTION, SOLUTION INTRAVENOUS at 13:43

## 2025-01-03 RX ADMIN — HYDROCORTISONE SODIUM SUCCINATE 50 MG: 100 INJECTION, POWDER, FOR SOLUTION INTRAMUSCULAR; INTRAVENOUS at 17:42

## 2025-01-03 RX ADMIN — SPIRONOLACTONE 25 MG: 25 TABLET ORAL at 09:21

## 2025-01-03 RX ADMIN — IPRATROPIUM BROMIDE AND ALBUTEROL SULFATE 3 ML: 2.5; .5 SOLUTION RESPIRATORY (INHALATION) at 08:45

## 2025-01-03 RX ADMIN — HYDROCORTISONE SODIUM SUCCINATE 50 MG: 100 INJECTION, POWDER, FOR SOLUTION INTRAMUSCULAR; INTRAVENOUS at 23:36

## 2025-01-03 RX ADMIN — SODIUM CHLORIDE 1000 MG: 900 INJECTION INTRAVENOUS at 01:09

## 2025-01-03 RX ADMIN — Medication 10 ML: at 09:26

## 2025-01-03 RX ADMIN — Medication 1 APPLICATION: at 20:16

## 2025-01-03 RX ADMIN — Medication 1 APPLICATION: at 06:31

## 2025-01-03 RX ADMIN — CHLORHEXIDINE GLUCONATE 1 APPLICATION: 500 CLOTH TOPICAL at 05:04

## 2025-01-03 RX ADMIN — BUDESONIDE 0.5 MG: 0.5 INHALANT RESPIRATORY (INHALATION) at 08:51

## 2025-01-03 RX ADMIN — PROPOFOL INJECTABLE EMULSION 50 MG: 10 INJECTION, EMULSION INTRAVENOUS at 08:16

## 2025-01-03 RX ADMIN — AMIODARONE HYDROCHLORIDE 200 MG: 200 TABLET ORAL at 20:16

## 2025-01-03 RX ADMIN — DOCUSATE SODIUM 50 MG AND SENNOSIDES 8.6 MG 2 TABLET: 8.6; 5 TABLET, FILM COATED ORAL at 09:21

## 2025-01-03 RX ADMIN — PERFLUTREN 6.52 MG: 6.52 INJECTION, SUSPENSION INTRAVENOUS at 13:48

## 2025-01-03 RX ADMIN — APIXABAN 5 MG: 5 TABLET, FILM COATED ORAL at 09:21

## 2025-01-03 RX ADMIN — AMIODARONE HYDROCHLORIDE 200 MG: 200 TABLET ORAL at 09:21

## 2025-01-03 RX ADMIN — CARVEDILOL 6.25 MG: 6.25 TABLET, FILM COATED ORAL at 20:16

## 2025-01-03 RX ADMIN — BUDESONIDE 0.5 MG: 0.5 INHALANT RESPIRATORY (INHALATION) at 19:14

## 2025-01-03 RX ADMIN — ASPIRIN 81 MG: 81 TABLET, COATED ORAL at 09:21

## 2025-01-03 RX ADMIN — CARVEDILOL 6.25 MG: 6.25 TABLET, FILM COATED ORAL at 09:21

## 2025-01-03 RX ADMIN — ATORVASTATIN CALCIUM 40 MG: 40 TABLET, FILM COATED ORAL at 09:21

## 2025-01-03 RX ADMIN — IPRATROPIUM BROMIDE AND ALBUTEROL SULFATE 3 ML: 2.5; .5 SOLUTION RESPIRATORY (INHALATION) at 19:14

## 2025-01-03 RX ADMIN — AZITHROMYCIN 500 MG: 250 TABLET, FILM COATED ORAL at 09:21

## 2025-01-03 RX ADMIN — Medication 10 ML: at 20:17

## 2025-01-03 RX ADMIN — SACUBITRIL AND VALSARTAN 1 TABLET: 24; 26 TABLET, FILM COATED ORAL at 20:16

## 2025-01-03 RX ADMIN — SACUBITRIL AND VALSARTAN 1 TABLET: 24; 26 TABLET, FILM COATED ORAL at 09:21

## 2025-01-03 RX ADMIN — HYDROCORTISONE SODIUM SUCCINATE 50 MG: 100 INJECTION, POWDER, FOR SOLUTION INTRAMUSCULAR; INTRAVENOUS at 11:22

## 2025-01-03 RX ADMIN — SODIUM CHLORIDE 1000 MG: 900 INJECTION INTRAVENOUS at 17:42

## 2025-01-03 NOTE — ED NOTES
Nursing report ED to floor  Vj Garcia  74 y.o.  male    HPI:   Chief Complaint   Patient presents with    Shortness of Breath       Admitting doctor:   Jovanni Schofield MD    Consulting provider(s):  Consults       No orders found for last 30 day(s).             Admitting diagnosis:   The primary encounter diagnosis was Pneumonia of right lung due to infectious organism, unspecified part of lung. Diagnoses of Congestive heart failure, unspecified HF chronicity, unspecified heart failure type and Elevated troponin were also pertinent to this visit.    Code status:   Current Code Status       Date Active Code Status Order ID Comments User Context       Prior            Allergies:   Patient has no known allergies.    Intake and Output  No intake or output data in the 24 hours ending 01/03/25 0220    Weight:       01/02/25 2256   Weight: 79.8 kg (176 lb)       Most recent vitals:   Vitals:    01/03/25 0031 01/03/25 0202 01/03/25 0204 01/03/25 0218   BP: 121/86 114/78     BP Location:       Patient Position:       Pulse: 116 117  116   Resp:       Temp:       TempSrc:       SpO2: 93%  93% 93%   Weight:       Height:         Oxygen Therapy: .    Active LDAs/IV Access:   Lines, Drains & Airways       Active LDAs       Name Placement date Placement time Site Days    Peripheral IV 01/02/25 2258 Anterior;Right Forearm 01/02/25 2258  Forearm  less than 1    Peripheral IV Left Antecubital --  --  Antecubital  --                    Labs (abnormal labs have a star):   Labs Reviewed   COMPREHENSIVE METABOLIC PANEL - Abnormal; Notable for the following components:       Result Value    Glucose 224 (*)     Creatinine 1.61 (*)     CO2 21.0 (*)     Calcium 8.5 (*)     eGFR 45.2 (*)     All other components within normal limits    Narrative:     GFR Categories in Chronic Kidney Disease (CKD)      GFR Category          GFR (mL/min/1.73)    Interpretation  G1                     90 or greater         Normal or high (1)  G2                       60-89                Mild decrease (1)  G3a                   45-59                Mild to moderate decrease  G3b                   30-44                Moderate to severe decrease  G4                    15-29                Severe decrease  G5                    14 or less           Kidney failure          (1)In the absence of evidence of kidney disease, neither GFR category G1 or G2 fulfill the criteria for CKD.    eGFR calculation 2021 CKD-EPI creatinine equation, which does not include race as a factor   BNP (IN-HOUSE) - Abnormal; Notable for the following components:    proBNP 9,873.0 (*)     All other components within normal limits    Narrative:     This assay is used as an aid in the diagnosis of individuals suspected of having heart failure. It can be used as an aid in the diagnosis of acute decompensated heart failure (ADHF) in patients presenting with signs and symptoms of ADHF to the emergency department (ED). In addition, NT-proBNP of <300 pg/mL indicates ADHF is not likely.    Age Range Result Interpretation  NT-proBNP Concentration (pg/mL:      <50             Positive            >450                   Gray                 300-450                    Negative             <300    50-75           Positive            >900                  Gray                300-900                  Negative            <300      >75             Positive            >1800                  Gray                300-1800                  Negative            <300   TROPONIN - Abnormal; Notable for the following components:    HS Troponin T 603 (*)     All other components within normal limits    Narrative:     High Sensitive Troponin T Reference Range:  <14.0 ng/L- Negative Female for AMI  <22.0 ng/L- Negative Male for AMI  >=14 - Abnormal Female indicating possible myocardial injury.  >=22 - Abnormal Male indicating possible myocardial injury.   Clinicians would have to utilize clinical acumen, EKG, Troponin, and  serial changes to determine if it is an Acute Myocardial Infarction or myocardial injury due to an underlying chronic condition.        CBC WITH AUTO DIFFERENTIAL - Abnormal; Notable for the following components:    WBC 14.15 (*)     Hemoglobin 12.5 (*)     Lymphocyte % 16.2 (*)     Neutrophils, Absolute 10.56 (*)     Monocytes, Absolute 1.04 (*)     Immature Grans, Absolute 0.07 (*)     All other components within normal limits   PROTIME-INR - Abnormal; Notable for the following components:    Protime 18.9 (*)     INR 1.50 (*)     All other components within normal limits   LACTIC ACID, PLASMA - Abnormal; Notable for the following components:    Lactate 3.3 (*)     All other components within normal limits   BLOOD GAS, ARTERIAL W/CO-OXIMETRY - Abnormal; Notable for the following components:    pH, Arterial 7.342 (*)     Base Excess, Arterial -4.1 (*)     Hemoglobin, Blood Gas 13.3 (*)     pH, Temp Corrected 7.342 (*)     All other components within normal limits   HIGH SENSITIVITIY TROPONIN T 1HR - Abnormal; Notable for the following components:    HS Troponin T 607 (*)     All other components within normal limits    Narrative:     High Sensitive Troponin T Reference Range:  <14.0 ng/L- Negative Female for AMI  <22.0 ng/L- Negative Male for AMI  >=14 - Abnormal Female indicating possible myocardial injury.  >=22 - Abnormal Male indicating possible myocardial injury.   Clinicians would have to utilize clinical acumen, EKG, Troponin, and serial changes to determine if it is an Acute Myocardial Infarction or myocardial injury due to an underlying chronic condition.        RESPIRATORY PANEL PCR W/ COVID-19 (SARS-COV-2), NP SWAB IN [facility], 2 HR TAT - Normal    Narrative:     In the setting of a positive respiratory panel with a viral infection PLUS a negative procalcitonin without other underlying concern for bacterial infection, consider observing off antibiotics or discontinuation of antibiotics and continue  supportive care. If the respiratory panel is positive for atypical bacterial infection (Bordetella pertussis, Chlamydophila pneumoniae, or Mycoplasma pneumoniae), consider antibiotic de-escalation to target atypical bacterial infection.   APTT - Normal   BLOOD CULTURE   RAINBOW DRAW    Narrative:     The following orders were created for panel order [place] Draw.  Procedure                               Abnormality         Status                     ---------                               -----------         ------                     Green Top (Gel)[[id number]]                                  Final result               Lavender Top[[id number]]                                     Final result               Red Top[[id number]]                                          Final result               Antunez Top[[id number]]                                         Final result               Light Blue Top[[id number]]                                   Final result                 Please view results for these tests on the individual orders.   BLOOD GAS, ARTERIAL   LACTIC ACID, REFLEX   GREEN TOP   LAVENDER TOP   RED TOP   GRAY TOP   LIGHT BLUE TOP   CBC AND DIFFERENTIAL    Narrative:     The following orders were created for panel order CBC & Differential.  Procedure                               Abnormality         Status                     ---------                               -----------         ------                     CBC Auto Differential[[id number]]        Abnormal            Final result                 Please view results for these tests on the individual orders.       Meds given in ED:   Medications   methylPREDNISolone sodium succinate (SOLU-Medrol) injection 125 mg (125 mg Intravenous Given 1/2/25 2259)   magnesium sulfate 2g/50 mL (PREMIX) infusion (0 g Intravenous Stopped 1/3/25 0008)   furosemide (LASIX) injection 80 mg (80 mg Intravenous Given 1/2/25 2259)   ipratropium-albuterol (DUO-NEB) nebulizer solution 3 mL  (3 mL Nebulization Given 1/2/25 2307)   nitroglycerin (NITROSTAT) SL tablet 0.4 mg (0.4 mg Sublingual Given 1/2/25 2309)   cefTRIAXone (ROCEPHIN) 1,000 mg in sodium chloride 0.9 % 100 mL MBP (0 mg Intravenous Stopped 1/3/25 0216)           NIH Stroke Scale:       Isolation/Infection(s):  No active isolations   No active infections     COVID Testing  Collected .  Resulted .    Nursing report ED to floor:  Mental status: . axox4  Ambulatory status: . Therapeutic bedrest    Precautions: .    ED nurse phone extentsion- ..

## 2025-01-03 NOTE — PLAN OF CARE
Problem: Pneumonia  Goal: Fluid Balance  Outcome: Progressing  Goal: Absence of Infection Signs and Symptoms  Outcome: Progressing  Goal: Effective Oxygenation and Ventilation  Outcome: Progressing  Intervention: Promote Airway Secretion Clearance  Description: Assess the effectiveness of pulmonary hygiene and ability to perform airway clearance techniques.  Promote early mobility or ambulation; match activity to ability and tolerance.  Encourage deep breathing and lung expansion therapy to prevent atelectasis; adjust treatment to patient's response.  Anticipate the need to splint chest or abdominal wall with cough to minimize discomfort; assist if needed.  Initiate cough-enhancement and airway-clearance techniques with instruction.  Consider pharmacologic therapy, such as beta-2 agonist, mucolytic, corticosteroid, antimicrobial, that may improve inflammation, mucus clearance, cough response and air flow.  Recent Flowsheet Documentation  Taken 1/3/2025 0900 by Seema Winters, RN  Activity Management: bedrest  Taken 1/3/2025 0800 by Seema Winters, RN  Activity Management: bedrest  Intervention: Optimize Oxygenation and Ventilation  Description: Assess and monitor airway, breathing and circulation for effective oxygenation and ventilation; consider oxygenation and ventilation parameters and goal.  Maintain head of bed elevation with regular position changes to minimize ventilation-perfusion mismatch and breathlessness; consider prone positioning to maximize alveolar recruitment.  Provide oxygen therapy judiciously to avoid hyperoxemia; adjust to achieve oxygenation goal.  Monitor fluid balance closely to minimize the risk of fluid overload.  Consider positive pressure ventilation to enhance oxygenation and ventilation, as well as reduce work of breathing.  Recent Flowsheet Documentation  Taken 1/3/2025 0900 by Seema Winters, RN  Head of Bed (HOB) Positioning: HOB elevated  Taken 1/3/2025 0800 by Singh  Seema ABDI, RN  Head of Bed (HOB) Positioning: HOB elevated   Goal Outcome Evaluation:

## 2025-01-03 NOTE — CONSULTS
"EP CONSULT NOTE    Subjective        History of Present Illness    EP Problems:  1.  Ventricular tachycardia  2.   Atrial flutter  3.  Left atrial appendage exclusion with atrial clip  4.  Presence of a cardiac ICD ([employer]ronik)     Cardiology Problems:  1.  LV aneurysm status postresection  2.  Mitral valve repair  3.  CAD status post CABG  4.  Hypertension  5.  Chronic systolic heart failure  6.  Hyperlipidemia     Medical Problems:  1.  COPD  2.  BPH       Vj Garcia is a 74 y.o. male with problem list as above for whom EP is consulted regarding sustained ventricular tachycardia.  He has been noted to have sustained VT on his device interrogation.  He came to the hospital after several days of feeling worse.  In the ER, he was noted to be in wide-complex tachycardia.  Given these findings, EP was consulted for further evaluation.    Objective   Vital Signs:  BP (!) 89/71   Pulse 69   Temp 98.2 °F (36.8 °C) (Oral)   Resp 18   Ht 177.8 cm (70\")   Wt 96.7 kg (213 lb 3 oz)   SpO2 90%   BMI 30.59 kg/m²   Estimated body mass index is 30.59 kg/m² as calculated from the following:    Height as of this encounter: 177.8 cm (70\").    Weight as of this encounter: 96.7 kg (213 lb 3 oz).      Physical Exam  Vitals reviewed.   Constitutional:       Appearance: Normal appearance.   Cardiovascular:      Rate and Rhythm: Regular rhythm.      Pulses: Normal pulses.      Heart sounds: Murmur heard.      Comments: Tachycardic  Pulmonary:      Effort: Pulmonary effort is normal.      Comments: Bilateral crackles present  Musculoskeletal:         General: No swelling.   Neurological:      Mental Status: He is alert and oriented to person, place, and time.   Psychiatric:         Mood and Affect: Mood normal.         Judgment: Judgment normal.          Result Review :  The following data was reviewed by: Jerad Contreras MD on 01/02/2025:  CMP          9/3/2024    13:48 1/2/2025    22:57 1/2/2025    23:00 1/3/2025    06:09   CMP "   Glucose 94   224  192    BUN 14   23  25    Creatinine 1.04   1.61  1.53    EGFR 76.3   45.2  48.0    Sodium 137  140  139  135    Potassium 4.5   4.2  4.0    Chloride 102   105  102    Calcium 9.0   8.5  8.5    Total Protein   7.2     Albumin   3.8     Globulin   3.4     Total Bilirubin   0.9     Alkaline Phosphatase   89     AST (SGOT)   40     ALT (SGPT)   26     Albumin/Globulin Ratio   1.1     BUN/Creatinine Ratio 13.5   14.3  16.3    Anion Gap 9.0   13.0  13.0      CBC          9/3/2024    13:48 1/2/2025    23:00 1/3/2025    06:09   CBC   WBC 6.19  14.15  13.25    RBC 4.31  4.15  3.89    Hemoglobin 12.5  12.5  11.7    Hematocrit 39.8  39.2  36.4    MCV 92.3  94.5  93.6    MCH 29.0  30.1  30.1    MCHC 31.4  31.9  32.1    RDW 15.1  14.0  13.7    Platelets 202  228  161      TSH          5/21/2024    18:04   TSH   TSH 0.786        Chest x-ray from yesterday directly visualized and independently reviewed: Bilateral pulmonary infiltrates are present suggestive of pulmonary edema.  ICD is in place.    [id number] SCORE   No data recorded           Assessment and Plan   Problems:  Sustained ventricular tachycardia  Pulmonary edema  Acute hypoxic respiratory failure  Acute on chronic systolic heart failure  [place]    Vj Garcia is a 74 y.o. male with problem list as above for whom EP is consulted regarding sustained VT, pulmonary edema, acute hypoxic respiratory failure, acute on chronic systolic heart failure.  He has evidence of sustained VT on his device.  I suspect that this is causing his pulmonary edema.  He likely has concomitant acute on chronic systolic heart failure exacerbation.  Will plan for urgent cardioversion this morning for his ventricular tachycardia.  He is already on amiodarone at a reasonable dose.  May be able to add mexiletine for short-term rhythm control, however long-term will need to consider ablation given the recurrent nature and associated evidence of endorgan  dysfunction.    Plan:  -ATP attempted unsuccessfully; cardioversion this morning for urgent treatment of VT  -Device reprogrammed for monitoring only between heart rates of 100 to 150 bpm; VT treatment above 150 bpm  -Repeat IV furosemide 80 mg today  -Continue supportive oxygen  -Repeat echocardiogram  -Consider addition of mexiletine for short-term rhythm control  -Long-term rhythm control with ablation  -Continue amiodarone 200 mg twice daily                   Part of this note may be an electronic transcription/translation of spoken language to printed text using the Dragon Dictation System.      Mr. Vj Garcia is critically ill from ventricular tachycardia, acute on chronic systolic heart failure, acute hypoxic respiratory failure with pulmonary edema.  This represents an acute condition with probability of imminent or life-threatening deterioration of the patient's condition without further treatment.  40 minutes of critical care time was spent by me on today's date of service in the diagnosis and treatment of ventricular tachycardia and acute on chronic systolic heart failure including chart review, ECG independent interpretation, chest x-ray independent interpretation, discussion with patient and family, constructing a treatment plan.  This time is separate from and does not include time spent performing the patient's cardioversion today.

## 2025-01-03 NOTE — ED PROVIDER NOTES
Subjective   History of Present Illness  This is a 74 y.o M with hx of heart failure, CABG, defibrillator in place, hx of V tach, here in the emergency room via EMS for evaluation of shortness, requiring immediate attention. Denies chest pain, palpitations, nausea, vomiting or diaphoresis        Review of Systems   All other systems reviewed and are negative.      Past Medical History:   Diagnosis Date    COPD (chronic obstructive pulmonary disease)     Elevated cholesterol     Enlarged prostate     History of heart attack     Hypertension     Urination frequency     Weak urine stream        No Known Allergies    Past Surgical History:   Procedure Laterality Date    ATRIAL APPENDAGE EXCLUSION LEFT WITH TRANSESOPHAGEAL ECHOCARDIOGRAM N/A 7/23/2024    Procedure: ATRIAL APPENDAGE EXCLUSION LEFT WITH TRANSESOPHAGEAL ECHOCARDIOGRAM;  Surgeon: Kobe Macile MD;  Location:  [facility] OR;  Service: Cardiothoracic;  Laterality: N/A;    BACK SURGERY      x3    CARDIAC CATHETERIZATION N/A 07/12/2024    Procedure: Left Heart Cath;  Surgeon: Brennan Khoury DO;  Location:  [facility] CATH INVASIVE LOCATION;  Service: Cardiovascular;  Laterality: N/A;    CARDIAC ELECTROPHYSIOLOGY PROCEDURE N/A 8/22/2024    Procedure: EP/CRM Study;  Surgeon: Jerad Contreras MD;  Location:  [facility] CATH INVASIVE LOCATION;  Service: Cardiovascular;  Laterality: N/A;    CARDIAC ELECTROPHYSIOLOGY PROCEDURE N/A 9/3/2024    Procedure: ICD SC new;  Surgeon: Jerad Contreras MD;  Location:  [facility] CATH INVASIVE LOCATION;  Service: Cardiovascular;  Laterality: N/A;    CORONARY ARTERY BYPASS GRAFT N/A 7/23/2024    Procedure: CORONARY ARTERY BYPASS GRAFTING X2, LEFT INTERNAL MAMMARY ARTERY GRAFT, RIGHT LEG OPEN VEIN HARVEST, MITRAL VALVE REAPIR, LEFT VENTRICULAR ANEURYSM REPAIR,  ATRIAL APPENDAGE EXCLUSION LEFT 45 ATRICLIP WITH TRANSESOPHAGEAL ECHOCARDIOGRAM;  Surgeon: Kobe Maciel MD;  Location:  [facility] OR;  Service: Cardiothoracic;  Laterality: N/A;     EXCISION      fatty patch on back    HERNIA REPAIR      MITRAL VALVE REPAIR/REPLACEMENT N/A 2024    Procedure: POSSIBLE MITRAL VALVE REPAIR/REPLACEMENT;  Surgeon: Kobe Maciel MD;  Location: [facility];  Service: Cardiothoracic;  Laterality: N/A;       Family History   Problem Relation Age of Onset    Heart disease Mother        Social History     Socioeconomic History    Marital status: Single   Tobacco Use    Smoking status: Former     Types: Cigarettes     Start date: 7/15/2024     Quit date: 1972     Years since quittin.0    Smokeless tobacco: Never   Vaping Use    Vaping status: Never Used   Substance and Sexual Activity    Alcohol use: Not Currently    Drug use: Not Currently    Sexual activity: Yes           Objective   Physical Exam  Constitutional:       General: He is in acute distress.      Appearance: He is not ill-appearing.   HENT:      Head: Normocephalic.   Eyes:      Pupils: Pupils are equal, round, and reactive to light.   Cardiovascular:      Rate and Rhythm: Regular rhythm. Tachycardia present.   Pulmonary:      Effort: Tachypnea, accessory muscle usage and respiratory distress present.      Comments: Bilateral rales and rhonci  Abdominal:      General: Bowel sounds are normal.      Palpations: Abdomen is soft. There is no splenomegaly.      Tenderness: There is no abdominal tenderness. There is no rebound.   Musculoskeletal:         General: Normal range of motion.      Cervical back: Normal range of motion and neck supple.   Skin:     Capillary Refill: Capillary refill takes less than 2 seconds.   Neurological:      General: No focal deficit present.      Mental Status: He is alert and oriented to person, place, and time.      Motor: No weakness.         Procedures           ED Course              [id number] Score: 4                                          Medical Decision Making  This is a 74 y.o M hx of cabg, defibrillator, heart failure in the emergency room for SOB. PT  requiring immediate attention for tachypnea and respiratory distress on arrival. Placed on bipap. Bilateral rales on exam, as well as wheezing. Solumedrol, mag, as well as duo neb order. Lasix, as well as nitro. Concerns for flash pulmonary edema vs bronchitis vs pnuemonia. Will give ceftriaxone 1g. Pending lactic acid. Will hold fluids due to concerns of flash pulmonary edema. No clinical concerns for PE at this time. Pending bnp, xr chest, troponin, as well as cbc, chemistry. PT in agreement with plan. Pending ABG, he is tolerating bipap well with much improvement, speakin in full sentence.     Initial EKG discussed with Dr. Hooper for concerns of stemi. Dr. Hooper reviewed EKG, as well as previous EKG with similar patterns. Recommendations to discontinue stemi, and treat patient medically. PT has had wide QRS tachycardia.     Troponin elevated x 2. 600 and 604. Remains negative     Case discussed with Dr. Hooper. He recommends ICU admit. Ceftriaxone ordered for concerns of pnuemonia, will not give further heparinization for ACSas pt took eliquis dose last night as per Dr. Hooper. . PE less likely. There is an elevated WBC, with R sided infiltrates making pneumonia more likely. PT is compliant with amiodarone , cardiology does not recommend IV medication at this time. We discussed family concerns for Defibrillation company that defibrillator should have shocked him yesterday. Family aware of plan. ICU arnoldo accepted PT under Dr. Burton. He is stable on high flow nasal cannula, speaking in full sentence, with resolved tachypnea.     Problems Addressed:  Congestive heart failure, unspecified HF chronicity, unspecified heart failure type: complicated acute illness or injury  Elevated troponin: complicated acute illness or injury  Pneumonia of right lung due to infectious organism, unspecified part of lung: complicated acute illness or injury    Amount and/or Complexity of Data Reviewed  Labs:  ordered.  Radiology: ordered.  ECG/medicine tests: ordered.    Risk  Prescription drug management.  Decision regarding hospitalization.        Final diagnoses:   Pneumonia of right lung due to infectious organism, unspecified part of lung   Congestive heart failure, unspecified HF chronicity, unspecified heart failure type   Elevated troponin       ED Disposition  ED Disposition       ED Disposition   Decision to Admit    Condition   --    Comment   Level of Care: Critical Care [6]   Diagnosis: Pneumonia [[id number]]   Admitting Physician: MICHELLE HARRIS [[id number]]   Attending Physician: MICHELLE HARRIS [[id number]]   Certification: I Certify That Inpatient Hospital Services Are Medically Necessary For Greater Than 2 Midnights                 No follow-up provider specified.       Medication List        ASK your doctor about these medications      apixaban 5 MG tablet tablet  Commonly known as: ELIQUIS  Ask about: Which instructions should I use?     traZODone 50 MG tablet  Commonly known as: DESYREL  Ask about: Which instructions should I use?                 Angelito Kilgore MD  01/03/25 [id number]

## 2025-01-03 NOTE — PLAN OF CARE
Problem: Adult Inpatient Plan of Care  Goal: Plan of Care Review  Outcome: Progressing  Goal: Patient-Specific Goal (Individualized)  Outcome: Progressing  Goal: Absence of Hospital-Acquired Illness or Injury  Outcome: Progressing  Intervention: Identify and Manage Fall Risk  Recent Flowsheet Documentation  Taken 1/3/2025 0500 by Linsey Esparza RN  Safety Promotion/Fall Prevention: safety round/check completed  Taken 1/3/2025 0400 by Linsey Esparza RN  Safety Promotion/Fall Prevention: safety round/check completed  Taken 1/3/2025 0300 by Linsey Esparza RN  Safety Promotion/Fall Prevention: safety round/check completed  Intervention: Prevent and Manage VTE (Venous Thromboembolism) Risk  Recent Flowsheet Documentation  Taken 1/3/2025 0300 by Linsey Esparza RN  VTE Prevention/Management:   bilateral   SCDs (sequential compression devices) on  Goal: Optimal Comfort and Wellbeing  Outcome: Progressing  Goal: Readiness for Transition of Care  Outcome: Progressing  Intervention: Mutually Develop Transition Plan  Recent Flowsheet Documentation  Taken 1/3/2025 0350 by Linsey Esparza RN  Transportation Anticipated: family or friend will provide  Patient/Family Anticipated Services at Transition: none  Patient/Family Anticipates Transition to: home  Taken 1/3/2025 0349 by Linsey Esparza RN  Equipment Currently Used at Home: none     Problem: Comorbidity Management  Goal: Maintenance of COPD Symptom Control  Outcome: Progressing  Goal: Maintenance of Heart Failure Symptom Control  Outcome: Progressing   Goal Outcome Evaluation:

## 2025-01-03 NOTE — CASE MANAGEMENT/SOCIAL WORK
Discharge Planning Assessment  [facility]     Patient Name: Vj Garcia  MRN: [id number]  Today's Date: 1/3/2025    Admit Date: 1/2/2025        Discharge Needs Assessment       Row Name 01/03/25 1050       Living Environment    People in Home alone;significant other    Name(s) of People in Home Darling    Current Living Arrangements home    Primary Care Provided by self    Provides Primary Care For no one    Family Caregiver if Needed spouse    Family Caregiver Names Darling    Able to Return to Prior Arrangements yes       Resource/Environmental Concerns    Resource/Environmental Concerns none       Transition Planning    Patient/Family Anticipates Transition to home with family    Transportation Anticipated family or friend will provide       Discharge Needs Assessment    Readmission Within the Last 30 Days no previous admission in last 30 days    Equipment Currently Used at Home walker, rolling;wheelchair;crutches    Concerns to be Addressed no discharge needs identified    Equipment Needed After Discharge none    Discharge Coordination/Progress spoke to patient who lives alone parttime and with significant other parttime; patient works so is independent at home prior to illness; has RX coverage and PCP; will follow for DC needs                   Discharge Plan    No documentation.                 Continued Care and Services - Admitted Since 1/2/2025    No active coordination exists for this encounter.          Demographic Summary    No documentation.                  Functional Status    No documentation.                  Psychosocial    No documentation.                  Abuse/Neglect    No documentation.                  Legal    No documentation.                  Substance Abuse    No documentation.                  Patient Forms    No documentation.                     Tosha Akbar RN

## 2025-01-03 NOTE — PROCEDURES
Indication: Symptomatic ventricular tachycardia    Procedure Performed: Cardioversion    Description of procedure:    The risks benefits, alternatives, and anticipated results of sedation and direct current cardioversion were explained to the patient. The patient was in a fasting state. Adequate airway support and hemodynamic monitoring was ensured. The patient was administered IV Propofol for sedation by the ICU provider.  Interventional device interrogation revealed evidence of ongoing ventricular tachycardia.  Once adequate sedation was ensured a 200 J biphasic shock was administered with the pads in the Anterior-Posterior configuration. This successfully converted the patient to normal sinus rhythm.   The patient was allowed to gradually recover from sedation. There were no apparent early complications of the procedure.    Pre Shock Rhythm: Ventricular tachycardia  Sedation: IV sedation by ICU provider  Energy: 200 joules    Conclusions:  1.  Ventricular tachycardia upon presentation.  2. Successful DC cardioversion with resumption of sinus rhythm.    Complications: None  EBL: None  Specimens: none

## 2025-01-04 ENCOUNTER — APPOINTMENT (OUTPATIENT)
Dept: GENERAL RADIOLOGY | Facility: HOSPITAL | Age: 75
DRG: 291 | End: 2025-01-04
Payer: MEDICARE

## 2025-01-04 ENCOUNTER — PREP FOR SURGERY (OUTPATIENT)
Dept: OTHER | Facility: HOSPITAL | Age: 75
End: 2025-01-04
Payer: MEDICARE

## 2025-01-04 PROBLEM — J44.1 COPD WITH ACUTE EXACERBATION: Status: ACTIVE | Noted: 2025-01-04

## 2025-01-04 PROBLEM — J96.01 ACUTE RESPIRATORY FAILURE WITH HYPOXIA: Status: ACTIVE | Noted: 2025-01-04

## 2025-01-04 LAB
ALBUMIN SERPL-MCNC: 3.4 G/DL (ref 3.5–5.2)
ALBUMIN/GLOB SERPL: 1.1 G/DL
ALP SERPL-CCNC: 69 U/L (ref 39–117)
ALT SERPL W P-5'-P-CCNC: 19 U/L (ref 1–41)
ANION GAP SERPL CALCULATED.3IONS-SCNC: 13 MMOL/L (ref 5–15)
AST SERPL-CCNC: 27 U/L (ref 1–40)
BASOPHILS # BLD AUTO: 0.02 10*3/MM3 (ref 0–0.2)
BASOPHILS NFR BLD AUTO: 0.1 % (ref 0–1.5)
BILIRUB SERPL-MCNC: 0.7 MG/DL (ref 0–1.2)
BUN SERPL-MCNC: 29 MG/DL (ref 8–23)
BUN/CREAT SERPL: 17.6 (ref 7–25)
CALCIUM SPEC-SCNC: 8.5 MG/DL (ref 8.6–10.5)
CHLORIDE SERPL-SCNC: 104 MMOL/L (ref 98–107)
CO2 SERPL-SCNC: 24 MMOL/L (ref 22–29)
CREAT SERPL-MCNC: 1.65 MG/DL (ref 0.76–1.27)
DEPRECATED RDW RBC AUTO: 46.2 FL (ref 37–54)
EGFRCR SERPLBLD CKD-EPI 2021: 43.8 ML/MIN/1.73
EOSINOPHIL # BLD AUTO: 0 10*3/MM3 (ref 0–0.4)
EOSINOPHIL NFR BLD AUTO: 0 % (ref 0.3–6.2)
ERYTHROCYTE [DISTWIDTH] IN BLOOD BY AUTOMATED COUNT: 13.8 % (ref 12.3–15.4)
GLOBULIN UR ELPH-MCNC: 3 GM/DL
GLUCOSE SERPL-MCNC: 151 MG/DL (ref 65–99)
HCT VFR BLD AUTO: 32.5 % (ref 37.5–51)
HGB BLD-MCNC: 10.6 G/DL (ref 13–17.7)
IMM GRANULOCYTES # BLD AUTO: 0.08 10*3/MM3 (ref 0–0.05)
IMM GRANULOCYTES NFR BLD AUTO: 0.4 % (ref 0–0.5)
LYMPHOCYTES # BLD AUTO: 0.97 10*3/MM3 (ref 0.7–3.1)
LYMPHOCYTES NFR BLD AUTO: 4.9 % (ref 19.6–45.3)
MAGNESIUM SERPL-MCNC: 2.2 MG/DL (ref 1.6–2.4)
MCH RBC QN AUTO: 29.9 PG (ref 26.6–33)
MCHC RBC AUTO-ENTMCNC: 32.6 G/DL (ref 31.5–35.7)
MCV RBC AUTO: 91.8 FL (ref 79–97)
MONOCYTES # BLD AUTO: 1.06 10*3/MM3 (ref 0.1–0.9)
MONOCYTES NFR BLD AUTO: 5.4 % (ref 5–12)
NEUTROPHILS NFR BLD AUTO: 17.61 10*3/MM3 (ref 1.7–7)
NEUTROPHILS NFR BLD AUTO: 89.2 % (ref 42.7–76)
NRBC BLD AUTO-RTO: 0 /100 WBC (ref 0–0.2)
PHOSPHATE SERPL-MCNC: 3.6 MG/DL (ref 2.5–4.5)
PLATELET # BLD AUTO: 168 10*3/MM3 (ref 140–450)
PMV BLD AUTO: 9.9 FL (ref 6–12)
POTASSIUM SERPL-SCNC: 4.1 MMOL/L (ref 3.5–5.2)
PROT SERPL-MCNC: 6.4 G/DL (ref 6–8.5)
RBC # BLD AUTO: 3.54 10*6/MM3 (ref 4.14–5.8)
SODIUM SERPL-SCNC: 141 MMOL/L (ref 136–145)
WBC NRBC COR # BLD AUTO: 19.74 10*3/MM3 (ref 3.4–10.8)

## 2025-01-04 PROCEDURE — 80053 COMPREHEN METABOLIC PANEL: CPT | Performed by: INTERNAL MEDICINE

## 2025-01-04 PROCEDURE — 71045 X-RAY EXAM CHEST 1 VIEW: CPT

## 2025-01-04 PROCEDURE — 25010000002 HYDROCORTISONE SOD SUC (PF) 100 MG RECONSTITUTED SOLUTION

## 2025-01-04 PROCEDURE — 94799 UNLISTED PULMONARY SVC/PX: CPT

## 2025-01-04 PROCEDURE — 94760 N-INVAS EAR/PLS OXIMETRY 1: CPT

## 2025-01-04 PROCEDURE — 94664 DEMO&/EVAL PT USE INHALER: CPT

## 2025-01-04 PROCEDURE — 84100 ASSAY OF PHOSPHORUS: CPT | Performed by: INTERNAL MEDICINE

## 2025-01-04 PROCEDURE — 94761 N-INVAS EAR/PLS OXIMETRY MLT: CPT

## 2025-01-04 PROCEDURE — 25010000002 CEFTRIAXONE PER 250 MG

## 2025-01-04 PROCEDURE — 83735 ASSAY OF MAGNESIUM: CPT | Performed by: INTERNAL MEDICINE

## 2025-01-04 PROCEDURE — 85025 COMPLETE CBC W/AUTO DIFF WBC: CPT | Performed by: INTERNAL MEDICINE

## 2025-01-04 RX ORDER — TRAZODONE HYDROCHLORIDE 50 MG/1
50 TABLET, FILM COATED ORAL NIGHTLY PRN
Status: DISCONTINUED | OUTPATIENT
Start: 2025-01-04 | End: 2025-01-05 | Stop reason: HOSPADM

## 2025-01-04 RX ADMIN — BUDESONIDE 0.5 MG: 0.5 INHALANT RESPIRATORY (INHALATION) at 20:02

## 2025-01-04 RX ADMIN — Medication 1 APPLICATION: at 08:01

## 2025-01-04 RX ADMIN — AZITHROMYCIN 500 MG: 250 TABLET, FILM COATED ORAL at 08:00

## 2025-01-04 RX ADMIN — SACUBITRIL AND VALSARTAN 1 TABLET: 24; 26 TABLET, FILM COATED ORAL at 20:28

## 2025-01-04 RX ADMIN — Medication 1 APPLICATION: at 20:28

## 2025-01-04 RX ADMIN — CARVEDILOL 6.25 MG: 6.25 TABLET, FILM COATED ORAL at 20:28

## 2025-01-04 RX ADMIN — IPRATROPIUM BROMIDE AND ALBUTEROL SULFATE 3 ML: 2.5; .5 SOLUTION RESPIRATORY (INHALATION) at 11:20

## 2025-01-04 RX ADMIN — APIXABAN 5 MG: 5 TABLET, FILM COATED ORAL at 20:28

## 2025-01-04 RX ADMIN — CARVEDILOL 6.25 MG: 6.25 TABLET, FILM COATED ORAL at 08:00

## 2025-01-04 RX ADMIN — IPRATROPIUM BROMIDE AND ALBUTEROL SULFATE 3 ML: 2.5; .5 SOLUTION RESPIRATORY (INHALATION) at 07:05

## 2025-01-04 RX ADMIN — HYDROCORTISONE SODIUM SUCCINATE 50 MG: 100 INJECTION, POWDER, FOR SOLUTION INTRAMUSCULAR; INTRAVENOUS at 17:17

## 2025-01-04 RX ADMIN — IPRATROPIUM BROMIDE AND ALBUTEROL SULFATE 3 ML: 2.5; .5 SOLUTION RESPIRATORY (INHALATION) at 15:37

## 2025-01-04 RX ADMIN — SPIRONOLACTONE 25 MG: 25 TABLET ORAL at 08:01

## 2025-01-04 RX ADMIN — AMIODARONE HYDROCHLORIDE 200 MG: 200 TABLET ORAL at 08:00

## 2025-01-04 RX ADMIN — HYDROCORTISONE SODIUM SUCCINATE 50 MG: 100 INJECTION, POWDER, FOR SOLUTION INTRAMUSCULAR; INTRAVENOUS at 11:03

## 2025-01-04 RX ADMIN — IPRATROPIUM BROMIDE AND ALBUTEROL SULFATE 3 ML: 2.5; .5 SOLUTION RESPIRATORY (INHALATION) at 20:02

## 2025-01-04 RX ADMIN — CHLORHEXIDINE GLUCONATE 1 APPLICATION: 500 CLOTH TOPICAL at 04:20

## 2025-01-04 RX ADMIN — Medication 10 ML: at 08:01

## 2025-01-04 RX ADMIN — SACUBITRIL AND VALSARTAN 1 TABLET: 24; 26 TABLET, FILM COATED ORAL at 08:00

## 2025-01-04 RX ADMIN — ASPIRIN 81 MG: 81 TABLET, COATED ORAL at 08:01

## 2025-01-04 RX ADMIN — Medication 10 ML: at 20:28

## 2025-01-04 RX ADMIN — BUDESONIDE 0.5 MG: 0.5 INHALANT RESPIRATORY (INHALATION) at 07:05

## 2025-01-04 RX ADMIN — AMIODARONE HYDROCHLORIDE 200 MG: 200 TABLET ORAL at 20:27

## 2025-01-04 RX ADMIN — SODIUM CHLORIDE 1000 MG: 900 INJECTION INTRAVENOUS at 17:17

## 2025-01-04 RX ADMIN — ATORVASTATIN CALCIUM 40 MG: 40 TABLET, FILM COATED ORAL at 08:01

## 2025-01-04 RX ADMIN — APIXABAN 5 MG: 5 TABLET, FILM COATED ORAL at 08:01

## 2025-01-04 RX ADMIN — HYDROCORTISONE SODIUM SUCCINATE 50 MG: 100 INJECTION, POWDER, FOR SOLUTION INTRAMUSCULAR; INTRAVENOUS at 05:36

## 2025-01-04 NOTE — PLAN OF CARE
Problem: Adult Inpatient Plan of Care  Goal: Plan of Care Review  Outcome: Progressing  Goal: Patient-Specific Goal (Individualized)  Outcome: Progressing  Goal: Absence of Hospital-Acquired Illness or Injury  Outcome: Progressing  Intervention: Identify and Manage Fall Risk  Recent Flowsheet Documentation  Taken 1/4/2025 0500 by Latha Sharma RN  Safety Promotion/Fall Prevention: safety round/check completed  Taken 1/4/2025 0400 by Latha Sharma RN  Safety Promotion/Fall Prevention: safety round/check completed  Taken 1/4/2025 0300 by Latha Sharma RN  Safety Promotion/Fall Prevention: safety round/check completed  Taken 1/4/2025 0200 by Latha Sharma RN  Safety Promotion/Fall Prevention: safety round/check completed  Taken 1/4/2025 0100 by Latha Sharma RN  Safety Promotion/Fall Prevention: safety round/check completed  Intervention: Prevent Skin Injury  Recent Flowsheet Documentation  Taken 1/4/2025 0500 by Latha Sharma RN  Body Position: position changed independently  Taken 1/4/2025 0300 by Latha Sharma RN  Body Position: position changed independently  Taken 1/4/2025 0100 by Latha Sharma RN  Body Position: position changed independently  Intervention: Prevent and Manage VTE (Venous Thromboembolism) Risk  Recent Flowsheet Documentation  Taken 1/4/2025 0400 by Latha Sharma RN  VTE Prevention/Management:   bilateral   SCDs (sequential compression devices) off   patient refused intervention  Goal: Optimal Comfort and Wellbeing  Outcome: Progressing  Intervention: Provide Person-Centered Care  Recent Flowsheet Documentation  Taken 1/4/2025 0400 by Latha Sharma RN  Trust Relationship/Rapport:   care explained   choices provided   questions answered   questions encouraged  Goal: Readiness for Transition of Care  Outcome: Progressing     Problem: Comorbidity Management  Goal: Maintenance of COPD Symptom Control  Outcome: Progressing  Goal: Maintenance of Heart Failure Symptom  Control  Outcome: Progressing     Problem: Pneumonia  Goal: Fluid Balance  Outcome: Progressing  Goal: Absence of Infection Signs and Symptoms  Outcome: Progressing  Goal: Effective Oxygenation and Ventilation  Outcome: Progressing  Intervention: Promote Airway Secretion Clearance  Recent Flowsheet Documentation  Taken 1/4/2025 0400 by Latha Sharma RN  Activity Management: bedrest     Problem: Fall Injury Risk  Goal: Absence of Fall and Fall-Related Injury  Outcome: Progressing  Intervention: Promote Injury-Free Environment  Recent Flowsheet Documentation  Taken 1/4/2025 0500 by Latha Sharma RN  Safety Promotion/Fall Prevention: safety round/check completed  Taken 1/4/2025 0400 by Latha Sharma RN  Safety Promotion/Fall Prevention: safety round/check completed  Taken 1/4/2025 0300 by Latha Sharma RN  Safety Promotion/Fall Prevention: safety round/check completed  Taken 1/4/2025 0200 by Latha Sharma RN  Safety Promotion/Fall Prevention: safety round/check completed  Taken 1/4/2025 0100 by Latha Sharma RN  Safety Promotion/Fall Prevention: safety round/check completed   Goal Outcome Evaluation:

## 2025-01-04 NOTE — NURSING NOTE
Patient arrived to unit via wheelchair.  Able to ambulate independently.  VSS.  Tele monitor placed.  No complaints of pain.  Skin intact.  Family at bedside.  No acute distress or discomfort at this time.

## 2025-01-04 NOTE — PROGRESS NOTES
[facility] Intensivist [facility]  INPATIENT PROGRESS NOTE    Patient Name: Vj Garcia  Date of Admission: 1/2/2025  Today's Date: 01/04/25  Length of Stay: 1  Primary Care Physician: SILKE Poon DO    Subjective   Chief Complaint: Shortness of breath    [facility] hospital summary:  The patient is a 75 yo male with a PMH of COPD, CAD s/p CABG, HFrEF, PAF, HTN, previous smoker, and HLD who presented to [facility] ED on 1/2 evening with c/o shortness of breath. He reported a 3 day hx of shortness of breath with productive cough with thick sputum. Symptoms worsened with exertion. He denied fever, chills, peripheral edema or palpitations. He had been taking his meds as prescribed. Denied chest pain. He stated that just prior to coming to the ED he felt so short of breath he thought that he was dying. He was brought to the ED via EMS who initially called a STEMI, however, after EKG and case was reviewed with cardiology, his EKG was felt to be similar to previous and that he patient was not experiencing any chest pain.     Further ED work-up revealed-Initial trop-603->607-negative delta troponin. Lactate-3.3->1.6. RPP negative for viral illness. ProBNP-9873.0. WBC-14.15, H&H-12.5/39.2 and plt-228. BUN-23 and creatinine-1.61 with glucose-224. Chest x-ray diffuse interstitial edema, right middle and lower lobe consolidation. He was found to be tachypneic with accessory muscle usage and SPO2 86% on 6L NC. He was placed on Bipap and admitted to the ICU for further evaluation and treatment.     He was placed on steroids, Azithromycin, and Rocephin for COPD exacerbation and pneumonia. He was give bronchodilators and his O2 requirements have continued to improve-Currently at 2L NC.     Interval history:  1/4/2025  He is alert and oriented. He is anxious to be transferred out of the ICU. He did undergo an urgent cardioversion per electrophysiology for ventricular tachycardia. His AICD  was reprogrammed. He was initiated on Amiodarone 200 mg po BID. He has remained in NSR. O2 demand decreased to 2LNC. Chest x-ray improved pulmonary edema. Small increase in creatinine-1.65-will monitor daily. Plan to [facility]/surg [facility] when bed available.        Review of Systems   Constitutional:  Positive for fatigue.   Respiratory:  Positive for cough (improved).       All pertinent negatives and positives are as above. All other systems have been reviewed and are negative unless otherwise stated.     Objective    Temp:  [97.6 °F (36.4 °C)-98.2 °F (36.8 °C)] 97.6 °F (36.4 °C)  Heart Rate:  [55-70] 68  Resp:  [14-24] 14  BP: ()/(49-78) 139/78  Physical Exam  Vitals reviewed.   HENT:      Head: Normocephalic.      Nose: Nose normal.      Mouth/Throat:      Mouth: Mucous membranes are dry.   Eyes:      Pupils: Pupils are equal, round, and reactive to light.   Cardiovascular:      Rate and Rhythm: Normal rate and regular rhythm.      Pulses: Normal pulses.      Heart sounds: Normal heart sounds.   Pulmonary:      Effort: Pulmonary effort is normal.      Breath sounds: Normal breath sounds. Transmitted upper airway sounds: Minimally diminished bilaterally. Examination of the right-lower field reveals decreased breath sounds. Examination of the left-lower field reveals decreased breath sounds.   Abdominal:      General: Bowel sounds are normal.      Palpations: Abdomen is soft.   Musculoskeletal:      Cervical back: Normal range of motion and neck supple.      Right lower leg: No edema.      Left lower leg: No edema.   Skin:     General: Skin is warm and dry.      Capillary Refill: Capillary refill takes less than 2 seconds.   Neurological:      General: No focal deficit present.      Mental Status: He is alert and oriented to person, place, and time. Mental status is at baseline.             Results Review:  Lab Results (last 24 hours)       Procedure Component Value Units Date/Time    Comprehensive  Metabolic Panel [[id number]]  (Abnormal) Collected: 01/04/25 0116    Specimen: Blood Updated: 01/04/25 0141     Glucose 151 mg/dL      BUN 29 mg/dL      Creatinine 1.65 mg/dL      Sodium 141 mmol/L      Potassium 4.1 mmol/L      Chloride 104 mmol/L      CO2 24.0 mmol/L      Calcium 8.5 mg/dL      Total Protein 6.4 g/dL      Albumin 3.4 g/dL      ALT (SGPT) 19 U/L      AST (SGOT) 27 U/L      Alkaline Phosphatase 69 U/L      Total Bilirubin 0.7 mg/dL      Globulin 3.0 gm/dL      A/G Ratio 1.1 g/dL      BUN/Creatinine Ratio 17.6     Anion Gap 13.0 mmol/L      eGFR 43.8 mL/min/1.73     Narrative:      GFR Categories in Chronic Kidney Disease (CKD)      GFR Category          GFR (mL/min/1.73)    Interpretation  G1                     90 or greater         Normal or high (1)  G2                      60-89                Mild decrease (1)  G3a                   45-59                Mild to moderate decrease  G3b                   30-44                Moderate to severe decrease  G4                    15-29                Severe decrease  G5                    14 or less           Kidney failure          (1)In the absence of evidence of kidney disease, neither GFR category G1 or G2 fulfill the criteria for CKD.    eGFR calculation 2021 CKD-EPI creatinine equation, which does not include race as a factor    Magnesium [[id number]]  (Normal) Collected: 01/04/25 0116    Specimen: Blood Updated: 01/04/25 0141     Magnesium 2.2 mg/dL     Phosphorus [[id number]]  (Normal) Collected: 01/04/25 0116    Specimen: Blood Updated: 01/04/25 0141     Phosphorus 3.6 mg/dL     CBC & Differential [[id number]]  (Abnormal) Collected: 01/04/25 0116    Specimen: Blood Updated: 01/04/25 0122    Narrative:      The following orders were created for panel order CBC & Differential.  Procedure                               Abnormality         Status                     ---------                               -----------         ------                      CBC Auto Differential[[id number]]        Abnormal            Final result                 Please view results for these tests on the individual orders.    CBC Auto Differential [[id number]]  (Abnormal) Collected: 01/04/25 0116    Specimen: Blood Updated: 01/04/25 0122     WBC 19.74 10*3/mm3      RBC 3.54 10*6/mm3      Hemoglobin 10.6 g/dL      Hematocrit 32.5 %      MCV 91.8 fL      MCH 29.9 pg      MCHC 32.6 g/dL      RDW 13.8 %      RDW-SD 46.2 fl      MPV 9.9 fL      Platelets 168 10*3/mm3      Neutrophil % 89.2 %      Lymphocyte % 4.9 %      Monocyte % 5.4 %      Eosinophil % 0.0 %      Basophil % 0.1 %      Immature Grans % 0.4 %      Neutrophils, Absolute 17.61 10*3/mm3      Lymphocytes, Absolute 0.97 10*3/mm3      Monocytes, Absolute 1.06 10*3/mm3      Eosinophils, Absolute 0.00 10*3/mm3      Basophils, Absolute 0.02 10*3/mm3      Immature Grans, Absolute 0.08 10*3/mm3      nRBC 0.0 /100 WBC     Blood Culture - Blood, Hand, Right [[id number]]  (Normal) Collected: 01/02/25 2340    Specimen: Blood from Hand, Right Updated: 01/04/25 0000     Blood Culture No growth at 24 hours             XR Chest 1 View    Result Date: 1/4/2025  1. Improving pulmonary opacities supporting improving pulmonary edema with residual upper lobe findings. Stable mediastinal contours with evidence of mediastinal surgery. Left subclavian cardiac pacer device.   This report was signed and finalized on 1/4/2025 8:40 AM by Dr. Dara Coelho MD.      XR Chest 1 View    Result Date: 1/3/2025  1. Diffuse bilateral interstitial pulmonary opacities with alveolar changes of the right lung.  Cardiomegaly with stable position of the left subclavian cardiac pacer lead. Findings favoring pulmonary edema/CHF with asymmetrically increased edema of the right lung. Superimposed right upper lobe pneumonia difficult to exclude.   This report was signed and finalized on 1/3/2025 7:08 AM by Dr. Dara Coelho MD.       Result Review:  I have personally  "reviewed the results from the time of this admission to 1/4/2025 09:58 CST and agree with these findings:  [x]  Laboratory list / accordion  []  Microbiology  []  Radiology  [x]  EKG/Telemetry   []  Cardiology/Vascular   []  Pathology  []  Old records  []  Other:  Most notable findings include:   WBC-19.74-Likely multifactorial-reactive and steroids  Creatinine-1.65  BC-NGTD    Basic Metabolic Panel    Sodium Sodium   Date Value Ref Range Status   01/04/2025 141 136 - 145 mmol/L Final   01/03/2025 135 (L) 136 - 145 mmol/L Final   01/02/2025 139 136 - 145 mmol/L Final     Sodium, Arterial   Date Value Ref Range Status   01/02/2025 140 136 - 145 mmol/L Final      Potassium Potassium   Date Value Ref Range Status   01/04/2025 4.1 3.5 - 5.2 mmol/L Final   01/03/2025 4.0 3.5 - 5.2 mmol/L Final   01/02/2025 4.2 3.5 - 5.2 mmol/L Final      Chloride Chloride   Date Value Ref Range Status   01/04/2025 104 98 - 107 mmol/L Final   01/03/2025 102 98 - 107 mmol/L Final   01/02/2025 105 98 - 107 mmol/L Final      Bicarbonate No results found for: \"PLASMABICARB\"   BUN BUN   Date Value Ref Range Status   01/04/2025 29 (H) 8 - 23 mg/dL Final   01/03/2025 25 (H) 8 - 23 mg/dL Final   01/02/2025 23 8 - 23 mg/dL Final      Creatinine Creatinine   Date Value Ref Range Status   01/04/2025 1.65 (H) 0.76 - 1.27 mg/dL Final   01/03/2025 1.53 (H) 0.76 - 1.27 mg/dL Final   01/02/2025 1.61 (H) 0.76 - 1.27 mg/dL Final      Calcium Calcium   Date Value Ref Range Status   01/04/2025 8.5 (L) 8.6 - 10.5 mg/dL Final   01/03/2025 8.5 (L) 8.6 - 10.5 mg/dL Final   01/02/2025 8.5 (L) 8.6 - 10.5 mg/dL Final      Glucose      No components found for: \"GLUCOSE.*\"      WBC   Date Value Ref Range Status   01/04/2025 19.74 (H) 3.40 - 10.80 10*3/mm3 Final     RBC   Date Value Ref Range Status   01/04/2025 3.54 (L) 4.14 - 5.80 10*6/mm3 Final     Hemoglobin   Date Value Ref Range Status   01/04/2025 10.6 (L) 13.0 - 17.7 g/dL Final     Hematocrit   Date Value Ref " Range Status   01/04/2025 32.5 (L) 37.5 - 51.0 % Final     MCV   Date Value Ref Range Status   01/04/2025 91.8 79.0 - 97.0 fL Final     MCH   Date Value Ref Range Status   01/04/2025 29.9 26.6 - 33.0 pg Final     MCHC   Date Value Ref Range Status   01/04/2025 32.6 31.5 - 35.7 g/dL Final     RDW   Date Value Ref Range Status   01/04/2025 13.8 12.3 - 15.4 % Final     RDW-SD   Date Value Ref Range Status   01/04/2025 46.2 37.0 - 54.0 fl Final     MPV   Date Value Ref Range Status   01/04/2025 9.9 6.0 - 12.0 fL Final     Platelets   Date Value Ref Range Status   01/04/2025 168 140 - 450 10*3/mm3 Final     Neutrophil %   Date Value Ref Range Status   01/04/2025 89.2 (H) 42.7 - 76.0 % Final     Lymphocyte %   Date Value Ref Range Status   01/04/2025 4.9 (L) 19.6 - 45.3 % Final     Monocyte %   Date Value Ref Range Status   01/04/2025 5.4 5.0 - 12.0 % Final     Eosinophil %   Date Value Ref Range Status   01/04/2025 0.0 (L) 0.3 - 6.2 % Final     Basophil %   Date Value Ref Range Status   01/04/2025 0.1 0.0 - 1.5 % Final     Immature Grans %   Date Value Ref Range Status   01/04/2025 0.4 0.0 - 0.5 % Final     Neutrophils, Absolute   Date Value Ref Range Status   01/04/2025 17.61 (H) 1.70 - 7.00 10*3/mm3 Final     Lymphocytes, Absolute   Date Value Ref Range Status   01/04/2025 0.97 0.70 - 3.10 10*3/mm3 Final     Monocytes, Absolute   Date Value Ref Range Status   01/04/2025 1.06 (H) 0.10 - 0.90 10*3/mm3 Final     Eosinophils, Absolute   Date Value Ref Range Status   01/04/2025 0.00 0.00 - 0.40 10*3/mm3 Final     Basophils, Absolute   Date Value Ref Range Status   01/04/2025 0.02 0.00 - 0.20 10*3/mm3 Final     Immature Grans, Absolute   Date Value Ref Range Status   01/04/2025 0.08 (H) 0.00 - 0.05 10*3/mm3 Final     nRBC   Date Value Ref Range Status   01/04/2025 0.0 0.0 - 0.2 /100 WBC Final             Left ventricular systolic function is moderately reduced with estimated ejection fraction 30-40% and calculated (biplane)  ejection fraction 34.2%    The left ventricular cavity is mildly dilated.    Left ventricular wall thickness is consistent with moderate eccentric hypertrophy.    Normal right ventricular cavity size and systolic function.    The left atrial cavity is mildly dilated.    There is no significant (more than mild) valve stenosis or regurgitation.       Culture Data:   Blood Culture   Date Value Ref Range Status   01/02/2025 No growth at 24 hours  Preliminary       I have reviewed the patient's current medications.     Assessment/Plan   Assessment  Active Hospital Problems    Diagnosis     **Pneumonia     Acute respiratory failure with hypoxia     COPD with acute exacerbation     Ventricular tachycardia, sustained     PAF (paroxysmal atrial fibrillation)     Status post two vessel coronary artery bypass, Posterior Basal LV Aneurysm Repair, MV repair  7/23/2024     Stage 3a chronic kidney disease      The patient is a 75 yo male with a PMH of COPD, CAD s/p CABG, HFrEF, PAF, HTN, previous smoker, and HLD who presented to [facility] ED on 1/2 evening with c/o shortness of breath. He reported a 3 day hx of shortness of breath with productive cough with thick sputum. Symptoms worsened with exertion. He was brought to the ED via EMS who initially called a STEMI, however, after EKG and case was reviewed with cardiology, his EKG was felt to be similar to previous and that he patient was not experiencing any chest pain.He was found to be tachypneic with accessory muscle usage and SPO2 86% on 6L NC. He was placed on Bipap and admitted to the ICU for further evaluation and treatment. He was placed on steroids, Azithromycin, and Rocephin for COPD exacerbation and pneumonia. He was give bronchodilators and his O2 requirements have continued to improve-Currently at 2L NC. Underwent urgent cardioversion on 1/3/25 for ventricular tachycardia. Medically stabilized on current treat with steroids, antiarrhythmics, bronchodilators and GDMT. Will  plan to transfer to the floor for ongoing care    Conditions and Status  Condition is improving. Will plan to transfer to tele floor today for further monitoring     Discussed with: patient, spouse, bedside nurse and attending physician       Treatment Plan  Acute hypoxic respiratory failure   -Multifactorial-CHF exacerbation, COPD exacerbation and pneumonia  -Improving  -wean O2 as tolerated-currently on 2L NC  -FEV1 63% 7/15/24 staged as moderate   -Continue Duonebs 4 x's daily per RT  -Continue Maintenance inhaler with Pulmicort  -continue hydrocortisone  -Pulmonary toilet    Pneumonia  -1/3-Diffuse Insterstitial opacities with alveolar changes right lung. Findings c/w CHF/pulmonary edema with superimposed pneumonia RUL  -Continue Azithromycin po 500 mg x3 days  -Continue IV Rocephin 1 g daily  -Monitor daily labs including CBC-today's leukocytosis felt to be reactive with steroids  -Wean O2 as able  -Pulmonary toilet encouraged  -Ambulate as tolerated  -Monitor BC from 1/3/25-NGTD  -Monitor vital signs including temperature curve      Acute on chronic systolic CHF  -Likely due to Atrial fib/VT  -continue GDMT with Coreg, Entresto, and aldactone  -2D echo on 1/3/25-EF-35 to 40% moderate LVH  -2x dose of Lasix 80 mg given-Monitor renal function closely-appears euvolemic     Ventricular tachycardia  -EP is following-will need long-term rhythm control with ablation in the future  -Cardiac ICD present  -S/P urgent bedside cardioversion on 1/4/25  -Continue tele    PAF  -Continue Coreg  -Amiodarone 200 mg BID per EP recommendations  -Eliquis 5 mg BID  -Tele    CAD s/p CABG  -Continue BB, Statin and ASA  -Tele    CKD stage 3b  -Today's creatinine-1.65  -Monitor renal functions closely  -Avoid hypotension  -Avoid nephrotoxins  -Daily chemistries    Total critical care time: 42 minutes    Electronically signed by RAMIRO Meyers on 1/4/2025 at 09:58 CST

## 2025-01-04 NOTE — PROGRESS NOTES
"EP Problems:  1.  Ventricular tachycardia  2.   Atrial flutter  3.  Left atrial appendage exclusion with atrial clip  4.  Presence of a cardiac ICD (Biotronik)     Cardiology Problems:  1.  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  Mitral valve repair  3.  CAD status post CABG  4.  Hypertension  5.  Chronic systolic heart failure  6.  Hyperlipidemia     Medical Problems:  1.  COPD  2.  BPH    Patient ID:  Vj Garcia is a 74 y.o. male with problem list as above as above who EP is following for VT, chronic systolic heart failure.    Subjective:  No further VT overnight.    Objective:  /66   Pulse 70   Temp 97.6 °F (36.4 °C) (Axillary)   Resp 15   Ht 177.8 cm (70\")   Wt 92.6 kg (204 lb 2.3 oz)   SpO2 90%   BMI 29.29 kg/m²     Well-appearing, no acute distress  Normal work of breathing, bibasilar crackles  Regular rate and rhythm  Warm, well-perfused      Labs today:  Lab Results   Component Value Date    GLUCOSE 151 (H) 01/04/2025    CALCIUM 8.5 (L) 01/04/2025     01/04/2025    K 4.1 01/04/2025    CO2 24.0 01/04/2025    BUN 29 (H) 01/04/2025    CREATININE 1.65 (H) 01/04/2025    EGFR 43.8 (L) 01/04/2025     Lab Results   Component Value Date    WBC 19.74 (H) 01/04/2025    HGB 10.6 (L) 01/04/2025    HCT 32.5 (L) 01/04/2025     01/04/2025     Lab Results   Component Value Date    MG 2.2 01/04/2025     Echocardiogram from yesterday directly visualized independently reviewed: Dilated LV, EF approximately 40%, normal RV function, relatively normal-appearing filling pressures    Assessment:  Sustained ventricular tachycardia  Pulmonary edema  Acute hypoxic respiratory failure  Acute on chronic systolic heart failure  [place]LEMAN    Volume status appears relatively euvolemic today.  Oxygen levels are borderline.  No further episodes of VT overnight.    Plan:  -No further diuretics given relative euvolemia  -Discussed risks and benefits of VT ablation; will plan for " this as an outpatient  -Continue amiodarone 200 mg twice daily for VT suppression for now  -Continue GDMT for chronic systolic heart failure (consider holding spironolactone if renal function continues to worsen)  -Appreciate ICU care for likely overlapping pneumonia  -Repeat chest x-ray today    Part of this note may be an electronic transcription/translation of spoken language to printed text using the Dragon Dictation System.

## 2025-01-04 NOTE — PLAN OF CARE
Problem: Adult Inpatient Plan of Care  Goal: Plan of Care Review  Outcome: Progressing  Flowsheets (Taken 1/3/2025 1843)  Progress: improving  Outcome Evaluation: VSS @ this time. Successful cardioversion this a.m. NSR with HR in the 50's to 70's post cardioversion. O2 weaned to 2L NC with current sat 96%. No c/o pain. IV Lasix given today with good UOP. No new issues at this time.  Plan of Care Reviewed With:   patient   family  Goal: Patient-Specific Goal (Individualized)  Outcome: Progressing  Goal: Absence of Hospital-Acquired Illness or Injury  Outcome: Progressing  Intervention: Identify and Manage Fall Risk  Description: Perform standard risk assessment on admission using a validated tool or comprehensive approach appropriate to the patient; reassess fall risk frequently, with change in status or transfer to another level of care.  Communicate risk to interprofessional healthcare team; ensure fall risk visible cue.  Determine need for increased observation, equipment and environmental modification, as well as use of supportive, nonskid footwear.  Adjust safety measures to individual needs and identified risk factors.  Reinforce the importance of active participation with fall risk prevention, safety, and physical activity with the patient and family.  Perform regular intentional rounding to assess need for position change, pain assessment and personal needs, including assistance with toileting.  Recent Flowsheet Documentation  Taken 1/3/2025 1800 by Seema Winters, RN  Safety Promotion/Fall Prevention: safety round/check completed  Taken 1/3/2025 1700 by Seema Winters, RN  Safety Promotion/Fall Prevention: safety round/check completed  Taken 1/3/2025 1600 by Seema Winters, RN  Safety Promotion/Fall Prevention: safety round/check completed  Taken 1/3/2025 1500 by Seema Winters, RN  Safety Promotion/Fall Prevention: safety round/check completed  Taken 1/3/2025 1400 by Seema Winters  RN  Safety Promotion/Fall Prevention: safety round/check completed  Taken 1/3/2025 1300 by Seema Winetrs RN  Safety Promotion/Fall Prevention: safety round/check completed  Taken 1/3/2025 1200 by Seema Winters RN  Safety Promotion/Fall Prevention: safety round/check completed  Taken 1/3/2025 1100 by Seema Winters RN  Safety Promotion/Fall Prevention: safety round/check completed  Taken 1/3/2025 1000 by Seema Winters RN  Safety Promotion/Fall Prevention: safety round/check completed  Taken 1/3/2025 0900 by Seema Winters RN  Safety Promotion/Fall Prevention: safety round/check completed  Taken 1/3/2025 0800 by Seema Winters RN  Safety Promotion/Fall Prevention: safety round/check completed  Taken 1/3/2025 0700 by Seema Winters RN  Safety Promotion/Fall Prevention: safety round/check completed  Intervention: Prevent Skin Injury  Description: Perform a screening for skin injury risk, such as pressure or moisture-associated skin damage on admission and at regular intervals throughout hospital stay.  Keep all areas of skin (especially folds) clean and dry.  Maintain adequate skin hydration.  Relieve and redistribute pressure and protect bony prominences and skin at risk for injury; implement measures based on patient-specific risk factors.  Match turning and repositioning schedule to clinical condition.  Encourage weight shift frequently; assist with reposition if unable to complete independently.  Float heels off bed; avoid pressure on the Achilles tendon.  Keep skin free from extended contact with medical devices.  Optimize nutrition and hydration.  Encourage functional activity and mobility, as early as tolerated.  Use aids (e.g., slide boards, mechanical lift) during transfer.  Recent Flowsheet Documentation  Taken 1/3/2025 1800 by Seema Winters, RN  Body Position:   turned   supine  Taken 1/3/2025 1700 by Seema Winters RN  Body Position:   position changed independently    tilted   left  Taken 1/3/2025 1600 by Seema Winters RN  Body Position:   position changed independently   supine  Skin Protection: incontinence pads utilized  Taken 1/3/2025 1500 by Seema Winters RN  Body Position:   position changed independently   tilted   left  Taken 1/3/2025 1400 by Seema Winters RN  Body Position:   position changed independently   sitting up in bed  Taken 1/3/2025 1300 by Seema Winters RN  Body Position:   position changed independently   sitting up in bed  Taken 1/3/2025 1200 by Seema Winters RN  Body Position:   position changed independently   tilted   left  Skin Protection: incontinence pads utilized  Taken 1/3/2025 1100 by Seema Winters RN  Body Position:   position changed independently   tilted   left  Taken 1/3/2025 1000 by Seema Winters RN  Body Position:   position changed independently   supine  Taken 1/3/2025 0900 by Seema Winters RN  Body Position:   position changed independently   sitting up in bed   supine  Taken 1/3/2025 0800 by Seema Winters RN  Body Position:   position changed independently   supine  Skin Protection: incontinence pads utilized  Taken 1/3/2025 0700 by Seema Winters RN  Body Position:   position changed independently   supine  Intervention: Prevent and Manage VTE (Venous Thromboembolism) Risk  Description: Assess for VTE (venous thromboembolism) risk.  Promote early mobilization; encourage both active and passive leg exercises, if unable to ambulate.  Initiate and maintain compression or other therapy, as indicated, based on identified risk in accordance with organizational protocol and provider order.  Recognize the patient's individual risk for bleeding before initiating pharmacologic thromboprophylaxis.  Recent Flowsheet Documentation  Taken 1/3/2025 1600 by Seema Winters RN  VTE Prevention/Management:   bilateral   SCDs (sequential compression devices) on  Taken 1/3/2025 1200 by Seema Winters  RN  VTE Prevention/Management:   bilateral   SCDs (sequential compression devices) on  Taken 1/3/2025 0800 by Seema Winters RN  VTE Prevention/Management:   bilateral   SCDs (sequential compression devices) on  Goal: Optimal Comfort and Wellbeing  Outcome: Progressing  Intervention: Provide Person-Centered Care  Description: Use a family-focused approach to care; encourage support system presence and participation.  Develop trust and rapport by proactively providing information, encouraging questions, addressing concerns and offering reassurance.  Acknowledge emotional response to hospitalization.  Recognize and utilize personal coping strategies and strengths; develop goals via shared decision-making.  Honor spiritual and cultural preferences.  Recent Flowsheet Documentation  Taken 1/3/2025 1200 by Seema Winters RN  Trust Relationship/Rapport:   care explained   thoughts/feelings acknowledged  Taken 1/3/2025 0800 by Seema Winters RN  Trust Relationship/Rapport:   care explained   thoughts/feelings acknowledged  Goal: Readiness for Transition of Care  Outcome: Progressing     Problem: Comorbidity Management  Goal: Maintenance of COPD Symptom Control  Outcome: Progressing  Goal: Maintenance of Heart Failure Symptom Control  Outcome: Progressing     Problem: Pneumonia  Goal: Fluid Balance  1/3/2025 1843 by Seema Winters RN  Outcome: Progressing  1/3/2025 0942 by Seema Winters RN  Outcome: Progressing  Goal: Absence of Infection Signs and Symptoms  1/3/2025 1843 by Seema Winters RN  Outcome: Progressing  1/3/2025 0942 by Seema Winters RN  Outcome: Progressing  Goal: Effective Oxygenation and Ventilation  1/3/2025 1843 by Seema Winters RN  Outcome: Progressing  1/3/2025 0942 by Seema Winters RN  Outcome: Progressing  Intervention: Promote Airway Secretion Clearance  Description: Assess the effectiveness of pulmonary hygiene and ability to perform airway clearance  techniques.  Promote early mobility or ambulation; match activity to ability and tolerance.  Encourage deep breathing and lung expansion therapy to prevent atelectasis; adjust treatment to patient's response.  Anticipate the need to splint chest or abdominal wall with cough to minimize discomfort; assist if needed.  Initiate cough-enhancement and airway-clearance techniques with instruction.  Consider pharmacologic therapy, such as beta-2 agonist, mucolytic, corticosteroid, antimicrobial, that may improve inflammation, mucus clearance, cough response and air flow.  Recent Flowsheet Documentation  Taken 1/3/2025 1800 by Seema Winters, RN  Activity Management: bedrest  Taken 1/3/2025 1600 by Seema Winters RN  Activity Management: bedrest  Taken 1/3/2025 1300 by Seema Winters RN  Activity Management: bedrest  Taken 1/3/2025 1200 by Seema Winters RN  Activity Management: bedrest  Taken 1/3/2025 1100 by Seema Winters RN  Activity Management: bedrest  Taken 1/3/2025 1000 by Seema Winters RN  Activity Management: bedrest  Taken 1/3/2025 0900 by Seema Winters RN  Activity Management: bedrest  Taken 1/3/2025 0800 by Seema Winters RN  Activity Management: bedrest  Intervention: Optimize Oxygenation and Ventilation  Description: Assess and monitor airway, breathing and circulation for effective oxygenation and ventilation; consider oxygenation and ventilation parameters and goal.  Maintain head of bed elevation with regular position changes to minimize ventilation-perfusion mismatch and breathlessness; consider prone positioning to maximize alveolar recruitment.  Provide oxygen therapy judiciously to avoid hyperoxemia; adjust to achieve oxygenation goal.  Monitor fluid balance closely to minimize the risk of fluid overload.  Consider positive pressure ventilation to enhance oxygenation and ventilation, as well as reduce work of breathing.  Recent Flowsheet Documentation  Taken 1/3/2025  1800 by Seema Winters RN  Head of Bed (HOB) Positioning: HOB elevated  Taken 1/3/2025 1700 by Seema Winters RN  Head of Bed (HOB) Positioning: HOB elevated  Taken 1/3/2025 1600 by Seema Winters RN  Head of Bed (HOB) Positioning: HOB elevated  Taken 1/3/2025 1500 by Seema Winters RN  Head of Bed (HOB) Positioning: HOB elevated  Taken 1/3/2025 1400 by Seema Winters RN  Head of Bed (HOB) Positioning: HOB elevated  Taken 1/3/2025 1300 by Seema Winters RN  Head of Bed (HOB) Positioning: HOB elevated  Taken 1/3/2025 1200 by Seema Winters RN  Head of Bed (HOB) Positioning: HOB elevated  Taken 1/3/2025 1100 by Seema Winters RN  Head of Bed (HOB) Positioning: HOB elevated  Taken 1/3/2025 1000 by Seema Winters RN  Head of Bed (HOB) Positioning: HOB elevated  Taken 1/3/2025 0900 by Seema Winters RN  Head of Bed (HOB) Positioning: HOB elevated  Taken 1/3/2025 0800 by Seema Winters RN  Head of Bed (HOB) Positioning: HOB elevated  Taken 1/3/2025 0700 by Seema Winters RN  Head of Bed (HOB) Positioning: HOB elevated   Goal Outcome Evaluation:  Plan of Care Reviewed With: patient, family        Progress: improving  Outcome Evaluation: VSS @ this time. Successful cardioversion this a.m. NSR with HR in the 50's to 70's post cardioversion. O2 weaned to 2L NC with current sat 96%. No c/o pain. IV Lasix given today with good UOP. No new issues at this time.

## 2025-01-05 ENCOUNTER — READMISSION MANAGEMENT (OUTPATIENT)
Dept: CALL CENTER | Facility: HOSPITAL | Age: 75
End: 2025-01-05
Payer: MEDICARE

## 2025-01-05 VITALS
OXYGEN SATURATION: 94 % | HEART RATE: 61 BPM | DIASTOLIC BLOOD PRESSURE: 68 MMHG | RESPIRATION RATE: 16 BRPM | SYSTOLIC BLOOD PRESSURE: 137 MMHG | WEIGHT: 191.5 LBS | BODY MASS INDEX: 27.41 KG/M2 | TEMPERATURE: 97.7 F | HEIGHT: 70 IN

## 2025-01-05 LAB
ALBUMIN SERPL-MCNC: 3.3 G/DL (ref 3.5–5.2)
ALBUMIN/GLOB SERPL: 1.1 G/DL
ALP SERPL-CCNC: 63 U/L (ref 39–117)
ALT SERPL W P-5'-P-CCNC: 19 U/L (ref 1–41)
ANION GAP SERPL CALCULATED.3IONS-SCNC: 11 MMOL/L (ref 5–15)
AST SERPL-CCNC: 22 U/L (ref 1–40)
BASOPHILS # BLD AUTO: 0.01 10*3/MM3 (ref 0–0.2)
BASOPHILS NFR BLD AUTO: 0.1 % (ref 0–1.5)
BILIRUB SERPL-MCNC: 0.5 MG/DL (ref 0–1.2)
BUN SERPL-MCNC: 27 MG/DL (ref 8–23)
BUN/CREAT SERPL: 19.3 (ref 7–25)
CALCIUM SPEC-SCNC: 8.1 MG/DL (ref 8.6–10.5)
CHLORIDE SERPL-SCNC: 103 MMOL/L (ref 98–107)
CO2 SERPL-SCNC: 26 MMOL/L (ref 22–29)
CREAT SERPL-MCNC: 1.4 MG/DL (ref 0.76–1.27)
DEPRECATED RDW RBC AUTO: 47.6 FL (ref 37–54)
EGFRCR SERPLBLD CKD-EPI 2021: 53.4 ML/MIN/1.73
EOSINOPHIL # BLD AUTO: 0 10*3/MM3 (ref 0–0.4)
EOSINOPHIL NFR BLD AUTO: 0 % (ref 0.3–6.2)
ERYTHROCYTE [DISTWIDTH] IN BLOOD BY AUTOMATED COUNT: 13.9 % (ref 12.3–15.4)
GLOBULIN UR ELPH-MCNC: 2.9 GM/DL
GLUCOSE SERPL-MCNC: 123 MG/DL (ref 65–99)
HCT VFR BLD AUTO: 32.5 % (ref 37.5–51)
HGB BLD-MCNC: 10.2 G/DL (ref 13–17.7)
IMM GRANULOCYTES # BLD AUTO: 0.06 10*3/MM3 (ref 0–0.05)
IMM GRANULOCYTES NFR BLD AUTO: 0.4 % (ref 0–0.5)
LYMPHOCYTES # BLD AUTO: 1.23 10*3/MM3 (ref 0.7–3.1)
LYMPHOCYTES NFR BLD AUTO: 8.1 % (ref 19.6–45.3)
MAGNESIUM SERPL-MCNC: 2.2 MG/DL (ref 1.6–2.4)
MCH RBC QN AUTO: 29.4 PG (ref 26.6–33)
MCHC RBC AUTO-ENTMCNC: 31.4 G/DL (ref 31.5–35.7)
MCV RBC AUTO: 93.7 FL (ref 79–97)
MONOCYTES # BLD AUTO: 0.84 10*3/MM3 (ref 0.1–0.9)
MONOCYTES NFR BLD AUTO: 5.6 % (ref 5–12)
NEUTROPHILS NFR BLD AUTO: 12.99 10*3/MM3 (ref 1.7–7)
NEUTROPHILS NFR BLD AUTO: 85.8 % (ref 42.7–76)
NRBC BLD AUTO-RTO: 0 /100 WBC (ref 0–0.2)
PHOSPHATE SERPL-MCNC: 3.5 MG/DL (ref 2.5–4.5)
PLATELET # BLD AUTO: 185 10*3/MM3 (ref 140–450)
PMV BLD AUTO: 10.2 FL (ref 6–12)
POTASSIUM SERPL-SCNC: 3.9 MMOL/L (ref 3.5–5.2)
PROT SERPL-MCNC: 6.2 G/DL (ref 6–8.5)
RBC # BLD AUTO: 3.47 10*6/MM3 (ref 4.14–5.8)
SODIUM SERPL-SCNC: 140 MMOL/L (ref 136–145)
WBC NRBC COR # BLD AUTO: 15.13 10*3/MM3 (ref 3.4–10.8)

## 2025-01-05 PROCEDURE — 94664 DEMO&/EVAL PT USE INHALER: CPT

## 2025-01-05 PROCEDURE — 94799 UNLISTED PULMONARY SVC/PX: CPT

## 2025-01-05 PROCEDURE — 85025 COMPLETE CBC W/AUTO DIFF WBC: CPT | Performed by: INTERNAL MEDICINE

## 2025-01-05 PROCEDURE — 80053 COMPREHEN METABOLIC PANEL: CPT | Performed by: INTERNAL MEDICINE

## 2025-01-05 PROCEDURE — 84100 ASSAY OF PHOSPHORUS: CPT | Performed by: INTERNAL MEDICINE

## 2025-01-05 PROCEDURE — 83735 ASSAY OF MAGNESIUM: CPT | Performed by: INTERNAL MEDICINE

## 2025-01-05 PROCEDURE — 25010000002 HYDROCORTISONE SOD SUC (PF) 100 MG RECONSTITUTED SOLUTION

## 2025-01-05 RX ORDER — IPRATROPIUM BROMIDE 17 UG/1
2 AEROSOL, METERED RESPIRATORY (INHALATION) 4 TIMES DAILY PRN
Qty: 12.9 G | Refills: 0 | Status: SHIPPED | OUTPATIENT
Start: 2025-01-05

## 2025-01-05 RX ORDER — PREDNISONE 20 MG/1
40 TABLET ORAL DAILY
Qty: 10 TABLET | Refills: 0 | Status: SHIPPED | OUTPATIENT
Start: 2025-01-05 | End: 2025-01-08

## 2025-01-05 RX ORDER — IPRATROPIUM BROMIDE AND ALBUTEROL SULFATE 2.5; .5 MG/3ML; MG/3ML
3 SOLUTION RESPIRATORY (INHALATION)
Status: DISCONTINUED | OUTPATIENT
Start: 2025-01-05 | End: 2025-01-05 | Stop reason: HOSPADM

## 2025-01-05 RX ORDER — NITROGLYCERIN 0.4 MG/1
0.4 TABLET SUBLINGUAL
Status: DISCONTINUED | OUTPATIENT
Start: 2025-01-05 | End: 2025-01-05 | Stop reason: HOSPADM

## 2025-01-05 RX ORDER — PREDNISONE 20 MG/1
40 TABLET ORAL DAILY
Status: DISCONTINUED | OUTPATIENT
Start: 2025-01-05 | End: 2025-01-05 | Stop reason: HOSPADM

## 2025-01-05 RX ORDER — CEFDINIR 300 MG/1
300 CAPSULE ORAL 2 TIMES DAILY
Qty: 6 CAPSULE | Refills: 0 | Status: SHIPPED | OUTPATIENT
Start: 2025-01-05 | End: 2025-01-08

## 2025-01-05 RX ADMIN — HYDROCORTISONE SODIUM SUCCINATE 50 MG: 100 INJECTION, POWDER, FOR SOLUTION INTRAMUSCULAR; INTRAVENOUS at 06:06

## 2025-01-05 RX ADMIN — HYDROCORTISONE SODIUM SUCCINATE 50 MG: 100 INJECTION, POWDER, FOR SOLUTION INTRAMUSCULAR; INTRAVENOUS at 01:18

## 2025-01-05 RX ADMIN — APIXABAN 5 MG: 5 TABLET, FILM COATED ORAL at 08:52

## 2025-01-05 RX ADMIN — SACUBITRIL AND VALSARTAN 1 TABLET: 24; 26 TABLET, FILM COATED ORAL at 08:52

## 2025-01-05 RX ADMIN — Medication 1 APPLICATION: at 08:52

## 2025-01-05 RX ADMIN — ATORVASTATIN CALCIUM 40 MG: 40 TABLET, FILM COATED ORAL at 08:52

## 2025-01-05 RX ADMIN — SPIRONOLACTONE 25 MG: 25 TABLET ORAL at 08:52

## 2025-01-05 RX ADMIN — AZITHROMYCIN 500 MG: 250 TABLET, FILM COATED ORAL at 08:52

## 2025-01-05 RX ADMIN — IPRATROPIUM BROMIDE AND ALBUTEROL SULFATE 3 ML: 2.5; .5 SOLUTION RESPIRATORY (INHALATION) at 07:01

## 2025-01-05 RX ADMIN — ASPIRIN 81 MG: 81 TABLET, COATED ORAL at 08:52

## 2025-01-05 RX ADMIN — CARVEDILOL 6.25 MG: 6.25 TABLET, FILM COATED ORAL at 08:52

## 2025-01-05 RX ADMIN — BUDESONIDE 0.5 MG: 0.5 INHALANT RESPIRATORY (INHALATION) at 07:05

## 2025-01-05 RX ADMIN — Medication 10 ML: at 08:52

## 2025-01-05 RX ADMIN — AMIODARONE HYDROCHLORIDE 200 MG: 200 TABLET ORAL at 08:52

## 2025-01-05 NOTE — PROGRESS NOTES
"EP Problems:  1.  Ventricular tachycardia  2.   Atrial flutter  3.  Left atrial appendage exclusion with atrial clip  4.  Presence of a cardiac ICD (Biotronik)     Cardiology Problems:  1.  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  Mitral valve repair  3.  CAD status post CABG  4.  Hypertension  5.  Chronic systolic heart failure  6.  Hyperlipidemia     Medical Problems:  1.  COPD  2.  BPH    Patient ID:  Vj Garcia is a 74 y.o. male with problem list as above as above who EP is following for VT, chronic systolic heart failure.    Subjective:  No further VT overnight.    Objective:  /68 (BP Location: Left arm, Patient Position: Lying)   Pulse 61   Temp 97.7 °F (36.5 °C) (Oral)   Resp 16   Ht 177.8 cm (70\")   Wt 86.9 kg (191 lb 8 oz)   SpO2 94%   BMI 27.48 kg/m²     Well-appearing, no acute distress  Normal work of breathing  Regular rate and rhythm  Warm, well-perfused      Labs today:  Lab Results   Component Value Date    GLUCOSE 123 (H) 01/05/2025    CALCIUM 8.1 (L) 01/05/2025     01/05/2025    K 3.9 01/05/2025    CO2 26.0 01/05/2025    BUN 27 (H) 01/05/2025    CREATININE 1.40 (H) 01/05/2025    EGFR 53.4 (L) 01/05/2025     Lab Results   Component Value Date    WBC 15.13 (H) 01/05/2025    HGB 10.2 (L) 01/05/2025    HCT 32.5 (L) 01/05/2025     01/05/2025     Lab Results   Component Value Date    MG 2.2 01/05/2025     Assessment:  Sustained ventricular tachycardia  Pulmonary edema  Acute hypoxic respiratory failure  Acute on chronic systolic heart failure  [place]    Plan:  -Creatinine improving  -Plan for outpatient VT ablation (though based on ECG, may be epicardial)  -Continue amiodarone 200 mg twice daily for VT suppression for now  -Continue GDMT for chronic systolic heart failure   -Agree with continued antibiotics for possible overlapping pneumonia given elevated WBC    Part of this note may be an electronic transcription/translation of spoken " language to printed text using the Dragon Dictation System.

## 2025-01-05 NOTE — PROGRESS NOTES
Assessment tool to be used for patients with existing breathing treatments ordered by hospitalist                               Respiratory Therapist Driven Protocol - RT to Assess and Treat Algorithm    Item 0 Points 1 Point 2 Points 3 Points 4 Points Subtotal   Mental Status Alert, orientated, cooperative Lethargic, follows commands Confused, not following commands Obtunded or Somnolent Comatose 0   Respiratory Pattern Regular RR  8-16 breaths/minute Increased RR  18-25 breaths/minute Dyspnea on exertion, irregular RR  26-30/minute Shortness of breath,  RR 31-35 breaths/minute Accessory muscle use, severe SOB  RR > 35 breath/minute 0   Breath Sounds Clear Decreased unilaterally Decreased bilaterally Basilar crackles Wheezing and/or rhonchi 2   Cough Strong, spontaneous non-productive Strong productive Weak, non-productive Weak productive or weak with rhonchi Absent or may require suctioning 0   Pulmonary Status Nonsmoker or no previous history > 1 year quit < 1 PPD  < 1 year quit >  or = 1 PPD Diagnosed pulmonary disease (severe or chronic) Severe or chronic pulmonary disease with exacerbation 3   Surgical Status None General surgery (non-abdominal or non-thoracic) Lower abdominal Thoracic or upper abdominal Thoracic with pulmonary disease 0   Chest X-ray Clear Chronic changes Infiltrates, atelectasis or pleural effusion Infiltrates > 1 lobe Diffuse infiltrates and atelectasis and/or effusions 2   Activity level Ambulatory Ambulatory with assistance Non-ambulatory Paraplegic Quadriplegic 0                     Total Score 7   Score    Drug Therapy Frequency  20 or >    Q4 Duoneb & Q3 Albuterol PRN 15 - 19     Q6 Duoneb & Q4 Albuterol PRN 10 - 14    QID Duoneb & Q4 Albuterol PRN 5 - 9    TID Duoneb & Q6 Albuterol PRN 0 - 4    Q4 PRN Duoneb or Q4 PRN Albuterol    Incentive Spirometry - Initial RT instruct    Lung Expansion Therapy (PEP) Bronchopulmonary Hygiene (CPT)   Q4 & PRN - Severe atelectasis,  poor oxygenation Q4 - copious secretions, dyspnea, unable to sleep, mucus plugging   QID - High risk for persistent atelectasis, existence of atelectasis QID & Q4 PRN - Moderate secretion production   TID - At risk for developing atelectasis TID - small amounts of secretions with poor cough   BID - prevention of atelectasis BID - unable to breathe deeply and cough spontaneously   *RT Protocol patients will be re-assessed/re-evaluated every 48 hours.    *Patients who are home nebulizer treatments will be protocoled to no less than their home regimen and will remain     on their home regimen with re-evaluations as needed with changes in patient condition.    RT Comments/Recommendations: change frequency to TID. Re-evaluate in 48 hours unless pt has status change

## 2025-01-05 NOTE — PLAN OF CARE
Goal Outcome Evaluation:  Problem: Adult Inpatient Plan of Care  Goal: Plan of Care Review  Flowsheets (Taken 1/4/2025 0657)  Progress: no change  Outcome Evaluation: Patient transferred to  this shift.  A&Ox4, RA.  Tele: SB/S 58-70 PVC.  Up ad kay.  Continent x2.  No complaints of pain.  Patient's spouse at bedside.  Safety precautions maintained.  No acute distress or discomfort at this time.  Report to be given to night shift nurse for continuation of care.  Plan of Care Reviewed With:   patient   spouse

## 2025-01-05 NOTE — DISCHARGE SUMMARY
[facility] Medicine Services  DISCHARGE SUMMARY       Date of Admission: 1/2/2025  Date of Discharge:  1/5/2025  Primary Care Physician: SILKE Poon DO    Presenting Problem/History of Present Illness:  SOB    Final Discharge Diagnoses:    Acute hypoxic respiratory failure believed to be multifactorial-acute on chronic systolic CHF exacerbation likely due to atrial fibrillation/ventricular tachycardia, possible COPD exacerbation and pneumonia, pulmonary edema  Concern for pneumonia-abnormal chest x-ray with diffuse interstitial opacities with concern for superimposed right upper lobe pneumonia  Acute on chronic systolic heart failure  Ventricular tachycardia status post urgent bedside cardioversion in January 4, 2025  Paroxysmal atrial fibrillation  Coronary artery disease with prior coronary artery bypass graft  CKD stage IIIb    Consults:   Intensivist   EP    Procedures Performed:   Urgent bedside cardioversion     Pertinent Test Results:   Results for orders placed during the hospital encounter of 01/02/25    Adult Transthoracic Echo Complete w/ Color, Spectral and Contrast if Necessary Per Protocol    Interpretation Summary    Left ventricular systolic function is moderately reduced with estimated ejection fraction 30-40% and calculated (biplane) ejection fraction 34.2%    The left ventricular cavity is mildly dilated.    Left ventricular wall thickness is consistent with moderate eccentric hypertrophy.    Normal right ventricular cavity size and systolic function.    The left atrial cavity is mildly dilated.    There is no significant (more than mild) valve stenosis or regurgitation.      Imaging Results (All)       Procedure Component Value Units Date/Time    XR Chest 1 View [[id number]] Collected: 01/04/25 0838     Updated: 01/04/25 0843    Narrative:      XR CHEST 1 VW-     HISTORY: Hypoxic respiratory failure; J18.9-Pneumonia, unspecified  organism;  I50.9-Heart failure, unspecified; R79.89-Other specified  abnormal findings of blood chemistry     COMPARISON: 1/2/2025     FINDINGS: Frontal view the chest obtained.     Interval improvement in the mixed interstitial alveolar opacities with  residual upper lobe findings. Left subclavian cardiac pacer leads  similar in orientation. Median sternotomy wires left atrial appendage  clip. Stable mediastinal contours. No pleural effusion or pneumothorax.       Impression:      1. Improving pulmonary opacities supporting improving pulmonary edema  with residual upper lobe findings. Stable mediastinal contours with  evidence of mediastinal surgery. Left subclavian cardiac pacer device.        This report was signed and finalized on 1/4/2025 8:40 AM by Dr. Dara Coelho MD.       XR Chest 1 View [[id number]] Collected: 01/03/25 0706     Updated: 01/03/25 0711    Narrative:      XR CHEST 1 VW-     HISTORY: SOA Triage Protocol     COMPARISON: 9/3/2024     FINDINGS: Frontal view the chest obtained.     There are diffuse bilateral interstitial pulmonary opacities with  asymmetrical alveolar changes of the right lung. Similar cardiomegaly  with stable positioning of the left subclavian cardiac pacer lead.  Median sternotomy wires with left atrial appendage clip. No appreciable  pleural effusion. No pneumothorax. Partial visualization of  postoperative changes of the lower cervical spine.       Impression:      1. Diffuse bilateral interstitial pulmonary opacities with alveolar  changes of the right lung.  Cardiomegaly with stable position of the  left subclavian cardiac pacer lead. Findings favoring pulmonary  edema/CHF with asymmetrically increased edema of the right lung.  Superimposed right upper lobe pneumonia difficult to exclude.        This report was signed and finalized on 1/3/2025 7:08 AM by Dr. Dara Coelho MD.             LAB RESULTS:      Lab 01/05/25  0114 01/04/25  0116 01/03/25  0609 01/03/25  0226  01/02/25  2300   WBC 15.13* 19.74* 13.25*  --  14.15*   HEMOGLOBIN 10.2* 10.6* 11.7*  --  12.5*   HEMATOCRIT 32.5* 32.5* 36.4*  --  39.2   PLATELETS 185 168 161  --  228   NEUTROS ABS 12.99* 17.61* 12.45*  --  10.56*   IMMATURE GRANS (ABS) 0.06* 0.08* 0.05  --  0.07*   LYMPHS ABS 1.23 0.97 0.51*  --  2.29   MONOS ABS 0.84 1.06* 0.22  --  1.04*   EOS ABS 0.00 0.00 0.00  --  0.11   MCV 93.7 91.8 93.6  --  94.5   LACTATE  --   --   --  1.6 3.3*   PROTIME  --   --   --   --  18.9*   APTT  --   --   --   --  29.2         Lab 01/05/25 0114 01/04/25 0116 01/03/25  0609 01/02/25 2300 01/02/25  2257   SODIUM 140 141 135* 139  --    SODIUM, ARTERIAL  --   --   --   --  140   POTASSIUM 3.9 4.1 4.0 4.2  --    CHLORIDE 103 104 102 105  --    CO2 26.0 24.0 20.0* 21.0*  --    ANION GAP 11.0 13.0 13.0 13.0  --    BUN 27* 29* 25* 23  --    CREATININE 1.40* 1.65* 1.53* 1.61*  --    EGFR 53.4* 43.8* 48.0* 45.2*  --    GLUCOSE 123* 151* 192* 224*  --    CALCIUM 8.1* 8.5* 8.5* 8.5*  --    MAGNESIUM 2.2 2.2  --   --   --    PHOSPHORUS 3.5 3.6  --   --   --          Lab 01/05/25 0114 01/04/25 0116 01/02/25 2300   TOTAL PROTEIN 6.2 6.4 7.2   ALBUMIN 3.3* 3.4* 3.8   GLOBULIN 2.9 3.0 3.4   ALT (SGPT) 19 19 26   AST (SGOT) 22 27 40   BILIRUBIN 0.5 0.7 0.9   ALK PHOS 63 69 89         Lab 01/03/25  0008 01/02/25  2300   PROBNP  --  9,873.0*   HSTROP T 607* 603*   PROTIME  --  18.9*   INR  --  1.50*                 Lab 01/02/25  2257   PH, ARTERIAL 7.342*   PCO2, ARTERIAL 39.3   PO2 .0   O2 SATURATION ART 97.9   FIO2 60   HCO3 ART 21.3   BASE EXCESS ART -4.1*   CARBOXYHEMOGLOBIN 0.4     Brief Urine Lab Results  (Last result in the past 365 days)        Color   Clarity   Blood   Leuk Est   Nitrite   Protein   CREAT   Urine HCG        08/21/24 1056 Yellow   Clear   Negative   Negative   Negative   Trace                 Microbiology Results (last 10 days)       Procedure Component Value - Date/Time    Blood Culture - Blood, Hand, Right  [[id number]]  (Normal) Collected: 01/02/25 2340    Lab Status: Preliminary result Specimen: Blood from Hand, Right Updated: 01/05/25 0000     Blood Culture No growth at 2 days    Respiratory Panel PCR w/COVID-19(SARS-CoV-2) JOANN/ZEN/CARMEN/PAD/COR/MCKAYLA In-House, NP Swab in UTM/VTM, 2 HR TAT - Swab, Nasopharynx [[id number]]  (Normal) Collected: 01/02/25 2300    Lab Status: Final result Specimen: Swab from Nasopharynx Updated: 01/02/25 2353     ADENOVIRUS, PCR Not Detected     Coronavirus 229E Not Detected     Coronavirus HKU1 Not Detected     Coronavirus NL63 Not Detected     Coronavirus OC43 Not Detected     COVID19 Not Detected     Human Metapneumovirus Not Detected     Human Rhinovirus/Enterovirus Not Detected     Influenza A PCR Not Detected     Influenza B PCR Not Detected     Parainfluenza Virus 1 Not Detected     Parainfluenza Virus 2 Not Detected     Parainfluenza Virus 3 Not Detected     Parainfluenza Virus 4 Not Detected     RSV, PCR Not Detected     Bordetella pertussis pcr Not Detected     Bordetella parapertussis PCR Not Detected     Chlamydophila pneumoniae PCR Not Detected     Mycoplasma pneumo by PCR Not Detected    Narrative:      In the setting of a positive respiratory panel with a viral infection PLUS a negative procalcitonin without other underlying concern for bacterial infection, consider observing off antibiotics or discontinuation of antibiotics and continue supportive care. If the respiratory panel is positive for atypical bacterial infection (Bordetella pertussis, Chlamydophila pneumoniae, or Mycoplasma pneumoniae), consider antibiotic de-escalation to target atypical bacterial infection.            Hospital Course:   He was transferred out of hospital service for continuity  of care after intensive care.   Patient was admitted presenting with shortness of breath over the last 3 days associated with coughing.  Initially came in called for STEMI by EMS however EKG after discussing with cardiologist  "found to be similar to previous and experiencing no chest pain.  Troponin reportedly elevated at 603 with delta of 607.  His chest x-ray showed diffuse interstitial edema with right middle and lower lobe consolidation.  Patient was admitted to intensive care unit for further medical management of respiratory failure with concern for pneumonia  He was placed on Vapotherm and now no longer requiring any oxygen after it effectively diuresing.  His follow-up chest x-ray showed significant improvement but remains with residual upper lobe findings.  Patient is on empiric antibiotic as pneumonia cannot be totally ruled out.  Acute hypoxic respiratory failure believed to be multifactorial from CHF exacerbation.  There was concern initially of COPD exacerbation.  He no longer is wheezing.  At least from progress note from yesterday there has not been mention of any wheezing.    Spoke with Dr. Contreras.  He is okay for patient to go home.  Patient is very adamant of going home.  I learned that he had urgent bedside cardioversion for VT January for 2025.  He plans for outpatient ablation.  Patient has cardiac ICD present.  Medications as outlined below.    Since it is hard to really rule out possibilities of pneumonia in this patient I will continue on oral antibiotic.  He tolerates p.o. intake.  White count improving and could be reactive with steroids according to the intensivist.    I am not certain whether patient had COPD exacerbation although been treated for this.  I will continue on short course of prednisone, antibiotic and resume home medications accordingly.    Note that renal function slowly improving.    Patient is medically stable and appropriate for discharge    Follow-up as outlined below.      Physical Exam on Discharge:  /68 (BP Location: Left arm, Patient Position: Lying)   Pulse 61   Temp 97.7 °F (36.5 °C) (Oral)   Resp 16   Ht 177.8 cm (70\")   Wt 86.9 kg (191 lb 8 oz)   SpO2 94%   BMI 27.48 kg/m² "   Physical Exam  Dynamically stable  In room air with saturation in the 90s  GEN: Awake, alert, interactive, in NAD  HEENT: Atraumatic, PERRLA, EOMI, Anicteric, Trachea midline  Lungs: CTAB, no wheezing/rales/rhonchi, clear to auscultation  Heart: RRR, +S1/s2, I did not appreciate any murmur   ABD: soft, nt/nd, +BS, no guarding/rebound  Extremities: atraumatic, no cyanosis, no edema; no gross fluid retention  Skin: no rashes or lesions  Neuro: AAOx3, no focal deficits  Psych: normal mood & affect      Condition on Discharge: stable    Discharge Disposition:  Home or Self Care    Discharge Medications:     Discharge Medications        ASK your doctor about these medications        Instructions Start Date   amiodarone 200 MG tablet  Commonly known as: PACERONE   200 mg, Oral, 2 Times Daily      apixaban 5 MG tablet tablet  Commonly known as: ELIQUIS  Ask about: Which instructions should I use?   5 mg, 2 Times Daily      aspirin 81 MG EC tablet   81 mg, Oral, Daily      atorvastatin 40 MG tablet  Commonly known as: LIPITOR   40 mg, Oral, Daily      carvedilol 6.25 MG tablet  Commonly known as: COREG   6.25 mg, Oral, Every 12 Hours Scheduled      multivitamin tablet tablet   1 tablet, Daily      sacubitril-valsartan 24-26 MG tablet  Commonly known as: ENTRESTO   1 tablet, Oral, 2 Times Daily      spironolactone 25 MG tablet  Commonly known as: ALDACTONE   25 mg, Oral, Daily      traZODone 50 MG tablet  Commonly known as: DESYREL  Ask about: Which instructions should I use?   50 mg, Nightly PRN               This patient has current or prior documentation of an left ventricular ejection fraction (LVEF) of less than or equal to 40%.  Results for orders placed during the hospital encounter of 01/02/25    Adult Transthoracic Echo Complete w/ Color, Spectral and Contrast if Necessary Per Protocol    Interpretation Summary    Left ventricular systolic function is moderately reduced with estimated ejection fraction 30-40% and  calculated (biplane) ejection fraction 34.2%    The left ventricular cavity is mildly dilated.    Left ventricular wall thickness is consistent with moderate eccentric hypertrophy.    Normal right ventricular cavity size and systolic function.    The left atrial cavity is mildly dilated.    There is no significant (more than mild) valve stenosis or regurgitation.        The patient was prescribed or already taking an ACE/ARB.    The patient was prescribed already taking bisoprolol, carvedilol, or sustained release metoprolol succinate.     Discharge Diet:     Activity at Discharge:     Follow-up Appointments:   Future Appointments   Date Time Provider Department Center   1/21/2025  8:00 AM Sugey Salinas PA [facility] CD PAD PAD   4/15/2025 10:00 AM SILKE Poon DO [facility] PC VSQ PAD   9/15/2025  1:00 PM Kobe Maciel MD [facility] CTS  PAD PAD   10/14/2025  9:30 AM [facility]AD DEVICE CHECK [facility] CD PAD PAD       Test Results Pending at Discharge: none    Electronically signed by Lloyd Sears MD, 01/05/25, 09:23 CST.    Time: > 30  minutes.

## 2025-01-05 NOTE — OUTREACH NOTE
Prep Survey      Flowsheet Row Responses   [facility] patient discharged from? [place]   Is LACE score < 7 ? No   Eligibility [facility]   Date of Admission 01/02/25   Date of Discharge 01/05/25   Discharge Disposition Home or Self Care   Discharge diagnosis Acute hypoxic respiratory failure believed to be multifactorial-acute on chronic systolic CHF exacerbation likely due to atrial fibrillation/ventricular tachycardia, possible COPD exacerbation and pneumonia, pulmonary edema   Does the patient have one of the following disease processes/diagnoses(primary or secondary)? CHF   Does the patient have Home health ordered? No   Is there a DME ordered? No   Prep survey completed? Yes            RHONDA METCALF - Registered Nurse

## 2025-01-05 NOTE — PLAN OF CARE
Goal Outcome Evaluation:  Plan of Care Reviewed With: patient        Progress: improving  Outcome Evaluation: VSS. S/SB 54-69, down to 38, occ , on tele. No complaints overnight. Pt rested well. Pt states he is eager to d/c home. Wife remains at bedside. Call light within reach. Safety maintained.

## 2025-01-06 ENCOUNTER — TRANSITIONAL CARE MANAGEMENT TELEPHONE ENCOUNTER (OUTPATIENT)
Dept: CALL CENTER | Facility: HOSPITAL | Age: 75
End: 2025-01-06
Payer: MEDICARE

## 2025-01-06 NOTE — OUTREACH NOTE
Call Center TCM Note      Flowsheet Row Responses   [facility] patient discharged from? [place]   Does the patient have one of the following disease processes/diagnoses(primary or secondary)? CHF   TCM attempt successful? Yes   Call start time 1502   Call end time 1503   Discharge diagnosis Acute hypoxic respiratory failure believed to be multifactorial-acute on chronic systolic CHF exacerbation likely due to atrial fibrillation/ventricular tachycardia, possible COPD exacerbation and pneumonia, pulmonary edema   Person spoke with today (if not patient) and relationship patient   Meds reviewed with patient/caregiver? Yes   Is the patient having any side effects they believe may be caused by any medication additions or changes? No   Does the patient have all medications ordered at discharge? Yes   Is the patient taking all medications as directed (includes completed medication regime)? Yes   Comments Patient has a hospital followup scheduled on 1/10/2025 with Dr. Poon.   Does the patient have an appointment with their PCP within 7-14 days of discharge? Yes   Psychosocial issues? No   Did the patient receive a copy of their discharge instructions? Yes   Nursing interventions Reviewed instructions with patient   What is the patient's perception of their health status since discharge? Improving   Nursing interventions Nurse provided patient education   Is the patient/caregiver able to teach back the hierarchy of who to call/visit for symptoms/problems? PCP, Specialist, Home health nurse, Urgent Care, ED, 911 Yes   TCM call completed? Yes   Call end time 1503   Would this patient benefit from a Referral to Amb Social Work? No   Is the patient interested in additional calls from an ambulatory ? No             Romaine Moctezuma RN    1/6/2025, 15:04 CST

## 2025-01-08 ENCOUNTER — HOSPITAL ENCOUNTER (OUTPATIENT)
Dept: CARDIOLOGY | Facility: HOSPITAL | Age: 75
Discharge: HOME | End: 2025-01-08
Admitting: NURSE PRACTITIONER
Payer: MEDICARE

## 2025-01-08 ENCOUNTER — PREP FOR SURGERY (OUTPATIENT)
Dept: OTHER | Facility: HOSPITAL | Age: 75
End: 2025-01-08
Payer: MEDICARE

## 2025-01-08 VITALS
HEIGHT: 70 IN | BODY MASS INDEX: 28.63 KG/M2 | DIASTOLIC BLOOD PRESSURE: 65 MMHG | OXYGEN SATURATION: 97 % | WEIGHT: 200 LBS | HEART RATE: 50 BPM | SYSTOLIC BLOOD PRESSURE: 134 MMHG

## 2025-01-08 DIAGNOSIS — I47.20 VENTRICULAR TACHYCARDIA, SUSTAINED: Primary | ICD-10-CM

## 2025-01-08 DIAGNOSIS — I25.5 ISCHEMIC CARDIOMYOPATHY: ICD-10-CM

## 2025-01-08 DIAGNOSIS — I50.22 CHRONIC SYSTOLIC CONGESTIVE HEART FAILURE: Primary | ICD-10-CM

## 2025-01-08 DIAGNOSIS — Z95.810 ICD (IMPLANTABLE CARDIOVERTER-DEFIBRILLATOR) IN PLACE: ICD-10-CM

## 2025-01-08 DIAGNOSIS — Z95.810 PRESENCE OF AUTOMATIC CARDIOVERTER/DEFIBRILLATOR (AICD): ICD-10-CM

## 2025-01-08 DIAGNOSIS — I47.20 VENTRICULAR TACHYCARDIA, SUSTAINED: ICD-10-CM

## 2025-01-08 LAB
ABSOLUTE LUNG FLUID CONTENT: 45 % (ref 20–35)
ANION GAP SERPL CALCULATED.3IONS-SCNC: 12 MMOL/L (ref 5–15)
BACTERIA SPEC AEROBE CULT: NORMAL
BUN SERPL-MCNC: 19 MG/DL (ref 8–23)
BUN/CREAT SERPL: 16.4 (ref 7–25)
CALCIUM SPEC-SCNC: 8.6 MG/DL (ref 8.6–10.5)
CHLORIDE SERPL-SCNC: 102 MMOL/L (ref 98–107)
CO2 SERPL-SCNC: 24 MMOL/L (ref 22–29)
CREAT SERPL-MCNC: 1.16 MG/DL (ref 0.76–1.27)
DEPRECATED RDW RBC AUTO: 47.1 FL (ref 37–54)
EGFRCR SERPLBLD CKD-EPI 2021: 66.9 ML/MIN/1.73
ERYTHROCYTE [DISTWIDTH] IN BLOOD BY AUTOMATED COUNT: 13.6 % (ref 12.3–15.4)
FERRITIN SERPL-MCNC: 541.6 NG/ML (ref 30–400)
GLUCOSE SERPL-MCNC: 246 MG/DL (ref 65–99)
HCT VFR BLD AUTO: 35.4 % (ref 37.5–51)
HGB BLD-MCNC: 11.3 G/DL (ref 13–17.7)
IRON 24H UR-MRATE: 32 MCG/DL (ref 59–158)
IRON SATN MFR SERPL: 14 % (ref 20–50)
MCH RBC QN AUTO: 30.1 PG (ref 26.6–33)
MCHC RBC AUTO-ENTMCNC: 31.9 G/DL (ref 31.5–35.7)
MCV RBC AUTO: 94.1 FL (ref 79–97)
PLATELET # BLD AUTO: 222 10*3/MM3 (ref 140–450)
PMV BLD AUTO: 9.4 FL (ref 6–12)
POTASSIUM SERPL-SCNC: 4.1 MMOL/L (ref 3.5–5.2)
RBC # BLD AUTO: 3.76 10*6/MM3 (ref 4.14–5.8)
SODIUM SERPL-SCNC: 138 MMOL/L (ref 136–145)
TIBC SERPL-MCNC: 232 MCG/DL (ref 298–536)
TRANSFERRIN SERPL-MCNC: 156 MG/DL (ref 200–360)
WBC NRBC COR # BLD AUTO: 9.41 10*3/MM3 (ref 3.4–10.8)

## 2025-01-08 PROCEDURE — 82728 ASSAY OF FERRITIN: CPT | Performed by: NURSE PRACTITIONER

## 2025-01-08 PROCEDURE — 94726 PLETHYSMOGRAPHY LUNG VOLUMES: CPT | Performed by: NURSE PRACTITIONER

## 2025-01-08 PROCEDURE — 83540 ASSAY OF IRON: CPT | Performed by: NURSE PRACTITIONER

## 2025-01-08 PROCEDURE — 85027 COMPLETE CBC AUTOMATED: CPT | Performed by: NURSE PRACTITIONER

## 2025-01-08 PROCEDURE — 84466 ASSAY OF TRANSFERRIN: CPT | Performed by: NURSE PRACTITIONER

## 2025-01-08 PROCEDURE — 80048 BASIC METABOLIC PNL TOTAL CA: CPT | Performed by: NURSE PRACTITIONER

## 2025-01-08 RX ORDER — SODIUM CHLORIDE 9 MG/ML
40 INJECTION, SOLUTION INTRAVENOUS AS NEEDED
OUTPATIENT
Start: 2025-01-08

## 2025-01-08 RX ORDER — SODIUM CHLORIDE 0.9 % (FLUSH) 0.9 %
10 SYRINGE (ML) INJECTION EVERY 12 HOURS SCHEDULED
OUTPATIENT
Start: 2025-01-08

## 2025-01-08 RX ORDER — SODIUM CHLORIDE 0.9 % (FLUSH) 0.9 %
10 SYRINGE (ML) INJECTION AS NEEDED
OUTPATIENT
Start: 2025-01-08

## 2025-01-08 NOTE — PROGRESS NOTES
Heart Failure Clinic Consultation Note  Reason For Visit:  CHF    Subjective        Vj Garcia is a 74 y.o. male who is referred for CHF.  He has been previously seen and followed by Dr. Peng MD for CAD with ischemic cardiomyopathy, atrial fibrillation, ventricular tachycardia, and CHF.  He has been previously admitted to [facility] on 1/2/2025 and discharged on 1/5/2025.  During that admission he was treated for worsening shortness of breath that have been worsening over a few days.  He is found to have pneumonia, COPD exacerbation, CHF exacerbation.  He was also having issues with ventricular tachycardia which required urgent bedside cardioversion while inpatient.  He did see EP during this visit and had changes to his ICD.  He was diuresed during this visit as well and started to show improvement of symptoms.  With regard to heart failure, he was discharged with carvedilol 6.25 mg twice daily, Entresto 24-26 mg twice daily, and spironolactone 25 mg daily.  He was continued on amiodarone 200 mg twice daily for ventricular tachycardia has being managed by EP.    He reports that he feels fair today.  He denies any new shortness of breath, lower extremity edema, lightheadedness, dizziness, or chest pain.  He denies any palpitations or near syncopal episodes since discharge from the hospital.  He remains on amiodarone 200 mg twice daily without any concerning issues.  He does have multiple questions regarding the need for medications and further evaluations.  He has not started his spironolactone that was originally ordered by Dr. Khoury.  His weight appears to be fluctuant but overall stable since discharge.  His ReDs reading is 45% today.    Review of Systems   Constitutional:  Negative for fatigue.   Respiratory:  Negative for shortness of breath.    Cardiovascular:  Negative for chest pain, palpitations and leg swelling.   Neurological:  Negative for dizziness and light-headedness.  "    Pertinent PMH  HFrEF  Ischemic cardiomyopathy  CAD  Status post CABG and mitral valve repair  Ventricular tachycardia  COPD  Chronic kidney disease    Pertinent past medical, surgical, family, and social history were reviewed and updated in the EMR.      Current Outpatient Medications:     amiodarone (PACERONE) 200 MG tablet, Take 1 tablet by mouth 2 (Two) Times a Day., Disp: 90 tablet, Rfl: 3    apixaban (ELIQUIS) 5 MG tablet tablet, Take 1 tablet by mouth 2 (Two) Times a Day., Disp: , Rfl:     aspirin 81 MG EC tablet, Take 1 tablet by mouth Daily., Disp: 90 tablet, Rfl: 3    atorvastatin (LIPITOR) 40 MG tablet, Take 1 tablet by mouth Daily., Disp: 30 tablet, Rfl: 11    carvedilol (COREG) 6.25 MG tablet, Take 1 tablet by mouth Every 12 (Twelve) Hours., Disp: 60 tablet, Rfl: 11    ipratropium (Atrovent HFA) 17 MCG/ACT inhaler, Inhale 2 puffs 4 (Four) Times a Day As Needed for Wheezing., Disp: 12.9 g, Rfl: 0    multivitamin (MULTI VITAMIN DAILY PO), Take 1 tablet by mouth Daily., Disp: , Rfl:     sacubitril-valsartan (ENTRESTO) 24-26 MG tablet, Take 1 tablet by mouth 2 (Two) Times a Day., Disp: 180 tablet, Rfl: 3    spironolactone (ALDACTONE) 25 MG tablet, Take 1 tablet by mouth Daily., Disp: 90 tablet, Rfl: 3    traZODone (DESYREL) 50 MG tablet, Take 1 tablet by mouth At Night As Needed for Sleep., Disp: , Rfl:      Objective   Vital Signs:  /65 (BP Location: Left arm, Patient Position: Sitting)   Pulse 50   Ht 177.8 cm (70\")   Wt 90.7 kg (200 lb)   SpO2 97%   BMI 28.70 kg/m²   Estimated body mass index is 28.7 kg/m² as calculated from the following:    Height as of this encounter: 177.8 cm (70\").    Weight as of this encounter: 90.7 kg (200 lb).      Neck:      Vascular: No JVD.   Pulmonary:      Effort: Pulmonary effort is normal.      Breath sounds: Normal breath sounds.   Cardiovascular:      Normal rate. Regular rhythm. Normal S1. Normal S2.       Murmurs: There is no murmur.      No gallop.  No " click. No rub.   Pulses:     Intact distal pulses.   Edema:     Peripheral edema absent.   Abdominal:      Palpations: Abdomen is soft.      Tenderness: There is no abdominal tenderness.   Skin:     General: Skin is warm and dry.   Neurological:      General: No focal deficit present.      Mental Status: Alert and oriented to person, place and time.   Psychiatric:         Behavior: Behavior is cooperative.       Result Review :  The following data was reviewed by: RAMIRO Baum on 01/08/2025:  BMP   BMP          1/4/2025    01:16 1/5/2025    01:14 1/8/2025    08:40   BMP   BUN 29  27  19    Creatinine 1.65  1.40  1.16    Sodium 141  140  138    Potassium 4.1  3.9  4.1    Chloride 104  103  102    CO2 24.0  26.0  24.0    Calcium 8.5  8.1  8.6      CBC   CBC          1/4/2025    01:16 1/5/2025    01:14 1/8/2025    08:40   CBC   WBC 19.74  15.13  9.41    RBC 3.54  3.47  3.76    Hemoglobin 10.6  10.2  11.3    Hematocrit 32.5  32.5  35.4    MCV 91.8  93.7  94.1    MCH 29.9  29.4  30.1    MCHC 32.6  31.4  31.9    RDW 13.8  13.9  13.6    Platelets 168  185  222      Ferritin   Ferritin   Date/Time Value Ref Range Status   01/08/2025 0840 541.60 (H) 30.00 - 400.00 ng/mL Final     Iron Profile   Iron   Date/Time Value Ref Range Status   01/08/2025 0840 32 (L) 59 - 158 mcg/dL Final     Iron Saturation (TSAT)   Date/Time Value Ref Range Status   01/08/2025 0840 14 (L) 20 - 50 % Final     Transferrin   Date/Time Value Ref Range Status   01/08/2025 0840 156 (L) 200 - 360 mg/dL Final     TIBC   Date/Time Value Ref Range Status   01/08/2025 0840 232 (L) 298 - 536 mcg/dL Final     ReDS   Lab Results   Component Value Date    ABSOLUTELUNG 45 (A) 01/08/2025      Results for orders placed during the hospital encounter of 01/02/25    Adult Transthoracic Echo Complete w/ Color, Spectral and Contrast if Necessary Per Protocol    Interpretation Summary    Left ventricular systolic function is moderately reduced with estimated  ejection fraction 30-40% and calculated (biplane) ejection fraction 34.2%    The left ventricular cavity is mildly dilated.    Left ventricular wall thickness is consistent with moderate eccentric hypertrophy.    Normal right ventricular cavity size and systolic function.    The left atrial cavity is mildly dilated.    There is no significant (more than mild) valve stenosis or regurgitation.          Chart review:  Reviewed hospital notes from recent admission to [facility].    Assessment and Plan   Diagnoses and all orders for this visit:    1. Chronic systolic congestive heart failure (Primary)  2. Ischemic cardiomyopathy  3. Presence of automatic cardioverter/defibrillator (AICD)  4. Ventricular tachycardia, sustained  5. ICD (implantable cardioverter-defibrillator) in place  Since discharge from the hospital he appears to be stable and is euvolemic on examination.  Multiple questions were answered today regarding goals of care and everything moving forward with cardiology services.  We had a decently lengthy discussion regarding medications and how beneficial they can be to heart failure and reduction of mortality risk.  He is understanding with this after our conversation.  - He appears to be doing well without any concerning symptoms for ventricular tachycardia.  Will have him continue to follow with EP regarding this.  - He has not started spironolactone we discussed the importance of this and its role with GDMT and heart failure treatments.  He will start spironolactone 25 mg daily today.  This will help with further diuresis and fluid management as he is not on any other diuretics.  - Carvedilol 6.25 mg twice daily  - Entresto 24-26 mg twice daily  - Discussed SGLT2 inhibitors but we will hold off today.  However, we did discuss the importance and we will further consider them in the future.  - Amiodarone 200 mg twice daily  - Aspirin 81 mg daily  - Lipitor 40 mg daily  - Discussed daily weights  and importance of tracking weights with regard to fluid status.   - Discussed importance of fluid restrictions and appropriate fluid balance along with sodium restriction.         Follow Up   Return in about 2 weeks (around 1/22/2025) for recheck.    Patient was given instructions and counseling regarding his condition or for health maintenance advice. Please see specific information pulled into the AVS if appropriate.       Derek Baig, APRN  01/08/25  16:49 CST    I spent 53 minutes caring for Vj on this date of service. This time includes time spent by me in the following activities:preparing for the visit, reviewing tests, obtaining and/or reviewing a separately obtained history, performing a medically appropriate examination and/or evaluation , counseling and educating the patient/family/caregiver, ordering medications, tests, or procedures, documenting information in the medical record, and independently interpreting results and communicating that information with the patient/family/caregiver  Time spent on the separately reported service of ReDS interpretation/documentation is not included in the time used to support the E/M service also reported today.

## 2025-01-08 NOTE — PROGRESS NOTES
Heart Failure Clinic    Date:  01/08/25     Vitals:    01/08/25 0849   BP: 134/65   Pulse: 50   SpO2: 97%        Indication:  Heart Failure     Procedure:  ReDS device sensor unit applied to right side of chest and right side of back.  Appropriate positioning confirmed based off of the unit's calculation.  Chest measurement obtained with the chest size ruler.  Measurement session performed over 45 seconds.      Method of arrival:  Ambulatory    Weighing self daily:  No; Patient instructed on the importance of weighing daily in the morning after using the bathroom in order to track fluid status. Patient v/u.       Taking medications as prescribed:  Yes    Edema:  No    Shortness of Air:  No    Number of pillows used at night:  <2, patient sleeps on one pillow    Results:  ReDS Value=    45                      Interpretation:  39-46% - elevated lung fluid content    Mallory L Haase, RN 01/08/25 08:50 CST

## 2025-01-08 NOTE — PATIENT INSTRUCTIONS
Medication Management  Continue Entresto 24/26mg twice daily  Start Spironolactone 25mg daily  Continue Carvedilol 6.25mg twice daily  Continue Amiodarone 200mg twice daily      Weight Monitoring  - Please weigh yourself every morning after you use the restroom while wearing the same amount of clothing.  - Please write down your weight readings in a log and bring that log with you to your next heart failure clinic appointment.  - If your weight increases by 2 lbs in 1 day or 5 lbs in 1 week, you should call the office. If your weight does not come down, please call the heart failure clinic.    Blood Pressure Monitoring  - Please check your blood pressure at least once daily ~2-4 hours after you have taken your morning blood pressure medications.  - Please write down your blood pressure readings in a log and bring that log with you to your next heart failure clinic appointment.    Diet  - It is very important that you monitor your fluid and sodium (salt) intake.  - Please try to limit sodium intake to less than 2,000 mg each day.  - Please try to limit fluid intake to ~2 liters (64 ounces) each day.

## 2025-01-13 LAB
QT INTERVAL: 362 MS
QTC INTERVAL: 496 MS

## 2025-01-14 ENCOUNTER — OFFICE VISIT (OUTPATIENT)
Dept: INTERNAL MEDICINE | Facility: CLINIC | Age: 75
End: 2025-01-14
Payer: MEDICARE

## 2025-01-14 VITALS
TEMPERATURE: 98.7 F | RESPIRATION RATE: 18 BRPM | DIASTOLIC BLOOD PRESSURE: 76 MMHG | HEART RATE: 55 BPM | OXYGEN SATURATION: 96 % | BODY MASS INDEX: 28.77 KG/M2 | SYSTOLIC BLOOD PRESSURE: 130 MMHG | HEIGHT: 70 IN | WEIGHT: 201 LBS

## 2025-01-14 DIAGNOSIS — Z87.01 HISTORY OF RECENT PNEUMONIA: ICD-10-CM

## 2025-01-14 DIAGNOSIS — Z09 HOSPITAL DISCHARGE FOLLOW-UP: Primary | ICD-10-CM

## 2025-01-14 DIAGNOSIS — Z95.1 STATUS POST TWO VESSEL CORONARY ARTERY BYPASS: ICD-10-CM

## 2025-01-14 DIAGNOSIS — I50.23 ACUTE ON CHRONIC SYSTOLIC (CONGESTIVE) HEART FAILURE: ICD-10-CM

## 2025-01-14 DIAGNOSIS — I47.20 VENTRICULAR TACHYCARDIA: ICD-10-CM

## 2025-01-14 DIAGNOSIS — I25.10 CORONARY ARTERY DISEASE INVOLVING NATIVE CORONARY ARTERY OF NATIVE HEART WITHOUT ANGINA PECTORIS: ICD-10-CM

## 2025-01-14 DIAGNOSIS — I25.5 ISCHEMIC CARDIOMYOPATHY: ICD-10-CM

## 2025-01-14 DIAGNOSIS — I48.0 PAF (PAROXYSMAL ATRIAL FIBRILLATION): ICD-10-CM

## 2025-01-14 PROCEDURE — 99495 TRANSJ CARE MGMT MOD F2F 14D: CPT | Performed by: INTERNAL MEDICINE

## 2025-01-14 PROCEDURE — 1160F RVW MEDS BY RX/DR IN RCRD: CPT | Performed by: INTERNAL MEDICINE

## 2025-01-14 PROCEDURE — 1111F DSCHRG MED/CURRENT MED MERGE: CPT | Performed by: INTERNAL MEDICINE

## 2025-01-14 PROCEDURE — 1126F AMNT PAIN NOTED NONE PRSNT: CPT | Performed by: INTERNAL MEDICINE

## 2025-01-14 PROCEDURE — 1159F MED LIST DOCD IN RCRD: CPT | Performed by: INTERNAL MEDICINE

## 2025-01-14 NOTE — PROGRESS NOTES
"    CC: Hospital follow-up for hypoxia and shortness of breath.    History:  Vj Garcia is a 74 y.o. male who presents today for transitional care management after hospitalization.    Date of Discharge: 1/5/2025.  TCM call successfully made on 1/6 by Romaine Moctezuma RN.    ROS:  Review of Systems    Mr. Garcia  reports that he quit smoking about 53 years ago. His smoking use included cigarettes. He started smoking about 6 months ago. He has never used smokeless tobacco. He reports that he does not currently use alcohol. He reports that he does not currently use drugs.      Current Outpatient Medications:     amiodarone (PACERONE) 200 MG tablet, Take 1 tablet by mouth 2 (Two) Times a Day., Disp: 90 tablet, Rfl: 3    apixaban (ELIQUIS) 5 MG tablet tablet, Take 1 tablet by mouth 2 (Two) Times a Day., Disp: , Rfl:     aspirin 81 MG EC tablet, Take 1 tablet by mouth Daily., Disp: 90 tablet, Rfl: 3    atorvastatin (LIPITOR) 40 MG tablet, Take 1 tablet by mouth Daily., Disp: 30 tablet, Rfl: 11    carvedilol (COREG) 6.25 MG tablet, Take 1 tablet by mouth Every 12 (Twelve) Hours., Disp: 60 tablet, Rfl: 11    ipratropium (Atrovent HFA) 17 MCG/ACT inhaler, Inhale 2 puffs 4 (Four) Times a Day As Needed for Wheezing., Disp: 12.9 g, Rfl: 0    multivitamin (MULTI VITAMIN DAILY PO), Take 1 tablet by mouth Daily., Disp: , Rfl:     sacubitril-valsartan (ENTRESTO) 24-26 MG tablet, Take 1 tablet by mouth 2 (Two) Times a Day., Disp: 180 tablet, Rfl: 3    spironolactone (ALDACTONE) 25 MG tablet, Take 1 tablet by mouth Daily., Disp: 90 tablet, Rfl: 3    traZODone (DESYREL) 50 MG tablet, Take 1 tablet by mouth At Night As Needed for Sleep., Disp: , Rfl:       OBJECTIVE:  /76 (BP Location: Left arm, Patient Position: Sitting, Cuff Size: Adult)   Pulse 55   Temp 98.7 °F (37.1 °C) (Infrared)   Resp 18   Ht 177.8 cm (70\")   Wt 91.2 kg (201 lb)   SpO2 96%   BMI 28.84 kg/m²        Physical Exam  Constitutional:       Comments: " Seen and discussed with his significant other.   HENT:      Head: Normocephalic and atraumatic.   Eyes:      Conjunctiva/sclera: Conjunctivae normal.      Pupils: Pupils are equal, round, and reactive to light.   Cardiovascular:      Rate and Rhythm: Normal rate and regular rhythm.      Heart sounds: Normal heart sounds.      Comments: Presently looks euvolemic on exam.  Pulmonary:      Effort: Pulmonary effort is normal. No respiratory distress.      Breath sounds: Normal breath sounds.   Musculoskeletal:         General: No swelling.      Cervical back: Neck supple.   Skin:     General: Skin is warm and dry.      Findings: No rash.   Neurological:      General: No focal deficit present.      Mental Status: He is alert and oriented to person, place, and time.   Psychiatric:         Mood and Affect: Mood normal.         Behavior: Behavior normal.         Thought Content: Thought content normal.         Judgment: Judgment normal.       Assessment/Plan    Diagnoses and all orders for this visit:    1. Hospital discharge follow-up (Primary)    2. Acute on chronic systolic (congestive) heart failure    3. Ischemic cardiomyopathy    4. History of recent pneumonia    5. Coronary artery disease involving native coronary artery of native heart without angina pectoris    6. Status post two vessel coronary artery bypass, Posterior Basal LV Aneurysm Repair, MV repair  7/23/2024    7. PAF (paroxysmal atrial fibrillation)    8. Ventricular tachycardia    He went to the emergency department at [facility] on 1/2 complaining of shortness of breath and cough.  Chest x-ray showed diffuse interstitial edema and a right middle/lower lobe consolidation.  He was diuresed.  He was treated with empiric antibiotics.  He at 1 point required Vapotherm and was weaned from an oxygen demand perspective.  He did have an episode of VT that required cardioversion.  Dr. Contreras was involved.  He continues on amiodarone and carvedilol.  He also continues  on Eliquis.  He has an AICD in place.  There may be an outpatient ablation in the future.  He sees electrophysiology again on 1/21.    It does not appear that any medications were changed.    He was seen in the heart failure clinic on 1/8.  He sees them again on 1/22.  He looks euvolemic in the office today.  Continue the same regimen.    Treated with azithromycin and cefdinir for possible superimposed pneumonia.     He admits that he needs to make some dietary changes.  He apparently had a bit of country ham around the time that these issues unfolded.  This likely tipped him into an exacerbation of heart failure.    His next follow-up with me is on 4/15.  He can come sooner if needed.  I am also available for questions for his other providers if needed.    Result Review :  XR CHEST 1 VW   IMPRESSION:  1. Improving pulmonary opacities supporting improving pulmonary edema  with residual upper lobe findings. Stable mediastinal contours with  evidence of mediastinal surgery. Left subclavian cardiac pacer device.  This report was signed and finalized on 1/4/2025 8:40 AM by Dr. Dara Coelho MD.     Results for orders placed during the hospital encounter of 01/02/25    Adult Transthoracic Echo Complete w/ Color, Spectral and Contrast if Necessary Per Protocol    Interpretation Summary    Left ventricular systolic function is moderately reduced with estimated ejection fraction 30-40% and calculated (biplane) ejection fraction 34.2%    The left ventricular cavity is mildly dilated.    Left ventricular wall thickness is consistent with moderate eccentric hypertrophy.    Normal right ventricular cavity size and systolic function.    The left atrial cavity is mildly dilated.    There is no significant (more than mild) valve stenosis or regurgitation.              An After Visit Summary was printed and given to the patient at discharge.  Return for Next scheduled follow up. Sooner if problems arise.         KAY Elizabeth  DO Ayah  Electronically signed by SILKE Poon DO, 01/14/25, 10:34 AM CST.

## 2025-01-16 ENCOUNTER — READMISSION MANAGEMENT (OUTPATIENT)
Dept: CALL CENTER | Facility: HOSPITAL | Age: 75
End: 2025-01-16
Payer: MEDICARE

## 2025-01-16 NOTE — OUTREACH NOTE
CHF Week 2 Survey      Flowsheet Row Responses   [facility] [place] patient discharged from? [place]   Does the patient have one of the following disease processes/diagnoses(primary or secondary)? CHF   Week 2 attempt successful? Yes   Call start time 1211   Call end time 1217   Discharge diagnosis Acute hypoxic respiratory failure believed to be multifactorial-acute on chronic systolic CHF exacerbation likely due to atrial fibrillation/ventricular tachycardia, possible COPD exacerbation and pneumonia, pulmonary edema   Person spoke with today (if not patient) and relationship patient   Medication alerts for this patient Atrovent HFA, Cefdinir, Prednisone   Meds reviewed with patient/caregiver? Yes   Is the patient having any side effects they believe may be caused by any medication additions or changes? No   Does the patient have all medications ordered at discharge? Yes   Is the patient taking all medications as directed (includes completed medication regime)? Yes   Comments regarding appointments Patient saw PCP on 1/14/25 for hospital f/u. Patient saw Cardiology on 1/8/25 for f/u.  Cardiology appt scheduled for 1/21/25 at 8:00 AM with ANTHONY Starr. [facility] Failure Clinic appt on 1/22/25 at 8:30 AM. Verified appts with patient.   Does the patient have a primary care provider?  Yes   Does the patient have an appointment with their PCP within 7 days of discharge? Yes   Comments regarding PCP DR. Glenn Poon   Has the patient kept scheduled appointments due by today? Yes   Has home health visited the patient within 72 hours of discharge? N/A   Pulse Ox monitoring None   Psychosocial issues? No   Comments Patient states he ate too much salt and his blood pressure tana last night. Advised patient to follow low sodium diet. Patient reports he is weighing daily in the mornings.   Did the patient receive a copy of their discharge instructions? Yes   Nursing interventions Reviewed instructions with  patient   What is the patient's perception of their health status since discharge? Improving   Is the patient able to teach back signs and symptoms of worsening condition? (i.e. weight gain, shortness of air, etc.) Yes   If the patient is a current smoker, are they able to teach back resources for cessation? Not a smoker   Is the patient/caregiver able to teach back the hierarchy of who to call/visit for symptoms/problems? PCP, Specialist, Home health nurse, Urgent Care, ED, 911 Yes   Additional teach back comments Reviewed s/s of when to seek care for pneumonia, A Fib and CHF.   CHF Zone this Call Green Zone   Green Zone Patient reports doing well, No new or worsening shortness of breath, Physical activity level is normal for you, No new swelling -  feet, ankles and legs look normal for you, Weight check stable, No chest pain   Green Zone Interventions Daily weight check, Meds as directed, Low sodium diet, Follow up visits planned   CHF Week 2 call completed? Yes   Is the patient interested in additional calls from an ambulatory ? No   Would this patient benefit from a Referral to [facility] Social Work? No   Wrap up additional comments Patient denies any needs, concerns or questions.   Call end time 1217            Nena KEITH - Registered Nurse

## 2025-01-21 ENCOUNTER — OFFICE VISIT (OUTPATIENT)
Dept: CARDIOLOGY | Facility: CLINIC | Age: 75
End: 2025-01-21
Payer: MEDICARE

## 2025-01-21 VITALS
BODY MASS INDEX: 27.35 KG/M2 | DIASTOLIC BLOOD PRESSURE: 56 MMHG | HEART RATE: 70 BPM | SYSTOLIC BLOOD PRESSURE: 90 MMHG | OXYGEN SATURATION: 97 % | HEIGHT: 70 IN | WEIGHT: 191 LBS

## 2025-01-21 DIAGNOSIS — I25.5 ISCHEMIC CARDIOMYOPATHY: Primary | ICD-10-CM

## 2025-01-21 DIAGNOSIS — I48.0 PAF (PAROXYSMAL ATRIAL FIBRILLATION): ICD-10-CM

## 2025-01-21 DIAGNOSIS — I47.20 VENTRICULAR TACHYCARDIA, SUSTAINED: ICD-10-CM

## 2025-01-21 PROCEDURE — 99214 OFFICE O/P EST MOD 30 MIN: CPT | Performed by: PHYSICIAN ASSISTANT

## 2025-01-21 PROCEDURE — 93000 ELECTROCARDIOGRAM COMPLETE: CPT | Performed by: PHYSICIAN ASSISTANT

## 2025-01-21 NOTE — PROGRESS NOTES
"[facility] HEART GROUP -  CLINIC FOLLOW UP     Patient Care Team:  SILKE Poon DO as PCP - General (Internal Medicine)  Brennan Khoury DO as Consulting Physician (Cardiology)    Chief Complaint: follow up to ablation     Subjective   EP Problems:  1.  Ventricular tachycardia  -8/22/24: retrograde one to one conduction  -1/5/25: DCCV  2.   Atrial flutter  -8/22/24: CTI ablation   3.  Left atrial appendage exclusion with atrial clip  4.  Presence of a cardiac ICD (Biotronik)     Cardiology Problems:  1.  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  Mitral valve repair  3.  CAD status post CABG  4.  Hypertension  5.  Chronic systolic heart failure  6.  Hyperlipidemia     Medical Problems:  1.  COPD  2.  BPH    HPI: Today I had the pleasure of seeing Vj Garcia in the cardiology clinic for follow up. He is a 74 year old male with a history complicated cardiac history and recurrent VT earlier this month noted on his remote transmission. The VT was was sustained and slower and device clinic advised him to report to ER if he had symptoms. Over a few days, his symptoms progressed and he did not convert and he presented to ER. ATP was unsuccessful and he required cardioversion urgently. Device reprogrammed for monitoring only between heart rates of 100 to 150 bpm; VT treatment above 150 bpm     Objective     Visit Vitals  BP 90/56   Pulse 70   Ht 177.8 cm (70\")   Wt 86.6 kg (191 lb)   SpO2 97%   BMI 27.41 kg/m²           Vitals reviewed.   Constitutional:       Appearance: Healthy appearance. Not in distress.   Eyes:      Extraocular Movements: Extraocular movements intact.      Conjunctiva/sclera: Conjunctivae normal.      Pupils: Pupils are equal, round, and reactive to light.   HENT:      Head: Normocephalic and atraumatic.      Nose: Nose normal.    Mouth/Throat:      Lips: Pink.      Mouth: Mucous membranes are moist.      Pharynx: Oropharynx " is clear.   Neck:      Vascular: No carotid bruit or JVD. JVD normal.   Pulmonary:      Effort: Pulmonary effort is normal.      Breath sounds: Normal breath sounds.   Chest:      Chest wall: Not tender to palpatation.   Cardiovascular:      PMI at left midclavicular line. Normal rate. Regular rhythm. Normal S1. Normal S2.       Murmurs: There is no murmur.      No gallop.  No rub.   Pulses:     Radial: 2+ bilaterally.  Edema:     Peripheral edema absent.   Abdominal:      General: Bowel sounds are normal.      Palpations: Abdomen is soft.   Musculoskeletal: Normal range of motion.      Extremities: No clubbing present.     Cervical back: Normal range of motion. Skin:     General: Skin is warm and dry.   Neurological:      General: No focal deficit present.      Mental Status: Alert and oriented to person, place, and time.   Psychiatric:         Attention and Perception: Attention normal.         Mood and Affect: Affect normal.         Speech: Speech normal.         Behavior: Behavior normal.         Cognition and Memory: Cognition normal.           The following portions of the patient's history were reviewed and updated as appropriate: allergies, current medications, past medical history, past social history, past and problem list.     Review of Systems   Constitutional: Negative.    HENT: Negative.     Eyes: Negative.    Respiratory: Negative.     Cardiovascular: Negative.    Gastrointestinal: Negative.    Endocrine: Negative.    Genitourinary: Negative.    Musculoskeletal: Negative.    Skin: Negative.    Allergic/Immunologic: Negative.    Neurological: Negative.    Hematological: Negative.    Psychiatric/Behavioral: Negative.         ECG 12 Lead    Date/Time: 1/21/2025 9:38 AM  Performed by: Sugey Salinas PA    Authorized by: Sugey Salinas PA  Comparison: compared with previous ECG from 1/2/2025  Rhythm: sinus rhythm  Rate: normal  BPM: 65  QRS axis: right  Other: no other findings  Other findings: T  wave abnormality and poor R wave progression    Clinical impression: abnormal EKG          Medication Review: yes    Current Outpatient Medications:     amiodarone (PACERONE) 200 MG tablet, Take 1 tablet by mouth 2 (Two) Times a Day., Disp: 90 tablet, Rfl: 3    apixaban (ELIQUIS) 5 MG tablet tablet, Take 1 tablet by mouth 2 (Two) Times a Day., Disp: , Rfl:     aspirin 81 MG EC tablet, Take 1 tablet by mouth Daily., Disp: 90 tablet, Rfl: 3    atorvastatin (LIPITOR) 40 MG tablet, Take 1 tablet by mouth Daily., Disp: 30 tablet, Rfl: 11    carvedilol (COREG) 6.25 MG tablet, Take 1 tablet by mouth Every 12 (Twelve) Hours., Disp: 60 tablet, Rfl: 11    ipratropium (Atrovent HFA) 17 MCG/ACT inhaler, Inhale 2 puffs 4 (Four) Times a Day As Needed for Wheezing., Disp: 12.9 g, Rfl: 0    multivitamin (MULTI VITAMIN DAILY PO), Take 1 tablet by mouth Daily., Disp: , Rfl:     sacubitril-valsartan (ENTRESTO) 24-26 MG tablet, Take 1 tablet by mouth 2 (Two) Times a Day., Disp: 180 tablet, Rfl: 3    spironolactone (ALDACTONE) 25 MG tablet, Take 1 tablet by mouth Daily., Disp: 90 tablet, Rfl: 3    traZODone (DESYREL) 50 MG tablet, Take 1 tablet by mouth At Night As Needed for Sleep., Disp: , Rfl:    No Known Allergies    I have reviewed       CBC:  Lab Results - Last 18 Months   Lab Units 01/08/25  0840   WBC 10*3/mm3 9.41   HEMOGLOBIN g/dL 11.3*   HEMATOCRIT % 35.4*   PLATELETS 10*3/mm3 222   IRON mcg/dL 32*      BMP/CMP:  Lab Results - Last 18 Months   Lab Units 01/08/25  0840   SODIUM mmol/L 138   POTASSIUM mmol/L 4.1   CHLORIDE mmol/L 102   CO2 mmol/L 24.0   GLUCOSE mg/dL 246*   BUN mg/dL 19   CREATININE mg/dL 1.16   CALCIUM mg/dL 8.6     BNP:   Lab Results - Last 18 Months   Lab Units 01/02/25  2300   PROBNP pg/mL 9,873.0*      THYROID:  Lab Results - Last 18 Months   Lab Units 05/21/24  1804   TSH uIU/mL 0.786       Results for orders placed during the hospital encounter of 01/02/25    Adult Transthoracic Echo Complete w/ Color,  Spectral and Contrast if Necessary Per Protocol    Interpretation Summary    Left ventricular systolic function is moderately reduced with estimated ejection fraction 30-40% and calculated (biplane) ejection fraction 34.2%    The left ventricular cavity is mildly dilated.    Left ventricular wall thickness is consistent with moderate eccentric hypertrophy.    Normal right ventricular cavity size and systolic function.    The left atrial cavity is mildly dilated.    There is no significant (more than mild) valve stenosis or regurgitation.     Assessment:   Diagnoses and all orders for this visit:    1. Ischemic cardiomyopathy (Primary)    2. PAF (paroxysmal atrial fibrillation)    3. Ventricular tachycardia, sustained    Other orders  -     ECG 12 Lead      VT: Continue amiodarone 200mg for VT suppression. Can add mexiletine if needed if recurrent VT reoccurs. During his sustained episode, his VT was occurring in 120s and was below threshold of his VT zone. VT 1 settings were set for 450 ms per device nurse.   -He is planning to have VT ablation in the near future and he is awaiting his date to be scheduled.     ICD: Discussed remote monitoring. No recurrent VT since hospitalization. Discussed programming changes made by Dr. Contreras during admission.   -If he has elevated HR >100 bpm associated with significant SOB or presyncope or chest discomfort, he was advised to go to the ER.     PAF: Previous flutter ablation (CTI). Remains on Eliquis with AUDRA clip.     Follow up in 3 months with Dr. Contreras.     I spent 30 minutes caring for Vj on this date of service. This time includes time spent by me in the following activities:preparing for the visit, reviewing tests, obtaining and/or reviewing a separately obtained history, performing a medically appropriate examination and/or evaluation , counseling and educating the patient/family/caregiver, ordering medications, tests, or procedures, referring and communicating with  other health care professionals , documenting information in the medical record, and independently interpreting results and communicating that information with the patient/family/caregiver        Electronically signed by ANTHONY Ford

## 2025-01-22 ENCOUNTER — HOSPITAL ENCOUNTER (OUTPATIENT)
Dept: CARDIOLOGY | Facility: HOSPITAL | Age: 75
Discharge: HOME | End: 2025-01-22
Admitting: NURSE PRACTITIONER
Payer: MEDICARE

## 2025-01-22 VITALS
SYSTOLIC BLOOD PRESSURE: 115 MMHG | DIASTOLIC BLOOD PRESSURE: 55 MMHG | BODY MASS INDEX: 27.41 KG/M2 | OXYGEN SATURATION: 98 % | WEIGHT: 191 LBS | HEART RATE: 54 BPM

## 2025-01-22 DIAGNOSIS — Z95.810 PRESENCE OF AUTOMATIC CARDIOVERTER/DEFIBRILLATOR (AICD): ICD-10-CM

## 2025-01-22 DIAGNOSIS — I47.20 VENTRICULAR TACHYCARDIA, SUSTAINED: ICD-10-CM

## 2025-01-22 DIAGNOSIS — I50.22 CHRONIC SYSTOLIC CONGESTIVE HEART FAILURE: Primary | ICD-10-CM

## 2025-01-22 DIAGNOSIS — I25.5 ISCHEMIC CARDIOMYOPATHY: ICD-10-CM

## 2025-01-22 LAB
ABSOLUTE LUNG FLUID CONTENT: 35 % (ref 20–35)
ANION GAP SERPL CALCULATED.3IONS-SCNC: 9 MMOL/L (ref 5–15)
BUN SERPL-MCNC: 20 MG/DL (ref 8–23)
BUN/CREAT SERPL: 13.9 (ref 7–25)
CALCIUM SPEC-SCNC: 8.9 MG/DL (ref 8.6–10.5)
CHLORIDE SERPL-SCNC: 102 MMOL/L (ref 98–107)
CO2 SERPL-SCNC: 25 MMOL/L (ref 22–29)
CREAT SERPL-MCNC: 1.44 MG/DL (ref 0.76–1.27)
EGFRCR SERPLBLD CKD-EPI 2021: 51.6 ML/MIN/1.73
GLUCOSE SERPL-MCNC: 141 MG/DL (ref 65–99)
POTASSIUM SERPL-SCNC: 4.3 MMOL/L (ref 3.5–5.2)
SODIUM SERPL-SCNC: 136 MMOL/L (ref 136–145)

## 2025-01-22 PROCEDURE — 80048 BASIC METABOLIC PNL TOTAL CA: CPT | Performed by: NURSE PRACTITIONER

## 2025-01-22 PROCEDURE — 94726 PLETHYSMOGRAPHY LUNG VOLUMES: CPT | Performed by: NURSE PRACTITIONER

## 2025-01-22 NOTE — PROGRESS NOTES
"    Heart Failure Clinic Progress Note  Reason For Visit:  CHF    Subjective    Vj Garcia is a 74 y.o. male with the below pertinent PMH who presents for follow-up of CHF.    Vj Garcia was most recently seen in the heart failure clinic on 1/8/2025.  At that time he was seen in his initial evaluation after having been admitted to [facility] for CHF exacerbation.  At that time he had felt well and denied any associated heart failure symptoms.  His weight appears to be overall stable and his ReDs reading was 45% at that time.  We did have quite a bit of conversation over the treatments for heart failure and started him on spironolactone 25 mg daily as he had never started this from his previous cardiology appointments.    He notes that he continues to feel well. He denies any shortness of breath, lightheadedness, dizziness, or lower extremity edema. He does note that he feels a little \"odd\" and is unable to fully describe when he goes from a sitting to a standing position. This happens for only a few seconds.  He has tolerated spironolactone well without any changes. He does continue to have quite a few questions regarding the number of medications he is taking and their purpose.  His weight is down by 9 pounds and his ReDs reading is normal at 35% today.    Review of Systems   Constitutional: Negative for malaise/fatigue.   Cardiovascular:  Negative for chest pain, dyspnea on exertion, leg swelling, orthopnea, palpitations and syncope.   Respiratory:  Negative for sleep disturbances due to breathing.    Neurological:  Negative for dizziness and light-headedness.     Pertinent PMH  HFrEF  Ischemic cardiomyopathy  CAD  Status post CABG and mitral valve repair  Ventricular tachycardia  COPD  Chronic kidney disease    Pertinent past medical, surgical, family, and social history were reviewed.    Current Outpatient Medications   Medication Instructions    amiodarone (PACERONE) 200 mg, Oral, 2 Times " "Daily    apixaban (ELIQUIS) 5 mg, 2 Times Daily    aspirin 81 mg, Oral, Daily    atorvastatin (LIPITOR) 40 mg, Oral, Daily    carvedilol (COREG) 6.25 mg, Oral, Every 12 Hours Scheduled    ipratropium (Atrovent HFA) 17 MCG/ACT inhaler 2 puffs, Inhalation, 4 Times Daily PRN    multivitamin (MULTI VITAMIN DAILY PO) 1 tablet, Daily    sacubitril-valsartan (ENTRESTO) 24-26 MG tablet 1 tablet, Oral, 2 Times Daily    spironolactone (ALDACTONE) 25 mg, Oral, Daily    traZODone (DESYREL) 50 mg, Nightly PRN        Objective   Vital Signs:  /55 (BP Location: Left arm, Patient Position: Sitting)   Pulse 54   Wt 86.6 kg (191 lb)   SpO2 98%   BMI 27.41 kg/m²   Estimated body mass index is 27.41 kg/m² as calculated from the following:    Height as of 1/21/25: 177.8 cm (70\").    Weight as of this encounter: 86.6 kg (191 lb).    Neck:      Vascular: No JVD.   Pulmonary:      Effort: Pulmonary effort is normal.      Breath sounds: Normal breath sounds.   Cardiovascular:      Normal rate. Regular rhythm. Normal S1. Normal S2.       Murmurs: There is no murmur.      No gallop.  No click. No rub.   Pulses:     Intact distal pulses.   Edema:     Peripheral edema absent.   Abdominal:      Palpations: Abdomen is soft.      Tenderness: There is no abdominal tenderness.   Skin:     General: Skin is warm and dry.   Neurological:      General: No focal deficit present.      Mental Status: Alert and oriented to person, place and time.   Psychiatric:         Behavior: Behavior is cooperative.        The following data was reviewed by myself, RAMIRO Rajput  BMP   BMP          1/5/2025    01:14 1/8/2025    08:40 1/22/2025    08:24   BMP   BUN 27  19  20    Creatinine 1.40  1.16  1.44    Sodium 140  138  136    Potassium 3.9  4.1  4.3    Chloride 103  102  102    CO2 26.0  24.0  25.0    Calcium 8.1  8.6  8.9      ReDS   Lab Results   Component Value Date    ABSOLUTELUNG 35 01/22/2025    ABSOLUTELUNG 45 (A) 01/08/2025       Results " "for orders placed during the hospital encounter of 01/02/25    Adult Transthoracic Echo Complete w/ Color, Spectral and Contrast if Necessary Per Protocol    Interpretation Summary    Left ventricular systolic function is moderately reduced with estimated ejection fraction 30-40% and calculated (biplane) ejection fraction 34.2%    The left ventricular cavity is mildly dilated.    Left ventricular wall thickness is consistent with moderate eccentric hypertrophy.    Normal right ventricular cavity size and systolic function.    The left atrial cavity is mildly dilated.    There is no significant (more than mild) valve stenosis or regurgitation.    Chart Review:  Office Visit with Sugey Salinas PA (01/21/2025)    Office Visit with SILKE Poon DO (01/14/2025)     Assessment   Assessment and Plan  Diagnoses and all orders for this visit:    1. Chronic systolic congestive heart failure (Primary)  2. Ischemic cardiomyopathy  3. Ventricular tachycardia, sustained  4. Presence of automatic cardioverter/defibrillator (AICD)  Overall he continues to do well without any new concerns.  He doesn't have any signs of decompensated HF and appears euvolemic.  His renal function has bumped some again that is likely due to spironolactone.  - He indicates that he is not drinking a lot of water.  Will have him increase his water intake some to see if this helps with his renal function.  I do believe that his reports of feeling \"a lot\" are secondary to some slight orthostatic hypotension and this should help with increased fluid as well.  - I would like to keep his spironolactone dosing where it is for now.  If he does not have any improvement with his renal function at his next visit then we will likely decrease his spironolactone dose.  - Carvedilol 6.25 mg twice daily  - Entresto 24-26 mg twice daily  -Due to his bump in renal function we will continue to defer SGLT2 had hamburgers at this time.  - Amiodarone 200 mg " twice daily  - Aspirin 81 mg daily  - Lipitor 40 mg daily  - Continue with EP and V. tach ablation.  - Discussed daily weights and importance of tracking weights with regard to fluid status.   - Discussed importance of fluid restrictions and appropriate fluid balance along with sodium restriction.       Follow Up:  Return in about 3 weeks (around 2/12/2025) for recheck.    Patient was given instructions and counseling regarding his condition or for health maintenance advice.      Derek Baig, APRN  01/22/25  16:24 CST

## 2025-01-22 NOTE — PROGRESS NOTES
Heart Failure Clinic    Date:  01/22/25     Vitals:    01/22/25 0830   BP: 115/55   Pulse: 54   SpO2: 98%        Indication:  Heart Failure     Procedure:  ReDS device sensor unit applied to right side of chest and right side of back.  Appropriate positioning confirmed based off of the unit's calculation.  Chest measurement obtained with the chest size ruler.  Measurement session performed over 45 seconds.      Method of arrival:  Ambulatory    Weighing self daily:  Yes    Taking medications as prescribed:  Yes    Edema:  No    Shortness of Air:  No    Number of pillows used at night:  1    Results:  ReDS Value=      35                    Interpretation:  25-35% - normal/ideal lung fluid content    Mallory L Haase, RN 01/22/25 08:31 CST

## 2025-01-28 ENCOUNTER — TELEPHONE (OUTPATIENT)
Dept: CARDIOLOGY | Facility: HOSPITAL | Age: 75
End: 2025-01-28

## 2025-01-28 NOTE — TELEPHONE ENCOUNTER
Patient states he was sick and vomited on Monday night. Since then his BP has been lower than usual. Today it is 96/62, HR 64, 02 96. He is drinking approximately 60 oz of water daily and does not have any weakness or lightheadedness.

## 2025-02-17 ENCOUNTER — HOSPITAL ENCOUNTER (OUTPATIENT)
Dept: CARDIOLOGY | Facility: HOSPITAL | Age: 75
Discharge: HOME | End: 2025-02-17
Admitting: NURSE PRACTITIONER
Payer: MEDICARE

## 2025-02-17 VITALS
SYSTOLIC BLOOD PRESSURE: 131 MMHG | HEART RATE: 51 BPM | BODY MASS INDEX: 27.92 KG/M2 | WEIGHT: 194.6 LBS | OXYGEN SATURATION: 97 % | DIASTOLIC BLOOD PRESSURE: 65 MMHG

## 2025-02-17 DIAGNOSIS — I47.20 VENTRICULAR TACHYCARDIA, SUSTAINED: ICD-10-CM

## 2025-02-17 DIAGNOSIS — Z95.810 ICD (IMPLANTABLE CARDIOVERTER-DEFIBRILLATOR) IN PLACE: ICD-10-CM

## 2025-02-17 DIAGNOSIS — I50.22 CHRONIC SYSTOLIC CONGESTIVE HEART FAILURE: Primary | ICD-10-CM

## 2025-02-17 DIAGNOSIS — I25.5 ISCHEMIC CARDIOMYOPATHY: ICD-10-CM

## 2025-02-17 LAB
ABSOLUTE LUNG FLUID CONTENT: 36 % (ref 20–35)
ANION GAP SERPL CALCULATED.3IONS-SCNC: 9 MMOL/L (ref 5–15)
BUN SERPL-MCNC: 17 MG/DL (ref 8–23)
BUN/CREAT SERPL: 11.3 (ref 7–25)
CALCIUM SPEC-SCNC: 9 MG/DL (ref 8.6–10.5)
CHLORIDE SERPL-SCNC: 104 MMOL/L (ref 98–107)
CO2 SERPL-SCNC: 27 MMOL/L (ref 22–29)
CREAT SERPL-MCNC: 1.51 MG/DL (ref 0.76–1.27)
EGFRCR SERPLBLD CKD-EPI 2021: 48.8 ML/MIN/1.73
GLUCOSE SERPL-MCNC: 106 MG/DL (ref 65–99)
POTASSIUM SERPL-SCNC: 4.6 MMOL/L (ref 3.5–5.2)
SODIUM SERPL-SCNC: 140 MMOL/L (ref 136–145)

## 2025-02-17 PROCEDURE — 80048 BASIC METABOLIC PNL TOTAL CA: CPT | Performed by: NURSE PRACTITIONER

## 2025-02-17 PROCEDURE — 94726 PLETHYSMOGRAPHY LUNG VOLUMES: CPT | Performed by: NURSE PRACTITIONER

## 2025-02-17 NOTE — PROGRESS NOTES
"    Heart Failure Clinic Progress Note  Reason For Visit:  CHF    Subjective    Vj Garcia is a 74 y.o. male with the below pertinent PMH who presents for follow-up of CHF.    Vj Garcia was most recently seen in the heart failure clinic on 1/22/2025.  At that time he was noting that he was feeling well.  He denied any heart failure symptoms at that time.  He noted that he has felt \"off\" but was unable to describe exactly what he meant by this.  He tolerated spironolactone at that time and continues to have quite a bit of medication questions at that point.  His weight was down about 9 pounds and his ReDs reading was normal at 35%.  He had improvement in his renal function at that point and I did defer the addition of an SGLT2 inhibitor at that point.    He continues to not that he feels well. He has no new complaints today.  He denies any shortness of breath, lightheadedness, dizziness, chest pain, or peripheral edema.  He is taking his medications appropriately without any missed doses. His weight is up by 3 pounds since last being seen and his ReDs reading is slightly elevated at 36%.    Review of Systems   Constitutional: Negative for malaise/fatigue.   Cardiovascular:  Negative for chest pain, dyspnea on exertion and leg swelling.   Neurological:  Negative for dizziness and light-headedness.     Pertinent PMH  HFrEF  Ischemic cardiomyopathy  CAD  Status post CABG and mitral valve repair  Ventricular tachycardia  COPD  Chronic kidney disease    Pertinent past medical, surgical, family, and social history were reviewed.    Current Outpatient Medications   Medication Instructions    amiodarone (PACERONE) 200 mg, Oral, 2 Times Daily    apixaban (ELIQUIS) 5 mg, 2 Times Daily    aspirin 81 mg, Oral, Daily    atorvastatin (LIPITOR) 40 mg, Oral, Daily    carvedilol (COREG) 6.25 mg, Oral, Every 12 Hours Scheduled    ipratropium (Atrovent HFA) 17 MCG/ACT inhaler 2 puffs, Inhalation, 4 Times Daily PRN    " "multivitamin (MULTI VITAMIN DAILY PO) 1 tablet, Daily    sacubitril-valsartan (ENTRESTO) 24-26 MG tablet 1 tablet, Oral, 2 Times Daily    spironolactone (ALDACTONE) 25 mg, Oral, Daily    traZODone (DESYREL) 50 mg, Nightly PRN        Objective   Vital Signs:  /65 (BP Location: Left arm, Patient Position: Sitting)   Pulse 51   Wt 88.3 kg (194 lb 9.6 oz)   SpO2 97%   BMI 27.92 kg/m²   Estimated body mass index is 27.92 kg/m² as calculated from the following:    Height as of 1/21/25: 177.8 cm (70\").    Weight as of this encounter: 88.3 kg (194 lb 9.6 oz).    Neck:      Vascular: JVD normal.   Pulmonary:      Effort: Pulmonary effort is normal.      Breath sounds: Normal breath sounds.   Cardiovascular:      Normal rate. Regular rhythm. Normal S1. Normal S2.       Murmurs: There is no murmur.      No gallop.  No click. No rub.   Pulses:     Intact distal pulses.   Edema:     Peripheral edema absent.   Abdominal:      Palpations: Abdomen is soft.      Tenderness: There is no abdominal tenderness.   Skin:     General: Skin is warm and dry.   Neurological:      Mental Status: Alert and oriented to person, place and time.      The following data was reviewed by myself, RAMIRO Rajput  BMP   BMP          1/8/2025    08:40 1/22/2025    08:24 2/17/2025    08:19   BMP   BUN 19  20  17    Creatinine 1.16  1.44  1.51    Sodium 138  136  140    Potassium 4.1  4.3  4.6    Chloride 102  102  104    CO2 24.0  25.0  27.0    Calcium 8.6  8.9  9.0      ReDS   Lab Results   Component Value Date    ABSOLUTELUNG 36 (A) 02/17/2025    ABSOLUTELUNG 35 01/22/2025    ABSOLUTELUNG 45 (A) 01/08/2025       Results for orders placed during the hospital encounter of 01/02/25    Adult Transthoracic Echo Complete w/ Color, Spectral and Contrast if Necessary Per Protocol    Interpretation Summary    Left ventricular systolic function is moderately reduced with estimated ejection fraction 30-40% and calculated (biplane) ejection fraction " 34.2%    The left ventricular cavity is mildly dilated.    Left ventricular wall thickness is consistent with moderate eccentric hypertrophy.    Normal right ventricular cavity size and systolic function.    The left atrial cavity is mildly dilated.    There is no significant (more than mild) valve stenosis or regurgitation.       Assessment   Assessment and Plan  Diagnoses and all orders for this visit:    1. Chronic systolic congestive heart failure (Primary)  2. Ischemic cardiomyopathy  3. Ventricular tachycardia, sustained  4. ICD (implantable cardioverter-defibrillator) in place  To feel well from a heart rate standpoint appears to be doing very well without any signs of hypervolemia today.  He is tolerating his medications appropriately.  We had a discussion about continued GDMT titration.  He is currently very hesitant to the addition of any medications, specifically SGLT2 inhibitors as he is concerned about side effect profiles.  His creatinine remains elevated and is a little worse than his last visit at 1.51 today.  - Carvedilol 6.25 mg twice daily  - Entresto 24-26 mg twice daily  - Spironolactone 25 mg daily.  Will continue to watch his kidney function to determine if he should continue on this medication or not.  However, will not make any acute changes today as his current creatinine is not far off of his baseline.  - Will continue to defer SGLT2 inhibitors primarily due to his hesitancy to start any new medications at this time.  Given his renal function has stayed decently close to his baseline, and his GFR remains above 20, he would likely tolerate SGLT2 inhibitor.  - Amiodarone 200 mg twice daily  - Aspirin 81 mg daily  - Lipitor 40 mg daily  - Continue with V. tach ablation scheduled on 2/27/2025.       Follow Up:  Return in about 4 weeks (around 3/17/2025) for recheck.    Patient was given instructions and counseling regarding his condition or for health maintenance advice.      Derek Baig,  APRN  02/17/25  09:58 CST

## 2025-02-17 NOTE — PROGRESS NOTES
Heart Failure Clinic    Date:  02/17/25     Vitals:    02/17/25 0823   BP: 131/65   Pulse: 51   SpO2: 97%        Indication:  Heart Failure     Procedure:  ReDS device sensor unit applied to right side of chest and right side of back.  Appropriate positioning confirmed based off of the unit's calculation.  Chest measurement obtained with the chest size ruler.  Measurement session performed over 45 seconds.      Method of arrival:      Weighing self daily:      Taking medications as prescribed:      Edema:      Shortness of Air:      Number of pillows used at night:      Results:  ReDS Value=    36                      Interpretation:  36-38% - mildly elevated lung fluid content    Stephy Roberts RN 02/17/25 08:25 CST

## 2025-02-26 ENCOUNTER — TELEPHONE (OUTPATIENT)
Dept: CARDIOLOGY | Facility: CLINIC | Age: 75
End: 2025-02-26
Payer: MEDICARE

## 2025-02-26 NOTE — TELEPHONE ENCOUNTER
I spoke with Mr. Garcia about his upcoming ablation.  He was well informed about the procedure from prior discussion with Dr. Contreras.  We discussed the procedure at length including risks, anesthesia, intra-op procedures, recovery, bedrest, sheath removal, discharge criteria, and normal post-procedure expectations.  I answered a few remaining questions. Mr. Garcia verbalized understanding and he is ready to proceed.       Salina Ahmadi RN

## 2025-02-27 ENCOUNTER — ANESTHESIA EVENT (OUTPATIENT)
Dept: CARDIOLOGY | Facility: HOSPITAL | Age: 75
End: 2025-02-27
Payer: MEDICARE

## 2025-02-27 ENCOUNTER — ANESTHESIA (OUTPATIENT)
Dept: CARDIOLOGY | Facility: HOSPITAL | Age: 75
End: 2025-02-27
Payer: MEDICARE

## 2025-02-27 ENCOUNTER — HOSPITAL ENCOUNTER (OUTPATIENT)
Facility: HOSPITAL | Age: 75
Setting detail: HOSPITAL OUTPATIENT SURGERY
Discharge: HOME | End: 2025-02-27
Attending: STUDENT IN AN ORGANIZED HEALTH CARE EDUCATION/TRAINING PROGRAM | Admitting: STUDENT IN AN ORGANIZED HEALTH CARE EDUCATION/TRAINING PROGRAM
Payer: MEDICARE

## 2025-02-27 VITALS
DIASTOLIC BLOOD PRESSURE: 86 MMHG | SYSTOLIC BLOOD PRESSURE: 140 MMHG | RESPIRATION RATE: 16 BRPM | OXYGEN SATURATION: 95 % | HEIGHT: 69 IN | BODY MASS INDEX: 29.03 KG/M2 | WEIGHT: 196 LBS | HEART RATE: 56 BPM

## 2025-02-27 DIAGNOSIS — I47.20 VENTRICULAR TACHYCARDIA, SUSTAINED: ICD-10-CM

## 2025-02-27 LAB
ANION GAP SERPL CALCULATED.3IONS-SCNC: 11 MMOL/L (ref 5–15)
BASOPHILS # BLD AUTO: 0.05 10*3/MM3 (ref 0–0.2)
BASOPHILS NFR BLD AUTO: 0.7 % (ref 0–1.5)
BUN SERPL-MCNC: 19 MG/DL (ref 8–23)
BUN/CREAT SERPL: 12.7 (ref 7–25)
CALCIUM SPEC-SCNC: 8.9 MG/DL (ref 8.6–10.5)
CHLORIDE SERPL-SCNC: 104 MMOL/L (ref 98–107)
CO2 SERPL-SCNC: 25 MMOL/L (ref 22–29)
CREAT SERPL-MCNC: 1.5 MG/DL (ref 0.76–1.27)
DEPRECATED RDW RBC AUTO: 44 FL (ref 37–54)
EGFRCR SERPLBLD CKD-EPI 2021: 48.9 ML/MIN/1.73
EOSINOPHIL # BLD AUTO: 0.33 10*3/MM3 (ref 0–0.4)
EOSINOPHIL NFR BLD AUTO: 4.7 % (ref 0.3–6.2)
ERYTHROCYTE [DISTWIDTH] IN BLOOD BY AUTOMATED COUNT: 13.4 % (ref 12.3–15.4)
GLUCOSE SERPL-MCNC: 118 MG/DL (ref 65–99)
HCT VFR BLD AUTO: 36.9 % (ref 37.5–51)
HGB BLD-MCNC: 12.3 G/DL (ref 13–17.7)
IMM GRANULOCYTES # BLD AUTO: 0.03 10*3/MM3 (ref 0–0.05)
IMM GRANULOCYTES NFR BLD AUTO: 0.4 % (ref 0–0.5)
INR PPP: 1.62 (ref 0.91–1.09)
LYMPHOCYTES # BLD AUTO: 1.09 10*3/MM3 (ref 0.7–3.1)
LYMPHOCYTES NFR BLD AUTO: 15.6 % (ref 19.6–45.3)
MCH RBC QN AUTO: 30.2 PG (ref 26.6–33)
MCHC RBC AUTO-ENTMCNC: 33.3 G/DL (ref 31.5–35.7)
MCV RBC AUTO: 90.7 FL (ref 79–97)
MONOCYTES # BLD AUTO: 0.54 10*3/MM3 (ref 0.1–0.9)
MONOCYTES NFR BLD AUTO: 7.7 % (ref 5–12)
NEUTROPHILS NFR BLD AUTO: 4.95 10*3/MM3 (ref 1.7–7)
NEUTROPHILS NFR BLD AUTO: 70.9 % (ref 42.7–76)
NRBC BLD AUTO-RTO: 0 /100 WBC (ref 0–0.2)
PLATELET # BLD AUTO: 173 10*3/MM3 (ref 140–450)
PMV BLD AUTO: 9.5 FL (ref 6–12)
POTASSIUM SERPL-SCNC: 4.5 MMOL/L (ref 3.5–5.2)
PROTHROMBIN TIME: 20.2 SECONDS (ref 11.8–14.8)
QT INTERVAL: 514 MS
QT INTERVAL: 522 MS
QTC INTERVAL: 473 MS
QTC INTERVAL: 508 MS
RBC # BLD AUTO: 4.07 10*6/MM3 (ref 4.14–5.8)
SODIUM SERPL-SCNC: 140 MMOL/L (ref 136–145)
WBC NRBC COR # BLD AUTO: 6.99 10*3/MM3 (ref 3.4–10.8)

## 2025-02-27 PROCEDURE — 25010000002 PROPOFOL 10 MG/ML EMULSION: Performed by: NURSE ANESTHETIST, CERTIFIED REGISTERED

## 2025-02-27 PROCEDURE — 85347 COAGULATION TIME ACTIVATED: CPT

## 2025-02-27 PROCEDURE — 80048 BASIC METABOLIC PNL TOTAL CA: CPT | Performed by: STUDENT IN AN ORGANIZED HEALTH CARE EDUCATION/TRAINING PROGRAM

## 2025-02-27 PROCEDURE — 93654 COMPRE EP EVAL TX VT: CPT | Performed by: STUDENT IN AN ORGANIZED HEALTH CARE EDUCATION/TRAINING PROGRAM

## 2025-02-27 PROCEDURE — 25010000002 PROTAMINE SULFATE PER 10 MG: Performed by: NURSE ANESTHETIST, CERTIFIED REGISTERED

## 2025-02-27 PROCEDURE — 25010000002 PROPOFOL 1000 MG/100ML EMULSION: Performed by: NURSE ANESTHETIST, CERTIFIED REGISTERED

## 2025-02-27 PROCEDURE — C1732 CATH, EP, DIAG/ABL, 3D/VECT: HCPCS | Performed by: STUDENT IN AN ORGANIZED HEALTH CARE EDUCATION/TRAINING PROGRAM

## 2025-02-27 PROCEDURE — 93005 ELECTROCARDIOGRAM TRACING: CPT | Performed by: STUDENT IN AN ORGANIZED HEALTH CARE EDUCATION/TRAINING PROGRAM

## 2025-02-27 PROCEDURE — C1894 INTRO/SHEATH, NON-LASER: HCPCS | Performed by: STUDENT IN AN ORGANIZED HEALTH CARE EDUCATION/TRAINING PROGRAM

## 2025-02-27 PROCEDURE — 85610 PROTHROMBIN TIME: CPT | Performed by: STUDENT IN AN ORGANIZED HEALTH CARE EDUCATION/TRAINING PROGRAM

## 2025-02-27 PROCEDURE — 93010 ELECTROCARDIOGRAM REPORT: CPT | Performed by: INTERNAL MEDICINE

## 2025-02-27 PROCEDURE — 25010000002 LIDOCAINE 2% SOLUTION: Performed by: STUDENT IN AN ORGANIZED HEALTH CARE EDUCATION/TRAINING PROGRAM

## 2025-02-27 PROCEDURE — C1760 CLOSURE DEV, VASC: HCPCS | Performed by: STUDENT IN AN ORGANIZED HEALTH CARE EDUCATION/TRAINING PROGRAM

## 2025-02-27 PROCEDURE — 25010000002 HEPARIN (PORCINE) PER 1000 UNITS: Performed by: NURSE ANESTHETIST, CERTIFIED REGISTERED

## 2025-02-27 PROCEDURE — 25810000003 SODIUM CHLORIDE 0.9 % SOLUTION: Performed by: NURSE ANESTHETIST, CERTIFIED REGISTERED

## 2025-02-27 PROCEDURE — 85025 COMPLETE CBC W/AUTO DIFF WBC: CPT | Performed by: STUDENT IN AN ORGANIZED HEALTH CARE EDUCATION/TRAINING PROGRAM

## 2025-02-27 PROCEDURE — 25010000002 HEPARIN (PORCINE) 1000-0.9 UT/500ML-% SOLUTION: Performed by: STUDENT IN AN ORGANIZED HEALTH CARE EDUCATION/TRAINING PROGRAM

## 2025-02-27 PROCEDURE — C1759 CATH, INTRA ECHOCARDIOGRAPHY: HCPCS | Performed by: STUDENT IN AN ORGANIZED HEALTH CARE EDUCATION/TRAINING PROGRAM

## 2025-02-27 PROCEDURE — 93662 INTRACARDIAC ECG (ICE): CPT | Performed by: STUDENT IN AN ORGANIZED HEALTH CARE EDUCATION/TRAINING PROGRAM

## 2025-02-27 PROCEDURE — 25010000002 HEPARIN (PORCINE) 2000-0.9 UNIT/L-% SOLUTION: Performed by: STUDENT IN AN ORGANIZED HEALTH CARE EDUCATION/TRAINING PROGRAM

## 2025-02-27 PROCEDURE — C1730 CATH, EP, 19 OR FEW ELECT: HCPCS | Performed by: STUDENT IN AN ORGANIZED HEALTH CARE EDUCATION/TRAINING PROGRAM

## 2025-02-27 PROCEDURE — S0260 H&P FOR SURGERY: HCPCS | Performed by: STUDENT IN AN ORGANIZED HEALTH CARE EDUCATION/TRAINING PROGRAM

## 2025-02-27 RX ORDER — SODIUM CHLORIDE 9 MG/ML
40 INJECTION, SOLUTION INTRAVENOUS AS NEEDED
Status: DISCONTINUED | OUTPATIENT
Start: 2025-02-27 | End: 2025-02-27 | Stop reason: HOSPADM

## 2025-02-27 RX ORDER — SODIUM CHLORIDE 9 MG/ML
INJECTION, SOLUTION INTRAVENOUS CONTINUOUS PRN
Status: DISCONTINUED | OUTPATIENT
Start: 2025-02-27 | End: 2025-02-27 | Stop reason: SURG

## 2025-02-27 RX ORDER — SODIUM CHLORIDE 0.9 % (FLUSH) 0.9 %
10 SYRINGE (ML) INJECTION AS NEEDED
Status: DISCONTINUED | OUTPATIENT
Start: 2025-02-27 | End: 2025-02-27 | Stop reason: HOSPADM

## 2025-02-27 RX ORDER — LIDOCAINE HYDROCHLORIDE 20 MG/ML
INJECTION, SOLUTION INFILTRATION; PERINEURAL
Status: DISCONTINUED | OUTPATIENT
Start: 2025-02-27 | End: 2025-02-27 | Stop reason: HOSPADM

## 2025-02-27 RX ORDER — PROPOFOL 10 MG/ML
VIAL (ML) INTRAVENOUS AS NEEDED
Status: DISCONTINUED | OUTPATIENT
Start: 2025-02-27 | End: 2025-02-27 | Stop reason: SURG

## 2025-02-27 RX ORDER — SODIUM CHLORIDE 0.9 % (FLUSH) 0.9 %
10 SYRINGE (ML) INJECTION EVERY 12 HOURS SCHEDULED
Status: DISCONTINUED | OUTPATIENT
Start: 2025-02-27 | End: 2025-02-27 | Stop reason: HOSPADM

## 2025-02-27 RX ORDER — HEPARIN SODIUM 200 [USP'U]/100ML
INJECTION, SOLUTION INTRAVENOUS
Status: DISCONTINUED | OUTPATIENT
Start: 2025-02-27 | End: 2025-02-27 | Stop reason: HOSPADM

## 2025-02-27 RX ORDER — BUPIVACAINE HCL/0.9 % NACL/PF 0.125 %
PLASTIC BAG, INJECTION (ML) EPIDURAL AS NEEDED
Status: DISCONTINUED | OUTPATIENT
Start: 2025-02-27 | End: 2025-02-27 | Stop reason: SURG

## 2025-02-27 RX ORDER — PROPOFOL 10 MG/ML
INJECTION, EMULSION INTRAVENOUS CONTINUOUS PRN
Status: DISCONTINUED | OUTPATIENT
Start: 2025-02-27 | End: 2025-02-27 | Stop reason: SURG

## 2025-02-27 RX ORDER — AMIODARONE HYDROCHLORIDE 200 MG/1
200 TABLET ORAL DAILY
Qty: 90 TABLET | Refills: 3 | Status: SHIPPED | OUTPATIENT
Start: 2025-02-27

## 2025-02-27 RX ORDER — HEPARIN SODIUM 1000 [USP'U]/ML
INJECTION, SOLUTION INTRAVENOUS; SUBCUTANEOUS AS NEEDED
Status: DISCONTINUED | OUTPATIENT
Start: 2025-02-27 | End: 2025-02-27 | Stop reason: SURG

## 2025-02-27 RX ORDER — PROTAMINE SULFATE 10 MG/ML
INJECTION, SOLUTION INTRAVENOUS AS NEEDED
Status: DISCONTINUED | OUTPATIENT
Start: 2025-02-27 | End: 2025-02-27 | Stop reason: SURG

## 2025-02-27 RX ADMIN — Medication 200 MCG: at 11:56

## 2025-02-27 RX ADMIN — HEPARIN SODIUM 3000 UNITS: 1000 INJECTION, SOLUTION INTRAVENOUS; SUBCUTANEOUS at 11:34

## 2025-02-27 RX ADMIN — Medication 100 MCG: at 11:51

## 2025-02-27 RX ADMIN — Medication 200 MCG: at 11:06

## 2025-02-27 RX ADMIN — Medication 200 MCG: at 11:15

## 2025-02-27 RX ADMIN — Medication 100 MCG: at 12:21

## 2025-02-27 RX ADMIN — Medication 200 MCG: at 11:32

## 2025-02-27 RX ADMIN — PROPOFOL 50 MG: 10 INJECTION, EMULSION INTRAVENOUS at 10:22

## 2025-02-27 RX ADMIN — HEPARIN SODIUM 15000 UNITS: 1000 INJECTION, SOLUTION INTRAVENOUS; SUBCUTANEOUS at 10:54

## 2025-02-27 RX ADMIN — PROTAMINE SULFATE 50 MG: 10 INJECTION, SOLUTION INTRAVENOUS at 12:29

## 2025-02-27 RX ADMIN — SODIUM CHLORIDE: 9 INJECTION, SOLUTION INTRAVENOUS at 10:15

## 2025-02-27 RX ADMIN — PROPOFOL 125 MCG/KG/MIN: 10 INJECTION, EMULSION INTRAVENOUS at 10:20

## 2025-02-27 NOTE — ANESTHESIA PREPROCEDURE EVALUATION
Anesthesia Evaluation     Patient summary reviewed   NPO Solid Status: > 8 hours             Airway   Mallampati: II  TM distance: >3 FB  Neck ROM: full  Dental    (+) upper dentures and lower dentures    Pulmonary    (+) a smoker Former, COPD,  (-) asthma, sleep apnea  Cardiovascular   Exercise tolerance: good (4-7 METS)    (+) pacemaker (biotronik) ICD, hypertension, CAD, CABG, dysrhythmias Tachycardia, PVC, hyperlipidemia  (-) past MI, angina, cardiac stents      Neuro/Psych  (-) seizures, TIA, CVA  GI/Hepatic/Renal/Endo    (+) renal disease- CRI  (-) GERD, liver disease, diabetes    Musculoskeletal     Abdominal    Substance History      OB/GYN          Other                      Anesthesia Plan    ASA 3     MAC     intravenous induction     Anesthetic plan, risks, benefits, and alternatives have been provided, discussed and informed consent has been obtained with: patient.    CODE STATUS:

## 2025-02-27 NOTE — DISCHARGE INSTRUCTIONS
Post-PVC Ablation Instructions    ACTIVITY    Avoid driving, operating machinery, or alcohol consumption for 24 hours after receiving sedation. In addition, avoid signing legal documents or participating in legal proceedings.    You may resume normal activities after 24 hours except for the following:    Avoid heavy lifting (no more than 10 pounds) and vigorous activities for a minimum of 7 days. This includes activities such as jogging, exercise classes, sports activities, etc.    You may resume walking and other normal activities.    Avoid sitting more than 2 consecutive hours during waking hours for the first 3 days. If you are traveling, stop for brief (5 minutes) walks every two hours.    MEDICATIONS  Continue Eliquis, Pradaxa, or Xarelto at your usual dose this evening. Do not stop this medication without consulting with your cardiologist.      MEDICAL FOLLOW UP   EP clinic follow up with Floyd Cooper on 4/4/2025..    PLEASE NOTIFY US IF YOU DEVELOP    Fever of 101 or greater during your first few days at home    The occurrence of a new, persistent cough or unexplained shortness of breath.    A groin bruise may be expected. Initial soreness and discomfort should improve over the first few days. If you continue to have discomfort or if bruising is excessive, please call us.    Patients often experience palpitations during the healing period.    Please call if you have an episode of palpitations accompanied by fast heart rate greater than 120 beats per minute for extended period that last longer than 1-2 days.    Mild chest soreness is common and may be treated with Tylenol, Advil, or Motrin.  This soreness typically worsens when taking deep breaths.  If you notice these symptoms, start one of these medications according to the package directions and continue for 2-3 days. If your symptoms are not relieved or become severe, please call us.      REASONS TO GO TO THE EMERGENCY ROOM FOR EVALUATION:   Severe chest  pain  Significant shortness of breath at rest  Near passing out or passing out episodes soon after your ablation  Symptoms of chest pain or shortness of breath associated with low blood pressure (reading less than 90 for the top number / systolic blood pressure)  Brisk bleeding or significant swelling from the groin where IVs were placed  Any concerns that an emergent or life threatening complication is occurring    If you have a clinical questions between the hours of 8am and 4pm, Monday - Friday please call the office and let us know your concern. Please leave a message if your call is not an emergency.    For emergencies, please go for evaluation at your nearest emergency department.    If you have a [employer] account, this an excellent way to communicate.  [facility] messages are checked Monday through Friday. Please allow 24-36 hours for your message to be answered.

## 2025-02-27 NOTE — ANESTHESIA POSTPROCEDURE EVALUATION
Patient: Vj Garcia    Procedure Summary       Date: 02/27/25 Room / Location: [facility]ATH [facility] 4 /  [facility] CATH INVASIVE LOCATION    Anesthesia Start: 1015 Anesthesia Stop: 1249    Procedure: Ablation PVC Diagnosis:       Ventricular tachycardia, sustained      (Ventricular tachycardia (76961))    Providers: Jerad Contreras MD Provider: oJse Baird CRNA    Anesthesia Type: MAC ASA Status: 3            Anesthesia Type: MAC    Vitals  Vitals Value Taken Time   /75 02/27/25 1431   Temp     Pulse 52 02/27/25 1451   Resp     SpO2 97 % 02/27/25 1451   Vitals shown include unfiled device data.        Post Anesthesia Care and Evaluation      Comments: Discharged per PACU Criteria

## 2025-02-27 NOTE — H&P
"Chief Complaint  Sustained ventricular tachycardia    Subjective        History of Present Illness    EP Problems:  1.  Ventricular tachycardia  2.   Atrial flutter  3.  Left atrial appendage exclusion with atrial clip  4.  Presence of a cardiac ICD (Biotronik)     Cardiology Problems:  1.  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  Mitral valve repair  3.  CAD status post CABG  4.  Hypertension  5.  Chronic systolic heart failure  6.  Hyperlipidemia     Medical Problems:  1.  COPD  2.  BPH    Vj Garcia is a 75 y.o. male with past medical history as above who presents to the hospital for outpatient EP study and ablation for treatment of sustained VT.  He has history of sustained VT following an LV aneurysm resection in August 2024 he unfortunately had recurrence in January 2025 despite amiodarone use.  He underwent cardioversion however felt as though he was extremely ill during that hospitalization.  Given the severity of his symptoms and sustained VT, decision was made to proceed with EP study and ablation.  Since the last time he was seen in the hospital, he has not had any known recurrences of sustained VT.      Allergies:  Patient has no known allergies.      Objective   Vital Signs:  /76 (BP Location: Left arm, Patient Position: Lying)   Pulse 83   Resp 18   Ht 175.3 cm (69\")   Wt 88.9 kg (196 lb)   SpO2 97%   BMI 28.94 kg/m²   Estimated body mass index is 28.94 kg/m² as calculated from the following:    Height as of this encounter: 175.3 cm (69\").    Weight as of this encounter: 88.9 kg (196 lb).      Physical Exam  Vitals reviewed.   Constitutional:       Appearance: Normal appearance.   Cardiovascular:      Rate and Rhythm: Normal rate and regular rhythm.      Pulses: Normal pulses.      Heart sounds: Normal heart sounds.   Pulmonary:      Effort: Pulmonary effort is normal.      Breath sounds: Normal breath sounds.   Musculoskeletal:         General: " No swelling.   Neurological:      Mental Status: He is alert.   Psychiatric:         Mood and Affect: Mood normal.         Judgment: Judgment normal.            Result Review :  Labs were reviewed with relevant findings as follows: No relevant findings               Assessment and Plan   Assessment/plan:  Vj Garcia is a 75 y.o. male who presents to the hospital for outpatient EP study and ablation for treatment of sustained VT.  There are no apparent contraindications to proceeding and he is medically appropriate for outpatient management with this procedure.      I have previously discussed and again recapitulated risks, benefits, and alternatives of an electrophysiology study and ablation for ventricular tachycardia with the patient.  Alternatives discussed include continued observation and medical management.  An electrophysiology study with ablation is an invasive procedure with possible complications that include but are not limited to vascular access complications, internal bleeding, tamponade requiring pericardiocentesis or cardiac surgery, stroke, MI, embolism, myocardial injury, injury to the normal conduction system requiring a pacemaker, and ultimately death.  We also discussed that given the nature of the procedure, therapeutic efficacy can be variable.  Possibilities of recurrent arrhythmias and the possible need for additional procedures and/or medical therapy was discussed. Questions asked were appropriately answered.  No guarantees were made or implied.    Despite this, they would still like to proceed.    Plan:   - Proceed with EP study and ablation            Part of this note may be an electronic transcription/translation of spoken language to printed text using the Dragon Dictation System.

## 2025-02-27 NOTE — Clinical Note
Hemostasis started on the right femoral vein. Angio-Seal was used in achieving hemostasis. Closure device deployed in the vessel. Hemostasis achieved successfully.

## 2025-02-28 ENCOUNTER — CALL CENTER PROGRAMS (OUTPATIENT)
Dept: CALL CENTER | Facility: HOSPITAL | Age: 75
End: 2025-02-28
Payer: MEDICARE

## 2025-02-28 NOTE — OUTREACH NOTE
PCI/Device Survey      Flowsheet Row Responses   Facility patient discharged from? [facility]   Procedure date 02/27/25   Procedure (if device, specify in description) Ablation  [Right groin]   Performing MD Other (annotate)  [DR. MONTILLA]   Attempt successful? Yes   Call start time 1019   Call end time 1022   Person spoke with today (if not patient) and relationship patient   Has the patient had any of the following symptoms since discharge? --  [No SOB or Chest pains]   Nursing intervention Reminded to continue to take prescribed medications   Does the patient have any of the following symptoms related to the cath/surgical site? --  [Dressing druy and intact to right groin area.]   Nursing intervention Patient education provided   Does the patient have an appointment scheduled with the cardiologist? Yes   Appointment comments Cardiology appt on 3/17   If the patient is a current smoker, are they able to teach back resources for cessation? Not a smoker   Did the patient feel prepared to go home on the same day as the procedure? Yes   Is the patient satisfied with the same day discharge process? Yes   PCI/Device call completed Yes   Wrap up additional comments Patient denies any needs, concerns or questions.            Romaine LINN - Registered Nurse

## 2025-03-01 LAB
ACT BLD: 371 SECONDS (ref 82–152)
ACT BLD: 417 SECONDS (ref 82–152)
ACT BLD: 441 SECONDS (ref 82–152)

## 2025-03-03 LAB
QT INTERVAL: 514 MS
QT INTERVAL: 522 MS
QTC INTERVAL: 473 MS
QTC INTERVAL: 508 MS

## 2025-03-04 ENCOUNTER — TELEPHONE (OUTPATIENT)
Dept: INTERNAL MEDICINE | Facility: CLINIC | Age: 75
End: 2025-03-04

## 2025-03-04 NOTE — TELEPHONE ENCOUNTER
Caller: Vj Garcia    Relationship: Self    Best call back number: [phone]     What form or medical record are you requesting: FORM FOR HANDICAP PLACARD     Who is requesting this form or medical record from you: PATIENT     How would you like to receive the form or medical records (pick-up, mail, fax): PATIENT WILL   (PLEASE CALL WHEN READY FOR )    Timeframe paperwork needed:     Additional notes: PATIENT CALLED REQUESTING INFORMATION ON HOW TO GET THIS DONE. HUB ADVISED THEY WOULD SEND A MESSAGE TO DR MCDONALD FOR THE PAPERWORK

## 2025-03-05 ENCOUNTER — TELEPHONE (OUTPATIENT)
Dept: CARDIOLOGY | Facility: CLINIC | Age: 75
End: 2025-03-05
Payer: MEDICARE

## 2025-03-05 NOTE — TELEPHONE ENCOUNTER
Call received from Mr. Garcia to discuss concerns of bruising to groin site from ablation on 2/27/2025. He states that the bruised area is approximately the size of an orange. He reports having a knot about one inch by half an inch at the puncture site. I explained the use of closure devices that can sometimes be felt at the site and that bruising, when soft, is common. No reports of pain, redness, drainage, fevers, or chills. Advised the patient to upload a photo to chart and offered to schedule a site check in the clinic. He states he will get help from his daughter to upload a photo this afternoon.     Salina Ahmadi RN

## 2025-03-10 ENCOUNTER — TELEPHONE (OUTPATIENT)
Dept: CARDIOLOGY | Facility: CLINIC | Age: 75
End: 2025-03-10
Payer: MEDICARE

## 2025-03-10 ENCOUNTER — HOSPITAL ENCOUNTER (OUTPATIENT)
Dept: CARDIOLOGY | Facility: HOSPITAL | Age: 75
Discharge: HOME | End: 2025-03-10
Payer: MEDICARE

## 2025-03-10 ENCOUNTER — DOCUMENTATION (OUTPATIENT)
Dept: CARDIOLOGY | Facility: CLINIC | Age: 75
End: 2025-03-10
Payer: MEDICARE

## 2025-03-10 ENCOUNTER — HOSPITAL ENCOUNTER (OUTPATIENT)
Dept: ULTRASOUND IMAGING | Facility: HOSPITAL | Age: 75
Discharge: HOME | End: 2025-03-10
Payer: MEDICARE

## 2025-03-10 DIAGNOSIS — I48.0 PAF (PAROXYSMAL ATRIAL FIBRILLATION): Primary | ICD-10-CM

## 2025-03-10 PROCEDURE — 93926 LOWER EXTREMITY STUDY: CPT

## 2025-03-10 PROCEDURE — 93926 LOWER EXTREMITY STUDY: CPT | Performed by: STUDENT IN AN ORGANIZED HEALTH CARE EDUCATION/TRAINING PROGRAM

## 2025-03-10 NOTE — PROGRESS NOTES
[facility] to site check of his right groin following a VT ablation 2/27/25. Right groin shows ecchymosis and minor swelling. Patient states that the color is lightening up. Upon palpation there is a hematoma that is about 3mm in diameter. Arterial US of the RLE performed and preliminary results showed no arterial abnormality with normal arterial flow through distal SFA. Advised patient to continue to watch site and reach out to us with any concerns. No restrictions from EP standpoint.     US Arterial Doppler Lower Extremity Right (03/10/2025 10:20)

## 2025-03-10 NOTE — NURSING NOTE
Pt arrived to [facility] for ablation site check to right groin. RAMIRO Cantu assessed site. Ultrasound ordered to be performed at bedside. Will update as needed

## 2025-03-10 NOTE — TELEPHONE ENCOUNTER
Patient called stating he continues to have increased bruising to his groin since his ablation on 2/27/25. He would like to be seen to make sure there are no reason for concerns. I spoke with RAMIRO Bush and instructed the patient to come in the [facility] for a site check today. Verbalized understanding.    Salina hAmadi RN

## 2025-03-17 ENCOUNTER — HOSPITAL ENCOUNTER (OUTPATIENT)
Dept: CARDIOLOGY | Facility: HOSPITAL | Age: 75
Discharge: HOME | End: 2025-03-17
Admitting: NURSE PRACTITIONER
Payer: MEDICARE

## 2025-03-17 VITALS
DIASTOLIC BLOOD PRESSURE: 72 MMHG | WEIGHT: 196.6 LBS | BODY MASS INDEX: 29.03 KG/M2 | OXYGEN SATURATION: 97 % | HEART RATE: 61 BPM | SYSTOLIC BLOOD PRESSURE: 151 MMHG

## 2025-03-17 DIAGNOSIS — Z95.810 ICD (IMPLANTABLE CARDIOVERTER-DEFIBRILLATOR) IN PLACE: ICD-10-CM

## 2025-03-17 DIAGNOSIS — I50.22 CHRONIC SYSTOLIC CONGESTIVE HEART FAILURE: Primary | ICD-10-CM

## 2025-03-17 DIAGNOSIS — I47.20 VENTRICULAR TACHYCARDIA, SUSTAINED: ICD-10-CM

## 2025-03-17 DIAGNOSIS — I25.5 ISCHEMIC CARDIOMYOPATHY: ICD-10-CM

## 2025-03-17 LAB
ABSOLUTE LUNG FLUID CONTENT: 35 % (ref 20–35)
BUN BLDA-MCNC: 11 MG/DL (ref 8–26)
CA-I BLDA-SCNC: 1.2 MMOL/L (ref 1.2–1.23)
CHLORIDE BLD-SCNC: 107 MMOL/L (ref 64–141)
CO2 BLD-SCNC: 27.3 MMOL/L (ref 22–29)
CREAT BLDA-MCNC: 1.4 MG/DL (ref 0.5–1.4)
GLUCOSE BLDC GLUCOMTR-MCNC: 184 MG/DL (ref 70–100)
POTASSIUM BLDA-SCNC: 4.1 MMOL/L (ref 3.5–5.3)
SODIUM BLD-SCNC: 142 MMOL/L (ref 135–145)

## 2025-03-17 PROCEDURE — 94726 PLETHYSMOGRAPHY LUNG VOLUMES: CPT | Performed by: NURSE PRACTITIONER

## 2025-03-17 NOTE — PROGRESS NOTES
Heart Failure Clinic    Date:  03/17/25     Vitals:    03/17/25 0853   BP: 151/72   Pulse: 61   SpO2: 97%        Indication:  Heart Failure    Procedure:  ReDS device sensor unit applied to right side of chest and right side of back.  Appropriate positioning confirmed based off of the unit's calculation.  Chest measurement obtained with the chest size ruler.  Measurement session performed over 45 seconds.      Results:  ReDS Value=35    Interpretation:  25-35% - normal/ideal lung fluid content    Stephy Roberts RN 03/17/25 09:10 CDT

## 2025-03-17 NOTE — PROGRESS NOTES
Heart Failure Clinic Progress Note  Reason For Visit:  CHF    Subjective    Vj Garcia is a 75 y.o. male with the below pertinent PMH who presents for follow-up of CHF.    Vj Garcia was most recently seen in the heart failure clinic on 2/17/2025.  At that time he was feeling well without any symptoms of decompensated heart failure.  He was taking 2 occasions appropriately and his weight was up at 3 pounds in addition to his ReDs reading being slightly elevated.  At that time we did have GDMT titration discussion and he remained hesitant to SGLT2 inhibitors.  Creatinine remained a little elevated at that time and no medication changes were made.    He did undergo ventricular tachycardia ablation on 2/27/2025 that overall went well.  However, he was having some issues with ecchymosis regarding his puncture site and did have to come in for reevaluation.  Ultrasound at that time revealed no significant issues.    He indicates that he feels very well today.  He denies any shortness of breath, lightheadedness, dizziness, chest pain, or peripheral edema.  He feels that he is recovering very well from his ablation without any new concerns.  He continues to hope that we are going to discontinue some of his medications.  Otherwise, he has no new complaints or questions today.  His weight today is stable at 196 and his ReDs reading today is 35%.    Review of Systems   Constitutional: Negative for malaise/fatigue.   Cardiovascular:  Negative for chest pain, dyspnea on exertion and leg swelling.   Neurological:  Negative for dizziness and light-headedness.     Pertinent PMH  HFrEF  Ischemic cardiomyopathy  CAD  Status post CABG and mitral valve repair  Ventricular tachycardia  COPD  Chronic kidney disease    Pertinent past medical, surgical, family, and social history were reviewed.    Current Outpatient Medications   Medication Instructions    amiodarone (PACERONE) 200 mg, Oral, Daily    apixaban (ELIQUIS) 5 mg,  "2 Times Daily    aspirin 81 mg, Oral, Daily    atorvastatin (LIPITOR) 40 mg, Oral, Daily    carvedilol (COREG) 6.25 mg, Oral, Every 12 Hours Scheduled    ipratropium (Atrovent HFA) 17 MCG/ACT inhaler 2 puffs, Inhalation, 4 Times Daily PRN    multivitamin (MULTI VITAMIN DAILY PO) 1 tablet, Daily    sacubitril-valsartan (ENTRESTO) 24-26 MG tablet 1 tablet, Oral, 2 Times Daily    spironolactone (ALDACTONE) 25 mg, Oral, Daily    traZODone (DESYREL) 50 mg, Nightly PRN        Objective   Vital Signs:  /72 (BP Location: Left arm, Patient Position: Sitting)   Pulse 61   Wt 89.2 kg (196 lb 9.6 oz)   SpO2 97%   BMI 29.03 kg/m²   Estimated body mass index is 29.03 kg/m² as calculated from the following:    Height as of 2/27/25: 175.3 cm (69\").    Weight as of this encounter: 89.2 kg (196 lb 9.6 oz).    Neck:      Vascular: JVD normal.   Pulmonary:      Effort: Pulmonary effort is normal.      Breath sounds: Normal breath sounds.   Cardiovascular:      Normal rate. Regular rhythm. Normal S1. Normal S2.       Murmurs: There is no murmur.      No gallop.  No click. No rub.   Pulses:     Intact distal pulses.   Edema:     Peripheral edema absent.   Abdominal:      Palpations: Abdomen is soft.      Tenderness: There is no abdominal tenderness.   Skin:     General: Skin is warm and dry.   Neurological:      Mental Status: Alert and oriented to person, place and time.        The following data was reviewed by myself, RAMIRO Rajput  [facility]   BMP          1/22/2025    08:24 2/17/2025    08:19 2/27/2025    07:58   BMP   BUN 20  17  19    Creatinine 1.44  1.51  1.50    Sodium 136  140  140    Potassium 4.3  4.6  4.5    Chloride 102  104  104    CO2 25.0  27.0  25.0    Calcium 8.9  9.0  8.9       Latest Reference Range & Units 03/17/25 09:33   BUN 8 - 26 mg/dL 11   Chloride 64 - 141 mmol/L 107   Creatinine 0.5 - 1.4 mg/dL 1.4   CTCO2 (Arterial) 22.0 - 29.0 mmol/L 27.3   Glucose 70 - 100 mg/dL 184 !   Ionized Calcium 1.20 - " 1.23 mmol/L 1.20   POC Potassium 3.5 - 5.3 mmol/L 4.1   Sodium 135 - 145 mmol/L 142   !: Data is abnormal      ReDS   Lab Results   Component Value Date    ABSOLUTELUNG 35 03/17/2025    ABSOLUTELUNG 36 (A) 02/17/2025    ABSOLUTELUNG 35 01/22/2025       Results for orders placed during the hospital encounter of 01/02/25    Adult Transthoracic Echo Complete w/ Color, Spectral and Contrast if Necessary Per Protocol    Interpretation Summary    Left ventricular systolic function is moderately reduced with estimated ejection fraction 30-40% and calculated (biplane) ejection fraction 34.2%    The left ventricular cavity is mildly dilated.    Left ventricular wall thickness is consistent with moderate eccentric hypertrophy.    Normal right ventricular cavity size and systolic function.    The left atrial cavity is mildly dilated.    There is no significant (more than mild) valve stenosis or regurgitation.       Assessment   Assessment and Plan  Diagnoses and all orders for this visit:    1. Chronic systolic congestive heart failure (Primary)  2. Ischemic cardiomyopathy  3. Ventricular tachycardia, sustained  4. ICD (implantable cardioverter-defibrillator) in place  He does continue to remain stable without any new concerns today.  There are no signs of decompensation or volume overload.  However, I do believe we have quite a bit of room for titration of his GDMT.  However, he continues to remain hesitant to SGLT2 inhibitors secondary to side effects in addition to increased doses of his medications.  We did have a lengthy discussion, again, about the importance of GDMT titration and longevity with regards to stabilization of his heart failure with increased doses of GDMT is a goal.  However, he continues to defer any further medication changes today.  His blood pressure is elevated today but his home blood pressure logs reveal very consistently manage blood pressures with systolic blood of 110-120s.  Denies any anginal  symptoms at this point.  - Carvedilol 6.25 mg twice daily  - Entresto 24-26 mg twice daily  - Spironolactone 25 mg daily.  -Discussed with him the importance of SGLT2 inhibitors but he remains hesitant as above.  - Amiodarone 200 mg twice daily  - Aspirin 81 mg daily  - Lipitor 40 mg daily       Follow Up:  Return in about 8 weeks (around 5/12/2025) for recheck.    Patient was given instructions and counseling regarding his condition or for health maintenance advice.      Derek Baig, RAMIRO  03/17/25  10:16 CDT

## 2025-04-10 ENCOUNTER — OFFICE VISIT (OUTPATIENT)
Dept: CARDIOLOGY | Facility: CLINIC | Age: 75
End: 2025-04-10
Payer: MEDICARE

## 2025-04-10 VITALS
HEART RATE: 58 BPM | HEIGHT: 69 IN | WEIGHT: 199 LBS | BODY MASS INDEX: 29.47 KG/M2 | DIASTOLIC BLOOD PRESSURE: 68 MMHG | SYSTOLIC BLOOD PRESSURE: 119 MMHG

## 2025-04-10 DIAGNOSIS — Z95.810 ICD (IMPLANTABLE CARDIOVERTER-DEFIBRILLATOR) IN PLACE: ICD-10-CM

## 2025-04-10 DIAGNOSIS — I47.20 VENTRICULAR TACHYCARDIA, SUSTAINED: Primary | ICD-10-CM

## 2025-04-10 DIAGNOSIS — I25.5 ISCHEMIC CARDIOMYOPATHY: ICD-10-CM

## 2025-04-10 NOTE — PROGRESS NOTES
[facility] Electrophysiology   Reason For Visit:  Ventricular tachycardia     Subjective        EP Problems:  1.  Ventricular tachycardia  -2/27/25: RF ablation with successful VT termination  2.   Atrial flutter  3.  Left atrial appendage exclusion with atrial clip  4.  Presence of a cardiac ICD (Biotronik)     Cardiology Problems:  1.  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  Mitral valve repair  3.  CAD status post CABG  4.  Hypertension  5.  Chronic systolic heart failure  6.  Hyperlipidemia     Medical Problems:  1.  COPD  2.  BPH      Vj Garcia is a 75 y.o. male with above pertinent PMH who presents for follow-up of VT ablation.    Patient underwent VT ablation on 2/27/2025.  Initially had some groin oozing that was evaluated on 3/10.  Arterial ultrasound did not show any significant abnormalities.    Overall the patient is doing pretty well today.  His groin site is healed from his VT ablation.  He has not noticed any significant recurrences of near syncope or syncope.  He tells me that he is dizzy with position changes.  He believes he is having some photosensitivity to amiodarone.  He tells me anytime he is out in the sun, even for brief periods, that the top of his head becomes very hot.  He is interested in stopping amiodarone because of this.      ROS: Pertinent findings as noted above        Pertinent past medical, surgical, family, and social history were reviewed.      Current Outpatient Medications:     amiodarone (PACERONE) 200 MG tablet, Take 1 tablet by mouth Daily., Disp: 90 tablet, Rfl: 3    apixaban (ELIQUIS) 5 MG tablet tablet, Take 1 tablet by mouth 2 (Two) Times a Day., Disp: , Rfl:     aspirin 81 MG EC tablet, Take 1 tablet by mouth Daily., Disp: 90 tablet, Rfl: 3    atorvastatin (LIPITOR) 40 MG tablet, Take 1 tablet by mouth Daily., Disp: 30 tablet, Rfl: 11    carvedilol (COREG) 6.25 MG tablet, Take 1 tablet by mouth Every 12 (Twelve)  "Hours., Disp: 60 tablet, Rfl: 11    multivitamin (MULTI VITAMIN DAILY PO), Take 1 tablet by mouth Daily., Disp: , Rfl:     sacubitril-valsartan (ENTRESTO) 24-26 MG tablet, Take 1 tablet by mouth 2 (Two) Times a Day., Disp: 180 tablet, Rfl: 3    spironolactone (ALDACTONE) 25 MG tablet, Take 1 tablet by mouth Daily., Disp: 90 tablet, Rfl: 3    traZODone (DESYREL) 50 MG tablet, Take 1 tablet by mouth At Night As Needed for Sleep., Disp: , Rfl:      Objective   Vital Signs:  /68   Pulse 58   Ht 175.3 cm (69\")   Wt 90.3 kg (199 lb)   BMI 29.39 kg/m²   Estimated body mass index is 29.39 kg/m² as calculated from the following:    Height as of this encounter: 175.3 cm (69\").    Weight as of this encounter: 90.3 kg (199 lb).      Constitutional:       Appearance: Healthy appearance. Not in distress.   Pulmonary:      Effort: Pulmonary effort is normal.      Breath sounds: Normal breath sounds and air entry.   Cardiovascular:      PMI at left midclavicular line. Normal rate. Regular rhythm. Normal S1. Normal S2.       Murmurs: There is no murmur.      No gallop.  No click. No rub.   Pulses:     Intact distal pulses.   Edema:     Peripheral edema absent.   Neurological:      Mental Status: Alert and oriented to person, place and time.        Result Review :  The following data was reviewed by: RAMIRO Iverson on 04/10/2025:  CMP   CMP          1/22/2025    08:24 2/17/2025    08:19 2/27/2025    07:58   CMP   Glucose 141  106  118    BUN 20  17  19    Creatinine 1.44  1.51  1.50    EGFR 51.6  48.8  48.9    Sodium 136  140  140    Potassium 4.3  4.6  4.5    Chloride 102  104  104    Calcium 8.9  9.0  8.9    BUN/Creatinine Ratio 13.9  11.3  12.7    Anion Gap 9.0  9.0  11.0      CBC   CBC          1/5/2025    01:14 1/8/2025    08:40 2/27/2025    07:58   CBC   WBC 15.13  9.41  6.99    RBC 3.47  3.76  4.07    Hemoglobin 10.2  11.3  12.3    Hematocrit 32.5  35.4  36.9    MCV 93.7  94.1  90.7    MCH 29.4  30.1  30.2  "   [facility] 31.4  31.9  33.3    RDW 13.9  13.6  13.4    Platelets 185  222  173           ECG 12 Lead    Date/Time: 4/10/2025 2:10 PM  Performed by: Floyd Cooper APRN    Authorized by: Floyd Cooper APRN  Comparison: compared with previous ECG from 2/27/2025  Similar to previous ECG  Comparison to previous ECG: Sinus bradycardia  Rhythm: sinus bradycardia  Rate: bradycardic  Q waves: II and III    QRS axis: normal    Clinical impression: abnormal EKG            Assessment and Plan   Diagnoses and all orders for this visit:    1. Ventricular tachycardia, sustained (Primary)  2. ICD (implantable cardioverter-defibrillator) in place  3. Ischemic cardiomyopathy  -Patient in sinus rhythm on EKG today  - Continue to follow with heart failure clinic for management of underlying GDMT  - Patient is interested in coming off amiodarone.  We discussed risk benefits of doing this.  Will discuss with Dr. Contreras more now that he has had the VT ablation.  - Continue Eliquis 5 mg twice daily  - Follow-up in the EP clinic in 6 months at times of his pacemaker interrogation      Addendum: Communicated with Dr. Contreras.  He believes it is okay to stop amiodarone at this point.  I called spoke with patient.  Will stop amiodarone on 4/14/2025           I spent 2 minutes on the separately reported service of EKG interpretation. This time is not included in the time used to support the E/M service also reported today.      Follow Up   Return in about 6 months (around 10/14/2025).  Patient was given instructions and counseling regarding his condition or for health maintenance advice. Please see specific information pulled into the AVS if appropriate.       Part of this note may be an electronic transcription/translation of spoken language to printed text using the Dragon Dictation System.

## 2025-04-15 ENCOUNTER — OFFICE VISIT (OUTPATIENT)
Dept: INTERNAL MEDICINE | Facility: CLINIC | Age: 75
End: 2025-04-15
Payer: MEDICARE

## 2025-04-15 VITALS
BODY MASS INDEX: 29.62 KG/M2 | HEART RATE: 54 BPM | DIASTOLIC BLOOD PRESSURE: 68 MMHG | SYSTOLIC BLOOD PRESSURE: 134 MMHG | HEIGHT: 69 IN | OXYGEN SATURATION: 98 % | WEIGHT: 200 LBS | TEMPERATURE: 97.3 F

## 2025-04-15 DIAGNOSIS — I10 ESSENTIAL HYPERTENSION: ICD-10-CM

## 2025-04-15 DIAGNOSIS — Z95.1 STATUS POST TWO VESSEL CORONARY ARTERY BYPASS: ICD-10-CM

## 2025-04-15 DIAGNOSIS — I25.10 CORONARY ARTERY DISEASE INVOLVING NATIVE CORONARY ARTERY OF NATIVE HEART WITHOUT ANGINA PECTORIS: Primary | ICD-10-CM

## 2025-04-15 DIAGNOSIS — I48.0 PAF (PAROXYSMAL ATRIAL FIBRILLATION): ICD-10-CM

## 2025-04-15 DIAGNOSIS — I25.5 ISCHEMIC CARDIOMYOPATHY: ICD-10-CM

## 2025-04-15 DIAGNOSIS — N18.31 STAGE 3A CHRONIC KIDNEY DISEASE: ICD-10-CM

## 2025-04-15 NOTE — PROGRESS NOTES
"    Chief Complaint  Follow-up (6 month follow up. Annual due 10/15/2025)    Subjective        Vj Garcia presents to [facility]  History of Present Illness  See below.     Objective   Vital Signs:  /68   Pulse 54   Temp 97.3 °F (36.3 °C) (Temporal)   Ht 175.3 cm (69\")   Wt 90.7 kg (200 lb)   SpO2 98%   BMI 29.53 kg/m²   Estimated body mass index is 29.53 kg/m² as calculated from the following:    Height as of this encounter: 175.3 cm (69\").    Weight as of this encounter: 90.7 kg (200 lb).         Physical Exam  HENT:      Head: Normocephalic and atraumatic.   Eyes:      Conjunctiva/sclera: Conjunctivae normal.      Pupils: Pupils are equal, round, and reactive to light.   Cardiovascular:      Rate and Rhythm: Normal rate and regular rhythm.      Heart sounds: Normal heart sounds.      Comments: Looks euvolemic.  Pulmonary:      Effort: Pulmonary effort is normal. No respiratory distress.      Breath sounds: Normal breath sounds.   Musculoskeletal:         General: No swelling.      Comments: Left infraclavicular PPM site is now free of swelling or hematoma.   Skin:     General: Skin is warm and dry.      Findings: No rash.   Neurological:      General: No focal deficit present.      Mental Status: He is alert and oriented to person, place, and time.   Psychiatric:         Mood and Affect: Mood normal.         Behavior: Behavior normal.         Thought Content: Thought content normal.         Judgment: Judgment normal.        Result Review :           Assessment and Plan   Diagnoses and all orders for this visit:    1. Coronary artery disease involving native coronary artery of native heart without angina pectoris (Primary)    2. Status post two vessel coronary artery bypass, Posterior Basal LV Aneurysm Repair, MV repair  7/23/2024    3. Ischemic cardiomyopathy    4. PAF (paroxysmal atrial fibrillation)    5. Essential hypertension    6. Stage 3a chronic kidney " disease       Presents today for follow-up.    He states that he has been feeling very well recently.  He has been mowing, working outside, doing things on the tractor.    He saw the CHF clinic on 3/17.  No changes were made.  It was recommended again that he start an SGLT2 medication such as Farxiga or Jardiance, but he continues to decline for now.    He just saw EP on 4/10.  EKG confirmed sinus.  It was recommended for amiodarone to be stopped.  He was very happy to get to stop this medication.  He is felt to be maintaining sinus rhythm by auscultation and palpation in our office today.    Blood pressure 134/68.    Creatinine was stable at the end of February.    Plan to have him back in October for his AWV.  He knows that he can reach out sooner if problems.  We will repeat all fasting labs when he returns in October as well.      Follow Up   Return in about 6 months (around 10/15/2025) for Annual Medicare Wellness Visit.  Patient was given instructions and counseling regarding his condition or for health maintenance advice. Please see specific information pulled into the AVS if appropriate.      KAY Poon DO       Electronically signed by SILKE Poon DO, 04/15/25, 10:21 AM CDT.

## 2025-05-12 NOTE — OUTREACH NOTE
Prep Survey      Flowsheet Row Responses   [facility] patient discharged from? [place]   Is LACE score < 7 ? Yes   Eligibility [facility]   Date of Admission 08/03/24   Date of Discharge 08/04/24   Discharge Disposition Home or Self Care   Discharge diagnosis Elevated troponin, Had CABG on 7/23/24   Does the patient have one of the following disease processes/diagnoses(primary or secondary)? Other   Does the patient have Home health ordered? No   Is there a DME ordered? No   Comments regarding appointments new PCP appt   Prep survey completed? Yes            Opal QUINTERO - Registered Nurse           The potassium is good at 4.6.  Creatinine is stable at 1.45 and blood sugar normal at 89.  Thyroid labs are also normal.  Liver enzymes are good.  Hemoglobin was stable at 12.4.  Let me know how the patient is feeling and if he has had any more episodes of significant fatigue or weakness

## 2025-06-06 RX ORDER — TRAZODONE HYDROCHLORIDE 50 MG/1
50 TABLET ORAL NIGHTLY PRN
Qty: 90 TABLET | Refills: 0 | Status: SHIPPED | OUTPATIENT
Start: 2025-06-06

## 2025-06-12 ENCOUNTER — HOSPITAL ENCOUNTER (OUTPATIENT)
Dept: CARDIOLOGY | Facility: HOSPITAL | Age: 75
Discharge: HOME | End: 2025-06-12
Admitting: NURSE PRACTITIONER
Payer: MEDICARE

## 2025-06-12 VITALS
HEART RATE: 61 BPM | BODY MASS INDEX: 29.12 KG/M2 | SYSTOLIC BLOOD PRESSURE: 122 MMHG | DIASTOLIC BLOOD PRESSURE: 62 MMHG | OXYGEN SATURATION: 97 % | WEIGHT: 197.2 LBS

## 2025-06-12 DIAGNOSIS — I50.22 CHRONIC SYSTOLIC CONGESTIVE HEART FAILURE: Primary | ICD-10-CM

## 2025-06-12 LAB
ABSOLUTE LUNG FLUID CONTENT: 33 % (ref 20–35)
ANION GAP SERPL CALCULATED.3IONS-SCNC: 10 MMOL/L (ref 5–15)
BUN SERPL-MCNC: 17.1 MG/DL (ref 8–23)
BUN/CREAT SERPL: 10.4 (ref 7–25)
CALCIUM SPEC-SCNC: 8.6 MG/DL (ref 8.6–10.5)
CHLORIDE SERPL-SCNC: 104 MMOL/L (ref 98–107)
CO2 SERPL-SCNC: 24 MMOL/L (ref 22–29)
CREAT SERPL-MCNC: 1.64 MG/DL (ref 0.76–1.27)
EGFRCR SERPLBLD CKD-EPI 2021: 43.9 ML/MIN/1.73
GLUCOSE SERPL-MCNC: 134 MG/DL (ref 65–99)
POTASSIUM SERPL-SCNC: 4.4 MMOL/L (ref 3.5–5.2)
SODIUM SERPL-SCNC: 138 MMOL/L (ref 136–145)

## 2025-06-12 PROCEDURE — 80048 BASIC METABOLIC PNL TOTAL CA: CPT | Performed by: NURSE PRACTITIONER

## 2025-06-12 PROCEDURE — 94726 PLETHYSMOGRAPHY LUNG VOLUMES: CPT | Performed by: NURSE PRACTITIONER

## 2025-06-12 RX ORDER — SACUBITRIL AND VALSARTAN 49; 51 MG/1; MG/1
1 TABLET, FILM COATED ORAL 2 TIMES DAILY
Qty: 60 TABLET | Refills: 11 | Status: SHIPPED | OUTPATIENT
Start: 2025-06-12

## 2025-06-12 NOTE — PROGRESS NOTES
Heart Failure Clinic    Date:  06/12/25     Vitals:    06/12/25 0841   BP: 122/62   Pulse: 61   SpO2: 97%        Indication:  Heart Failure    Procedure:  ReDS device sensor unit applied to right side of chest and right side of back.  Appropriate positioning confirmed based off of the unit's calculation.  Chest measurement obtained with the chest size ruler.  Measurement session performed over 45 seconds.      Results:  ReDS Value=33    Interpretation:  25-35% - normal/ideal lung fluid content    Stephy Roberts RN 06/12/25 08:51 CDT

## 2025-06-12 NOTE — PROGRESS NOTES
"    Heart Failure Clinic Progress Note  Reason For Visit:  CHF    Subjective    Vj Garcia is a 75 y.o. male with the below pertinent PMH who presents for follow-up of CHF.    Vj Garcia was most recently seen in the heart failure clinic on 3/17/25. At that time he was feeling well. He denied dizziness, shortness of breath, lightheadedness, chest pain, or peripheral edema.  At that time he had recently had cardiac ablation on 2/27/2025 performed by Dr. Contreras for ventricular tachycardia.  His labs and ReDs reading were stable at that appointment.  At that appointment he also expressed interest in reducing his medications and remained hesitant about beginning SGLT2 inhibitors.    At this time Mr. Garcia reports that he is feeling very well.  He denies dizziness, shortness of breath, lightheadedness, chest pain, or edema.  On exam he appears euvolemic.  He reports that he does occasionally have dizziness upon standing up too quickly, but this is not a new finding for him.   At this appointment his weight is stable at 197 pounds, blood pressure 122/64, heart rate 61, and a ReDs reading 33.  His creatinine was slightly elevated on today's labs.    Review of Systems   Constitutional: Positive for decreased appetite (Reported that food \"does not taste good anymore\"). Negative for malaise/fatigue and weight gain.   Cardiovascular:  Negative for chest pain, dyspnea on exertion, leg swelling, near-syncope, orthopnea and paroxysmal nocturnal dyspnea.   Respiratory:  Negative for cough and shortness of breath.    Musculoskeletal:  Negative for falls, muscle weakness and myalgias.   Gastrointestinal:  Negative for bloating, anorexia, nausea and vomiting.   Neurological:  Negative for dizziness, light-headedness, loss of balance and weakness.     Pertinent PMH  HFrEF  Ischemic cardiomyopathy  CAD  Status post CABG and mitral valve repair  Ventricular tachycardia  COPD  Chronic kidney disease    Pertinent past medical, " "surgical, family, and social history were reviewed.    Current Outpatient Medications   Medication Instructions    apixaban (ELIQUIS) 5 mg, 2 Times Daily    aspirin 81 mg, Oral, Daily    atorvastatin (LIPITOR) 40 mg, Oral, Daily    carvedilol (COREG) 6.25 mg, Oral, Every 12 Hours Scheduled    multivitamin (MULTI VITAMIN DAILY PO) 1 tablet, Daily    sacubitril-valsartan (Entresto) 49-51 MG tablet 1 tablet, Oral, 2 Times Daily    spironolactone (ALDACTONE) 25 mg, Oral, Daily        Objective   Vital Signs:  /62 (BP Location: Left arm, Patient Position: Sitting)   Pulse 61   Wt 89.4 kg (197 lb 3.2 oz)   SpO2 97%   BMI 29.12 kg/m²   Estimated body mass index is 29.12 kg/m² as calculated from the following:    Height as of 4/15/25: 175.3 cm (69\").    Weight as of this encounter: 89.4 kg (197 lb 3.2 oz).    Vitals reviewed.   Constitutional:       Appearance: Normal and healthy appearance. Not in distress.   HENT:      Head: Normocephalic.   Neck:      Vascular: JVD normal.   Pulmonary:      Effort: Pulmonary effort is normal.      Breath sounds: Normal breath sounds.   Cardiovascular:      PMI at left midclavicular line. Normal rate. Regular rhythm. Normal S1. Normal S2.    Pulses:     Intact distal pulses.   Edema:     Peripheral edema absent.   Skin:     General: Skin is warm and dry.   Neurological:      General: No focal deficit present.      Mental Status: Alert and oriented to person, place and time.      Coordination: Coordination is intact.      Gait: Gait is intact.   Psychiatric:         Attention and Perception: Attention normal.         Mood and Affect: Mood normal.         Behavior: Behavior is cooperative.         Thought Content: Thought content normal.        The following data was reviewed by myself, RAMIRO Finley  BMP   BMP          2/17/2025    08:19 2/27/2025    07:58 6/12/2025    08:49   BMP   BUN 17  19  17.1    Creatinine 1.51  1.50  1.64    Sodium 140  140  138    Potassium 4.6  " 4.5  4.4    Chloride 104  104  104    CO2 27.0  25.0  24.0    Calcium 9.0  8.9  8.6        ReDS   Lab Results   Component Value Date    ABSOLUTELUNG 33 06/12/2025    ABSOLUTELUNG 35 03/17/2025    ABSOLUTELUNG 36 (A) 02/17/2025       Results for orders placed during the hospital encounter of 01/02/25    Adult Transthoracic Echo Complete w/ Color, Spectral and Contrast if Necessary Per Protocol    Interpretation Summary    Left ventricular systolic function is moderately reduced with estimated ejection fraction 30-40% and calculated (biplane) ejection fraction 34.2%    The left ventricular cavity is mildly dilated.    Left ventricular wall thickness is consistent with moderate eccentric hypertrophy.    Normal right ventricular cavity size and systolic function.    The left atrial cavity is mildly dilated.    There is no significant (more than mild) valve stenosis or regurgitation.    Assessment and Plan    1. Chronic systolic congestive heart failure (Primary)  2. Ischemic cardiomyopathy  3. Ventricular tachycardia, ablation on 2/27/25  4. ICD (implantable cardioverter-defibrillator) in place    He continues to remain stable at this time, with no new concerns.  He has no signs of decompensation or volume overload at this visit.  We again discussed optimization of his GDMT. Explanation was given about the benefits for his heart failure by doing so. He stated that he does not wish to start any new medications at this time, but he is agreeable to increasing the dose of his Entresto to the next dose up at this time.  He was advised to report any problems with this increased dose or to call with any questions that he might have.    His blood pressure is stable at this time and it is felt that he will tolerate this well.  He does keep consistent blood pressure logs at home, and was encouraged to continue to do so and to report any changes, or any symptoms that are bothersome to him.  He denies any symptoms of angina at this  time. He was encouraged to maintain adequate water intake, as he expressed that he has not been doing so.     - Increase to Entresto 49/51 mg twice daily - report any dizziness, lightheadedness or other concerns.   - Continue carvedilol 6.25 mg twice daily  - Continue spironolactone 25 mg daily  - Continue Eliquis 5 mg twice daily  - Continue aspirin 81 mg daily  - Continue Lipitor 40 mg daily      Follow Up:  Return in about 4 months (around 10/12/2025).    Patient was given instructions and counseling regarding his condition or for health maintenance advice.  Monique Bird, RAMIRO  06/12/25  10:16 CDT

## 2025-07-07 LAB
MC_CV_MDC_IDC_RATE_1: 100
MC_CV_MDC_IDC_RATE_1: 150
MC_CV_MDC_IDC_RATE_1: 200
MC_CV_MDC_IDC_RATE_1: 200
MC_CV_MDC_IDC_SHOCK_MEASURED_IMPEDANCE: 68
MC_CV_MDC_IDC_THERAPIES: NORMAL
MC_CV_MDC_IDC_THERAPIES: NORMAL
MC_CV_MDC_IDC_ZONE_ID: 10
MC_CV_MDC_IDC_ZONE_ID: 7
MC_CV_MDC_IDC_ZONE_ID: 8
MC_CV_MDC_IDC_ZONE_ID: 9
MDC_IDC_MSMT_BATTERY_REMAINING_PERCENTAGE: 100 %
MDC_IDC_MSMT_BATTERY_RRT_TRIGGER: 2.85
MDC_IDC_MSMT_BATTERY_STATUS: NORMAL
MDC_IDC_MSMT_BATTERY_VOLTAGE: 3.11
MDC_IDC_MSMT_CAP_CHARGE_TIME: 9
MDC_IDC_MSMT_LEADCHNL_RA_DTM: NORMAL
MDC_IDC_MSMT_LEADCHNL_RA_SENSING_INTR_AMPL: 9.7
MDC_IDC_MSMT_LEADCHNL_RV_DTM: NORMAL
MDC_IDC_MSMT_LEADCHNL_RV_IMPEDANCE_VALUE: 522
MDC_IDC_MSMT_LEADCHNL_RV_PACING_THRESHOLD_AMPLITUDE: 0.9
MDC_IDC_MSMT_LEADCHNL_RV_PACING_THRESHOLD_POLARITY: NORMAL
MDC_IDC_MSMT_LEADCHNL_RV_PACING_THRESHOLD_PULSEWIDTH: 0.4
MDC_IDC_MSMT_LEADCHNL_RV_SENSING_INTR_AMPL: 20
MDC_IDC_PG_IMPLANT_DTM: NORMAL
MDC_IDC_PG_MFG: NORMAL
MDC_IDC_PG_MODEL: NORMAL
MDC_IDC_PG_SERIAL: NORMAL
MDC_IDC_PG_TYPE: NORMAL
MDC_IDC_SESS_DTM: NORMAL
MDC_IDC_SESS_TYPE: NORMAL
MDC_IDC_SET_BRADY_LOWRATE: 40
MDC_IDC_SET_BRADY_MODE: NORMAL
MDC_IDC_SET_LEADCHNL_RA_SENSING_POLARITY: NORMAL
MDC_IDC_SET_LEADCHNL_RA_SENSING_SENSITIVITY: 1
MDC_IDC_SET_LEADCHNL_RV_PACING_AMPLITUDE: 1.9
MDC_IDC_SET_LEADCHNL_RV_PACING_POLARITY: NORMAL
MDC_IDC_SET_LEADCHNL_RV_PACING_PULSEWIDTH: 0.4
MDC_IDC_SET_LEADCHNL_RV_SENSING_POLARITY: NORMAL
MDC_IDC_SET_LEADCHNL_RV_SENSING_SENSITIVITY: 0.8
MDC_IDC_SET_ZONE_STATUS: NORMAL
MDC_IDC_SET_ZONE_TYPE: NORMAL
MDC_IDC_STAT_AT_BURDEN_PERCENT: 0
MDC_IDC_STAT_BRADY_RV_PERCENT_PACED: 0
MDC_IDC_STAT_TACHYTHERAPY_ATP_DELIVERED_RECENT: 0
MDC_IDC_STAT_TACHYTHERAPY_SHOCKS_ABORTED_RECENT: 0
MDC_IDC_STAT_TACHYTHERAPY_SHOCKS_DELIVERED_RECENT: 0

## (undated) DEVICE — CATH IV ANGIOCATH FEP 14GA 1.88IN ORNG

## (undated) DEVICE — DISPOSABLE SURGICAL CABLE

## (undated) DEVICE — SCANLAN® SURG-I-PAW® INSTRUMENT COVERS - RED, 1/10" X 5"/ 3 MMX13 CM (2 - 5" PCS /PKG): Brand: SCANLAN® SURG-I-PAW® INSTRUMENT COVERS

## (undated) DEVICE — SOLIDIFIER LIQ LIQUILOC/PLUS W/TREAT 2000CC

## (undated) DEVICE — SLV CBL IVL 5X96IN

## (undated) DEVICE — PCH SURG INVISISHIELD FLD/COL W/DRN/PRT 20X6IN

## (undated) DEVICE — Device: Brand: SOUNDSTAR

## (undated) DEVICE — INTRO TEAR AWAY/LVD W/SD PRT 9F 13CM

## (undated) DEVICE — PAD, DEFIB, ADULT, RADIOTRANS, PHYSIO: Brand: MEDLINE

## (undated) DEVICE — SYS DISTNTION VEIN BONCHEK 300MMHG

## (undated) DEVICE — GLV SURG BIOGEL M LTX PF 7 1/2

## (undated) DEVICE — Device: Brand: SMARTABLATE

## (undated) DEVICE — SUT SILK 2/0 FS BLK 18IN 685G

## (undated) DEVICE — 450 ML BOTTLE OF 0.05% CHLORHEXIDINE GLUCONATE IN 99.95% STERILE WATER FOR IRRIGATION, USP AND APPLICATOR.: Brand: IRRISEPT ANTIMICROBIAL WOUND LAVAGE

## (undated) DEVICE — GUIDE SELECT ATRICLIP

## (undated) DEVICE — SYS COL WAST NAMIC IV SGL/LN FML/FIT W/VNT/SPK/HD 72IN

## (undated) DEVICE — TRAP FLD MINIVAC MEGADYNE 100ML

## (undated) DEVICE — PINNACLE INTRODUCER SHEATH: Brand: PINNACLE

## (undated) DEVICE — Device: Brand: WEBSTER CS

## (undated) DEVICE — CABL BIPOL W/ALLGTR CLIP/SM 12FT

## (undated) DEVICE — SOL NS 500ML

## (undated) DEVICE — TR BAND RADIAL ARTERY COMPRESSION DEVICE: Brand: TR BAND

## (undated) DEVICE — PK OPN HEART 30

## (undated) DEVICE — ANTIBACTERIAL UNDYED BRAIDED (POLYGLACTIN 910), SYNTHETIC ABSORBABLE SUTURE: Brand: COATED VICRYL

## (undated) DEVICE — SOL IRR NACL 0.9PCT BT 1000ML

## (undated) DEVICE — SUT PROLN 4/0 RB1 36IN 4PK M8557

## (undated) DEVICE — BI-DIRECTIONAL NAVIGATION CATHETER, NAV, D-F: Brand: QDOT MICRO

## (undated) DEVICE — STPCK 3/WY HP M/RA W/OFF/HNDL 1050PSI STRL

## (undated) DEVICE — Device

## (undated) DEVICE — SUT POLY BR TP 2STRND 1/8X30IN

## (undated) DEVICE — BAPTIST TURNOVER KIT: Brand: MEDLINE INDUSTRIES, INC.

## (undated) DEVICE — SUT SILK 2/0 SH CR8 18IN CR8 C012D

## (undated) DEVICE — STERNUM BLADE, OFFSET (32.0 X 0.8 X 6.3MM)

## (undated) DEVICE — DRAIN,WOUND,ROUND,24FR,5/16",FULL-FLUTED: Brand: MEDLINE

## (undated) DEVICE — PATIENT RETURN ELECTRODE, SINGLE-USE, CONTACT QUALITY MONITORING, ADULT, WITH 9FT CORD, FOR PATIENTS WEIGING OVER 33LBS. (15KG): Brand: MEGADYNE

## (undated) DEVICE — SUREFIT, DUAL DISPERSIVE ELECTRODE, CONTACT QUALITY MONITOR: Brand: SUREFIT

## (undated) DEVICE — RADIFOCUS OPTITORQUE ANGIOGRAPHIC CATHETER: Brand: OPTITORQUE

## (undated) DEVICE — KIT,ANTI FOG,W/SPONGE & FLUID,SOFT PACK: Brand: MEDLINE

## (undated) DEVICE — LIMB HOLDER, WRIST/ANKLE: Brand: DEROYAL

## (undated) DEVICE — SUT PROLN 3/0 SH D/A 36IN 8522H

## (undated) DEVICE — DRAPE,ANGIO,BRACH,STERILE,38X44: Brand: MEDLINE

## (undated) DEVICE — OPTRELL-36 F-J CURVE: Brand: OPTRELL MAPPING CATHETER WITH TRUEREF TECHNOLOGY

## (undated) DEVICE — SUT GUT CHRM 4/0 RB1 27IN U203H

## (undated) DEVICE — Device: Brand: REFERENCE PATCH CARTO 3

## (undated) DEVICE — GW STARTER FXD CORE J .035 3X260CM 3MM

## (undated) DEVICE — SUP ARMBRD ART/LINE BLU

## (undated) DEVICE — PK CATH CARD 30 CA/4

## (undated) DEVICE — MARKR SKIN 2TP W/RULR

## (undated) DEVICE — CATH F5 INF PIG 110CM 6SH: Brand: INFINITI

## (undated) DEVICE — PRESSURE MONITORING SET: Brand: TRUWAVE

## (undated) DEVICE — PK SET UP ANES OPN HEART 30

## (undated) DEVICE — ELECTRD BLD EZ CLN MOD XLNG 2.75IN

## (undated) DEVICE — SI AVANTI+ 10F STD W/GW: Brand: AVANTI

## (undated) DEVICE — BLD SCLPL BEAVR MINI STR 2BVL 180D LF

## (undated) DEVICE — CANN NASL ETCO2 LO/FLO A/

## (undated) DEVICE — OASIS DRAIN, SINGLE, INLINE & ATS COMPATIBLE: Brand: OASIS

## (undated) DEVICE — TB SXN SURG DLP MALL/CARD SFT/TP 6F 6IN

## (undated) DEVICE — SI AVANTI+ 7F STD W/GW  NO OBT: Brand: AVANTI

## (undated) DEVICE — SUT ETHIB 5/0 RB1 DA 36IN X556H

## (undated) DEVICE — WIPE INST 3X3IN 2MM BX/20

## (undated) DEVICE — SYS CLS VASC/VENI VASCADE MVP 6TO12F

## (undated) DEVICE — KT NDL GUIDE STRL 18GA

## (undated) DEVICE — BLAKE SILICONE DRAINS CARDIO CONNECTOR 2:1: Brand: BLAKE

## (undated) DEVICE — BLAKE CARDIO CONNECTOR 1:1: Brand: J-VAC

## (undated) DEVICE — SYR LUERLOK 20CC BX/50

## (undated) DEVICE — INTENDED FOR TISSUE SEPARATION, AND OTHER PROCEDURES THAT REQUIRE A SHARP SURGICAL BLADE TO PUNCTURE OR CUT.: Brand: BARD-PARKER ® STAINLESS STEEL BLADES

## (undated) DEVICE — 1/2 FORCE SURGICAL SPRING CLIP: Brand: STEALTH® SPRING CLIP

## (undated) DEVICE — PK PM 30

## (undated) DEVICE — GLIDESHEATH SLENDER STAINLESS STEEL KIT: Brand: GLIDESHEATH SLENDER

## (undated) DEVICE — SOLIDIFIER LIQUI LOC PLUS 2000CC

## (undated) DEVICE — ELECTRD BLD EZ CLN MOD 4IN

## (undated) DEVICE — A2000 MULTI-USE SYRINGE KIT, P/N 701277-003KIT CONTENTS: 100ML CONTRAST RESERVOIR AND TUBING WITH CONTRAST SPIKE AND CLAMP: Brand: A2000 MULTI-USE SYRINGE KIT

## (undated) DEVICE — SOL IRR NACL 0.9PCT BO 1000ML

## (undated) DEVICE — ELECTRD BLD MEGADYNE EZCLEAN STD 2.75IN XLNG

## (undated) DEVICE — KT MINI ACC MAK401

## (undated) DEVICE — ST TBG PNEUMOCLEAR EVAC SMOKE HIFLO

## (undated) DEVICE — SI AVANTI+ 8F STD W/GW  NO OBT: Brand: AVANTI

## (undated) DEVICE — ANGIO-SEAL VIP VASCULAR CLOSURE DEVICE: Brand: ANGIO-SEAL

## (undated) DEVICE — MARKER,SKIN,WI/RULER AND LABELS: Brand: MEDLINE

## (undated) DEVICE — DRP SLUSH MACH OM-ORS-320

## (undated) DEVICE — TBG PRESS/MONITR FIX M/F LL A/ 48IN STRL

## (undated) DEVICE — E-PACK PROCEDURE KIT: Brand: E-PACK

## (undated) DEVICE — STRIP,CLOSURE,WOUND,MEDI-STRIP,1/2X4: Brand: MEDLINE

## (undated) DEVICE — Device: Brand: DECANAV

## (undated) DEVICE — ROTATING SURGICAL PUNCHES, 1 PER POUCH: Brand: A&E MEDICAL / ROTATING SURGICAL PUNCHES

## (undated) DEVICE — DRSNG SURESITE123 6X8IN

## (undated) DEVICE — BG OR ZSUT SADDLE 20IN CLR STRL

## (undated) DEVICE — INTRO STEER AGILIS NXT MED/CURL 8.5F

## (undated) DEVICE — MODEL BT2000 P/N 700287-012KIT CONTENTS: MANIFOLD WITH SALINE AND CONTRAST PORTS, SALINE TUBING WITH SPIKE AND HAND SYRINGE, TRANSDUCER: Brand: BT2000 AUTOMATED MANIFOLD KIT

## (undated) DEVICE — CLTH CLENS READYCLEANSE PERI CARE PK/5

## (undated) DEVICE — DRSNG SURESITE123 4X10IN

## (undated) DEVICE — OPTIFOAM GENTLE SA, POSTOP, 4X12: Brand: MEDLINE

## (undated) DEVICE — NDL CNTR 40CT FM MAG: Brand: MEDLINE INDUSTRIES, INC.

## (undated) DEVICE — GLV SURG BIOGEL LTX PF 6 1/2

## (undated) DEVICE — DRSNG SURESITE WNDW 4X4.5

## (undated) DEVICE — SUT ETHIB 2/0 SH SH 36IN X523H

## (undated) DEVICE — Device: Brand: WEBSTER

## (undated) DEVICE — DRSNG WND SIL OPTIFOAM GNTL BRDR ADHS 1.6X2IN

## (undated) DEVICE — STOCKINETTE,DOUBLE PLY,6X54,STERILE: Brand: MEDLINE

## (undated) DEVICE — MODEL AT P65, P/N 701554-001KIT CONTENTS: HAND CONTROLLER, 3-WAY HIGH-PRESSURE STOPCOCK WITH ROTATING END AND PREMIUM HIGH-PRESSURE TUBING: Brand: ANGIOTOUCH® KIT

## (undated) DEVICE — ADHS LIQ MASTISOL 2/3ML